# Patient Record
Sex: FEMALE | Race: WHITE | Employment: OTHER | URBAN - METROPOLITAN AREA
[De-identification: names, ages, dates, MRNs, and addresses within clinical notes are randomized per-mention and may not be internally consistent; named-entity substitution may affect disease eponyms.]

---

## 2017-01-17 ENCOUNTER — TELEPHONE (OUTPATIENT)
Dept: CARDIOLOGY CLINIC | Age: 72
End: 2017-01-17

## 2017-02-23 ENCOUNTER — TELEPHONE (OUTPATIENT)
Dept: CARDIOLOGY CLINIC | Age: 72
End: 2017-02-23

## 2017-03-13 ENCOUNTER — OFFICE VISIT (OUTPATIENT)
Dept: CARDIOLOGY CLINIC | Age: 72
End: 2017-03-13

## 2017-03-13 VITALS
SYSTOLIC BLOOD PRESSURE: 134 MMHG | WEIGHT: 246.9 LBS | HEART RATE: 69 BPM | BODY MASS INDEX: 39.68 KG/M2 | OXYGEN SATURATION: 97 % | HEIGHT: 66 IN | DIASTOLIC BLOOD PRESSURE: 66 MMHG

## 2017-03-13 DIAGNOSIS — I36.1 NON-RHEUMATIC TRICUSPID VALVE INSUFFICIENCY: ICD-10-CM

## 2017-03-13 DIAGNOSIS — I48.0 PAROXYSMAL ATRIAL FIBRILLATION (HCC): ICD-10-CM

## 2017-03-13 DIAGNOSIS — I77.9 CAROTID DISEASE, BILATERAL (HCC): ICD-10-CM

## 2017-03-13 DIAGNOSIS — I25.10 ATHEROSCLEROSIS OF NATIVE CORONARY ARTERY OF NATIVE HEART WITHOUT ANGINA PECTORIS: ICD-10-CM

## 2017-03-13 DIAGNOSIS — I50.42 CHRONIC COMBINED SYSTOLIC AND DIASTOLIC CONGESTIVE HEART FAILURE (HCC): Primary | ICD-10-CM

## 2017-03-13 PROCEDURE — 99213 OFFICE O/P EST LOW 20 MIN: CPT | Performed by: INTERNAL MEDICINE

## 2017-03-13 PROCEDURE — 93000 ELECTROCARDIOGRAM COMPLETE: CPT | Performed by: INTERNAL MEDICINE

## 2017-05-17 RX ORDER — RIVAROXABAN 15 MG/1
TABLET, FILM COATED ORAL
Qty: 30 TABLET | Refills: 6 | Status: SHIPPED | OUTPATIENT
Start: 2017-05-17 | End: 2017-12-10 | Stop reason: SDUPTHER

## 2017-09-19 ENCOUNTER — OFFICE VISIT (OUTPATIENT)
Dept: CARDIOLOGY CLINIC | Age: 72
End: 2017-09-19

## 2017-09-19 VITALS
BODY MASS INDEX: 42.43 KG/M2 | HEIGHT: 66 IN | SYSTOLIC BLOOD PRESSURE: 130 MMHG | DIASTOLIC BLOOD PRESSURE: 60 MMHG | WEIGHT: 264 LBS | HEART RATE: 70 BPM

## 2017-09-19 DIAGNOSIS — I77.9 CAROTID DISEASE, BILATERAL (HCC): ICD-10-CM

## 2017-09-19 DIAGNOSIS — I25.10 ATHEROSCLEROSIS OF NATIVE CORONARY ARTERY OF NATIVE HEART WITHOUT ANGINA PECTORIS: ICD-10-CM

## 2017-09-19 DIAGNOSIS — G47.33 OSA (OBSTRUCTIVE SLEEP APNEA): ICD-10-CM

## 2017-09-19 DIAGNOSIS — N18.9 CHRONIC KIDNEY DISEASE, UNSPECIFIED STAGE: ICD-10-CM

## 2017-09-19 DIAGNOSIS — I50.42 CHRONIC COMBINED SYSTOLIC AND DIASTOLIC CONGESTIVE HEART FAILURE (HCC): Primary | ICD-10-CM

## 2017-09-19 DIAGNOSIS — I48.20 CHRONIC ATRIAL FIBRILLATION (HCC): ICD-10-CM

## 2017-09-19 DIAGNOSIS — E78.5 HYPERLIPIDEMIA, UNSPECIFIED HYPERLIPIDEMIA TYPE: ICD-10-CM

## 2017-09-19 DIAGNOSIS — I36.1 NON-RHEUMATIC TRICUSPID VALVE INSUFFICIENCY: ICD-10-CM

## 2017-09-19 PROCEDURE — 99214 OFFICE O/P EST MOD 30 MIN: CPT | Performed by: INTERNAL MEDICINE

## 2017-10-02 ENCOUNTER — TELEPHONE (OUTPATIENT)
Dept: CARDIOLOGY CLINIC | Age: 72
End: 2017-10-02

## 2017-10-02 NOTE — TELEPHONE ENCOUNTER
Pt called to see if order for lexiscan to be faxed to [facility] fax # [phone].  Please call to adv thank you

## 2017-10-02 NOTE — TELEPHONE ENCOUNTER
I spoke with patient to let her know that her order was faxed to [address] [facility],[address]. She verbalizes understanding.

## 2017-12-12 RX ORDER — RIVAROXABAN 15 MG/1
TABLET, FILM COATED ORAL
Qty: 30 TABLET | Refills: 5 | Status: SHIPPED | OUTPATIENT
Start: 2017-12-12 | End: 2018-06-02 | Stop reason: SDUPTHER

## 2018-03-07 ENCOUNTER — TELEPHONE (OUTPATIENT)
Dept: CARDIOLOGY CLINIC | Age: 73
End: 2018-03-07

## 2018-03-12 ENCOUNTER — TELEPHONE (OUTPATIENT)
Dept: CARDIOLOGY CLINIC | Age: 73
End: 2018-03-12

## 2018-03-12 DIAGNOSIS — R06.09 DYSPNEA ON EXERTION: Primary | ICD-10-CM

## 2018-04-13 ENCOUNTER — OFFICE VISIT (OUTPATIENT)
Dept: CARDIOLOGY CLINIC | Age: 73
End: 2018-04-13

## 2018-04-13 VITALS
WEIGHT: 247 LBS | HEART RATE: 74 BPM | DIASTOLIC BLOOD PRESSURE: 63 MMHG | BODY MASS INDEX: 39.7 KG/M2 | SYSTOLIC BLOOD PRESSURE: 137 MMHG | HEIGHT: 66 IN

## 2018-04-13 DIAGNOSIS — I50.9 ACUTE ON CHRONIC CONGESTIVE HEART FAILURE, UNSPECIFIED CONGESTIVE HEART FAILURE TYPE: ICD-10-CM

## 2018-04-13 DIAGNOSIS — I65.23 OCCLUSION AND STENOSIS OF BILATERAL CAROTID ARTERIES: ICD-10-CM

## 2018-04-13 DIAGNOSIS — I25.10 ATHEROSCLEROSIS OF NATIVE CORONARY ARTERY OF NATIVE HEART WITHOUT ANGINA PECTORIS: ICD-10-CM

## 2018-04-13 DIAGNOSIS — I50.43 ACUTE ON CHRONIC COMBINED SYSTOLIC AND DIASTOLIC HEART FAILURE (HCC): Primary | ICD-10-CM

## 2018-04-13 DIAGNOSIS — I77.9 CAROTID DISEASE, BILATERAL (HCC): ICD-10-CM

## 2018-04-13 DIAGNOSIS — Z95.1 HX OF CABG: Chronic | ICD-10-CM

## 2018-04-13 DIAGNOSIS — I10 ESSENTIAL HYPERTENSION: ICD-10-CM

## 2018-04-13 PROCEDURE — G8417 CALC BMI ABV UP PARAM F/U: HCPCS | Performed by: INTERNAL MEDICINE

## 2018-04-13 PROCEDURE — 99214 OFFICE O/P EST MOD 30 MIN: CPT | Performed by: INTERNAL MEDICINE

## 2018-04-13 PROCEDURE — 1036F TOBACCO NON-USER: CPT | Performed by: INTERNAL MEDICINE

## 2018-04-13 PROCEDURE — 3014F SCREEN MAMMO DOC REV: CPT | Performed by: INTERNAL MEDICINE

## 2018-04-13 PROCEDURE — 1090F PRES/ABSN URINE INCON ASSESS: CPT | Performed by: INTERNAL MEDICINE

## 2018-04-13 PROCEDURE — G8400 PT W/DXA NO RESULTS DOC: HCPCS | Performed by: INTERNAL MEDICINE

## 2018-04-13 PROCEDURE — G8598 ASA/ANTIPLAT THER USED: HCPCS | Performed by: INTERNAL MEDICINE

## 2018-04-13 PROCEDURE — 3017F COLORECTAL CA SCREEN DOC REV: CPT | Performed by: INTERNAL MEDICINE

## 2018-04-13 PROCEDURE — 4040F PNEUMOC VAC/ADMIN/RCVD: CPT | Performed by: INTERNAL MEDICINE

## 2018-04-13 PROCEDURE — 1123F ACP DISCUSS/DSCN MKR DOCD: CPT | Performed by: INTERNAL MEDICINE

## 2018-04-13 PROCEDURE — G8427 DOCREV CUR MEDS BY ELIG CLIN: HCPCS | Performed by: INTERNAL MEDICINE

## 2018-04-18 ENCOUNTER — HOSPITAL ENCOUNTER (OUTPATIENT)
Dept: VASCULAR LAB | Age: 73
Discharge: HOME | End: 2018-04-18
Attending: INTERNAL MEDICINE | Admitting: INTERNAL MEDICINE

## 2018-04-18 DIAGNOSIS — I50.42 CHRONIC COMBINED SYSTOLIC AND DIASTOLIC HEART FAILURE (HCC): ICD-10-CM

## 2018-04-18 DIAGNOSIS — I77.9 CAROTID DISEASE, BILATERAL (HCC): ICD-10-CM

## 2018-04-18 DIAGNOSIS — I65.23 OCCLUSION AND STENOSIS OF BILATERAL CAROTID ARTERIES: ICD-10-CM

## 2018-05-17 ENCOUNTER — TELEPHONE (OUTPATIENT)
Dept: CARDIOLOGY CLINIC | Age: 73
End: 2018-05-17

## 2018-06-04 RX ORDER — RIVAROXABAN 15 MG/1
TABLET, FILM COATED ORAL
Qty: 30 TABLET | Refills: 5 | Status: SHIPPED | OUTPATIENT
Start: 2018-06-04 | End: 2018-12-11 | Stop reason: SDUPTHER

## 2018-08-14 ENCOUNTER — OFFICE VISIT (OUTPATIENT)
Dept: SURGERY | Age: 73
End: 2018-08-14

## 2018-08-14 VITALS
BODY MASS INDEX: 38.25 KG/M2 | WEIGHT: 237 LBS | HEART RATE: 98 BPM | DIASTOLIC BLOOD PRESSURE: 81 MMHG | SYSTOLIC BLOOD PRESSURE: 136 MMHG

## 2018-08-14 DIAGNOSIS — I87.2 VENOUS STASIS DERMATITIS OF BOTH LOWER EXTREMITIES: ICD-10-CM

## 2018-08-14 DIAGNOSIS — I89.0 LYMPHEDEMA: Primary | ICD-10-CM

## 2018-08-14 DIAGNOSIS — I87.2 VENOUS INSUFFICIENCY: ICD-10-CM

## 2018-08-14 DIAGNOSIS — L97.919 IDIOPATHIC CHRONIC VENOUS HTN OF RIGHT LEG WITH ULCER AND INFLAMMATION (HCC): ICD-10-CM

## 2018-08-14 DIAGNOSIS — I87.331 IDIOPATHIC CHRONIC VENOUS HTN OF RIGHT LEG WITH ULCER AND INFLAMMATION (HCC): ICD-10-CM

## 2018-08-14 DIAGNOSIS — M79.89 LEG SWELLING: ICD-10-CM

## 2018-08-14 PROCEDURE — 99204 OFFICE O/P NEW MOD 45 MIN: CPT | Performed by: SURGERY

## 2018-08-14 PROCEDURE — 1090F PRES/ABSN URINE INCON ASSESS: CPT | Performed by: SURGERY

## 2018-08-14 PROCEDURE — 3017F COLORECTAL CA SCREEN DOC REV: CPT | Performed by: SURGERY

## 2018-08-14 PROCEDURE — 29580 STRAPPING UNNA BOOT: CPT | Performed by: SURGERY

## 2018-08-14 PROCEDURE — G8417 CALC BMI ABV UP PARAM F/U: HCPCS | Performed by: SURGERY

## 2018-08-14 PROCEDURE — 1101F PT FALLS ASSESS-DOCD LE1/YR: CPT | Performed by: SURGERY

## 2018-08-14 PROCEDURE — G8427 DOCREV CUR MEDS BY ELIG CLIN: HCPCS | Performed by: SURGERY

## 2018-08-14 ASSESSMENT — ENCOUNTER SYMPTOMS
EYES NEGATIVE: 1
ALLERGIC/IMMUNOLOGIC NEGATIVE: 1
RESPIRATORY NEGATIVE: 1
GASTROINTESTINAL NEGATIVE: 1

## 2018-08-14 NOTE — PATIENT INSTRUCTIONS
Patient to elevate leg(s) with the ankle at or above the level of the heart as needed to relieve leg pain and swelling. Patient to participate in exercise as tolerated with focus on the leg(s) including, daily walking, repetitive toe pointing, and calve muscle pumping/stretching as tolerated. Patient has been given a prescription for compression stockings. Patient was instructed to wear compression stockings (20-30 mmHg) on a daily basis, off every night. Patient has been instructed to follow up in 6 weeks with a venous duplex scan and office visit. Unna boot applied to left leg today. Patient is to follow up in 1 week for unna boot change.

## 2018-08-14 NOTE — PROGRESS NOTES
Subjective:      Patient ID: Yuniel Reina is a 74 y.o. female. Chief Complaint   Patient presents with    Leg Swelling     New patient. Ref by Dr. Aubree Kunz for right leg edema        HPI    Review of Systems   Constitutional: Negative. HENT: Negative. Eyes: Negative. Respiratory: Negative. Cardiovascular: Negative. Gastrointestinal: Negative. Endocrine: Negative. Genitourinary: Negative. Musculoskeletal: Negative. Skin: Negative. Allergic/Immunologic: Negative. Neurological: Negative. Hematological: Negative. Psychiatric/Behavioral: Negative. Objective:   Physical Exam   Constitutional: She is oriented to person, place, and time. She appears well-developed and well-nourished. HENT:   Head: Normocephalic and atraumatic. Right Ear: External ear normal.   Left Ear: External ear normal.   Nose: Nose normal.   Mouth/Throat: Oropharynx is clear and moist.   Eyes: Pupils are equal, round, and reactive to light. Conjunctivae and EOM are normal.   Neck: Normal range of motion. Neck supple. Cardiovascular: Normal rate, regular rhythm and normal heart sounds. Pulmonary/Chest: Effort normal and breath sounds normal.   Abdominal: Soft. Bowel sounds are normal.   Musculoskeletal: Normal range of motion. She exhibits edema (12 plus bilateral lower extremity swelling). Neurological: She is alert and oriented to person, place, and time. Skin: Skin is warm and dry. Rash (Bilateral stasis dermatitis with venous ulcers right calf) noted. Psychiatric: She has a normal mood and affect. Her behavior is normal.       Assessment:       Diagnosis Orders   1. Lymphedema  VL Extremity Venous Bilateral    NE APPLY OF PASTE BOOT   2. Leg swelling  VL Extremity Venous Bilateral    NE APPLY OF PASTE BOOT   3. Venous stasis dermatitis of both lower extremities  VL Extremity Venous Bilateral    NE APPLY OF PASTE BOOT   4.  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 Venous insufficiency  VL Extremity Venous Bilateral    MO APPLY OF PASTE BOOT     The patient has evidence of severe chronic venous insufficiency with chronic lymphedema of the lower extremities. She now has an active venous stasis ulcer of the right lower extremity and will benefit from compression wraps and venous duplex imaging      Plan:       Patient to elevate leg(s) with the ankle at or above the level of the heart as needed to relieve leg pain and swelling. Patient to participate in exercise as tolerated with focus on the leg(s) including, daily walking, repetitive toe pointing, and calve muscle pumping/stretching as tolerated. Patient has been given a prescription for compression stockings. Patient was instructed to wear compression stockings (20-30 mmHg) on a daily basis, off every night. Patient has been instructed to follow up in 6 weeks with a venous duplex scan and office visit. Unna boot applied to left leg today. Patient is to follow up in 1 week for unna boot change. EDITH Santos MA am scribing for and in the presence of Juana Gonzales MD on this date of 08/14/18 at 3:01 PM    I Juana Gonzales MD personally performed the services described in this documentation as scribed by the Certified Medical Assistant Zo Santos in my presence and it is both accurate and complete.

## 2018-08-17 ENCOUNTER — TELEPHONE (OUTPATIENT)
Dept: SURGERY | Age: 73
End: 2018-08-17

## 2018-08-17 NOTE — TELEPHONE ENCOUNTER
Pt had weeping the unna boot was way to tight she snipped the top and now it has slipped down due to the weeping in the leg, Dr Lux Ayala said that if the unna boot got wet to come back immediately so her appt has been changed to Mon instead of Wed she wants to know what else to do between now and then please call her back

## 2018-08-20 ENCOUNTER — PROCEDURE VISIT (OUTPATIENT)
Dept: SURGERY | Age: 73
End: 2018-08-20

## 2018-08-20 VITALS
DIASTOLIC BLOOD PRESSURE: 63 MMHG | BODY MASS INDEX: 36.64 KG/M2 | WEIGHT: 228 LBS | HEART RATE: 51 BPM | HEIGHT: 66 IN | SYSTOLIC BLOOD PRESSURE: 127 MMHG

## 2018-08-20 DIAGNOSIS — I89.0 LYMPHEDEMA: ICD-10-CM

## 2018-08-20 DIAGNOSIS — L97.911 ULCER OF RIGHT LEG, LIMITED TO BREAKDOWN OF SKIN (HCC): Primary | ICD-10-CM

## 2018-08-20 DIAGNOSIS — M79.89 LEG SWELLING: ICD-10-CM

## 2018-08-20 DIAGNOSIS — L97.919 IDIOPATHIC CHRONIC VENOUS HTN OF RIGHT LEG WITH ULCER AND INFLAMMATION (HCC): ICD-10-CM

## 2018-08-20 DIAGNOSIS — I87.2 VENOUS INSUFFICIENCY: ICD-10-CM

## 2018-08-20 DIAGNOSIS — I87.331 IDIOPATHIC CHRONIC VENOUS HTN OF RIGHT LEG WITH ULCER AND INFLAMMATION (HCC): ICD-10-CM

## 2018-08-20 PROCEDURE — 29580 STRAPPING UNNA BOOT: CPT | Performed by: NURSE PRACTITIONER

## 2018-08-20 NOTE — PATIENT INSTRUCTIONS
PATIENT EDUCATION focused on need for continued Unna boot therapy for compression. It supports vascular problems, helps to heal leg ulcers and controls leg swelling. The dressing can be worn up to a week before it needs changed. Patient must keep the Unna boot dry. Return in about 1 week (around 8/27/2018) for wound/edema check with unna boot change.

## 2018-08-20 NOTE — PROGRESS NOTES
Chart reviewed and I agree with the assessment and plan as per Ac[place]   Hopefully, she can keep the [facility] boot in place as this will help to promote healing of this swollen leg  MARTHA Schaefer MD
normal and behavior is normal.       Assessment:       Diagnosis Orders   1. Ulcer of right leg, limited to breakdown of skin ([address]     2. 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 (HCC)     3. Leg swelling  SD APPLY OF PASTE BOOT   4. Lymphedema     5. Venous insufficiency         Unna boot applied to RLE (Betamethasone Cream, dry gauze, Maxorb Ag, kerlix and Unna boot (CoFlex). Plan:        Return in about 2 days (around 8/22/2018) for wound/edema check with unna boot change.

## 2018-08-22 ENCOUNTER — PROCEDURE VISIT (OUTPATIENT)
Dept: SURGERY | Age: 73
End: 2018-08-22

## 2018-08-22 VITALS
HEIGHT: 66 IN | SYSTOLIC BLOOD PRESSURE: 140 MMHG | HEART RATE: 72 BPM | DIASTOLIC BLOOD PRESSURE: 79 MMHG | BODY MASS INDEX: 36.8 KG/M2

## 2018-08-22 DIAGNOSIS — L97.919 IDIOPATHIC CHRONIC VENOUS HTN OF RIGHT LEG WITH ULCER AND INFLAMMATION (HCC): ICD-10-CM

## 2018-08-22 DIAGNOSIS — I87.2 VENOUS INSUFFICIENCY: ICD-10-CM

## 2018-08-22 DIAGNOSIS — I89.0 LYMPHEDEMA: ICD-10-CM

## 2018-08-22 DIAGNOSIS — M79.89 LEG SWELLING: ICD-10-CM

## 2018-08-22 DIAGNOSIS — L97.911 ULCER OF RIGHT LEG, LIMITED TO BREAKDOWN OF SKIN (HCC): Primary | ICD-10-CM

## 2018-08-22 DIAGNOSIS — I87.331 IDIOPATHIC CHRONIC VENOUS HTN OF RIGHT LEG WITH ULCER AND INFLAMMATION (HCC): ICD-10-CM

## 2018-08-22 PROCEDURE — 97598 DBRDMT OPN WND ADDL 20CM/<: CPT | Performed by: NURSE PRACTITIONER

## 2018-08-22 PROCEDURE — 97597 DBRDMT OPN WND 1ST 20 CM/<: CPT | Performed by: NURSE PRACTITIONER

## 2018-08-22 NOTE — PATIENT INSTRUCTIONS
PATIENT EDUCATION focused on need for continued Unna boot therapy for compression. It supports vascular problems, helps to heal leg ulcers and controls leg swelling. The dressing can be worn up to a week before it needs changed. Patient must keep the Unna boot dry. Return in about 1 week (around 8/29/2018) for wound/edema check with unna boot change.

## 2018-08-23 NOTE — PROGRESS NOTES
Chart reviewed and I agree with the assessment and plan as per Jimmy borja therapy with weekly changes appropriate.   Kesha Wang MD

## 2018-08-27 ENCOUNTER — PROCEDURE VISIT (OUTPATIENT)
Dept: SURGERY | Age: 73
End: 2018-08-27

## 2018-08-27 VITALS
HEIGHT: 66 IN | SYSTOLIC BLOOD PRESSURE: 141 MMHG | BODY MASS INDEX: 36.8 KG/M2 | HEART RATE: 71 BPM | DIASTOLIC BLOOD PRESSURE: 73 MMHG

## 2018-08-27 DIAGNOSIS — M79.89 LEG SWELLING: ICD-10-CM

## 2018-08-27 DIAGNOSIS — I87.2 VENOUS INSUFFICIENCY: ICD-10-CM

## 2018-08-27 DIAGNOSIS — L97.911 ULCER OF RIGHT LEG, LIMITED TO BREAKDOWN OF SKIN (HCC): Primary | ICD-10-CM

## 2018-08-27 PROCEDURE — 97597 DBRDMT OPN WND 1ST 20 CM/<: CPT | Performed by: NURSE PRACTITIONER

## 2018-08-27 PROCEDURE — 97598 DBRDMT OPN WND ADDL 20CM/<: CPT | Performed by: NURSE PRACTITIONER

## 2018-08-27 NOTE — PROGRESS NOTES
Subjective:      Patient ID: Tutu Gibson is a 74 y.o. female. Chief Complaint   Patient presents with    Wound Check     One week f/u on RLE ulcers/leg swelling and to have unna boot changed.  Leg Swelling     Pain Assessment  Tutu Gibson has a pain level on 0/10 scale:  8  Location:  Right Lower extremity  Description:  throbbing  Radiation:   No  Duration:  5 month(s)  Time:  constant      Wound Check     The patient reports several superficial ulcers on her right shin and lateral calf. The symptoms first began 5 months ago. The patient describes pain as 8 on a scale of 1-10. The ulcers are aggravated by local pressure and prolonged sitting. The patient reports associated symptoms of drainage of clear fluid and swelling. Per the patient the ulcers are recurrent. Relevant past surgeries include none. Current treatment includes:  Silvadene Cream to ulcers, Betamethasone Cream to periwound, gauze dressing, maxorb, abd pad and unna boot (CoFlex). The patient has not undergone any diagnostic testing as yet. Edema  The edema is located in the right lower extremity. The patient reports partial worsening. The wound appeared to be draining through the [employer] days ago and she removed it. She has not been wearing any compression since then. Review of Systems   Constitutional: Negative for chills and fever. Cardiovascular: Positive for leg swelling. Skin: Positive for wound. All other systems reviewed and are negative. Allergies   Allergen Reactions    Adhesive Tape      Other reaction(s): Ulcers    Chlorhexidine     Lisinopril      Med causes lethargy- takes in lower doses     Prior to Visit Medications    Medication Sig Taking? Authorizing Provider   betamethasone valerate (VALISONE) 0.1 % cream Apply topically right leg daily, as needed.  Yes JOBY Pritchett - CNP   XARELTO 15 MG TABS tablet TAKE ONE TABLET BY MOUTH DAILY Yes Royce Calvillo MD   Liraglutide (VICTOZA) 18 MG/3ML SOPN SC injection INJECT 1.8MG (0.3ML) DAILY Yes Historical Provider, MD   polyethyl glycol-propyl glycol 0.4-0.3 % (SYSTANE) 0.4-0.3 % ophthalmic solution 1 drop as needed for Dry Eyes Yes Historical Provider, MD   atorvastatin (LIPITOR) 20 MG tablet Take 20 mg by mouth daily Yes Historical Provider, MD   furosemide (LASIX) 20 MG tablet Take 1 tablet by mouth 2 times daily Yes JOBY Tapia - CNP   vitamin D (CHOLECALCIFEROL) 400 UNITS TABS tablet Take 400 Units by mouth daily Yes Historical Provider, MD   metoprolol (LOPRESSOR) 25 MG tablet Take 0.5 tablets by mouth 2 times daily Yes Carmen Domínguez MD   Lesa Stager Oil 300 MG CAPS Take by mouth daily Yes Historical Provider, MD   therapeutic multivitamin-minerals (THERAGRAN-M) tablet Take 1 tablet by mouth daily. Yes Historical Provider, MD   hydrOXYzine (ATARAX) 25 MG tablet Take 25 mg by mouth 3 times daily as needed. Yes Historical Provider, MD   L-Methylfolate-B6-B12 (METANX) 2.8-25-2 MG TABS Take 1 tablet by mouth daily. Yes Historical Provider, MD   ranitidine (ZANTAC) 300 MG tablet Take 150 mg by mouth 2 times daily  Yes Historical Provider, MD     History reviewed. Objective:   Physical Exam   Constitutional: She is oriented to person, place, and time. She appears well-developed and well-nourished. She is active and cooperative. Neck: Normal range of motion and full passive range of motion without pain. Neck supple. No JVD present. Cardiovascular:   Pulses:       Dorsalis pedis pulses are 2+ on the right side, and 2+ on the left side. Posterior tibial pulses are 2+ on the right side, and 2+ on the left side. Pulmonary/Chest: Effort normal. No respiratory distress. Musculoskeletal: Normal range of motion. She exhibits edema (+3 pitting edema; right ankle measures 25.4 cm, calf 45.6 cm). Neurological: She is alert and oriented to person, place, and time. She has normal strength.    Use of wheelchair   Skin: Skin is warm and dry. Multiple superficial ulcers of her right lower leg. Larger ulcers have been measured:    Proximal right lateral shin:  1.8 cm x 1.1 cm  1.3 cm x 0.4 cm    Right lateral calf:  1.0 cm x 0.6 cm  8.5 cm x 2.5 cm  1.9 cm x 1.4 cm    Right medial lower leg, just above ankle:  1.0 cm x 0.7 cm     Psychiatric: She has a normal mood and affect. Her speech is normal and behavior is normal.       Assessment:       Diagnosis Orders   1. Ulcer of right leg, limited to breakdown of skin (HCC)  MI APPLY OF PASTE BOOT   2. Leg swelling  MI APPLY OF PASTE BOOT   3. Venous insufficiency         Debrided surface area of wound, located right lateral shin. Topical Lidocaine 2% was indicated. Dimensions of wound are 1.8x1.1cms, 1.3x0.4cms, 1.0x0.6cms, 8.5x2.5cms, 1.9x1.4cms, 1.9x1.4cms and 1.0x0.7cms. Dressed area with Silvadene Cream to ulcer beds, Betamethasone Cream to periwound, dry gauze, Maxorb, abd pad and Unna boot (CoFlex). No immediate complications observed. Call office if signs of infection or fever, excessive swelling or pain occur. Total of 27.01 sq cm debrided. Silvadene Cream to ulcer beds, Betamethasone Cream to periwound, dry gauze, Maxorb, abd pad and Unna boot (CoFlex). Plan:       PATIENT EDUCATION focused on need for continued Unna boot therapy for compression. It supports vascular problems, helps to heal leg ulcers and controls leg swelling. The dressing can be worn up to a week before it needs changed. Patient must keep the Unna boot dry. PATIENT EDUCATION focused on proper application of ACE wrap in the event the Unna boot does not remain on her right leg. Patient instructed to start at the ankle, go down to just above his toes and go up leg to just below knee. Avoid Velcro touching the skin. Patient verbalized understanding. Return in about 1 week (around 9/3/2018) for wound/edema check with unna boot change.

## 2018-09-06 ENCOUNTER — PROCEDURE VISIT (OUTPATIENT)
Dept: SURGERY | Age: 73
End: 2018-09-06

## 2018-09-06 VITALS
SYSTOLIC BLOOD PRESSURE: 150 MMHG | BODY MASS INDEX: 36.48 KG/M2 | DIASTOLIC BLOOD PRESSURE: 79 MMHG | HEART RATE: 71 BPM | WEIGHT: 226 LBS

## 2018-09-06 DIAGNOSIS — M79.89 LEG SWELLING: ICD-10-CM

## 2018-09-06 DIAGNOSIS — I87.2 VENOUS INSUFFICIENCY: ICD-10-CM

## 2018-09-06 DIAGNOSIS — L97.911 ULCER OF RIGHT LEG, LIMITED TO BREAKDOWN OF SKIN (HCC): Primary | ICD-10-CM

## 2018-09-06 PROCEDURE — 97597 DBRDMT OPN WND 1ST 20 CM/<: CPT | Performed by: NURSE PRACTITIONER

## 2018-09-06 ASSESSMENT — ENCOUNTER SYMPTOMS: COLOR CHANGE: 1

## 2018-09-06 NOTE — PROGRESS NOTES
Subjective:      Patient ID: Piotr Kaur is a 74 y.o. female. Chief Complaint   Patient presents with    Wound Check     one week f/u on RLE ulcers/leg swelling and to have unna boot changed.  Leg Swelling     Pain Assessment  Piotr Kaur has a pain level on 0/10 scale:  9  Location:  Right Lower extremity  Description:  throbbing  Radiation:   No  Duration:  5 month(s)  Time:  constant      Wound Check     The patient reports several superficial ulcers on her right shin and lateral calf. The symptoms first began 5 months ago. The patient describes pain as 9 on a scale of 1-10. The ulcers are aggravated by local pressure and prolonged sitting. The patient reports associated symptoms of drainage of clear fluid and swelling. Per the patient the ulcers are recurrent. Relevant past surgeries include none. Current treatment includes:  Silvadene Cream to ulcers, Betamethasone Cream to periwound, gauze dressing, maxorb, abd pad and unna boot (CoFlex). The patient has not undergone any diagnostic testing as yet. Edema  The edema is located in the right lower extremity. The patient reports partial worsening. The wound appeared to be draining through the [employer] and she removed it. She has not been wearing any compression since then. Review of Systems   Constitutional: Negative for chills and fever. Cardiovascular: Positive for leg swelling. Skin: Positive for color change and wound. All other systems reviewed and are negative. Allergies   Allergen Reactions    Adhesive Tape      Other reaction(s): Ulcers    Chlorhexidine     Lisinopril      Med causes lethargy- takes in lower doses     Prior to Visit Medications    Medication Sig Taking?  Authorizing Provider   XARELTO 15 MG TABS tablet TAKE ONE TABLET BY MOUTH DAILY Yes Esteban Holly MD   Liraglutide (VICTOZA) 18 MG/3ML SOPN SC injection INJECT 1.8MG (0.3ML) DAILY Yes Historical Provider, MD   polyethyl glycol-propyl glycol 0.4-0.3 % (SYSTANE) 0.4-0.3 % ophthalmic solution 1 drop as needed for Dry Eyes Yes Historical Provider, MD   atorvastatin (LIPITOR) 20 MG tablet Take 20 mg by mouth daily Yes Historical Provider, MD   furosemide (LASIX) 20 MG tablet Take 1 tablet by mouth 2 times daily Yes Yves Gaytan APRN - CNP   vitamin D (CHOLECALCIFEROL) 400 UNITS TABS tablet Take 400 Units by mouth daily Yes Historical Provider, MD   metoprolol (LOPRESSOR) 25 MG tablet Take 0.5 tablets by mouth 2 times daily Yes MD [place] Oil 300 MG CAPS Take by mouth daily Yes Historical Provider, MD   therapeutic multivitamin-minerals (THERAGRAN-M) tablet Take 1 tablet by mouth daily. Yes Historical Provider, MD   hydrOXYzine (ATARAX) 25 MG tablet Take 25 mg by mouth 3 times daily as needed. Yes Historical Provider, MD   L-Methylfolate-B6-B12 (METANX) 2.8-25-2 MG TABS Take 1 tablet by mouth daily. Yes Historical Provider, MD   ranitidine (ZANTAC) 300 MG tablet Take 150 mg by mouth 2 times daily  Yes Historical Provider, MD     History reviewed. Objective:   Physical Exam   Constitutional: She is oriented to person, place, and time. She appears well-developed and well-nourished. She is active and cooperative. Neck: Normal range of motion and full passive range of motion without pain. Neck supple. No JVD present. Cardiovascular:   Pulses:       Dorsalis pedis pulses are 2+ on the right side, and 2+ on the left side. Posterior tibial pulses are 2+ on the right side, and 2+ on the left side. Pulmonary/Chest: Effort normal. No respiratory distress. Musculoskeletal: Normal range of motion. She exhibits edema (RLE +3 pitting edema). Neurological: She is alert and oriented to person, place, and time. She has normal strength. Use of wheelchair   Skin: Skin is warm and dry. Multiple superficial ulcers of her right lower leg.   Larger ulcers have been measured:    Proximal right lateral shin:  1.8 cm x 1.3 cm  1

## 2018-09-10 ENCOUNTER — OFFICE VISIT (OUTPATIENT)
Dept: SURGERY | Age: 73
End: 2018-09-10

## 2018-09-10 VITALS
BODY MASS INDEX: 37.77 KG/M2 | DIASTOLIC BLOOD PRESSURE: 59 MMHG | WEIGHT: 235 LBS | HEART RATE: 69 BPM | HEIGHT: 66 IN | SYSTOLIC BLOOD PRESSURE: 157 MMHG

## 2018-09-10 DIAGNOSIS — I87.2 VENOUS INSUFFICIENCY: ICD-10-CM

## 2018-09-10 DIAGNOSIS — L97.911 ULCER OF RIGHT LEG, LIMITED TO BREAKDOWN OF SKIN (HCC): Primary | ICD-10-CM

## 2018-09-10 DIAGNOSIS — M79.89 LEG SWELLING: ICD-10-CM

## 2018-09-10 PROCEDURE — 29580 STRAPPING UNNA BOOT: CPT | Performed by: NURSE PRACTITIONER

## 2018-09-10 ASSESSMENT — ENCOUNTER SYMPTOMS: COLOR CHANGE: 1

## 2018-09-14 ENCOUNTER — PROCEDURE VISIT (OUTPATIENT)
Dept: SURGERY | Age: 73
End: 2018-09-14

## 2018-09-14 VITALS
HEART RATE: 65 BPM | DIASTOLIC BLOOD PRESSURE: 79 MMHG | BODY MASS INDEX: 37.61 KG/M2 | WEIGHT: 234 LBS | SYSTOLIC BLOOD PRESSURE: 144 MMHG | HEIGHT: 66 IN

## 2018-09-14 DIAGNOSIS — I87.2 VENOUS INSUFFICIENCY: ICD-10-CM

## 2018-09-14 DIAGNOSIS — M79.89 LEG SWELLING: ICD-10-CM

## 2018-09-14 DIAGNOSIS — L97.911 ULCER OF RIGHT LEG, LIMITED TO BREAKDOWN OF SKIN (HCC): Primary | ICD-10-CM

## 2018-09-14 PROCEDURE — 29580 STRAPPING UNNA BOOT: CPT | Performed by: NURSE PRACTITIONER

## 2018-09-14 ASSESSMENT — ENCOUNTER SYMPTOMS: COLOR CHANGE: 1

## 2018-09-14 NOTE — PROGRESS NOTES
Subjective:      Patient ID: Jong Davenport is a 74 y.o. female. Chief Complaint   Patient presents with    Wound Check     4 day f/u for wound/edema check with unna boot change    Leg Swelling     Pain Assessment  The patient is currently not experiencing any pain at this time. Wound Check   The patient reports several superficial ulcers on her right shin and lateral calf. The symptoms first began 5 1/2 months ago. The patient is currently not experiencing any pain. The ulcers are aggravated by local pressure and prolonged sitting. The patient reports associated symptoms of drainage of clear fluid and swelling. Per the patient the ulcers are recurrent. Relevant past surgeries include none. Current treatment includes:  Maxorb Ag to ulcer beds, Betamethasone Cream to periwound, gauze dressing, abd pad and unna boot (CoFlex). The patient has not undergone any diagnostic testing as yet. Edema  The edema is located in the right lower extremity. The patient reports partial improvement. The patient has not experienced any new symptoms since last visit. The patient states the edema is recurrent. The patient has not undergone any new diagnostic testing since last visit. Treatment so far includes: Unna boot. Review of Systems   Constitutional: Negative for chills and fever. Cardiovascular: Positive for leg swelling. Skin: Positive for color change and wound. All other systems reviewed and are negative. Allergies   Allergen Reactions    Adhesive Tape      Other reaction(s): Ulcers    Chlorhexidine     Lisinopril      Med causes lethargy- takes in lower doses     Prior to Visit Medications    Medication Sig Taking?  Authorizing Provider   XARELTO 15 MG TABS tablet TAKE ONE TABLET BY MOUTH DAILY Yes Parveen Angulo MD   Liraglutide (VICTOZA) 18 MG/3ML SOPN SC injection INJECT 1.8MG (0.3ML) DAILY Yes Historical Provider, MD   polyethyl glycol-propyl glycol 0.4-0.3 % (SYSTANE) 0.4-0.3 % ophthalmic solution 1 drop as needed for Dry Eyes Yes Historical Provider, MD   atorvastatin (LIPITOR) 20 MG tablet Take 20 mg by mouth daily Yes Historical Provider, MD   furosemide (LASIX) 20 MG tablet Take 1 tablet by mouth 2 times daily Yes Kavin Simmonds, APRN - CNP   vitamin D (CHOLECALCIFEROL) 400 UNITS TABS tablet Take 400 Units by mouth daily Yes Historical Provider, MD   metoprolol (LOPRESSOR) 25 MG tablet Take 0.5 tablets by mouth 2 times daily Yes Flori Almeida MD   [place] [employer] Oil 300 MG CAPS Take by mouth daily Yes Historical Provider, MD   therapeutic multivitamin-minerals (THERAGRAN-M) tablet Take 1 tablet by mouth daily. Yes Historical Provider, MD   hydrOXYzine (ATARAX) 25 MG tablet Take 25 mg by mouth 3 times daily as needed. Yes Historical Provider, MD   L-Methylfolate-B6-B12 (METANX) 2.8-25-2 MG TABS Take 1 tablet by mouth daily. Yes Historical Provider, MD   ranitidine (ZANTAC) 300 MG tablet Take 150 mg by mouth 2 times daily  Yes Historical Provider, MD     History reviewed. Objective:   Physical Exam   Constitutional: She is oriented to person, place, and time. She appears well-developed and well-nourished. She is active and cooperative. Neck: Normal range of motion and full passive range of motion without pain. Neck supple. No JVD present. Cardiovascular:   Pulses:       Dorsalis pedis pulses are 2+ on the right side, and 2+ on the left side. Posterior tibial pulses are 2+ on the right side, and 2+ on the left side. Pulmonary/Chest: Effort normal. No respiratory distress. Musculoskeletal: Normal range of motion. She exhibits edema (RLE +2 pitting edema right ankle 24.3 cm, right calf 40.8 cm). Neurological: She is alert and oriented to person, place, and time. She has normal strength. Use of wheelchair   Skin: Skin is warm and dry. Multiple superficial ulcers of her right lower leg.   Larger ulcers have been measured:    Proximal right lateral shin:  2.1 cm x 1.0 cm  1.1 cm x 0.5 cm    Right lateral calf:  5.5 cm x 1.9 cm  1.0 cm x 0.6 cm    Right medial lower leg, just above ankle  Has healed     Psychiatric: She has a normal mood and affect. Her speech is normal and behavior is normal.       Assessment:       Diagnosis Orders   1. Ulcer of right leg, limited to breakdown of skin (HCC)     2. Leg swelling     3. Venous insufficiency          Unna boot (CoFlex) applied to RLE (Maxorb Ag to ulcer  beds, Betamethasone Cream to surrounding tissue, gauze dressing and abd pad)    PATIENT EDUCATION focused on need for continued Unna boot therapy for compression. It supports vascular problems, helps to heal leg ulcers and controls leg swelling. The dressing can be worn up to a week before it needs changed. Patient must keep the Unna boot on and dry. Plan:         Return in about 1 week (around 9/21/2018) for RLE ulcers/leg swelling.

## 2018-09-17 ENCOUNTER — PROCEDURE VISIT (OUTPATIENT)
Dept: SURGERY | Age: 73
End: 2018-09-17

## 2018-09-17 VITALS
HEART RATE: 111 BPM | SYSTOLIC BLOOD PRESSURE: 148 MMHG | DIASTOLIC BLOOD PRESSURE: 73 MMHG | BODY MASS INDEX: 37.77 KG/M2 | WEIGHT: 234 LBS

## 2018-09-17 DIAGNOSIS — I87.2 VENOUS INSUFFICIENCY: ICD-10-CM

## 2018-09-17 DIAGNOSIS — L97.911 ULCER OF RIGHT LEG, LIMITED TO BREAKDOWN OF SKIN (HCC): Primary | ICD-10-CM

## 2018-09-17 DIAGNOSIS — M79.89 LEG SWELLING: ICD-10-CM

## 2018-09-17 PROCEDURE — 29580 STRAPPING UNNA BOOT: CPT | Performed by: NURSE PRACTITIONER

## 2018-09-17 ASSESSMENT — ENCOUNTER SYMPTOMS: COLOR CHANGE: 1

## 2018-09-17 NOTE — PROGRESS NOTES
Subjective:      Patient ID: Bettie Tubbs is a 74 y.o. female. Chief Complaint   Patient presents with    Wound Check     4 day f/u for wound/edema check with unna boot change.  Leg Swelling     Pain Assessment  The patient is currently not experiencing any pain at this time. Wound Check   The patient reports 4 superficial ulcers on her right shin and lateral calf. The symptoms first began 5 1/2 months ago. The patient is currently not experiencing any pain. The ulcers are aggravated by local pressure and prolonged sitting. The patient reports associated symptoms of drainage of clear fluid and swelling. Per the patient the ulcers are recurrent. Relevant past surgeries include none. Current treatment includes:  Maxorb Ag to ulcer beds, Betamethasone Cream to periwound, gauze dressing, abd pad and unna boot (CoFlex). The patient has not undergone any diagnostic testing as yet. Edema  The edema is located in the right lower extremity. The patient reports partial improvement. The patient has not experienced any new symptoms since last visit. The patient states the edema is recurrent. The patient has not undergone any new diagnostic testing since last visit. Treatment so far includes: Unna boot. Review of Systems   Constitutional: Negative for chills and fever. Cardiovascular: Positive for leg swelling. Skin: Positive for color change and wound. All other systems reviewed and are negative. Allergies   Allergen Reactions    Adhesive Tape      Other reaction(s): Ulcers    Chlorhexidine     Lisinopril      Med causes lethargy- takes in lower doses     Prior to Visit Medications    Medication Sig Taking?  Authorizing Provider   XARELTO 15 MG TABS tablet TAKE ONE TABLET BY MOUTH DAILY Yes Robi Singer MD   Liraglutide (VICTOZA) 18 MG/3ML SOPN SC injection INJECT 1.8MG (0.3ML) DAILY Yes Historical Provider, MD   polyethyl glycol-propyl glycol 0.4-0.3 % (SYSTANE) 0.4-0.3 % ophthalmic solution 1 drop as needed for Dry Eyes Yes Historical Provider, MD   atorvastatin (LIPITOR) 20 MG tablet Take 20 mg by mouth daily Yes Historical Provider, MD   furosemide (LASIX) 20 MG tablet Take 1 tablet by mouth 2 times daily Yes JOBY Dos Santos - CNP   vitamin D (CHOLECALCIFEROL) 400 UNITS TABS tablet Take 400 Units by mouth daily Yes Historical Provider, MD   metoprolol (LOPRESSOR) 25 MG tablet Take 0.5 tablets by mouth 2 times daily Yes Jessica Armstrong MD   Jitendra Pile Oil 300 MG CAPS Take by mouth daily Yes Historical Provider, MD   therapeutic multivitamin-minerals (THERAGRAN-M) tablet Take 1 tablet by mouth daily. Yes Historical Provider, MD   hydrOXYzine (ATARAX) 25 MG tablet Take 25 mg by mouth 3 times daily as needed. Yes Historical Provider, MD   L-Methylfolate-B6-B12 (METANX) 2.8-25-2 MG TABS Take 1 tablet by mouth daily. Yes Historical Provider, MD   ranitidine (ZANTAC) 300 MG tablet Take 150 mg by mouth 2 times daily  Yes Historical Provider, MD     History reviewed. Objective:   Physical Exam   Constitutional: She is oriented to person, place, and time. She appears well-developed and well-nourished. She is active and cooperative. Neck: Normal range of motion and full passive range of motion without pain. Neck supple. No JVD present. Cardiovascular:   Pulses:       Dorsalis pedis pulses are 2+ on the right side, and 2+ on the left side. Posterior tibial pulses are 2+ on the right side, and 2+ on the left side. Pulmonary/Chest: Effort normal. No respiratory distress. Musculoskeletal: Normal range of motion. She exhibits edema (RLE nonpitting edema). Neurological: She is alert and oriented to person, place, and time. She has normal strength. Use of wheelchair   Skin: Skin is warm and dry.    4 superficial ulcers of RLE:    Proximal right lateral shin:  1.7 cm x 1.0 cm  0.9 cm x 0.4 cm    Right lateral calf:  5.3 cm x 2.7 cm  1.0 cm x 0.6 cm       Psychiatric: She has a normal mood and affect. Her speech is normal and behavior is normal.       Assessment:       Diagnosis Orders   1. Ulcer of right leg, limited to breakdown of skin (HCC)     2. Leg swelling     3. Venous insufficiency          Unna boot (CoFlex) applied to RLE (Maxorb Ag, Silvadene Cream to ulcer  beds, Betamethasone Cream to surrounding tissue and gauze dressing). PATIENT EDUCATION focused on need for continued Unna boot therapy for compression. It supports vascular problems, helps to heal leg ulcers and controls leg swelling. The dressing can be worn up to a week before it needs changed. Patient must keep the Unna boot on and dry. Plan:         Return in about 1 week (around 9/24/2018) for wound/edema check with unna boot change.

## 2018-09-18 NOTE — PROGRESS NOTES
Chart reviewed and I agree with the assessment and plan as per Dontrell Garcia boots prompting healing of the ulcers on the legs  Continue the Filomena Geiger MD

## 2018-09-21 ENCOUNTER — PROCEDURE VISIT (OUTPATIENT)
Dept: SURGERY | Age: 73
End: 2018-09-21

## 2018-09-21 VITALS
HEART RATE: 69 BPM | HEIGHT: 66 IN | BODY MASS INDEX: 37.61 KG/M2 | DIASTOLIC BLOOD PRESSURE: 60 MMHG | WEIGHT: 234 LBS | SYSTOLIC BLOOD PRESSURE: 121 MMHG

## 2018-09-21 DIAGNOSIS — I87.2 VENOUS INSUFFICIENCY: ICD-10-CM

## 2018-09-21 DIAGNOSIS — L97.911 ULCER OF RIGHT LEG, LIMITED TO BREAKDOWN OF SKIN (HCC): Primary | ICD-10-CM

## 2018-09-21 DIAGNOSIS — M79.89 LEG SWELLING: ICD-10-CM

## 2018-09-21 PROCEDURE — 29580 STRAPPING UNNA BOOT: CPT | Performed by: NURSE PRACTITIONER

## 2018-09-21 ASSESSMENT — ENCOUNTER SYMPTOMS: COLOR CHANGE: 1

## 2018-09-21 NOTE — PATIENT INSTRUCTIONS
PATIENT EDUCATION focused on need for continued Unna boot therapy for compression. It supports vascular problems, helps to heal leg ulcers and controls leg swelling. The dressing can be worn up to a week before it needs changed. Patient must keep the Unna boot dry. Return for wound/edema check with unna boot change.

## 2018-09-21 NOTE — PROGRESS NOTES
(SYSTANE) 0.4-0.3 % ophthalmic solution 1 drop as needed for Dry Eyes Yes Historical Provider, MD   atorvastatin (LIPITOR) 20 MG tablet Take 20 mg by mouth daily Yes Historical Provider, MD   furosemide (LASIX) 20 MG tablet Take 1 tablet by mouth 2 times daily Yes Love Snell APRN - CNP   vitamin D (CHOLECALCIFEROL) 400 UNITS TABS tablet Take 400 Units by mouth daily Yes Historical Provider, MD   metoprolol (LOPRESSOR) 25 MG tablet Take 0.5 tablets by mouth 2 times daily Yes Jordin Mayorga MD   Gordon Grammes Oil 300 MG CAPS Take by mouth daily Yes Historical Provider, MD   therapeutic multivitamin-minerals (THERAGRAN-M) tablet Take 1 tablet by mouth daily. Yes Historical Provider, MD   hydrOXYzine (ATARAX) 25 MG tablet Take 25 mg by mouth 3 times daily as needed. Yes Historical Provider, MD   L-Methylfolate-B6-B12 (METANX) 2.8-25-2 MG TABS Take 1 tablet by mouth daily. Yes Historical Provider, MD   ranitidine (ZANTAC) 300 MG tablet Take 150 mg by mouth 2 times daily  Yes Historical Provider, MD     History reviewed. Objective:   Physical Exam   Constitutional: She is oriented to person, place, and time. She appears well-developed and well-nourished. She is active and cooperative. Neck: Normal range of motion and full passive range of motion without pain. Neck supple. No JVD present. Cardiovascular:   Pulses:       Dorsalis pedis pulses are 2+ on the right side, and 2+ on the left side. Posterior tibial pulses are 2+ on the right side, and 2+ on the left side. Pulmonary/Chest: Effort normal. No respiratory distress. Musculoskeletal: Normal range of motion. She exhibits edema (RLE nonpitting edema). Neurological: She is alert and oriented to person, place, and time. She has normal strength. Use of wheelchair   Skin: Skin is warm and dry.    4 superficial ulcers of RLE:    Proximal right lateral shin:  1.8 cm x 0.7 cm  0.5 cm x 0.6 cm    Right lateral calf:  4.8 cm x 1.7 cm  0.7 cm x 0.6 cm Psychiatric: She has a normal mood and affect. Her speech is normal and behavior is normal.       Assessment:       Diagnosis Orders   1. Ulcer of right leg, limited to breakdown of skin (HCC)  ND APPLY OF PASTE BOOT   2. Leg swelling  ND APPLY OF PASTE BOOT   3. Venous insufficiency          Unna boot (CoFlex) applied to RLE (Maxorb Ag, Silvadene Cream to ulcer  beds, Betamethasone Cream to surrounding tissue and gauze dressing). PATIENT EDUCATION focused on need for continued Unna boot therapy for compression. It supports vascular problems, helps to heal leg ulcers and controls leg swelling. The dressing can be worn up to a week before it needs changed. Patient must keep the Unna boot on and dry. Plan:         Return for wound/edema check with unna boot change.

## 2018-09-24 ENCOUNTER — OFFICE VISIT (OUTPATIENT)
Dept: SURGERY | Age: 73
End: 2018-09-24
Payer: MEDICARE

## 2018-09-24 VITALS
DIASTOLIC BLOOD PRESSURE: 81 MMHG | WEIGHT: 234 LBS | BODY MASS INDEX: 37.61 KG/M2 | HEIGHT: 66 IN | SYSTOLIC BLOOD PRESSURE: 127 MMHG

## 2018-09-24 DIAGNOSIS — L97.911 ULCER OF RIGHT LEG, LIMITED TO BREAKDOWN OF SKIN (HCC): Primary | ICD-10-CM

## 2018-09-24 DIAGNOSIS — M79.89 LEG SWELLING: ICD-10-CM

## 2018-09-24 DIAGNOSIS — I87.2 VENOUS INSUFFICIENCY: ICD-10-CM

## 2018-09-24 PROCEDURE — 29580 STRAPPING UNNA BOOT: CPT | Performed by: NURSE PRACTITIONER

## 2018-09-24 ASSESSMENT — ENCOUNTER SYMPTOMS: COLOR CHANGE: 1

## 2018-09-24 NOTE — PROGRESS NOTES
0.4-0.3 % ophthalmic solution 1 drop as needed for Dry Eyes Yes Historical Provider, MD   atorvastatin (LIPITOR) 20 MG tablet Take 20 mg by mouth daily Yes Historical Provider, MD   furosemide (LASIX) 20 MG tablet Take 1 tablet by mouth 2 times daily Yes JOBY Tapia CNP   vitamin D (CHOLECALCIFEROL) 400 UNITS TABS tablet Take 400 Units by mouth daily Yes Historical Provider, MD   metoprolol (LOPRESSOR) 25 MG tablet Take 0.5 tablets by mouth 2 times daily Yes MD Lesa Sung Stager Oil 300 MG CAPS Take by mouth daily Yes Historical Provider, MD   therapeutic multivitamin-minerals (THERAGRAN-M) tablet Take 1 tablet by mouth daily. Yes Historical Provider, MD   hydrOXYzine (ATARAX) 25 MG tablet Take 25 mg by mouth 3 times daily as needed. Yes Historical Provider, MD   L-Methylfolate-B6-B12 (METANX) 2.8-25-2 MG TABS Take 1 tablet by mouth daily. Yes Historical Provider, MD   ranitidine (ZANTAC) 300 MG tablet Take 150 mg by mouth 2 times daily  Yes Historical Provider, MD     History reviewed. Objective:   Physical Exam   Constitutional: She is oriented to person, place, and time. She appears well-developed and well-nourished. She is active and cooperative. Neck: Normal range of motion and full passive range of motion without pain. Neck supple. No JVD present. Cardiovascular:   Pulses:       Dorsalis pedis pulses are 2+ on the right side, and 2+ on the left side. Posterior tibial pulses are 2+ on the right side, and 2+ on the left side. Pulmonary/Chest: Effort normal. No respiratory distress. Musculoskeletal: Normal range of motion. She exhibits edema (RLE nonpitting edema). Neurological: She is alert and oriented to person, place, and time. She has normal strength. Use of wheelchair   Skin: Skin is warm and dry.    4 superficial ulcers of RLE:    Proximal right lateral shin:  2.1 cm x 0.8 cm  1 cm x 0.5 cm    Right lateral calf:  4.9 cm x 1.4 cm  1.0 cm x 0.5 cm Psychiatric: She has a normal mood and affect. Her speech is normal and behavior is normal.       Assessment:       Diagnosis Orders   1. Ulcer of right leg, limited to breakdown of skin (HCC)  NV APPLY OF PASTE BOOT   2. Leg swelling  NV APPLY OF PASTE BOOT   3. Venous insufficiency          Unna boot (CoFlex) applied to RLE (Silvadene Cream to ulcer beds, Betamethasone Cream to surrounding tissue and gauze dressing). PATIENT EDUCATION focused on need for continued Unna boot therapy for compression. It supports vascular problems, helps to heal leg ulcers and controls leg swelling. The dressing can be worn up to a week before it needs changed. Patient must keep the Unna boot on and dry. Plan:         Return in about 1 week (around 10/1/2018) for wound/edema check with unna boot change.

## 2018-09-27 ENCOUNTER — OFFICE VISIT (OUTPATIENT)
Dept: SURGERY | Age: 73
End: 2018-09-27
Payer: MEDICARE

## 2018-09-27 VITALS
BODY MASS INDEX: 37.77 KG/M2 | SYSTOLIC BLOOD PRESSURE: 127 MMHG | HEIGHT: 66 IN | DIASTOLIC BLOOD PRESSURE: 63 MMHG | TEMPERATURE: 99.2 F

## 2018-09-27 DIAGNOSIS — M79.89 LEG SWELLING: ICD-10-CM

## 2018-09-27 DIAGNOSIS — I87.2 VENOUS INSUFFICIENCY: ICD-10-CM

## 2018-09-27 DIAGNOSIS — L97.911 ULCER OF RIGHT LEG, LIMITED TO BREAKDOWN OF SKIN (HCC): Primary | ICD-10-CM

## 2018-09-27 PROCEDURE — 29580 STRAPPING UNNA BOOT: CPT | Performed by: NURSE PRACTITIONER

## 2018-09-27 ASSESSMENT — ENCOUNTER SYMPTOMS
COLOR CHANGE: 1
COUGH: 1

## 2018-09-27 NOTE — PROGRESS NOTES
Subjective:      Patient ID: Zacarias Casillas is a 74 y.o. female. Chief Complaint   Patient presents with    Wound Check     One week f/u for wound/edema check with unna boot change.  Leg Swelling     Pain Assessment  The patient is currently not experiencing any pain at this time. Wound Check   The patient reports 4 superficial ulcers on her right shin and lateral calf. The symptoms first began 6 months ago. The patient is currently not experiencing any pain. The ulcers are aggravated by local pressure and prolonged sitting. The patient reports associated symptoms of drainage of clear fluid and swelling. Per the patient the ulcers are recurrent. Relevant past surgeries include none. Current treatment includes:  Maxorb Ag to ulcer beds, Betamethasone Cream to periwound, gauze dressing, abd pad and unna boot (CoFlex). The patient has not undergone any diagnostic testing as yet. Edema  The edema is located in the right lower extremity. The patient reports partial improvement. The patient has not experienced any new symptoms since last visit. The patient states the edema is recurrent. The patient has not undergone any new diagnostic testing since last visit. Treatment so far includes: Unna boot. Review of Systems   Constitutional: Negative for chills and fever. Respiratory: Positive for cough. Cardiovascular: Positive for leg swelling. Skin: Positive for color change and wound. All other systems reviewed and are negative. Allergies   Allergen Reactions    Adhesive Tape      Other reaction(s): Ulcers    Chlorhexidine     Lisinopril      Med causes lethargy- takes in lower doses     Prior to Visit Medications    Medication Sig Taking?  Authorizing Provider   XARELTO 15 MG TABS tablet TAKE ONE TABLET BY MOUTH DAILY Yes Felipe Lamb MD   Liraglutide (VICTOZA) 18 MG/3ML SOPN SC injection INJECT 1.8MG (0.3ML) DAILY Yes Historical Provider, MD   polyethyl glycol-propyl glycol 0.4-0.3 % (SYSTANE) 0.4-0.3 % ophthalmic solution 1 drop as needed for Dry Eyes Yes Historical Provider, MD   atorvastatin (LIPITOR) 20 MG tablet Take 20 mg by mouth daily Yes Historical Provider, MD   furosemide (LASIX) 20 MG tablet Take 1 tablet by mouth 2 times daily Yes Erick Sharma APRN - CNP   vitamin D (CHOLECALCIFEROL) 400 UNITS TABS tablet Take 400 Units by mouth daily Yes Historical Provider, MD   metoprolol (LOPRESSOR) 25 MG tablet Take 0.5 tablets by mouth 2 times daily Yes MD Dontrell Johnston Chough Oil 300 MG CAPS Take by mouth daily Yes Historical Provider, MD   therapeutic multivitamin-minerals (THERAGRAN-M) tablet Take 1 tablet by mouth daily. Yes Historical Provider, MD   hydrOXYzine (ATARAX) 25 MG tablet Take 25 mg by mouth 3 times daily as needed. Yes Historical Provider, MD   L-Methylfolate-B6-B12 (METANX) 2.8-25-2 MG TABS Take 1 tablet by mouth daily. Yes Historical Provider, MD   ranitidine (ZANTAC) 300 MG tablet Take 150 mg by mouth 2 times daily  Yes Historical Provider, MD     History reviewed. Objective:   Physical Exam   Constitutional: She is oriented to person, place, and time. She appears well-developed and well-nourished. She is active and cooperative. Neck: Normal range of motion and full passive range of motion without pain. Neck supple. No JVD present. Cardiovascular:   Pulses:       Dorsalis pedis pulses are 2+ on the right side, and 2+ on the left side. Posterior tibial pulses are 2+ on the right side, and 2+ on the left side. Pulmonary/Chest: Effort normal. No respiratory distress. Musculoskeletal: Normal range of motion. She exhibits edema (RLE nonpitting edema). Neurological: She is alert and oriented to person, place, and time. She has normal strength. Use of wheelchair   Skin: Skin is warm and dry.    4 superficial ulcers of RLE:    Proximal right lateral shin:  1.9 cm x 0.8 cm  1.2 cm x 0.7 cm    Right lateral calf:  4.6 cm x 1.2

## 2018-09-27 NOTE — PATIENT INSTRUCTIONS
PATIENT EDUCATION focused on need for continued Unna boot therapy for compression. It supports vascular problems, helps to heal leg ulcers and controls leg swelling. The dressing can be worn up to a week before it needs changed. Patient must keep the Unna boot dry. Return in about 1 week (around 10/4/2018) for wound/edema check with unna boot change.

## 2018-10-01 ENCOUNTER — OFFICE VISIT (OUTPATIENT)
Dept: SURGERY | Age: 73
End: 2018-10-01
Payer: MEDICARE

## 2018-10-01 VITALS
SYSTOLIC BLOOD PRESSURE: 121 MMHG | DIASTOLIC BLOOD PRESSURE: 63 MMHG | HEART RATE: 91 BPM | BODY MASS INDEX: 36.64 KG/M2 | WEIGHT: 228 LBS | HEIGHT: 66 IN

## 2018-10-01 DIAGNOSIS — I87.2 VENOUS INSUFFICIENCY: ICD-10-CM

## 2018-10-01 DIAGNOSIS — M79.89 LEG SWELLING: ICD-10-CM

## 2018-10-01 DIAGNOSIS — L97.911 ULCER OF RIGHT LEG, LIMITED TO BREAKDOWN OF SKIN (HCC): Primary | ICD-10-CM

## 2018-10-01 PROCEDURE — 29580 STRAPPING UNNA BOOT: CPT | Performed by: NURSE PRACTITIONER

## 2018-10-01 ASSESSMENT — ENCOUNTER SYMPTOMS
COUGH: 1
COLOR CHANGE: 1

## 2018-10-04 ENCOUNTER — OFFICE VISIT (OUTPATIENT)
Dept: SURGERY | Age: 73
End: 2018-10-04
Payer: MEDICARE

## 2018-10-04 VITALS
DIASTOLIC BLOOD PRESSURE: 70 MMHG | WEIGHT: 232 LBS | HEART RATE: 90 BPM | BODY MASS INDEX: 37.28 KG/M2 | SYSTOLIC BLOOD PRESSURE: 122 MMHG | HEIGHT: 66 IN

## 2018-10-04 DIAGNOSIS — M79.89 LEG SWELLING: ICD-10-CM

## 2018-10-04 DIAGNOSIS — I87.2 VENOUS INSUFFICIENCY: ICD-10-CM

## 2018-10-04 DIAGNOSIS — L97.911 ULCER OF RIGHT LEG, LIMITED TO BREAKDOWN OF SKIN (HCC): Primary | ICD-10-CM

## 2018-10-04 PROCEDURE — 29580 STRAPPING UNNA BOOT: CPT | Performed by: NURSE PRACTITIONER

## 2018-10-04 RX ORDER — BENZONATATE 100 MG/1
100 CAPSULE ORAL 3 TIMES DAILY PRN
COMMUNITY
End: 2018-10-25 | Stop reason: CLARIF

## 2018-10-04 ASSESSMENT — ENCOUNTER SYMPTOMS
COLOR CHANGE: 1
COUGH: 1

## 2018-10-04 NOTE — PROGRESS NOTES
Subjective:      Patient ID: Max Mae is a 74 y.o. female. Chief Complaint   Patient presents with    Wound Check     One week f/u for wound/edenma check with unna boot change.  Leg Swelling     Pain Assessment  The patient is currently not experiencing any pain at this time. Wound Check   The patient reports 4 superficial ulcers on her right shin and lateral calf. The symptoms first began 6 months ago. The patient is currently not experiencing any pain. The ulcers are aggravated by local pressure and prolonged sitting. The patient reports associated symptoms of drainage of clear fluid and swelling. Per the patient the ulcers are recurrent. Relevant past surgeries include none. Current treatment includes:  Maxorb Ag to ulcer beds, Betamethasone Cream to periwound, gauze dressing, abd pad and unna boot (CoFlex). The patient has not undergone any diagnostic testing as yet. Edema  The edema is located in the right lower extremity. The patient reports partial improvement. The patient has not experienced any new symptoms since last visit. The patient states the edema is recurrent. The patient has not undergone any new diagnostic testing since last visit. Treatment so far includes: Unna boot. Review of Systems   Constitutional: Negative for chills and fever. Respiratory: Positive for cough. Cardiovascular: Positive for leg swelling. Skin: Positive for color change and wound. All other systems reviewed and are negative. Allergies   Allergen Reactions    Adhesive Tape      Other reaction(s): Ulcers    Chlorhexidine     Lisinopril      Med causes lethargy- takes in lower doses     Prior to Visit Medications    Medication Sig Taking?  Authorizing Provider   benzonatate (TESSALON) 100 MG capsule Take 100 mg by mouth 3 times daily as needed for Cough Yes Historical Provider, MD   XARELTO 15 MG TABS tablet [place] Broach MOUTH DAILY Yes Ghassan Lowe MD   Liraglutide

## 2018-10-11 ENCOUNTER — OFFICE VISIT (OUTPATIENT)
Dept: SURGERY | Age: 73
End: 2018-10-11
Payer: MEDICARE

## 2018-10-11 VITALS
SYSTOLIC BLOOD PRESSURE: 108 MMHG | DIASTOLIC BLOOD PRESSURE: 50 MMHG | BODY MASS INDEX: 37.61 KG/M2 | WEIGHT: 234 LBS | HEIGHT: 66 IN

## 2018-10-11 DIAGNOSIS — L97.911 ULCER OF RIGHT LEG, LIMITED TO BREAKDOWN OF SKIN (HCC): Primary | ICD-10-CM

## 2018-10-11 DIAGNOSIS — M79.89 LEG SWELLING: ICD-10-CM

## 2018-10-11 DIAGNOSIS — I87.2 VENOUS INSUFFICIENCY: ICD-10-CM

## 2018-10-11 PROCEDURE — 99999 PR OFFICE/OUTPT VISIT,PROCEDURE ONLY: CPT | Performed by: NURSE PRACTITIONER

## 2018-10-11 PROCEDURE — 29580 STRAPPING UNNA BOOT: CPT | Performed by: NURSE PRACTITIONER

## 2018-10-11 ASSESSMENT — ENCOUNTER SYMPTOMS
COLOR CHANGE: 1
COUGH: 1

## 2018-10-15 ENCOUNTER — OFFICE VISIT (OUTPATIENT)
Dept: CARDIOLOGY CLINIC | Age: 73
End: 2018-10-15
Payer: MEDICARE

## 2018-10-15 VITALS
BODY MASS INDEX: 37.12 KG/M2 | HEIGHT: 66 IN | DIASTOLIC BLOOD PRESSURE: 66 MMHG | RESPIRATION RATE: 14 BRPM | OXYGEN SATURATION: 94 % | HEART RATE: 65 BPM | WEIGHT: 231 LBS | SYSTOLIC BLOOD PRESSURE: 110 MMHG

## 2018-10-15 DIAGNOSIS — E78.2 MIXED HYPERLIPIDEMIA: ICD-10-CM

## 2018-10-15 DIAGNOSIS — E78.5 HYPERLIPIDEMIA, UNSPECIFIED HYPERLIPIDEMIA TYPE: ICD-10-CM

## 2018-10-15 DIAGNOSIS — I10 ESSENTIAL HYPERTENSION: ICD-10-CM

## 2018-10-15 DIAGNOSIS — Z95.1 HX OF CABG: Primary | Chronic | ICD-10-CM

## 2018-10-15 DIAGNOSIS — I25.5 ISCHEMIC CARDIOMYOPATHY: ICD-10-CM

## 2018-10-15 DIAGNOSIS — I48.0 PAROXYSMAL ATRIAL FIBRILLATION (HCC): ICD-10-CM

## 2018-10-15 PROCEDURE — 1123F ACP DISCUSS/DSCN MKR DOCD: CPT | Performed by: INTERNAL MEDICINE

## 2018-10-15 PROCEDURE — G8484 FLU IMMUNIZE NO ADMIN: HCPCS | Performed by: INTERNAL MEDICINE

## 2018-10-15 PROCEDURE — 1090F PRES/ABSN URINE INCON ASSESS: CPT | Performed by: INTERNAL MEDICINE

## 2018-10-15 PROCEDURE — 1036F TOBACCO NON-USER: CPT | Performed by: INTERNAL MEDICINE

## 2018-10-15 PROCEDURE — G8417 CALC BMI ABV UP PARAM F/U: HCPCS | Performed by: INTERNAL MEDICINE

## 2018-10-15 PROCEDURE — G8400 PT W/DXA NO RESULTS DOC: HCPCS | Performed by: INTERNAL MEDICINE

## 2018-10-15 PROCEDURE — 4040F PNEUMOC VAC/ADMIN/RCVD: CPT | Performed by: INTERNAL MEDICINE

## 2018-10-15 PROCEDURE — G8427 DOCREV CUR MEDS BY ELIG CLIN: HCPCS | Performed by: INTERNAL MEDICINE

## 2018-10-15 PROCEDURE — 3017F COLORECTAL CA SCREEN DOC REV: CPT | Performed by: INTERNAL MEDICINE

## 2018-10-15 PROCEDURE — G8598 ASA/ANTIPLAT THER USED: HCPCS | Performed by: INTERNAL MEDICINE

## 2018-10-15 PROCEDURE — 99214 OFFICE O/P EST MOD 30 MIN: CPT | Performed by: INTERNAL MEDICINE

## 2018-10-15 PROCEDURE — 1101F PT FALLS ASSESS-DOCD LE1/YR: CPT | Performed by: INTERNAL MEDICINE

## 2018-10-15 NOTE — PROGRESS NOTES
[address]  Cardiology Follow up    [place], 1945    Primary Care Doctor:  Matt Burns MD    Chief Complaint   Patient presents with    6 Month Follow-Up    Congestive Heart Failure    Hypertension    Hyperlipidemia       History of Present Illness:   Ms. Nakia Brock is seen in follow up. She is followed for CHF, HTN, CAD hx CABG, AF and CKI. Follows with  Dr Srinivasa Russell. In the interval since her last visit, she has lost 12 pounds on her own. She only eats when she  Is hungry. She has not yet seen Dr Debi Koyanagi. She  Now  sees the wound clinic due to nonhealing wounds on her legs. Has lymphedema. Her  Podiatrist referred her to Dr Hazel Livingston. She has a uniboot, wraps her legs daily and wears support hose. Currently being treated for URI, has  A productive cough that was yellowish,  Now with clear phlegm. She has no chest  Pain,change In exertional shortness of breath palpitations or dizziness. She has no change in exercise tolerance.  She is here today with her     NYHA class:  II  Allergies   Allergen Reactions    Adhesive Tape      Other reaction(s): Ulcers    Chlorhexidine     Lisinopril      Med causes lethargy- takes in lower doses     Current Outpatient Prescriptions   Medication Sig Dispense Refill    benzonatate (TESSALON) 100 MG capsule Take 100 mg by mouth 3 times daily as needed for Cough      XARELTO 15 MG TABS tablet TAKE ONE TABLET BY MOUTH DAILY 30 tablet 5    Liraglutide (VICTOZA) 18 MG/3ML SOPN SC injection INJECT 1.8MG (0.3ML) DAILY      polyethyl glycol-propyl glycol 0.4-0.3 % (SYSTANE) 0.4-0.3 % ophthalmic solution 1 drop as needed for Dry Eyes      atorvastatin (LIPITOR) 20 MG tablet Take 20 mg by mouth daily      furosemide (LASIX) 20 MG tablet Take 1 tablet by mouth 2 times daily 60 tablet 3    vitamin D (CHOLECALCIFEROL) 400 UNITS TABS tablet Take 400 Units by mouth daily      metoprolol (LOPRESSOR) 25 MG tablet Take 0.5 tablets by mouth 2 times

## 2018-10-15 NOTE — LETTER
[address] Cardiology [place] [address] [address] [address]  Phone: [phone]  Fax: [phone]    Ghassan Lowe MD        October 24, 2018     Chaya Monzon MD  [address]  [address] [place] [address]    Patient: Max Mae  MR Number: [id number]  YOB: 1945  Date of Visit: 10/15/2018    Dear Dr. Chaya Monzon:    Thank you for the request for consultation for Max Mae to me for the evaluation of chf. Below are the relevant portions of my assessment and plan of care. [address]  Cardiology Follow up    Max Mae, 1945    Primary Care Doctor:  Chaya Monzon MD    Chief Complaint   Patient presents with    6 Month Follow-Up    Congestive Heart Failure    Hypertension    Hyperlipidemia       History of Present Illness:   Ms. Lizz Wilkins is seen in follow up. She is followed for CHF, HTN, CAD hx CABG, AF and CKI. Follows with  Dr Rita Briones. In the interval since her last visit, she has lost 12 pounds on her own. She only eats when she  Is hungry. She has not yet seen Dr Selina Madera. She  Now  sees the wound clinic due to nonhealing wounds on her legs. Has lymphedema. Her  Podiatrist referred her to Dr Pat Angel. She has a uniboot, wraps her legs daily and wears support hose. Currently being treated for URI, has  A productive cough that was yellowish,  Now with clear phlegm. She has no chest  Pain,change In exertional shortness of breath palpitations or dizziness. She has no change in exercise tolerance.  She is here today with her     NYHA class:  II  Allergies   Allergen Reactions    Adhesive Tape      Other reaction(s): Ulcers    Chlorhexidine     Lisinopril      Med causes lethargy- takes in lower doses     Current Outpatient Prescriptions   Medication Sig Dispense Refill    benzonatate (TESSALON) 100 MG capsule Take 100 mg by mouth 3 times daily as needed for Cough Family History   Problem Relation Age of Onset    Diabetes Mother     Heart Disease Mother     Heart Disease Father     Stroke Father      Social History     Social History    Marital status:      Spouse name: N/A    Number of children: N/A    Years of education: N/A     Occupational History    Not on file. Social History Main Topics    Smoking status: Never Smoker    Smokeless tobacco: Never Used    Alcohol use No    Drug use: No    Sexual activity: Yes     Partners: Male     Other Topics Concern    Not on file     Social History Narrative    No narrative on file       Review of System:  · General ROS: negative for - chills, fever   · Psychological ROS: negative for - anxiety or depression  · Ophthalmic ROS: negative for - eye pain or loss of vision  · ENT ROS: negative for - headaches, sore throat   · Allergy and Immunology ROS: negative for - hives  · Hematological and Lymphatic ROS: negative for - bleeding problems, blood clots, bruising or jaundice  · Endocrine ROS: negative for - skin changes, temperature intolerance or unexpected weight changes  · Respiratory ROS: negative for - cough, sputum, wheezing  · Cardiovascular ROS: Per HPI. · Gastrointestinal ROS: negative for - abdominal pain, diarrhea, nausea/vomiting, bleeding   · Genito-Urinary ROS: negative for - dysuria or incontinence  · Musculoskeletal ROS: negative for - joint swelling--Joint pain. · Neurological ROS: negative for - confusion, numbness/tingling, seizures, weakness  · Dermatological ROS: negative for - rash    Physical Examination:    Vitals:    10/15/18 1027   BP: 110/66   Site: Left Upper Arm   Position: Sitting   Cuff Size: Large Adult   Pulse: 65   Resp: 14   SpO2: 94%   Weight: 231 lb (104.8 kg)   Height: 5' 6\" (1.676 m)        Constitutional and General Appearance:   . NAD   SKIN:  .     Warm and dry  EYES:    .     EOMI  Neck:   .      Normal carotid contour  Respiratory:

## 2018-10-18 ENCOUNTER — OFFICE VISIT (OUTPATIENT)
Dept: SURGERY | Age: 73
End: 2018-10-18
Payer: MEDICARE

## 2018-10-18 VITALS
WEIGHT: 230 LBS | BODY MASS INDEX: 37.12 KG/M2 | SYSTOLIC BLOOD PRESSURE: 128 MMHG | HEART RATE: 97 BPM | DIASTOLIC BLOOD PRESSURE: 73 MMHG

## 2018-10-18 DIAGNOSIS — L97.911 ULCER OF RIGHT LEG, LIMITED TO BREAKDOWN OF SKIN (HCC): Primary | ICD-10-CM

## 2018-10-18 DIAGNOSIS — M79.89 LEG SWELLING: ICD-10-CM

## 2018-10-18 DIAGNOSIS — I87.2 VENOUS INSUFFICIENCY: ICD-10-CM

## 2018-10-18 DIAGNOSIS — L97.921 ULCER OF LOWER EXTREMITY, LEFT, LIMITED TO BREAKDOWN OF SKIN (HCC): ICD-10-CM

## 2018-10-18 PROCEDURE — 29580 STRAPPING UNNA BOOT: CPT | Performed by: NURSE PRACTITIONER

## 2018-10-18 PROCEDURE — 99999 PR OFFICE/OUTPT VISIT,PROCEDURE ONLY: CPT | Performed by: NURSE PRACTITIONER

## 2018-10-18 ASSESSMENT — ENCOUNTER SYMPTOMS: COLOR CHANGE: 1

## 2018-10-24 NOTE — COMMUNICATION BODY
daily 60 tablet 3    Krill Oil 300 MG CAPS Take by mouth daily      therapeutic multivitamin-minerals (THERAGRAN-M) tablet Take 1 tablet by mouth daily.  hydrOXYzine (ATARAX) 25 MG tablet Take 25 mg by mouth 3 times daily as needed.  L-Methylfolate-B6-B12 (METANX) 2.8-25-2 MG TABS Take 1 tablet by mouth daily.  ranitidine (ZANTAC) 300 MG tablet Take 150 mg by mouth 2 times daily        No current facility-administered medications for this visit. Past Medical History:   Diagnosis Date    Arthritis     Blood circulation, collateral     Blood transfusion     CAD (coronary artery disease)     CHF (congestive heart failure) (HCC)     Chronic renal failure, stage 3 (moderate) (HCC)     Diabetes mellitus (HCC)     GERD (gastroesophageal reflux disease)     Hyperlipidemia     Hypertension      Past Surgical History:   Procedure Laterality Date    ABDOMEN SURGERY      CARDIAC SURGERY      2009    CATARACT REMOVAL WITH IMPLANT Left 11/23/2016    CATARACT REMOVAL WITH IMPLANT Right 11/02/2016    CATARACT REMOVAL WITH IMPLANT Bilateral 10/16, 11/16    CHOLECYSTECTOMY      COLONOSCOPY      ENDOSCOPY, COLON, DIAGNOSTIC      EYE SURGERY      left tear duct surgery    JOINT REPLACEMENT      BTKR    KNEE ARTHROPLASTY  09/15/2010    left total knee    TOTAL KNEE ARTHROPLASTY      VASCULAR SURGERY       Family History   Problem Relation Age of Onset    Diabetes Mother     Heart Disease Mother     Heart Disease Father     Stroke Father      Social History     Social History    Marital status:      Spouse name: N/A    Number of children: N/A    Years of education: N/A     Occupational History    Not on file.      Social History Main Topics    Smoking status: Never Smoker    Smokeless tobacco: Never Used    Alcohol use No    Drug use: No    Sexual activity: Yes     Partners: Male     Other Topics Concern    Not on file     Social History Narrative    No narrative on file

## 2018-10-25 ENCOUNTER — OFFICE VISIT (OUTPATIENT)
Dept: SURGERY | Age: 73
End: 2018-10-25
Payer: MEDICARE

## 2018-10-25 VITALS
BODY MASS INDEX: 36.8 KG/M2 | HEIGHT: 66 IN | HEART RATE: 97 BPM | WEIGHT: 229 LBS | DIASTOLIC BLOOD PRESSURE: 67 MMHG | SYSTOLIC BLOOD PRESSURE: 134 MMHG

## 2018-10-25 DIAGNOSIS — M79.89 LEG SWELLING: ICD-10-CM

## 2018-10-25 DIAGNOSIS — L97.911 ULCER OF RIGHT LEG, LIMITED TO BREAKDOWN OF SKIN (HCC): Primary | ICD-10-CM

## 2018-10-25 DIAGNOSIS — L97.921 ULCER OF LOWER EXTREMITY, LEFT, LIMITED TO BREAKDOWN OF SKIN (HCC): ICD-10-CM

## 2018-10-25 DIAGNOSIS — I87.2 VENOUS INSUFFICIENCY: ICD-10-CM

## 2018-10-25 PROCEDURE — 99999 PR OFFICE/OUTPT VISIT,PROCEDURE ONLY: CPT | Performed by: NURSE PRACTITIONER

## 2018-10-25 PROCEDURE — 29580 STRAPPING UNNA BOOT: CPT | Performed by: NURSE PRACTITIONER

## 2018-10-25 ASSESSMENT — ENCOUNTER SYMPTOMS: COLOR CHANGE: 1

## 2018-11-01 ENCOUNTER — OFFICE VISIT (OUTPATIENT)
Dept: SURGERY | Age: 73
End: 2018-11-01
Payer: MEDICARE

## 2018-11-01 VITALS
BODY MASS INDEX: 37.12 KG/M2 | HEART RATE: 67 BPM | TEMPERATURE: 97.1 F | HEIGHT: 66 IN | SYSTOLIC BLOOD PRESSURE: 145 MMHG | DIASTOLIC BLOOD PRESSURE: 73 MMHG | WEIGHT: 231 LBS

## 2018-11-01 DIAGNOSIS — M79.89 LEG SWELLING: ICD-10-CM

## 2018-11-01 DIAGNOSIS — L97.911 ULCER OF RIGHT LEG, LIMITED TO BREAKDOWN OF SKIN (HCC): Primary | ICD-10-CM

## 2018-11-01 PROCEDURE — 29580 STRAPPING UNNA BOOT: CPT | Performed by: NURSE PRACTITIONER

## 2018-11-01 PROCEDURE — 99999 PR OFFICE/OUTPT VISIT,PROCEDURE ONLY: CPT | Performed by: NURSE PRACTITIONER

## 2018-11-01 ASSESSMENT — ENCOUNTER SYMPTOMS: COLOR CHANGE: 1

## 2018-11-01 NOTE — PATIENT INSTRUCTIONS
PATIENT EDUCATION focused on need for continued Unna boot therapy for compression. They support vascular problems, help to heal leg ulcers and control leg swelling. The dressings can be worn up to a week before they need changed. Patient must keep the Unna boots dry. Return in about 1 week (around 11/8/2018) for wound/edema check with unna boot change.

## 2018-11-08 ENCOUNTER — OFFICE VISIT (OUTPATIENT)
Dept: SURGERY | Age: 73
End: 2018-11-08
Payer: MEDICARE

## 2018-11-08 VITALS
HEART RATE: 66 BPM | WEIGHT: 229.3 LBS | DIASTOLIC BLOOD PRESSURE: 77 MMHG | BODY MASS INDEX: 37.01 KG/M2 | SYSTOLIC BLOOD PRESSURE: 142 MMHG

## 2018-11-08 DIAGNOSIS — L97.911 ULCER OF RIGHT LEG, LIMITED TO BREAKDOWN OF SKIN (HCC): Primary | ICD-10-CM

## 2018-11-08 DIAGNOSIS — M79.89 LEG SWELLING: ICD-10-CM

## 2018-11-08 DIAGNOSIS — I87.2 VENOUS INSUFFICIENCY: ICD-10-CM

## 2018-11-08 PROCEDURE — 99999 PR OFFICE/OUTPT VISIT,PROCEDURE ONLY: CPT | Performed by: NURSE PRACTITIONER

## 2018-11-08 PROCEDURE — 29580 STRAPPING UNNA BOOT: CPT | Performed by: NURSE PRACTITIONER

## 2018-11-08 RX ORDER — BETAMETHASONE DIPROPIONATE 0.05 %
OINTMENT (GRAM) TOPICAL
Qty: 45 G | Refills: 0 | Status: SHIPPED | OUTPATIENT
Start: 2018-11-08 | End: 2019-04-17 | Stop reason: ALTCHOICE

## 2018-11-08 ASSESSMENT — ENCOUNTER SYMPTOMS: COLOR CHANGE: 1

## 2018-11-08 NOTE — PATIENT INSTRUCTIONS
PATIENT EDUCATION focused on need for continued Unna boot therapy for compression.  They support vascular problems, help to heal leg ulcers and control leg swelling.  The dressing can be worn up to a week before they needs changed.  Patient must keep the Unna boots on and dry.      She has been informed that she may benefit from Betamethasone  cream to the right leg to help with itching. Rx for this has been provided to Mrs. Kahlil Archuleta today for 45 gram tube of Betamethasone topical cream to be applied to the affected areas of the right leg for itching. Return in about 1 week (around 11/15/2018) for right leg ulcers/bilateral LE edema and to have Unna boots changed.

## 2018-11-15 ENCOUNTER — OFFICE VISIT (OUTPATIENT)
Dept: SURGERY | Age: 73
End: 2018-11-15
Payer: MEDICARE

## 2018-11-15 VITALS
SYSTOLIC BLOOD PRESSURE: 127 MMHG | WEIGHT: 231 LBS | DIASTOLIC BLOOD PRESSURE: 58 MMHG | HEART RATE: 67 BPM | BODY MASS INDEX: 37.12 KG/M2 | HEIGHT: 66 IN

## 2018-11-15 DIAGNOSIS — I87.2 VENOUS INSUFFICIENCY: ICD-10-CM

## 2018-11-15 DIAGNOSIS — L97.911 ULCER OF RIGHT LEG, LIMITED TO BREAKDOWN OF SKIN (HCC): Primary | ICD-10-CM

## 2018-11-15 DIAGNOSIS — M79.89 LEG SWELLING: ICD-10-CM

## 2018-11-15 PROCEDURE — 99999 PR OFFICE/OUTPT VISIT,PROCEDURE ONLY: CPT | Performed by: NURSE PRACTITIONER

## 2018-11-15 PROCEDURE — 29580 STRAPPING UNNA BOOT: CPT | Performed by: NURSE PRACTITIONER

## 2018-11-15 ASSESSMENT — ENCOUNTER SYMPTOMS: COLOR CHANGE: 1

## 2018-11-15 NOTE — PATIENT INSTRUCTIONS
PATIENT EDUCATION focused on need for continued Unna boot therapy for compression. They support vascular problems, help to heal leg ulcers and control leg swelling. The dressings can be worn up to a week before they need changed. Patient must keep the Unna boots dry. Return in about 1 week (around 11/22/2018) for wound/edema check with unna boot change.

## 2018-11-21 ENCOUNTER — OFFICE VISIT (OUTPATIENT)
Dept: SURGERY | Age: 73
End: 2018-11-21
Payer: MEDICARE

## 2018-11-21 DIAGNOSIS — L97.911 ULCER OF RIGHT LEG, LIMITED TO BREAKDOWN OF SKIN (HCC): Primary | ICD-10-CM

## 2018-11-21 DIAGNOSIS — M79.89 LEG SWELLING: ICD-10-CM

## 2018-11-21 DIAGNOSIS — I87.2 VENOUS INSUFFICIENCY: ICD-10-CM

## 2018-11-21 PROCEDURE — G8598 ASA/ANTIPLAT THER USED: HCPCS | Performed by: NURSE PRACTITIONER

## 2018-11-21 PROCEDURE — 1036F TOBACCO NON-USER: CPT | Performed by: NURSE PRACTITIONER

## 2018-11-21 PROCEDURE — G8427 DOCREV CUR MEDS BY ELIG CLIN: HCPCS | Performed by: NURSE PRACTITIONER

## 2018-11-21 PROCEDURE — 3017F COLORECTAL CA SCREEN DOC REV: CPT | Performed by: NURSE PRACTITIONER

## 2018-11-21 PROCEDURE — 1090F PRES/ABSN URINE INCON ASSESS: CPT | Performed by: NURSE PRACTITIONER

## 2018-11-21 PROCEDURE — G8417 CALC BMI ABV UP PARAM F/U: HCPCS | Performed by: NURSE PRACTITIONER

## 2018-11-21 PROCEDURE — 4040F PNEUMOC VAC/ADMIN/RCVD: CPT | Performed by: NURSE PRACTITIONER

## 2018-11-21 PROCEDURE — 1101F PT FALLS ASSESS-DOCD LE1/YR: CPT | Performed by: NURSE PRACTITIONER

## 2018-11-21 PROCEDURE — 1123F ACP DISCUSS/DSCN MKR DOCD: CPT | Performed by: NURSE PRACTITIONER

## 2018-11-21 PROCEDURE — 99213 OFFICE O/P EST LOW 20 MIN: CPT | Performed by: NURSE PRACTITIONER

## 2018-11-21 PROCEDURE — G8484 FLU IMMUNIZE NO ADMIN: HCPCS | Performed by: NURSE PRACTITIONER

## 2018-11-21 PROCEDURE — G8400 PT W/DXA NO RESULTS DOC: HCPCS | Performed by: NURSE PRACTITIONER

## 2018-11-21 ASSESSMENT — ENCOUNTER SYMPTOMS: COLOR CHANGE: 1

## 2018-11-21 NOTE — PROGRESS NOTES
Subjective:      Patient ID: Delfino Vaughn is a 74 y.o. female. Chief Complaint   Patient presents with    Wound Check     Follow up on right LE ulcers/bilateral LE leg swelling and to have Unna boots removed and compression stockings applied    Leg Swelling     Pain Assessment  The patient is currently not experiencing any pain at this time. HPI     Edema  The edema is located in the bilateral LE's. The patient reports improvement overall. The patient has not experienced any new symptoms since last visit. The patient states the edema is recurrent. The patient has not undergone any new diagnostic testing since last visit. Treatment so far includes: bilateral Unna boots. Ulcers just below right knee continue to heal.      Review of Systems   Constitutional: Negative for chills and fever. Cardiovascular: Leg swelling: controlled with bilateral Unna boots. Skin: Positive for color change. All other systems reviewed and are negative. Allergies   Allergen Reactions    Adhesive Tape      Other reaction(s): Ulcers    Chlorhexidine     Lisinopril      Med causes lethargy- takes in lower doses     Prior to Visit Medications    Medication Sig Taking? Authorizing Provider   betamethasone dipropionate (DIPROLENE) 0.05 % ointment Apply topically daily to the affected areas of the right leg.  Yes Cristhian Macdonald APRN - CNP   XARELTO 15 MG TABS tablet TAKE ONE TABLET BY MOUTH DAILY Yes Nila Munson MD   Liraglutide (VICTOZA) 18 MG/3ML SOPN SC injection INJECT 1.8MG (0.3ML) DAILY Yes Historical Provider, MD   polyethyl glycol-propyl glycol 0.4-0.3 % (SYSTANE) 0.4-0.3 % ophthalmic solution 1 drop as needed for Dry Eyes Yes Historical Provider, MD   atorvastatin (LIPITOR) 20 MG tablet Take 20 mg by mouth daily Yes Historical Provider, MD   furosemide (LASIX) 20 MG tablet Take 1 tablet by mouth 2 times daily Yes Mihaela Ibanez APRN - CNP   vitamin D (CHOLECALCIFEROL) 400 UNITS TABS tablet Take 400 Units by mouth daily Yes Historical Provider, MD   metoprolol (LOPRESSOR) 25 MG tablet Take 0.5 tablets by mouth 2 times daily Yes MD Elvira Bocanegra Larve Oil 300 MG CAPS Take by mouth daily Yes Historical Provider, MD   therapeutic multivitamin-minerals [facility]) tablet Take 1 tablet by mouth daily. Yes Historical Provider, MD   hydrOXYzine (ATARAX) 25 MG tablet Take 25 mg by mouth 3 times daily as needed. Yes Historical Provider, MD   L-Methylfolate-B6-B12 (METANX) 2.8-25-2 MG TABS Take 1 tablet by mouth daily. Yes Historical Provider, MD   ranitidine (ZANTAC) 300 MG tablet Take 150 mg by mouth 2 times daily  Yes Historical Provider, MD     History reviewed. Objective:   Physical Exam   Constitutional: She is oriented to person, place, and time. She appears well-developed and well-nourished. She is active and cooperative. Neck: Normal range of motion and full passive range of motion without pain. Neck supple. No JVD present. Cardiovascular:   Pulses:       Dorsalis pedis pulses are 2+ on the right side, and 2+ on the left side. Posterior tibial pulses are 2+ on the right side, and 2+ on the left side. Pulmonary/Chest: Effort normal. No respiratory distress. Musculoskeletal: Normal range of motion. Edema: nonpitting edema; right ankle measures 23.7 cm, calf 40.6 cm; left ankle measures 25 cm, calf 40.8 cm. Neurological: She is alert and oriented to person, place, and time. She has normal strength. Use of wheelchair   Skin: Skin is warm and dry. Scratches on right lateral shin just below knee have healed significantly since last visit   Psychiatric: She has a normal mood and affect. Her speech is normal and behavior is normal.       Assessment:       Diagnosis   1. Ulcer of right leg, limited to breakdown of skin (HCC) - resolving   2. Leg swelling    3. Venous insufficiency         Compression stockings (30-40 mmHg) were applied to bilateral LE's.     PATIENT EDUCATION focused on

## 2018-11-25 VITALS — SYSTOLIC BLOOD PRESSURE: 158 MMHG | DIASTOLIC BLOOD PRESSURE: 74 MMHG

## 2018-12-11 RX ORDER — RIVAROXABAN 15 MG/1
TABLET, FILM COATED ORAL
Qty: 30 TABLET | Refills: 4 | Status: SHIPPED | OUTPATIENT
Start: 2018-12-11 | End: 2019-05-06 | Stop reason: SDUPTHER

## 2019-04-03 PROBLEM — D50.9 IRON DEFICIENCY ANEMIA: Status: ACTIVE | Noted: 2019-04-03

## 2019-04-17 ENCOUNTER — OFFICE VISIT (OUTPATIENT)
Dept: CARDIOLOGY CLINIC | Age: 74
End: 2019-04-17
Payer: MEDICARE

## 2019-04-17 VITALS
BODY MASS INDEX: 36.64 KG/M2 | WEIGHT: 228 LBS | HEIGHT: 66 IN | SYSTOLIC BLOOD PRESSURE: 134 MMHG | DIASTOLIC BLOOD PRESSURE: 64 MMHG | HEART RATE: 88 BPM | OXYGEN SATURATION: 97 %

## 2019-04-17 DIAGNOSIS — E78.2 MIXED HYPERLIPIDEMIA: ICD-10-CM

## 2019-04-17 DIAGNOSIS — I25.5 ISCHEMIC CARDIOMYOPATHY: Primary | ICD-10-CM

## 2019-04-17 DIAGNOSIS — I25.10 ATHEROSCLEROSIS OF NATIVE CORONARY ARTERY OF NATIVE HEART WITHOUT ANGINA PECTORIS: ICD-10-CM

## 2019-04-17 DIAGNOSIS — I48.0 PAROXYSMAL ATRIAL FIBRILLATION (HCC): ICD-10-CM

## 2019-04-17 DIAGNOSIS — I50.43 ACUTE ON CHRONIC COMBINED SYSTOLIC AND DIASTOLIC HEART FAILURE (HCC): ICD-10-CM

## 2019-04-17 DIAGNOSIS — I10 ESSENTIAL HYPERTENSION: ICD-10-CM

## 2019-04-17 PROCEDURE — G8428 CUR MEDS NOT DOCUMENT: HCPCS | Performed by: INTERNAL MEDICINE

## 2019-04-17 PROCEDURE — 3017F COLORECTAL CA SCREEN DOC REV: CPT | Performed by: INTERNAL MEDICINE

## 2019-04-17 PROCEDURE — 4040F PNEUMOC VAC/ADMIN/RCVD: CPT | Performed by: INTERNAL MEDICINE

## 2019-04-17 PROCEDURE — G8417 CALC BMI ABV UP PARAM F/U: HCPCS | Performed by: INTERNAL MEDICINE

## 2019-04-17 PROCEDURE — 99214 OFFICE O/P EST MOD 30 MIN: CPT | Performed by: INTERNAL MEDICINE

## 2019-04-17 PROCEDURE — 1090F PRES/ABSN URINE INCON ASSESS: CPT | Performed by: INTERNAL MEDICINE

## 2019-04-17 PROCEDURE — 1123F ACP DISCUSS/DSCN MKR DOCD: CPT | Performed by: INTERNAL MEDICINE

## 2019-04-17 PROCEDURE — 1036F TOBACCO NON-USER: CPT | Performed by: INTERNAL MEDICINE

## 2019-04-17 PROCEDURE — 93000 ELECTROCARDIOGRAM COMPLETE: CPT | Performed by: INTERNAL MEDICINE

## 2019-04-17 PROCEDURE — G8400 PT W/DXA NO RESULTS DOC: HCPCS | Performed by: INTERNAL MEDICINE

## 2019-04-17 PROCEDURE — G8598 ASA/ANTIPLAT THER USED: HCPCS | Performed by: INTERNAL MEDICINE

## 2019-04-17 NOTE — LETTER
[address] [place] [address] Cardiology [place] [address] [address] [address]  Phone: [phone]  Fax: [phone]    Chris Herman MD        April 19, 2019     Jarrett Hensley MD  [address]  [address] [id number]    Patient: Kirsten Dial  MR Number: [id number]  YOB: 1945  Date of Visit: 4/17/2019    Dear Dr. Jarrett Hensley:        [address]  Cardiology Follow up    Kirsten Dial, 1945    Primary Care Doctor:  Jarrett Hensley MD    Chief Complaint   Patient presents with    Congestive Heart Failure     6 month f/u.  Coronary Artery Disease    Shortness of Breath     C/o noticing SOB with exertion.  Dizziness     Pt states she has noticed mild dizziness. History of Present Illness:   Ms Maite Brownlee is seen as a follow up for CAD, CABG,CHF, htn,hchol, AF CRI  Today, she complains of exertional shortness of breath. Would like to lose weight an exercise more. She has no chest pain, palpitations or dizziness.  No syncope        NYHA class:  II  Allergies   Allergen Reactions    Adhesive Tape Shortness Of Breath     Other reaction(s): Ulcers    Chlorhexidine     Lisinopril Other (See Comments)     Med causes lethargy- takes in lower doses     Current Outpatient Medications   Medication Sig Dispense Refill    ferrous sulfate 325 (65 Fe) MG tablet Take 1 tablet by mouth 3 times daily (with meals) 90 tablet 3    furosemide (LASIX) 20 MG tablet TAKE 1 TABLET BY MOUTH TWICE A DAY 60 tablet 3    XARELTO 15 MG TABS tablet TAKE ONE TABLET BY MOUTH DAILY 30 tablet 4    Liraglutide (VICTOZA) 18 MG/3ML SOPN SC injection INJECT 1.8MG (0.3ML) DAILY      polyethyl glycol-propyl glycol 0.4-0.3 % (SYSTANE) 0.4-0.3 % ophthalmic solution 1 drop as needed for Dry Eyes      atorvastatin (LIPITOR) 20 MG tablet Take 20 mg by mouth daily      vitamin D (CHOLECALCIFEROL) 400 UNITS TABS tablet Take 400 Units by mouth daily  metoprolol (LOPRESSOR) 25 MG tablet Take 0.5 tablets by mouth 2 times daily 60 tablet 3    Krill Oil 300 MG CAPS Take by mouth daily      therapeutic multivitamin-minerals (THERAGRAN-M) tablet Take 1 tablet by mouth daily.  hydrOXYzine (ATARAX) 25 MG tablet Take 25 mg by mouth 3 times daily as needed.  L-Methylfolate-B6-B12 (METANX) 2.8-25-2 MG TABS Take 1 tablet by mouth daily.  ranitidine (ZANTAC) 300 MG tablet Take 150 mg by mouth 2 times daily        No current facility-administered medications for this visit.         Past Medical History:   Diagnosis Date    Arthritis     Blood circulation, collateral     Blood transfusion     CAD (coronary artery disease)     CHF (congestive heart failure) (HCC)     Chronic renal failure, stage 3 (moderate) (HCC)     Diabetes mellitus (HCC)     GERD (gastroesophageal reflux disease)     Hyperlipidemia     Hypertension      Past Surgical History:   Procedure Laterality Date    ABDOMEN SURGERY      CARDIAC SURGERY      2009    CATARACT REMOVAL WITH IMPLANT Left 11/23/2016    CATARACT REMOVAL WITH IMPLANT Right 11/02/2016    CATARACT REMOVAL WITH IMPLANT Bilateral 10/16, 11/16    CHOLECYSTECTOMY      COLONOSCOPY      ENDOSCOPY, COLON, DIAGNOSTIC      EYE SURGERY      left tear duct surgery    JOINT REPLACEMENT      BTKR    KNEE ARTHROPLASTY  09/15/2010    left total knee    TOTAL KNEE ARTHROPLASTY      VASCULAR SURGERY       Family History   Problem Relation Age of Onset    Diabetes Mother     Heart Disease Mother     Heart Disease Father     Stroke Father      Social History     Socioeconomic History    Marital status:      Spouse name: Not on file    Number of children: Not on file    Years of education: Not on file    Highest education level: Not on file   Occupational History    Not on file   Social Needs    Financial resource strain: Not on file    Food insecurity:     Worry: Not on file     Inability: Not on file  Transportation needs:     Medical: Not on file     Non-medical: Not on file   Tobacco Use    Smoking status: Never Smoker    Smokeless tobacco: Never Used   Substance and Sexual Activity    Alcohol use: No    Drug use: No    Sexual activity: Yes     Partners: Male   Lifestyle    Physical activity:     Days per week: Not on file     Minutes per session: Not on file    Stress: Not on file   Relationships    Social connections:     Talks on phone: Not on file     Gets together: Not on file     Attends Gnosticist service: Not on file     Active member of club or organization: Not on file     Attends meetings of clubs or organizations: Not on file     Relationship status: Not on file    Intimate partner violence:     Fear of current or ex partner: Not on file     Emotionally abused: Not on file     Physically abused: Not on file     Forced sexual activity: Not on file   Other Topics Concern    Not on file   Social History Narrative    Not on file       Review of System:  · General ROS: negative for - chills, fever   · Psychological ROS: negative for - anxiety or depression  · Ophthalmic ROS: negative for - eye pain or loss of vision  · ENT ROS: negative for - headaches, sore throat   · Allergy and Immunology ROS: negative for - hives  · Hematological and Lymphatic ROS: negative for - bleeding problems, blood clots, bruising or jaundice  · Endocrine ROS: negative for - skin changes, temperature intolerance or unexpected weight changes  · Respiratory ROS: negative for - cough, sputum, wheezing  · Cardiovascular ROS: Per HPI. · Gastrointestinal ROS: negative for - abdominal pain, diarrhea, nausea/vomiting, bleeding   · Genito-Urinary ROS: negative for - dysuria or incontinence  · Musculoskeletal ROS: negative for - joint swelling--Joint pain.   · Neurological ROS: negative for - confusion, numbness/tingling, seizures, weakness  · Dermatological ROS: negative for - rash    Physical Examination:    Vitals: 04/17/19 0921   BP: 134/64   Site: Right Upper Arm   Position: Sitting   Cuff Size: Medium Adult   Pulse: 88   SpO2: 97%   Weight: 228 lb (103.4 kg)   Height: 5' 6\" (1.676 m)   Constitutional and General Appearance:   . NAD   SKIN:  .     Warm and dry  EYES:    .     EOMI  Neck:   . Normal carotid contour  Respiratory:  Normal excursion and expansion without use of accessory muscles  Resp Auscultation: Normal breath sounds without dullness  Cardiovascular: The apical impulses not displaced  Heart tones are crisp and normal  Cervical veins are not engorged  Normal S1S2, No S3, No Murmur  Peripheral pulses are symmetrical and full  Extremities: There is no clubbing, cyanosis of the extremities. 1+ edema  Femoral Arteries: 2+ and equal  Pedal Pulses: 2+ and equal   Abdomen:  No masses or tenderness  Liver/Spleen: No Abnormalities Noted  Neurological/Psychiatric:  Alert and oriented in all spheres  Moves all extremities well  Exhibits normal gait balance and coordination  No abnormalities of mood, affect, memory    All testing and labs listed below were personally reviewed. 8/18/15 echo  Left ventricle size is normal.   Normal left ventricular wall thickness.   Global hypokinesis.   Estimated ejection fraction is 45%.   Mitral annular calcification is present.   Mild mitral regurgitation is present.   The left atrium is dilated.   Aortic valve appears sclerotic but opens adequately.   The right ventricle is mildly enlarged.   There is severe tricuspid regurgitation with RVSP estimated at 55 mmHg.   This is suggestive of pulmonary hypertension.   Right atrial size is mildly dilated .   There is a pleural effusion.     Assessment:   CAD--  cath 8/9/10 patent JAIMES to LAD, patent SVG to D1 then OM1, OM2; EF 55%    CHF--  no chf on exam    HTN  /64 (Site: Right Upper Arm, Position: Sitting, Cuff Size: Medium Adult)   Pulse 88   Ht 5' 6\" (1.676 m)   Wt 228 lb (103.4 kg)   SpO2 97%   BMI 36.80 kg/m² Hchol--will repeat    Carotid disease  4/18/18 carotid disease--less than 50% bilateral    AF  currently on Xarelto. Rate controlled      Plan:   Continue all medications    Handicap brittany GUTIERREZ    Patient has been advised on the importance of regular exercise of at least 20-30 minutes daily alternating with aerobic and isometric activities. Follow up in one year        Scribe Attestation:  I, Gus Martinez, am scribing for and in the presence of Mita Plascencia MD.   Bryn Phillips 04/17/19 10:06 AM   Provider Minor Daigle is working as a scribe for and in the presence of milena Plascencia MD). Working as a scribe, Gus Martinez may have prepopulated components of this note with my historical  intellectual property under my direct supervision. Any additions to this intellectual property were performed in my presence and at my direction. Furthermore, the content and accuracy of this note have been reviewed by me Mita Plascencia MD). If you have questions, please do not hesitate to call me. I look forward to following Johanne Chun along with you.     Sincerely,        Chihci Rao MD

## 2019-04-17 NOTE — PROGRESS NOTES
[address]  Cardiology Follow up    [facility], 1945    Primary Care Doctor:  Dulce Maria Retana MD    Chief Complaint   Patient presents with    Congestive Heart Failure     6 month f/u.  Coronary Artery Disease    Shortness of Breath     C/o noticing SOB with exertion.  Dizziness     Pt states she has noticed mild dizziness. History of Present Illness:   Ms Rangel is seen as a follow up for CAD, CABG,CHF, htn,hchol, AF CRI  Today, she complains of exertional shortness of breath. Would like to lose weight an exercise more. She has no chest pain, palpitations or dizziness. No syncope        NYHA class:  II  Allergies   Allergen Reactions    Adhesive Tape Shortness Of Breath     Other reaction(s): Ulcers    Chlorhexidine     Lisinopril Other (See Comments)     Med causes lethargy- takes in lower doses     Current Outpatient Medications   Medication Sig Dispense Refill    ferrous sulfate 325 (65 Fe) MG tablet Take 1 tablet by mouth 3 times daily (with meals) 90 tablet 3    furosemide (LASIX) 20 MG tablet TAKE 1 TABLET BY MOUTH TWICE A DAY 60 tablet 3    XARELTO 15 MG TABS tablet TAKE ONE TABLET BY MOUTH DAILY 30 tablet 4    Liraglutide (VICTOZA) 18 MG/3ML SOPN SC injection INJECT 1.8MG (0.3ML) DAILY      polyethyl glycol-propyl glycol 0.4-0.3 % (SYSTANE) 0.4-0.3 % ophthalmic solution 1 drop as needed for Dry Eyes      atorvastatin (LIPITOR) 20 MG tablet Take 20 mg by mouth daily      vitamin D (CHOLECALCIFEROL) 400 UNITS TABS tablet Take 400 Units by mouth daily      metoprolol (LOPRESSOR) 25 MG tablet Take 0.5 tablets by mouth 2 times daily 60 tablet 3    Krill Oil 300 MG CAPS Take by mouth daily      therapeutic multivitamin-minerals (THERAGRAN-M) tablet Take 1 tablet by mouth daily.  hydrOXYzine (ATARAX) 25 MG tablet Take 25 mg by mouth 3 times daily as needed.  L-Methylfolate-B6-B12 (METANX) 2.8-25-2 MG TABS Take 1 tablet by mouth daily.       ranitidine (ZANTAC) 300 MG tablet Take 150 mg by mouth 2 times daily        No current facility-administered medications for this visit.         Past Medical History:   Diagnosis Date    Arthritis     Blood circulation, collateral     Blood transfusion     CAD (coronary artery disease)     CHF (congestive heart failure) (HCC)     Chronic renal failure, stage 3 (moderate) (HCC)     Diabetes mellitus (HCC)     GERD (gastroesophageal reflux disease)     Hyperlipidemia     Hypertension      Past Surgical History:   Procedure Laterality Date    ABDOMEN SURGERY      CARDIAC SURGERY      2009    CATARACT REMOVAL WITH IMPLANT Left 11/23/2016    CATARACT REMOVAL WITH IMPLANT Right 11/02/2016    CATARACT REMOVAL WITH IMPLANT Bilateral 10/16, 11/16    CHOLECYSTECTOMY      COLONOSCOPY      ENDOSCOPY, COLON, DIAGNOSTIC      EYE SURGERY      left tear duct surgery    JOINT REPLACEMENT      BTKR    KNEE ARTHROPLASTY  09/15/2010    left total knee    TOTAL KNEE ARTHROPLASTY      VASCULAR SURGERY       Family History   Problem Relation Age of Onset    Diabetes Mother     Heart Disease Mother     Heart Disease Father     Stroke Father      Social History     Socioeconomic History    Marital status:      Spouse name: Not on file    Number of children: Not on file    Years of education: Not on file    Highest education level: Not on file   Occupational History    Not on file   Social Needs    Financial resource strain: Not on file    Food insecurity:     Worry: Not on file     Inability: Not on file    Transportation needs:     Medical: Not on file     Non-medical: Not on file   Tobacco Use    Smoking status: Never Smoker    Smokeless tobacco: Never Used   Substance and Sexual Activity    Alcohol use: No    Drug use: No    Sexual activity: Yes     Partners: Male   Lifestyle    Physical activity:     Days per week: Not on file     Minutes per session: Not on file    Stress: Not on file Relationships    Social connections:     Talks on phone: Not on file     Gets together: Not on file     Attends Yazidism service: Not on file     Active member of club or organization: Not on file     Attends meetings of clubs or organizations: Not on file     Relationship status: Not on file    Intimate partner violence:     Fear of current or ex partner: Not on file     Emotionally abused: Not on file     Physically abused: Not on file     Forced sexual activity: Not on file   Other Topics Concern    Not on file   Social History Narrative    Not on file       Review of System:  · General ROS: negative for - chills, fever   · Psychological ROS: negative for - anxiety or depression  · Ophthalmic ROS: negative for - eye pain or loss of vision  · ENT ROS: negative for - headaches, sore throat   · Allergy and Immunology ROS: negative for - hives  · Hematological and Lymphatic ROS: negative for - bleeding problems, blood clots, bruising or jaundice  · Endocrine ROS: negative for - skin changes, temperature intolerance or unexpected weight changes  · Respiratory ROS: negative for - cough, sputum, wheezing  · Cardiovascular ROS: Per HPI. · Gastrointestinal ROS: negative for - abdominal pain, diarrhea, nausea/vomiting, bleeding   · Genito-Urinary ROS: negative for - dysuria or incontinence  · Musculoskeletal ROS: negative for - joint swelling--Joint pain. · Neurological ROS: negative for - confusion, numbness/tingling, seizures, weakness  · Dermatological ROS: negative for - rash    Physical Examination:    Vitals:    04/17/19 0921   BP: 134/64   Site: Right Upper Arm   Position: Sitting   Cuff Size: Medium Adult   Pulse: 88   SpO2: 97%   Weight: 228 lb (103.4 kg)   Height: 5' 6\" (1.676 m)   Constitutional and General Appearance:   . NAD   SKIN:  .     Warm and dry  EYES:    .     EOMI  Neck:   .      Normal carotid contour  Respiratory:  Normal excursion and expansion without use of accessory muscles  Resp Auscultation: Normal breath sounds without dullness  Cardiovascular: The apical impulses not displaced  Heart tones are crisp and normal  Cervical veins are not engorged  Normal S1S2, No S3, No Murmur  Peripheral pulses are symmetrical and full  Extremities: There is no clubbing, cyanosis of the extremities. 1+ edema  Femoral Arteries: 2+ and equal  Pedal Pulses: 2+ and equal   Abdomen:  No masses or tenderness  Liver/Spleen: No Abnormalities Noted  Neurological/Psychiatric:  Alert and oriented in all spheres  Moves all extremities well  Exhibits normal gait balance and coordination  No abnormalities of mood, affect, memory    All testing and labs listed below were personally reviewed. 8/18/15 echo  Left ventricle size is normal.   Normal left ventricular wall thickness.   Global hypokinesis.   Estimated ejection fraction is 45%.   Mitral annular calcification is present.   Mild mitral regurgitation is present.   The left atrium is dilated.   Aortic valve appears sclerotic but opens adequately.   The right ventricle is mildly enlarged.   There is severe tricuspid regurgitation with RVSP estimated at 55 mmHg.   This is suggestive of pulmonary hypertension.   Right atrial size is mildly dilated .   There is a pleural effusion. Assessment:   CAD--  cath 8/9/10 patent JAIMES to LAD, patent SVG to D1 then OM1, OM2; EF 55%    CHF--  no chf on exam    HTN  /64 (Site: Right Upper Arm, Position: Sitting, Cuff Size: Medium Adult)   Pulse 88   Ht 5' 6\" (1.676 m)   Wt 228 lb (103.4 kg)   SpO2 97%   BMI 36.80 kg/m²     Hchol--will repeat    Carotid disease  4/18/18 carotid disease--less than 50% bilateral    AF  currently on Xarelto. Rate controlled      Plan:   Continue all medications    Handicap brittany     BRENDA    Patient has been advised on the importance of regular exercise of at least 20-30 minutes daily alternating with aerobic and isometric activities.     Follow up in one year        Scribe Attestation:  Paul Coleman, am scribing for and in the presence of Anastasia Ogden MD.   Ashely Juan 04/17/19 10:06 AM   Provider Catalina Belcher is working as a scribe for and in the presence of me Anastasia Ogden MD). Working as a scribe, Monae Norton may have prepopulated components of this note with my historical  intellectual property under my direct supervision. Any additions to this intellectual property were performed in my presence and at my direction. Furthermore, the content and accuracy of this note have been reviewed by me Anastasia Ogden MD).

## 2019-05-06 RX ORDER — RIVAROXABAN 15 MG/1
TABLET, FILM COATED ORAL
Qty: 30 TABLET | Refills: 3 | Status: SHIPPED | OUTPATIENT
Start: 2019-05-06 | End: 2019-09-11 | Stop reason: SDUPTHER

## 2019-07-03 PROBLEM — N17.9 AKI (ACUTE KIDNEY INJURY) (HCC): Status: ACTIVE | Noted: 2019-07-03

## 2019-09-11 RX ORDER — RIVAROXABAN 15 MG/1
TABLET, FILM COATED ORAL
Qty: 90 TABLET | Refills: 3 | Status: SHIPPED | OUTPATIENT
Start: 2019-09-11 | End: 2020-08-06

## 2019-10-31 ENCOUNTER — TELEPHONE (OUTPATIENT)
Dept: CARDIOLOGY CLINIC | Age: 74
End: 2019-10-31

## 2019-11-04 ENCOUNTER — OFFICE VISIT (OUTPATIENT)
Dept: CARDIOLOGY CLINIC | Age: 74
End: 2019-11-04
Payer: MEDICARE

## 2019-11-04 VITALS
DIASTOLIC BLOOD PRESSURE: 74 MMHG | HEIGHT: 65 IN | HEART RATE: 74 BPM | SYSTOLIC BLOOD PRESSURE: 126 MMHG | BODY MASS INDEX: 34.66 KG/M2 | WEIGHT: 208 LBS

## 2019-11-04 DIAGNOSIS — I65.23 OCCLUSION AND STENOSIS OF BILATERAL CAROTID ARTERIES: ICD-10-CM

## 2019-11-04 DIAGNOSIS — I48.0 PAROXYSMAL ATRIAL FIBRILLATION (HCC): Primary | ICD-10-CM

## 2019-11-04 DIAGNOSIS — E78.5 HYPERLIPIDEMIA, UNSPECIFIED HYPERLIPIDEMIA TYPE: ICD-10-CM

## 2019-11-04 DIAGNOSIS — I25.10 ATHEROSCLEROSIS OF NATIVE CORONARY ARTERY OF NATIVE HEART WITHOUT ANGINA PECTORIS: ICD-10-CM

## 2019-11-04 DIAGNOSIS — I50.43 ACUTE ON CHRONIC COMBINED SYSTOLIC AND DIASTOLIC HEART FAILURE (HCC): ICD-10-CM

## 2019-11-04 DIAGNOSIS — E78.2 MIXED HYPERLIPIDEMIA: ICD-10-CM

## 2019-11-04 PROCEDURE — 1036F TOBACCO NON-USER: CPT | Performed by: INTERNAL MEDICINE

## 2019-11-04 PROCEDURE — G8417 CALC BMI ABV UP PARAM F/U: HCPCS | Performed by: INTERNAL MEDICINE

## 2019-11-04 PROCEDURE — 4040F PNEUMOC VAC/ADMIN/RCVD: CPT | Performed by: INTERNAL MEDICINE

## 2019-11-04 PROCEDURE — 99214 OFFICE O/P EST MOD 30 MIN: CPT | Performed by: INTERNAL MEDICINE

## 2019-11-04 PROCEDURE — G8482 FLU IMMUNIZE ORDER/ADMIN: HCPCS | Performed by: INTERNAL MEDICINE

## 2019-11-04 PROCEDURE — G8400 PT W/DXA NO RESULTS DOC: HCPCS | Performed by: INTERNAL MEDICINE

## 2019-11-04 PROCEDURE — G8427 DOCREV CUR MEDS BY ELIG CLIN: HCPCS | Performed by: INTERNAL MEDICINE

## 2019-11-04 PROCEDURE — G8598 ASA/ANTIPLAT THER USED: HCPCS | Performed by: INTERNAL MEDICINE

## 2019-11-04 PROCEDURE — 1123F ACP DISCUSS/DSCN MKR DOCD: CPT | Performed by: INTERNAL MEDICINE

## 2019-11-04 PROCEDURE — 1090F PRES/ABSN URINE INCON ASSESS: CPT | Performed by: INTERNAL MEDICINE

## 2019-11-08 ENCOUNTER — HOSPITAL ENCOUNTER (OUTPATIENT)
Dept: ULTRASOUND IMAGING | Age: 74
Discharge: HOME | End: 2019-11-08
Payer: MEDICARE

## 2019-11-08 ENCOUNTER — HOSPITAL ENCOUNTER (OUTPATIENT)
Dept: VASCULAR LAB | Age: 74
Discharge: HOME | End: 2019-11-08
Payer: MEDICARE

## 2019-11-08 ENCOUNTER — TELEPHONE (OUTPATIENT)
Dept: CARDIOLOGY CLINIC | Age: 74
End: 2019-11-08

## 2019-11-08 DIAGNOSIS — I65.23 OCCLUSION AND STENOSIS OF BILATERAL CAROTID ARTERIES: ICD-10-CM

## 2019-11-08 DIAGNOSIS — E78.5 HYPERLIPIDEMIA, UNSPECIFIED HYPERLIPIDEMIA TYPE: ICD-10-CM

## 2019-11-08 DIAGNOSIS — I25.10 ATHEROSCLEROSIS OF NATIVE CORONARY ARTERY OF NATIVE HEART WITHOUT ANGINA PECTORIS: ICD-10-CM

## 2019-11-08 PROCEDURE — 93880 EXTRACRANIAL BILAT STUDY: CPT

## 2019-11-08 PROCEDURE — 76775 US EXAM ABDO BACK WALL LIM: CPT

## 2019-11-12 ENCOUNTER — TELEPHONE (OUTPATIENT)
Dept: CARDIOLOGY CLINIC | Age: 74
End: 2019-11-12

## 2019-11-13 ENCOUNTER — TELEPHONE (OUTPATIENT)
Dept: CARDIOLOGY CLINIC | Age: 74
End: 2019-11-13

## 2020-01-02 LAB
ALBUMIN SERPL-MCNC: 3.4 G/DL (ref 3.5–5)
ANION GAP SERPL CALCULATED.3IONS-SCNC: 13 MMOL/L (ref 6–18)
BUN BLDV-MCNC: 46 MG/DL (ref 8–26)
CALCIUM SERPL-MCNC: 9.4 MG/DL (ref 8.5–10.5)
CHLORIDE BLD-SCNC: 97 MEQ/L (ref 101–111)
CO2: 28 MMOL/L (ref 24–36)
CREAT SERPL-MCNC: 1.52 MG/DL (ref 0.44–1.03)
GFR AFRICAN AMERICAN: 37 ML/MIN/1.73 M2
GFR NON-AFRICAN AMERICAN: 32 ML/MIN/1.73 M2
GLUCOSE BLD-MCNC: 161 MG/DL (ref 70–99)
HCT VFR BLD CALC: 38.6 % (ref 36–46)
HEMOGLOBIN: 12.5 G/DL (ref 12–15.2)
MCH RBC QN AUTO: 29.9 PG (ref 27–33)
MCHC RBC AUTO-ENTMCNC: 32.5 G/DL (ref 32–36)
MCV RBC AUTO: 92.1 FL (ref 82–97)
PARATHYROID HORMONE INTACT: 27.1 PG/ML (ref 15–65)
PDW BLD-RTO: 13.9 %
PHOSPHORUS: 3.6 MG/DL (ref 2.5–4.5)
PLATELET # BLD: 283 THOU/MCL (ref 140–375)
PMV BLD AUTO: 8.2 FL (ref 7.4–11.5)
POTASSIUM SERPL-SCNC: 3.6 MEQ/L (ref 3.6–5.1)
RBC # BLD: 4.19 MIL/MCL (ref 3.8–5.2)
SODIUM BLD-SCNC: 138 MEQ/L (ref 135–145)
WBC # BLD: 7.6 THOU/MCL (ref 3.6–10.5)

## 2020-01-09 NOTE — PROGRESS NOTES
[facility]   Electrophysiology Consultation   Date: 1/14/2020  Reason for Consultation: Atrial Fibrillation  Consult Requesting Physician: Sherida Libman, MD      CC:Afib   HPI: Nicole Foster is a 66 y.o. female with a PMH of atrial fibrillation, CAD s/p CABG, HTN, CHF, hchol, and CRI. Ralph Portillo presents to the office for discussion of atrial fibrillation. By review of ECG it appears that she has been in atrial fibrillation since 2015. She does not know that she is in afib and was unaware of the symptoms of afib. Ralph Portillo does state that she has fatigue at times. She has fibromyalgia that is her biggest complaint. She had been prescribed 400 mg of CoQ10 and it caused her a rash. She tries to walk around and be active but has some complaints of shortness of breath upon activity. She recently lost weight and states this helped with her fatigue symptoms.        Past Medical History:   Diagnosis Date    Arthritis     Blood circulation, collateral     Blood transfusion     CAD (coronary artery disease)     CHF (congestive heart failure) (HCC)     Chronic renal failure, stage 3 (moderate) (HCC)     Diabetes mellitus (HCC)     GERD (gastroesophageal reflux disease)     Hyperlipidemia     Hypertension         Past Surgical History:   Procedure Laterality Date    ABDOMEN SURGERY      CARDIAC SURGERY      2009    CATARACT REMOVAL WITH IMPLANT Left 11/23/2016    CATARACT REMOVAL WITH IMPLANT Right 11/02/2016    CATARACT REMOVAL WITH IMPLANT Bilateral 10/16, 11/16    CHOLECYSTECTOMY      COLONOSCOPY      ENDOSCOPY, COLON, DIAGNOSTIC      EYE SURGERY      left tear duct surgery    JOINT REPLACEMENT      BTKR    KNEE ARTHROPLASTY  09/15/2010    left total knee    TOTAL KNEE ARTHROPLASTY      VASCULAR SURGERY         Allergies   Allergen Reactions    Adhesive Tape Shortness Of Breath     Other reaction(s): Ulcers    Chlorhexidine     Lisinopril Other (See Comments)     Med causes and imaging results reviewed. Reviewed. Lab Results   Component Value Date    CREATININE 1.52 2020    CREATININE 1.15 2019    AST 20 10/29/2019    ALT 16 10/29/2019       EC20  Atrial fibrillation     Lexiscan 3/15/18  Interpetation Summary:  The LV EF is 56%  Normal global and regional wall motion in all territories. 1.Negative nuclear stress imaging for inducible ischemia. 2.Non-diagnostic ECG for ischemia with pharmocologic stress. 3.Normal left ventriular systolic function. 4.Nuclear stress image findings indicate low risk for future      ischemic event . Echo: 8/8/15   Summary   Left ventricle size is normal.   Normal left ventricular wall thickness. Global hypokinesis. Estimated ejection fraction is 45%. Mitral annular calcification is present. Mild mitral regurgitation is present. The left atrium is dilated. Aortic valve appears sclerotic but opens adequately. The right ventricle is mildly enlarged. There is severe tricuspid regurgitation with RVSP estimated at 55 mmHg. This is suggestive of pulmonary hypertension. Right atrial size is mildly dilated . There is a pleural effusion. cath 8/9/10   patent JAIMES to LAD, patent SVG to D1 then OM1, OM2; EF 55%    Medication:  Prior to Admission medications    Medication Sig Start Date End Date Taking?  Authorizing Provider   Coenzyme Q10 (CO Q-10) 400 MG CAPS Take by mouth   Yes Historical Provider, MD   furosemide (LASIX) 20 MG tablet TAKE ONE TABLET BY MOUTH TWICE DAILY  19  Yes Chaparrita Johansen MD   XARELTO 15 MG TABS tablet TAKE ONE TABLET BY MOUTH DAILY 19  Yes Shelia Rocha MD   Liraglutide (VICTOZA) 18 MG/3ML SOPN SC injection Inject 1.2 mg into the skin daily   Yes Historical Provider, MD   Handicap Placard MISC by Does not apply route Sob when walking less than 200 feet  Length of time--greater than 5 year 19  Yes Shelia Rocha MD   ferrous sulfate 325 (65 Fe) MG tablet Take 1 tablet by mouth 3 times daily (with meals) 4/3/19  Yes Liat Flaherty MD   polyethyl glycol-propyl glycol 0.4-0.3 % (SYSTANE) 0.4-0.3 % ophthalmic solution 1 drop as needed for Dry Eyes   Yes Historical Provider, MD   atorvastatin (LIPITOR) 20 MG tablet Take 20 mg by mouth daily   Yes Historical Provider, MD   vitamin D (CHOLECALCIFEROL) 400 UNITS TABS tablet Take 400 Units by mouth daily   Yes Historical Provider, MD   metoprolol (LOPRESSOR) 25 MG tablet Take 0.5 tablets by mouth 2 times daily 8/29/15  Yes Chavo Drew MD   therapeutic multivitamin-minerals [facility]) tablet Take 1 tablet by mouth daily. Yes Historical Provider, MD   hydrOXYzine (ATARAX) 25 MG tablet Take 25 mg by mouth 3 times daily as needed. 8/9/10  Yes Historical Provider, MD   ranitidine (ZANTAC) 300 MG tablet Take 150 mg by mouth 2 times daily  8/9/10  Yes Historical Provider, MD       Assessment and plan:      Long persistent atrial fibrillation   It appears that patient has been in atrial fibrillation since 2015. She has chronic fatigue and also dyspnea on exertion. Is not clear whether these are related to atrial fibrillation or other comorbidities that she has including coronary artery disease, morbid obesity, and hypertension. On xarelto   Metoprolol 12.5 mg BID   Rate controlled. Afib risk factors including age, HTN, obesity, inactivity and INOCENCIO were discussed with patient. Risk factor modification recommended. All questions were answered. - Treatment options including cardioversion, rate control strategy, antiarrhythmics, anticoagulation and possible ablation were discussed with patient. Risks, benefits and alternative of each treatment options were explained. All questions answered    Discussed doing a DCCV to determine if she feels better in sinus rhythm.    Explained to patient and family that since her afib is now chronic there is less of a chance to be successful with treatment

## 2020-01-14 ENCOUNTER — OFFICE VISIT (OUTPATIENT)
Dept: CARDIOLOGY CLINIC | Age: 75
End: 2020-01-14
Payer: MEDICARE

## 2020-01-14 VITALS
BODY MASS INDEX: 36.64 KG/M2 | WEIGHT: 228 LBS | HEART RATE: 96 BPM | RESPIRATION RATE: 20 BRPM | SYSTOLIC BLOOD PRESSURE: 133 MMHG | DIASTOLIC BLOOD PRESSURE: 83 MMHG | HEIGHT: 66 IN

## 2020-01-14 PROCEDURE — G8427 DOCREV CUR MEDS BY ELIG CLIN: HCPCS | Performed by: INTERNAL MEDICINE

## 2020-01-14 PROCEDURE — G8417 CALC BMI ABV UP PARAM F/U: HCPCS | Performed by: INTERNAL MEDICINE

## 2020-01-14 PROCEDURE — 4040F PNEUMOC VAC/ADMIN/RCVD: CPT | Performed by: INTERNAL MEDICINE

## 2020-01-14 PROCEDURE — G8400 PT W/DXA NO RESULTS DOC: HCPCS | Performed by: INTERNAL MEDICINE

## 2020-01-14 PROCEDURE — 99214 OFFICE O/P EST MOD 30 MIN: CPT | Performed by: INTERNAL MEDICINE

## 2020-01-14 PROCEDURE — 93000 ELECTROCARDIOGRAM COMPLETE: CPT | Performed by: INTERNAL MEDICINE

## 2020-01-14 PROCEDURE — 1036F TOBACCO NON-USER: CPT | Performed by: INTERNAL MEDICINE

## 2020-01-14 PROCEDURE — G8482 FLU IMMUNIZE ORDER/ADMIN: HCPCS | Performed by: INTERNAL MEDICINE

## 2020-01-14 PROCEDURE — 1123F ACP DISCUSS/DSCN MKR DOCD: CPT | Performed by: INTERNAL MEDICINE

## 2020-01-14 PROCEDURE — 1090F PRES/ABSN URINE INCON ASSESS: CPT | Performed by: INTERNAL MEDICINE

## 2020-01-14 NOTE — LETTER
[facility]    1/14/20  [facility] [place]  1945  EP Procedures     Pacemaker implant (single/dual)  EP Study    ICD implant (single/dual)  Atrial flutter ablation (SOHAIL Y/N)    Biv implant ICD  Cryoablation    Biv implant PPM  Atrial fibrillation ablation (SOHAIL Yes)    Generator Change (PPM/ICD/BiV)  SVT ablation    Lead revision (RV/LA/RA) (<1 month)  VT ablation      Lead extraction +/- upgrade (BiV/PPM/ICD)  VT Ischemic/ non-ischemic    Loop implant/ removal  VT RVOT   xxxx Cardioversion  VT Left sided    SOHAIL  AVN ablation     Equipment     [employer]   EDISON Mapping System    [place]  [id number] [address] [employer]  CryoAblation    B[employer]ron[employer]  Laser Lead Extraction     EP Procedures Scheduling Request    # hours Requested     Specific Day    Anesthesia    CT surgery backup    Overnight stay    Location [facility]     Pre-Procedure Labs / Imaging     PT/INR  Type & cross    CBC  Units PRBC    BMP/Mg  Units FFP    Venogram  CXR    Echo  Cardiac CTA for Pulmonary vein mapping     RN INITIALS: RA    Patient Instructions  Do not eat or drink after midnight the night prior to procedure  Dx: Afib  On xarelto 15 mg

## 2020-01-27 ENCOUNTER — HOSPITAL ENCOUNTER (OUTPATIENT)
Dept: CARDIAC CATH/INVASIVE PROCEDURES | Age: 75
Discharge: HOME | End: 2020-01-27
Attending: INTERNAL MEDICINE | Admitting: INTERNAL MEDICINE
Payer: MEDICARE

## 2020-01-27 VITALS
TEMPERATURE: 98 F | HEART RATE: 82 BPM | DIASTOLIC BLOOD PRESSURE: 90 MMHG | HEIGHT: 66 IN | BODY MASS INDEX: 36.64 KG/M2 | SYSTOLIC BLOOD PRESSURE: 163 MMHG | WEIGHT: 228 LBS | RESPIRATION RATE: 18 BRPM

## 2020-01-27 LAB
EKG ATRIAL RATE: 56 BPM
EKG ATRIAL RATE: 84 BPM
EKG DIAGNOSIS: NORMAL
EKG DIAGNOSIS: NORMAL
EKG P AXIS: 83 DEGREES
EKG P-R INTERVAL: 202 MS
EKG Q-T INTERVAL: 434 MS
EKG Q-T INTERVAL: 492 MS
EKG QRS DURATION: 164 MS
EKG QRS DURATION: 166 MS
EKG QTC CALCULATION (BAZETT): 474 MS
EKG QTC CALCULATION (BAZETT): 507 MS
EKG R AXIS: -66 DEGREES
EKG R AXIS: -67 DEGREES
EKG T AXIS: -1 DEGREES
EKG T AXIS: -18 DEGREES
EKG VENTRICULAR RATE: 56 BPM
EKG VENTRICULAR RATE: 82 BPM

## 2020-01-27 PROCEDURE — 93005 ELECTROCARDIOGRAM TRACING: CPT | Performed by: INTERNAL MEDICINE

## 2020-01-27 PROCEDURE — 93010 ELECTROCARDIOGRAM REPORT: CPT | Performed by: INTERNAL MEDICINE

## 2020-01-27 PROCEDURE — 2500000003 HC RX 250 WO HCPCS

## 2020-01-27 PROCEDURE — 92960 CARDIOVERSION ELECTRIC EXT: CPT | Performed by: INTERNAL MEDICINE

## 2020-01-27 PROCEDURE — 7100000010 HC PHASE II RECOVERY - FIRST 15 MIN

## 2020-01-27 PROCEDURE — 92960 CARDIOVERSION ELECTRIC EXT: CPT

## 2020-01-27 NOTE — PROCEDURES
[employer]     Electrophysiology Procedure Note       Date of Procedure: 1/27/2020  Patient's Name: Rik Cochran  YOB: 1945   Medical Record Number: [id number]  Procedure Performed by: Chintan Church MD    Procedures performed:  IV sedation. External Electrical cardioversion     Indication of the procedure: Persistent atrial fibrillation     Details of procedure: The patient was brought to the cath lab area in a fasting and non-sedated state. The risks, benefits and alternatives of the procedure were discussed with the patient. The patient opted to proceed with the procedure. Written informed consent was signed and placed in the chart. A timeout protocol was completed to identify the patient and the procedure being performed. Patient is on chronic anticoagulation therapy. Then we used brevital for sedation and electrical DC cardioversion was perfomred using 200J, synchronized shock. Patient was converted to sinus rhythm. The patient tolerated the procedure well and there were no complications.      Conclusion:   Successful external DC cardioversion of atrial fibrillation

## 2020-01-27 NOTE — H&P
H&P Update    I have reviewed the history and physical and examined the patient and updated with relevant changes. Consent: I have discussed with the patient and/or the patient representative the indication, alternatives, and the possible risks and/or complications of the planned procedure and the anesthesia methods. The patient and/or patient representative appear to understand and agree to proceed. Vitals:    01/27/20 0839   BP: (!) 163/90   Pulse: 82   Resp: 18   Temp: 98 °F (36.7 °C)     Prior to Admission medications    Medication Sig Start Date End Date Taking? Authorizing Provider   Coenzyme Q10 (CO Q-10) 400 MG CAPS Take by mouth   Yes Historical Provider, MD   furosemide (LASIX) 20 MG tablet TAKE ONE TABLET BY MOUTH TWICE DAILY  11/14/19  Yes Tommie Ramirez MD   XARELTO 15 MG TABS tablet TAKE ONE TABLET BY MOUTH DAILY 9/11/19  Yes Jason Tony MD   Liraglutide (VICTOZA) 18 MG/3ML SOPN SC injection Inject 1.2 mg into the skin daily   Yes Historical Provider, MD   ferrous sulfate 325 (65 Fe) MG tablet Take 1 tablet by mouth 3 times daily (with meals) 4/3/19  Yes Tommie Ramirez MD   atorvastatin (LIPITOR) 20 MG tablet Take 20 mg by mouth daily   Yes Historical Provider, MD   vitamin D (CHOLECALCIFEROL) 400 UNITS TABS tablet Take 400 Units by mouth daily   Yes Historical Provider, MD   metoprolol (LOPRESSOR) 25 MG tablet Take 0.5 tablets by mouth 2 times daily 8/29/15  Yes Tyson Juárez MD   therapeutic multivitamin-minerals [facility]) tablet Take 1 tablet by mouth daily. Yes Historical Provider, MD   hydrOXYzine (ATARAX) 25 MG tablet Take 25 mg by mouth 3 times daily as needed.  8/9/10  Yes Historical Provider, MD   ranitidine (ZANTAC) 300 MG tablet Take 150 mg by mouth 2 times daily  8/9/10  Yes Historical Provider, MD   Handicap Placard MISC by Does not apply route Sob when walking less than 200 feet  Length of time--greater than 5 year 4/17/19   Jason Tony MD polyethyl glycol-propyl glycol 0.4-0.3 % (SYSTANE) 0.4-0.3 % ophthalmic solution 1 drop as needed for Dry Eyes    Historical Provider, MD     Past Medical History:   Diagnosis Date    Arthritis     Blood circulation, collateral     Blood transfusion     CAD (coronary artery disease)     CHF (congestive heart failure) (HCC)     Chronic renal failure, stage 3 (moderate) (HCC)     Diabetes mellitus ([address]     GERD (gastroesophageal reflux disease)     Hyperlipidemia     Hypertension      Past Surgical History:   Procedure Laterality Date    ABDOMEN SURGERY      CARDIAC SURGERY      2009    CATARACT REMOVAL WITH IMPLANT Left 11/23/2016    CATARACT REMOVAL WITH IMPLANT Right 11/02/2016    CATARACT REMOVAL WITH IMPLANT Bilateral 10/16, 11/16    CHOLECYSTECTOMY      COLONOSCOPY      ENDOSCOPY, COLON, DIAGNOSTIC      EYE SURGERY      left tear duct surgery    JOINT REPLACEMENT      BTKR    KNEE ARTHROPLASTY  09/15/2010    left total knee    TOTAL KNEE ARTHROPLASTY      VASCULAR SURGERY       Allergies   Allergen Reactions    Adhesive Tape Shortness Of Breath     Other reaction(s): Ulcers    Chlorhexidine     Lisinopril Other (See Comments)     Med causes lethargy- takes in lower doses       Pre-Sedation Documentation and Exam:   I have personally completed a history, physical exam & review of systems for this patient (see notes).     Mallampati Airway Assessment:  Class I     Prior History of Anesthesia Complications:   None    ASA Classification:  Class 3 - A patient with severe systemic disease that limits activity but is not incapacitating    Sedation/ Anesthesia Plan:   Intravenous sedation    Medications Planned:   Brevital intravenously     Patient is an appropriate candidate for plan of sedation:   Yes    Electronically signed by Delonte Karimi MD on 1/27/2020 at 8:42 AM

## 2020-03-28 ENCOUNTER — HOSPITAL ENCOUNTER (EMERGENCY)
Age: 75
Discharge: HOME | End: 2020-03-29
Payer: MEDICARE

## 2020-03-28 ENCOUNTER — APPOINTMENT (OUTPATIENT)
Dept: CT IMAGING | Age: 75
End: 2020-03-28
Payer: MEDICARE

## 2020-03-28 PROCEDURE — 70486 CT MAXILLOFACIAL W/O DYE: CPT

## 2020-03-28 PROCEDURE — 72125 CT NECK SPINE W/O DYE: CPT

## 2020-03-28 PROCEDURE — 70450 CT HEAD/BRAIN W/O DYE: CPT

## 2020-03-28 PROCEDURE — 99284 EMERGENCY DEPT VISIT MOD MDM: CPT

## 2020-03-28 RX ORDER — ONDANSETRON 4 MG/1
4 TABLET, ORALLY DISINTEGRATING ORAL ONCE
Status: COMPLETED | OUTPATIENT
Start: 2020-03-28 | End: 2020-03-29

## 2020-03-28 RX ORDER — OXYCODONE HYDROCHLORIDE AND ACETAMINOPHEN 5; 325 MG/1; MG/1
2 TABLET ORAL ONCE
Status: COMPLETED | OUTPATIENT
Start: 2020-03-28 | End: 2020-03-29

## 2020-03-28 ASSESSMENT — PAIN DESCRIPTION - LOCATION: LOCATION: HEAD

## 2020-03-28 ASSESSMENT — PAIN SCALES - GENERAL: PAINLEVEL_OUTOF10: 9

## 2020-03-28 ASSESSMENT — PAIN DESCRIPTION - PAIN TYPE: TYPE: ACUTE PAIN

## 2020-03-29 ENCOUNTER — APPOINTMENT (OUTPATIENT)
Dept: GENERAL RADIOLOGY | Age: 75
End: 2020-03-29
Payer: MEDICARE

## 2020-03-29 VITALS
TEMPERATURE: 98.4 F | RESPIRATION RATE: 16 BRPM | HEIGHT: 66 IN | HEART RATE: 80 BPM | BODY MASS INDEX: 36.96 KG/M2 | OXYGEN SATURATION: 94 % | DIASTOLIC BLOOD PRESSURE: 54 MMHG | WEIGHT: 230 LBS | SYSTOLIC BLOOD PRESSURE: 143 MMHG

## 2020-03-29 PROCEDURE — 73564 X-RAY EXAM KNEE 4 OR MORE: CPT

## 2020-03-29 PROCEDURE — 6370000000 HC RX 637 (ALT 250 FOR IP): Performed by: PHYSICIAN ASSISTANT

## 2020-03-29 RX ORDER — ONDANSETRON 4 MG/1
4 TABLET, ORALLY DISINTEGRATING ORAL ONCE
Status: COMPLETED | OUTPATIENT
Start: 2020-03-29 | End: 2020-03-29

## 2020-03-29 RX ADMIN — OXYCODONE HYDROCHLORIDE AND ACETAMINOPHEN 2 TABLET: 5; 325 TABLET ORAL at 00:00

## 2020-03-29 RX ADMIN — ONDANSETRON 4 MG: 4 TABLET, ORALLY DISINTEGRATING ORAL at 01:20

## 2020-03-29 RX ADMIN — ONDANSETRON 4 MG: 4 TABLET, ORALLY DISINTEGRATING ORAL at 00:00

## 2020-03-29 ASSESSMENT — ENCOUNTER SYMPTOMS
NAUSEA: 0
DIARRHEA: 0
SHORTNESS OF BREATH: 0
ABDOMINAL PAIN: 0
VOMITING: 0

## 2020-03-29 ASSESSMENT — PAIN SCALES - GENERAL: PAINLEVEL_OUTOF10: 9

## 2020-03-29 NOTE — ED PROVIDER NOTES
[facility]        Pt Name: Bello Mckeon  MRN: [id number]  [place] 1945  Date of evaluation: 3/28/2020  Provider: Luciana Courtney PA-C  PCP: Daniel Mcneil MD    Evaluation by YONAS. My supervising physician was available for consultation. CHIEF COMPLAINT       Chief Complaint   Patient presents with    Fall     Pt presented to the ED from via [facility] after the Pt fell when she tripped on the carpet. The Pt hit her head and fell on her left knee, denies LOC. Pt is on blood thinners. HISTORY OF PRESENT ILLNESS   (Location, Timing/Onset, Context/Setting, Quality, Duration, Modifying Factors, Severity, Associated Signs and Symptoms)  Note limiting factors. Bello Mckeon is a 66 y.o. female who presents to the emergency department today for evaluation for a fall which occurred around 10:30 PM tonight. The patient states that she was walking to go to the restroom, and she states that she tripped over a rug, and she fell hitting her head, and landing on her left knee. The patient denies feeling dizzy, lightheaded, having chest pain or shortness of breath before her fall, her fall was mechanical in nature. The patient is complaining of pain mostly to her left knee when she is rating as a 9/10. She does have a history of a prosthesis in that knee. Patient states that her pain is constant, and she does have some bruising to the left side of her face but she denies a headache. She is no visual changes. She has no numbness, tingling or weakness. She is no neck pain or back pain. She does have a history of A. fib, and is on Xarelto. No other blood thinners, no other complaint    Nursing Notes were all reviewed and agreed with or any disagreements were addressed in the HPI.     REVIEW OF SYSTEMS    (2-9 systems for level 4, 10 or more for level 5)     Review of Systems   Constitutional: Negative for activity change, radiographic images are visualized and preliminarily interpreted by the ED Provider with the below findings:        Interpretation per the Radiologist below, if available at the time of this note:    XR KNEE LEFT (MIN 4 VIEWS)   Final Result   Anterior soft tissue swelling with no evidence of an acute fracture. CT FACIAL BONES WO CONTRAST   Final Result   No acute intracranial abnormality. No acute traumatic injury of the facial bones. Left forehead soft tissue swelling and subcutaneous hematoma         CT CERVICAL SPINE WO CONTRAST   Final Result   Multilevel degenerate changes. No acute fracture or traumatic malalignment. CT HEAD WO CONTRAST   Final Result   No acute intracranial abnormality. No acute traumatic injury of the facial bones. Left forehead soft tissue swelling and subcutaneous hematoma           No results found. PROCEDURES   Unless otherwise noted below, none     Procedures    CRITICAL CARE TIME   N/A    CONSULTS:  None      EMERGENCY DEPARTMENT COURSE and DIFFERENTIAL DIAGNOSIS/MDM:   Vitals:    Vitals:    03/28/20 2315 03/28/20 2330   BP: (!) 160/65 (!) 166/59   Pulse: 85 76   Resp: 16 16   Temp: 98.4 °F (36.9 °C)    TempSrc: Oral    SpO2: 97% 98%   Weight: 230 lb (104.3 kg)    Height: 5' 6\" (1.676 m)        Patient was given the following medications:  Medications   oxyCODONE-acetaminophen (PERCOCET) 5-325 MG per tablet 2 tablet (2 tablets Oral Given 3/29/20 0000)   ondansetron (ZOFRAN-ODT) disintegrating tablet 4 mg (4 mg Oral Given 3/29/20 0000)       The patient presents to the emergency department today for evaluation for a fall which occurred around 10:30 PM tonight. The patient states that she was walking to go to the restroom, and she states that she tripped over a rug, and she fell hitting her head, and landing on her left knee.   The patient denies feeling dizzy, lightheaded, having chest pain or shortness of breath before her fall, her fall was [place] [address]  [phone]    Schedule an appointment as soon as possible for a visit in 2 days      [facility] Emergency Department  [address] [facility]  [phone]    As needed, If symptoms worsen      DISCHARGE MEDICATIONS:  New Prescriptions    No medications on file       DISCONTINUED MEDICATIONS:  Discontinued Medications    No medications on file              (Please note that portions of this note were completed with a voice recognition program.  Efforts were made to edit the dictations but occasionally words are mis-transcribed.)    Tato Mcmanus PA-C (electronically signed)            Tato Mcmanus PA-C  03/29/20 0040

## 2020-03-30 ENCOUNTER — CARE COORDINATION (OUTPATIENT)
Dept: CARE COORDINATION | Age: 75
End: 2020-03-30

## 2020-04-01 ENCOUNTER — CARE COORDINATION (OUTPATIENT)
Dept: CARE COORDINATION | Age: 75
End: 2020-04-01

## 2020-06-30 ENCOUNTER — TELEPHONE (OUTPATIENT)
Dept: CARDIOLOGY CLINIC | Age: 75
End: 2020-06-30

## 2020-06-30 NOTE — TELEPHONE ENCOUNTER
Notified patient of all above, agrees to see Dr Darlene Wolf ordered, she will call if she has any further recurrences of bleeding    Schedulers--can you call and find an appointment for her to see Dr Briana Correa to discuss Watchman procedure

## 2020-06-30 NOTE — TELEPHONE ENCOUNTER
Yes. Please ensure that spot for now and place her on the list for call back earlier spots open up.     Joel Foster, JOBY-CNP

## 2020-06-30 NOTE — TELEPHONE ENCOUNTER
Pt states she is having bleeding from nose and hard to stop 1-1 1/2 hr beginning of June and a few weeks later she began having  chunky bloody phlegm in mouth thru out the day. She read that these are warning from [address]. Please call to advise.

## 2020-07-01 ENCOUNTER — HOSPITAL ENCOUNTER (OUTPATIENT)
Age: 75
Discharge: HOME | End: 2020-07-01
Payer: MEDICARE

## 2020-07-01 LAB
HCT VFR BLD CALC: 34.4 % (ref 36–48)
HEMOGLOBIN: 11.2 G/DL (ref 12–16)
MCH RBC QN AUTO: 30.1 PG (ref 26–34)
MCHC RBC AUTO-ENTMCNC: 32.6 G/DL (ref 31–36)
MCV RBC AUTO: 92.4 FL (ref 80–100)
PDW BLD-RTO: 15.2 % (ref 12.4–15.4)
PLATELET # BLD: 263 K/UL (ref 135–450)
PMV BLD AUTO: 8.3 FL (ref 5–10.5)
RBC # BLD: 3.72 M/UL (ref 4–5.2)
WBC # BLD: 6.8 K/UL (ref 4–11)

## 2020-07-01 PROCEDURE — 85027 COMPLETE CBC AUTOMATED: CPT

## 2020-07-01 PROCEDURE — 36415 COLL VENOUS BLD VENIPUNCTURE: CPT

## 2020-07-27 ENCOUNTER — HOSPITAL ENCOUNTER (OUTPATIENT)
Dept: WOUND CARE | Age: 75
Discharge: HOME | End: 2020-07-27

## 2020-08-03 ENCOUNTER — HOSPITAL ENCOUNTER (OUTPATIENT)
Dept: WOUND CARE | Age: 75
Discharge: HOME | End: 2020-08-03
Payer: MEDICARE

## 2020-08-03 PROCEDURE — 11042 DBRDMT SUBQ TIS 1ST 20SQCM/<: CPT | Performed by: NURSE PRACTITIONER

## 2020-08-03 PROCEDURE — 99202 OFFICE O/P NEW SF 15 MIN: CPT | Performed by: NURSE PRACTITIONER

## 2020-08-03 PROCEDURE — 11042 DBRDMT SUBQ TIS 1ST 20SQCM/<: CPT

## 2020-08-03 PROCEDURE — 99213 OFFICE O/P EST LOW 20 MIN: CPT

## 2020-08-03 RX ORDER — LIDOCAINE 50 MG/G
OINTMENT TOPICAL ONCE
Status: CANCELLED | OUTPATIENT
Start: 2020-08-03

## 2020-08-03 RX ORDER — BETAMETHASONE DIPROPIONATE 0.05 %
OINTMENT (GRAM) TOPICAL ONCE
Status: CANCELLED | OUTPATIENT
Start: 2020-08-03

## 2020-08-03 RX ORDER — LIDOCAINE HYDROCHLORIDE 40 MG/ML
SOLUTION TOPICAL ONCE
Status: CANCELLED | OUTPATIENT
Start: 2020-08-03

## 2020-08-03 RX ORDER — BACITRACIN, NEOMYCIN, POLYMYXIN B 400; 3.5; 5 [USP'U]/G; MG/G; [USP'U]/G
OINTMENT TOPICAL ONCE
Status: CANCELLED | OUTPATIENT
Start: 2020-08-03

## 2020-08-03 RX ORDER — GENTAMICIN SULFATE 1 MG/G
OINTMENT TOPICAL ONCE
Status: CANCELLED | OUTPATIENT
Start: 2020-08-03

## 2020-08-03 RX ORDER — BACITRACIN ZINC AND POLYMYXIN B SULFATE 500; 1000 [USP'U]/G; [USP'U]/G
OINTMENT TOPICAL ONCE
Status: CANCELLED | OUTPATIENT
Start: 2020-08-03

## 2020-08-03 RX ORDER — LIDOCAINE 40 MG/G
CREAM TOPICAL ONCE
Status: DISCONTINUED | OUTPATIENT
Start: 2020-08-03 | End: 2020-08-04 | Stop reason: HOSPADM

## 2020-08-03 RX ORDER — GINSENG 100 MG
CAPSULE ORAL ONCE
Status: CANCELLED | OUTPATIENT
Start: 2020-08-03

## 2020-08-03 RX ORDER — CLOBETASOL PROPIONATE 0.5 MG/G
OINTMENT TOPICAL ONCE
Status: CANCELLED | OUTPATIENT
Start: 2020-08-03

## 2020-08-03 RX ORDER — LIDOCAINE 40 MG/G
CREAM TOPICAL ONCE
Status: CANCELLED | OUTPATIENT
Start: 2020-08-03

## 2020-08-03 NOTE — PLAN OF CARE
Discharge instructions given. Patient verbalized understanding. Return to [facility] in 1 week.   Applied Unna Boots/ Betamethasone

## 2020-08-03 NOTE — PLAN OF CARE
Compression Application   Below Knee    NAME:  Malvin Seip  YOB: 1945  MEDICAL RECORD NUMBER:  [id number]  DATE:  8/3/2020       Applied moisturizing agent to dry skin as needed. Appied primary and secondary dressing as ordered     Applied  Unna Boot wrap from toes to knee overlapping each time. Applied cast padding and coban from toes to below the knee. Instructed patient/caregiver to keep dressing dry and intact. DO NOT REMOVE DRESSING. Instructed pt/family/caregiver to report excessive draining, loose bandage, wet dressing, severe pain or tingling in toes. Applied Compression dressing below the knee to Bilateral lower leg(s)      Compression wrap(s) were applied per  Guidelines.      Electronically signed by Yonis Snell RN on 8/3/2020 at 11:11 AM

## 2020-08-04 NOTE — PROGRESS NOTES
[facility]  Progress Note and Procedure Note      Sugey Lopes  AGE: 66 y.o. GENDER: female  : 1945  TODAY'S DATE:  8/3/2020    Subjective:     Chief Complaint   Patient presents with    Wound Check     BLE wounds on and off for 5 years         HISTORY of PRESENT ILLNESS HPI     Sugey Lopes is a 66 y.o. female who presents today for wound evaluation. Pt accompanied by  who assists pt with bilateral lower leg care. Pt has long history of leg wounds. Pt has lymphedema pumps at home but does not use consistently, but understands importance of use. Pt admits to \"picking wounds\" on legs. History of Wound: off and on for last 5 years.   Wound Pain:  intermittent, mild  Severity:  1 / 10   Wound Type:  venous  Modifying Factors:  edema, venous stasis, lymphedema, diabetes, obesity and non-adherence  Associated Signs/Symptoms:  edema, erythema, drainage and pain        PAST MEDICAL HISTORY        Diagnosis Date    Arthritis     Blood circulation, collateral     Blood transfusion     CAD (coronary artery disease)     CHF (congestive heart failure) (HCC)     Chronic renal failure, stage 3 (moderate) (HCC)     Diabetes mellitus ([address]     GERD (gastroesophageal reflux disease)     Hyperlipidemia     Hypertension        PAST SURGICAL HISTORY    Past Surgical History:   Procedure Laterality Date    ABDOMEN SURGERY      CARDIAC SURGERY          CATARACT REMOVAL WITH IMPLANT Left 2016    CATARACT REMOVAL WITH IMPLANT Right 2016    CATARACT REMOVAL WITH IMPLANT Bilateral 10/16,     CHOLECYSTECTOMY      COLONOSCOPY      ENDOSCOPY, COLON, DIAGNOSTIC      EYE SURGERY      left tear duct surgery    JOINT REPLACEMENT      BTKR    KNEE ARTHROPLASTY  09/15/2010    left total knee    TOTAL KNEE ARTHROPLASTY      VASCULAR SURGERY         FAMILY HISTORY    Family History   Problem Relation Age of Onset    Diabetes Mother     Heart Disease Mother  Heart Disease Father     Stroke Father        SOCIAL HISTORY    Social History     Tobacco Use    Smoking status: Never Smoker    Smokeless tobacco: Never Used   Substance Use Topics    Alcohol use: No    Drug use: No       ALLERGIES    Allergies   Allergen Reactions    Adhesive Tape Shortness Of Breath     Other reaction(s): Ulcers    Chlorhexidine     Lisinopril Other (See Comments)     Med causes lethargy- takes in lower doses       MEDICATIONS    Current Outpatient Medications on File Prior to Encounter   Medication Sig Dispense Refill    furosemide (LASIX) 20 MG tablet TAKE ONE TABLET BY MOUTH TWICE DAILY  60 tablet 3    Coenzyme Q10 (CO Q-10) 400 MG CAPS Take by mouth      XARELTO 15 MG TABS tablet TAKE ONE TABLET BY MOUTH DAILY 90 tablet 3    Liraglutide (VICTOZA) 18 MG/3ML SOPN SC injection Inject 1.2 mg into the skin daily      Handicap Placard MISC by Does not apply route Sob when walking less than 200 feet  Length of time--greater than 5 year 1 each 0    ferrous sulfate 325 (65 Fe) MG tablet Take 1 tablet by mouth 3 times daily (with meals) 90 tablet 3    polyethyl glycol-propyl glycol 0.4-0.3 % (SYSTANE) 0.4-0.3 % ophthalmic solution 1 drop as needed for Dry Eyes      atorvastatin (LIPITOR) 20 MG tablet Take 20 mg by mouth daily      vitamin D (CHOLECALCIFEROL) 400 UNITS TABS tablet Take 400 Units by mouth daily      metoprolol (LOPRESSOR) 25 MG tablet Take 0.5 tablets by mouth 2 times daily 60 tablet 3    therapeutic multivitamin-minerals (THERAGRAN-M) tablet Take 1 tablet by mouth daily.  hydrOXYzine (ATARAX) 25 MG tablet Take 25 mg by mouth 3 times daily as needed.  ranitidine (ZANTAC) 300 MG tablet Take 150 mg by mouth 2 times daily        No current facility-administered medications on file prior to encounter. REVIEW OF SYSTEMS    Pertinent items are noted in HPI. Objective: There were no vitals taken for this visit.     PHYSICAL EXAM    General Appearance: alert and oriented to person, place and time, with anxious affect, obese and pale  Skin: warm and dry  Head: normocephalic and atraumatic  Eyes: pupils equal, round, and reactive to light  Pulmonary/Chest: clear to auscultation bilaterally- no wheezes, rales or rhonchi, normal air movement, no respiratory distress  Cardiovascular: normal rate, regular rhythm, no murmurs, no gallops, intact distal pulses and no carotid bruits  Extremities: no cyanosis, no clubbing and 1 + edema-  bilateral lower legs  Wounds:  Anterior surfaces of bilateral lower legs with right> left. Clusters of superficial open areas below knees where pt can reach and scratch/pick wounds.        Assessment:     Patient Active Problem List   Diagnosis    Osteoarthritis of left knee    HTN (hypertension)    Acute blood loss anemia    Diabetes mellitus ([address]    Coronary atherosclerosis of native coronary artery    Ischemic cardiomyopathy    Atrial fibrillation    Peripheral vascular disease ([address]    CHF exacerbation (HCC)    Acute on chronic combined systolic and diastolic heart failure (HCC)    Chronic renal failure, stage 3 (moderate) (HCC)    Chronic atrial fibrillation    Pleural effusion    Atherosclerosis of native coronary artery without angina pectoris    Essential hypertension    Chronic combined systolic and diastolic congestive heart failure ([address]    Hx of CABG    Non-rheumatic tricuspid valve insufficiency    Mixed hyperlipidemia    Carotid disease, bilateral ([address]    Atherosclerosis of native coronary artery of native heart without angina pectoris    Chronic kidney disease    Hyperlipidemia    INOCENCIO (obstructive sleep apnea)    Lymphedema    Leg swelling    Venous stasis dermatitis of both lower extremities    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 Devitalized Tissue Debrided:  fibrin, biofilm and slough    Pre Debridement Measurements:  Are located in the Wound Documentation Flow Sheet    Wound #: 2     Post  Debridement Measurements:  Wound 08/03/20 Leg Right;Lateral #1 (Active)   Wound Image   08/03/20 0929   Wound Other 08/03/20 0929   Wound Length (cm) 1.5 cm 08/03/20 0929   Wound Width (cm) 0.9 cm 08/03/20 0929   Wound Depth (cm) 0.1 cm 08/03/20 0929   Wound Surface Area (cm^2) 1.35 cm^2 08/03/20 0929   Wound Volume (cm^3) 0.14 cm^3 08/03/20 0929   Post-Procedure Length (cm) 1.6 cm 08/03/20 0942   Post-Procedure Width (cm) 1 cm 08/03/20 0942   Post-Procedure Depth (cm) 0.1 cm 08/03/20 0942   Post-Procedure Surface Area (cm^2) 1.6 cm^2 08/03/20 0942   Post-Procedure Volume (cm^3) 0.16 cm^3 08/03/20 0942   Wound Assessment Bleeding 08/03/20 0942   Drainage Amount Moderate 08/03/20 0942   Drainage Description Serosanguinous 08/03/20 0942   Ximena-wound Assessment Excoriated 08/03/20 0929   Rippey%Wound Bed 100 08/03/20 0929   Red%Wound Bed 0 08/03/20 0929   Yellow%Wound Bed 0 08/03/20 0929   Black%Wound Bed 0 08/03/20 0929   Purple%Wound Bed 0 08/03/20 0929   Number of days: 1       Wound 08/03/20 Leg Left; Anterior #2 (Active)   Wound Image   08/03/20 0929   Wound Other 08/03/20 0929   Wound Cleansed Rinsed/Irrigated with saline 08/03/20 0929   Wound Length (cm) 0.8 cm 08/03/20 0929   Wound Width (cm) 0.6 cm 08/03/20 0929   Wound Depth (cm) 0.1 cm 08/03/20 0929   Wound Surface Area (cm^2) 0.48 cm^2 08/03/20 0929   Wound Volume (cm^3) 0.05 cm^3 08/03/20 0929   Post-Procedure Length (cm) 0.9 cm 08/03/20 0942   Post-Procedure Width (cm) 0.7 cm 08/03/20 0942   Post-Procedure Depth (cm) 0.1 cm 08/03/20 0942   Post-Procedure Surface Area (cm^2) 0.63 cm^2 08/03/20 0942   Post-Procedure Volume (cm^3) 0.06 cm^3 08/03/20 0942   Wound Assessment Bleeding 08/03/20 0942   Drainage Amount Moderate 08/03/20 0942   Drainage Description Serosanguinous 08/03/20 0942   Ximena-wound Assessment Excoriated 08/03/20 0929   Garden%Wound Bed 100 08/03/20 0929   Red%Wound Bed 0 08/03/20 0929   Yellow%Wound Bed 0 08/03/20 0929   Black%Wound Bed 0 08/03/20 0929   Number of days: 1           Total Surface Area Debrided:  0.63 sq cm     Percentage of wound debrided 100%    Bleeding:  Minimal    Hemostasis Achieved:  not needed    Procedural Pain:  1  / 10     Post Procedural Pain:  0 / 10     Response to treatment:  Well tolerated by patient. Plan:     The nature of the patient's condition was explained in depth. The patient was informed that their compliance to the treatment plan is paramount to successful healing and prevention of further ulceration and/or infection     Discharge Treatment   Dressing care: Betamethasone to both legs and Unna Boot,extra padding to top anterior of both legs. Change in 1 week.   Pt is agreeable to plan of care stating this treatment has worked to heal ulcers in past.     Written Patient Discharge Instructions Given            Electronically signed by JOBY Guzman CNP on 8/4/2020 at 9:48 AM

## 2020-08-07 RX ORDER — RIVAROXABAN 15 MG/1
TABLET, FILM COATED ORAL
Qty: 90 TABLET | Refills: 1 | Status: SHIPPED | OUTPATIENT
Start: 2020-08-07 | End: 2021-04-09 | Stop reason: SDUPTHER

## 2020-08-10 ENCOUNTER — HOSPITAL ENCOUNTER (OUTPATIENT)
Dept: WOUND CARE | Age: 75
Discharge: HOME | End: 2020-08-10
Payer: MEDICARE

## 2020-08-10 VITALS — RESPIRATION RATE: 16 BRPM | SYSTOLIC BLOOD PRESSURE: 176 MMHG | DIASTOLIC BLOOD PRESSURE: 78 MMHG | HEART RATE: 72 BPM

## 2020-08-10 PROCEDURE — 99213 OFFICE O/P EST LOW 20 MIN: CPT

## 2020-08-10 PROCEDURE — 11042 DBRDMT SUBQ TIS 1ST 20SQCM/<: CPT | Performed by: NURSE PRACTITIONER

## 2020-08-10 PROCEDURE — 11042 DBRDMT SUBQ TIS 1ST 20SQCM/<: CPT

## 2020-08-10 RX ORDER — CLOBETASOL PROPIONATE 0.5 MG/G
OINTMENT TOPICAL ONCE
Status: CANCELLED | OUTPATIENT
Start: 2020-08-10

## 2020-08-10 RX ORDER — BACITRACIN, NEOMYCIN, POLYMYXIN B 400; 3.5; 5 [USP'U]/G; MG/G; [USP'U]/G
OINTMENT TOPICAL ONCE
Status: CANCELLED | OUTPATIENT
Start: 2020-08-10

## 2020-08-10 RX ORDER — LIDOCAINE 50 MG/G
OINTMENT TOPICAL ONCE
Status: CANCELLED | OUTPATIENT
Start: 2020-08-10

## 2020-08-10 RX ORDER — LIDOCAINE 40 MG/G
CREAM TOPICAL ONCE
Status: CANCELLED | OUTPATIENT
Start: 2020-08-10

## 2020-08-10 RX ORDER — BACITRACIN ZINC AND POLYMYXIN B SULFATE 500; 1000 [USP'U]/G; [USP'U]/G
OINTMENT TOPICAL ONCE
Status: CANCELLED | OUTPATIENT
Start: 2020-08-10

## 2020-08-10 RX ORDER — LIDOCAINE 40 MG/G
CREAM TOPICAL ONCE
Status: DISCONTINUED | OUTPATIENT
Start: 2020-08-10 | End: 2020-08-11 | Stop reason: HOSPADM

## 2020-08-10 RX ORDER — BETAMETHASONE DIPROPIONATE 0.05 %
OINTMENT (GRAM) TOPICAL
Qty: 45 G | Refills: 0 | Status: SHIPPED | OUTPATIENT
Start: 2020-08-10 | End: 2020-08-26

## 2020-08-10 RX ORDER — LIDOCAINE HYDROCHLORIDE 40 MG/ML
SOLUTION TOPICAL ONCE
Status: CANCELLED | OUTPATIENT
Start: 2020-08-10

## 2020-08-10 RX ORDER — BETAMETHASONE DIPROPIONATE 0.05 %
OINTMENT (GRAM) TOPICAL ONCE
Status: CANCELLED | OUTPATIENT
Start: 2020-08-10

## 2020-08-10 RX ORDER — GINSENG 100 MG
CAPSULE ORAL ONCE
Status: CANCELLED | OUTPATIENT
Start: 2020-08-10

## 2020-08-10 RX ORDER — GENTAMICIN SULFATE 1 MG/G
OINTMENT TOPICAL ONCE
Status: CANCELLED | OUTPATIENT
Start: 2020-08-10

## 2020-08-10 NOTE — PLAN OF CARE
Discharge instructions given. Patient verbalized understanding. Return to [address] [place] [address],[address] in 1 week.   Called/faxed orders to  [facility]

## 2020-08-10 NOTE — PROGRESS NOTES
VASCULAR SURGERY         FAMILY HISTORY    Family History   Problem Relation Age of Onset    Diabetes Mother     Heart Disease Mother     Heart Disease Father     Stroke Father        SOCIAL HISTORY    Social History     Tobacco Use    Smoking status: Never Smoker    Smokeless tobacco: Never Used   Substance Use Topics    Alcohol use: No    Drug use: No       ALLERGIES    Allergies   Allergen Reactions    Adhesive Tape Shortness Of Breath     Other reaction(s): Ulcers    Chlorhexidine     Lisinopril Other (See Comments)     Med causes lethargy- takes in lower doses       MEDICATIONS    Current Outpatient Medications on File Prior to Encounter   Medication Sig Dispense Refill    XARELTO 15 MG TABS tablet TAKE ONE TABLET BY MOUTH DAILY 90 tablet 1    furosemide (LASIX) 20 MG tablet TAKE ONE TABLET BY MOUTH TWICE DAILY  60 tablet 3    Coenzyme Q10 (CO Q-10) 400 MG CAPS Take by mouth      Liraglutide (VICTOZA) 18 MG/3ML SOPN SC injection Inject 1.2 mg into the skin daily      Handicap Placard MISC by Does not apply route Sob when walking less than 200 feet  Length of time--greater than 5 year 1 each 0    ferrous sulfate 325 (65 Fe) MG tablet Take 1 tablet by mouth 3 times daily (with meals) 90 tablet 3    polyethyl glycol-propyl glycol 0.4-0.3 % (SYSTANE) 0.4-0.3 % ophthalmic solution 1 drop as needed for Dry Eyes      atorvastatin (LIPITOR) 20 MG tablet Take 20 mg by mouth daily      vitamin D (CHOLECALCIFEROL) 400 UNITS TABS tablet Take 400 Units by mouth daily      metoprolol (LOPRESSOR) 25 MG tablet Take 0.5 tablets by mouth 2 times daily 60 tablet 3    therapeutic multivitamin-minerals (THERAGRAN-M) tablet Take 1 tablet by mouth daily.  hydrOXYzine (ATARAX) 25 MG tablet Take 25 mg by mouth 3 times daily as needed.  ranitidine (ZANTAC) 300 MG tablet Take 150 mg by mouth 2 times daily        No current facility-administered medications on file prior to encounter.         REVIEW OF SYSTEMS    Pertinent items are noted in HPI.       Objective:      BP (!) 176/78   Pulse 72   Resp 16     PHYSICAL EXAM    General Appearance: alert and oriented to person, place and time, well-developed and well-nourished, in no acute distress and obese  Skin: warm and dry  Head: normocephalic and atraumatic  Eyes: pupils equal, round, and reactive to light  Pulmonary/Chest: normal air movement, no respiratory distress  Cardiovascular: normal rate, regular rhythm and intact distal pulses  Extremities: no cyanosis, no clubbing and venous stasis dermatosis noted  Wounds:  Multiple superficial wounds both lower legs and full thickness wound left knee from picking at area      Assessment:     Patient Active Problem List   Diagnosis    Osteoarthritis of left knee    HTN (hypertension)    Acute blood loss anemia    Diabetes mellitus ([address]    Coronary atherosclerosis of native coronary artery    Ischemic cardiomyopathy    Atrial fibrillation    Peripheral vascular disease ([address]    CHF exacerbation (HCC)    Acute on chronic combined systolic and diastolic heart failure (HCC)    Chronic renal failure, stage 3 (moderate) (HCC)    Chronic atrial fibrillation    Pleural effusion    Atherosclerosis of native coronary artery without angina pectoris    Essential hypertension    Chronic combined systolic and diastolic congestive heart failure (HCC)    Hx of CABG    Non-rheumatic tricuspid valve insufficiency    Mixed hyperlipidemia    Carotid disease, bilateral ([address]    Atherosclerosis of native coronary artery of native heart without angina pectoris    Chronic kidney disease    Hyperlipidemia    INOCENCIO (obstructive sleep apnea)    Lymphedema    Leg swelling    Venous stasis dermatitis of both lower extremities    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 (HCC)    Venous insufficiency    Ulcer of right leg, limited to breakdown of skin (HCC)    Ulcer of lower extremity, left, limited to breakdown of skin [facility]    Iron deficiency anemia    CHANO (acute kidney injury) ([facility]       Procedure Note    Performed by: JOBY Piper CNP    Consent obtained: Yes    Time out taken:  Yes    Pain Control: Anesthetic  Anesthetic: 4% Lidocaine Cream     Debridement:Excisional Debridement    Using curette and forceps the wound was sharply debrided    down through and including the removal of epidermis, dermis and subcutaneous tissue. Devitalized Tissue Debrided:  fibrin, biofilm and slough    Pre Debridement Measurements:  Are located in the Wound Documentation Flow Sheet    Wound #: 1, 2 and 3     Post  Debridement Measurements:  Wound 08/03/20 Leg Right;Lateral #1 (Active)   Wound Image   08/03/20 0929   Wound Other 08/10/20 0852   Wound Length (cm) 1.1 cm 08/10/20 0852   Wound Width (cm) 0.5 cm 08/10/20 0852   Wound Depth (cm) 0.1 cm 08/10/20 0852   Wound Surface Area (cm^2) 0.55 cm^2 08/10/20 0852   Change in Wound Size % (l*w) 59.26 08/10/20 0852   Wound Volume (cm^3) 0.06 cm^3 08/10/20 0852   Wound Healing % 57 08/10/20 0852   Post-Procedure Length (cm) 1.6 cm 08/03/20 0942   Post-Procedure Width (cm) 1 cm 08/03/20 0942   Post-Procedure Depth (cm) 0.1 cm 08/03/20 0942   Post-Procedure Surface Area (cm^2) 1.6 cm^2 08/03/20 0942   Post-Procedure Volume (cm^3) 0.16 cm^3 08/03/20 0942   Wound Assessment Pink 08/10/20 0852   Drainage Amount Scant 08/10/20 0852   Drainage Description Serosanguinous 08/10/20 0852   Ximena-wound Assessment Excoriated 08/10/20 0852   [place]%Wound Bed 100 08/10/20 0852   Red%Wound Bed 0 08/10/20 0852   Yellow%Wound Bed 0 08/10/20 0852   Black%Wound Bed 0 08/10/20 0852   Purple%Wound Bed 0 08/10/20 0852   Number of days: 7       Wound 08/03/20 Leg Left; Anterior #2 (Active)   Wound Image   08/03/20 0929   Wound Other 08/10/20 0852   Wound Cleansed Rinsed/Irrigated with saline; Wound cleanser 08/10/20 0852   Wound Length (cm) 0.6 cm 08/10/20 0852   Wound Width (cm) 0.4 cm 08/10/20 0852   Wound Depth (cm) 0.1 cm 08/10/20 0852   Wound Surface Area (cm^2) 0.24 cm^2 08/10/20 0852   Change in Wound Size % (l*w) 50 08/10/20 0852   Wound Volume (cm^3) 0.02 cm^3 08/10/20 0852   Wound Healing % 60 08/10/20 0852   Post-Procedure Length (cm) 0.9 cm 08/03/20 0942   Post-Procedure Width (cm) 0.7 cm 08/03/20 0942   Post-Procedure Depth (cm) 0.1 cm 08/03/20 0942   Post-Procedure Surface Area (cm^2) 0.63 cm^2 08/03/20 0942   Post-Procedure Volume (cm^3) 0.06 cm^3 08/03/20 0942   Wound Assessment Pink 08/10/20 0852   Drainage Amount Scant 08/10/20 0852   Drainage Description Serosanguinous 08/10/20 0852   Ximena-wound Assessment Excoriated 08/10/20 0852   [place]%Wound Bed 100 08/10/20 0852   Red%Wound Bed 0 08/10/20 0852   Yellow%Wound Bed 0 08/10/20 0852   Black%Wound Bed 0 08/10/20 0852   Purple%Wound Bed 0 08/10/20 0852   Other%Wound Bed 0 08/10/20 0852   Number of days: 7       Wound 08/10/20 Knee Left #3 (Active)   Wound Other 08/10/20 1149   Wound Cleansed Rinsed/Irrigated with saline 08/10/20 1149   Wound Length (cm) 2.7 cm 08/10/20 1149   Wound Width (cm) 1.9 cm 08/10/20 1149   Wound Depth (cm) 0.1 cm 08/10/20 1149   Wound Surface Area (cm^2) 5.13 cm^2 08/10/20 1149   Wound Volume (cm^3) 0.51 cm^3 08/10/20 1149   Post-Procedure Length (cm) 2.8 cm 08/10/20 1159   Post-Procedure Width (cm) 2 cm 08/10/20 1159   Post-Procedure Depth (cm) 0.1 cm 08/10/20 1159   Post-Procedure Surface Area (cm^2) 5.6 cm^2 08/10/20 1159   Post-Procedure Volume (cm^3) 0.56 cm^3 08/10/20 1159   Wound Assessment Red 08/10/20 1149   Drainage Amount Moderate 08/10/20 1149   Drainage Description Serosanguinous 08/10/20 1149   Odor None 08/10/20 1149   Ximena-wound Assessment Dry 08/10/20 1149   [place]%Wound Bed 0 08/10/20 1149   Red%Wound Bed 100 08/10/20 1149   Yellow%Wound Bed 0 08/10/20 1149   Black%Wound Bed 0 08/10/20 1149   Purple%Wound Bed 0 08/10/20 1149   Other%Wound Bed 0 08/10/20 1149   Number of days: 0 Total Surface Area Debrided:  7.83 sq cm     Percentage of wound debrided 100%    Bleeding:  Minimal    Hemostasis Achieved:  not needed    Procedural Pain:  0  / 10     Post Procedural Pain:  0 / 10     Response to treatment:  Well tolerated by patient. Plan:     The nature of the patient's condition was explained in depth. The patient was informed that their compliance to the treatment plan is paramount to successful healing and prevention of further ulceration and/or infection     Discharge Treatment   Dressing care: Betamethasone to both legs and Unna Boot,extra padding to top anterior of both legs. Change in 1 week.  Apply Polysporin and Mepilex Border to Left Knee- change every 2 days      Written Patient Discharge Instructions Given            Electronically signed by JOBY Murillo CNP on 8/10/2020 at 4:44 PM

## 2020-08-10 NOTE — PROGRESS NOTES
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 (HCC) I87.331, L97.919    Venous insufficiency I87.2    Ulcer of right leg, limited to breakdown of skin (HCC) L97.911    Ulcer of lower extremity, left, limited to breakdown of skin ([facility]) L97.921    Iron deficiency anemia D50.9    CHANO (acute kidney injury) ([address] N17.9       WOUNDS REQUIRING DRESSING SUPPLIES:     Wound 08/03/20 Leg Right;Lateral #1 (Active)   Wound Image   08/03/20 0929   Wound Other 08/10/20 0852   Wound Length (cm) 1.1 cm 08/10/20 0852   Wound Width (cm) 0.5 cm 08/10/20 0852   Wound Depth (cm) 0.1 cm 08/10/20 0852   Wound Surface Area (cm^2) 0.55 cm^2 08/10/20 0852   Change in Wound Size % (l*w) 59.26 08/10/20 0852   Wound Volume (cm^3) 0.06 cm^3 08/10/20 0852   Wound Healing % 57 08/10/20 0852   Post-Procedure Length (cm) 1.6 cm 08/03/20 0942   Post-Procedure Width (cm) 1 cm 08/03/20 0942   Post-Procedure Depth (cm) 0.1 cm 08/03/20 0942   Post-Procedure Surface Area (cm^2) 1.6 cm^2 08/03/20 0942   Post-Procedure Volume (cm^3) 0.16 cm^3 08/03/20 0942   Wound Assessment Pink 08/10/20 0852   Drainage Amount Scant 08/10/20 0852   Drainage Description Serosanguinous 08/10/20 0852   Ximena-wound Assessment Excoriated 08/10/20 0852   [place]%Wound Bed 100 08/10/20 0852   Red%Wound Bed 0 08/10/20 0852   Yellow%Wound Bed 0 08/10/20 0852   Black%Wound Bed 0 08/10/20 0852   Purple%Wound Bed 0 08/10/20 0852   Number of days: 7       Wound 08/03/20 Leg Left; Anterior #2 (Active)   Wound Image   08/03/20 0929   Wound Other 08/10/20 0852   Wound Cleansed Rinsed/Irrigated with saline; Wound cleanser 08/10/20 0852   Wound Length (cm) 0.6 cm 08/10/20 0852   Wound Width (cm) 0.4 cm 08/10/20 0852   Wound Depth (cm) 0.1 cm 08/10/20 0852   Wound Surface Area (cm^2) 0.24 cm^2 08/10/20 0852   Change in Wound Size % (l*w) 50 08/10/20 0852   Wound Volume (cm^3) 0.02 cm^3 08/10/20 0852   Wound Healing % 60 08/10/20 0852   Post-Procedure Length (cm) 0.9 cm 08/03/20 0942   Post-Procedure Width (cm) 0.7 cm 08/03/20 0942   Post-Procedure Depth (cm) 0.1 cm 08/03/20 0942   Post-Procedure Surface Area (cm^2) 0.63 cm^2 08/03/20 0942   Post-Procedure Volume (cm^3) 0.06 cm^3 08/03/20 0942   Wound Assessment Pink 08/10/20 0852   Drainage Amount Scant 08/10/20 0852   Drainage Description Serosanguinous 08/10/20 0852   Ximena-wound Assessment Excoriated 08/10/20 0852   [place]%Wound Bed 100 08/10/20 0852   Red%Wound Bed 0 08/10/20 0852   Yellow%Wound Bed 0 08/10/20 0852   Black%Wound Bed 0 08/10/20 0852   Purple%Wound Bed 0 08/10/20 0852   Other%Wound Bed 0 08/10/20 0852   Number of days: 7       Wound 08/10/20 Knee Left #3 (Active)   Wound Other 08/10/20 1149   Wound Cleansed Rinsed/Irrigated with saline 08/10/20 1149   Wound Length (cm) 2.7 cm 08/10/20 1149   Wound Width (cm) 1.9 cm 08/10/20 1149   Wound Depth (cm) 0.1 cm 08/10/20 1149   Wound Surface Area (cm^2) 5.13 cm^2 08/10/20 1149   Wound Volume (cm^3) 0.51 cm^3 08/10/20 1149   Post-Procedure Length (cm) 2.8 cm 08/10/20 1159   Post-Procedure Width (cm) 2 cm 08/10/20 1159   Post-Procedure Depth (cm) 0.1 cm 08/10/20 1159   Post-Procedure Surface Area (cm^2) 5.6 cm^2 08/10/20 1159   Post-Procedure Volume (cm^3) 0.56 cm^3 08/10/20 1159   Wound Assessment Red 08/10/20 1149   Drainage Amount Moderate 08/10/20 1149   Drainage Description Serosanguinous 08/10/20 1149   Odor None 08/10/20 1149   Ximena-wound Assessment Dry 08/10/20 1149   [place]%Wound Bed 0 08/10/20 1149   Red%Wound Bed 100 08/10/20 1149   Yellow%Wound Bed 0 08/10/20 1149   Black%Wound Bed 0 08/10/20 1149   Purple%Wound Bed 0 08/10/20 1149   Other%Wound Bed 0 08/10/20 1149   Number of days: 0          Supplies Requested :      WOUND #: 3   PRIMARY DRESSING:    Other: Mepilex Border   Cover and Secure with: None     FREQUENCY OF DRESSING CHANGES:  Every other day    Wound Thickness [x] Full   []Partial                                     Patient Wound(s) Debrided: [x] Yes   [] No    Debridement Date: 8/10/2020    Debribement Type: Excisional/Sharp    ADDITIONAL ITEMS:  [] Gloves Small  [x] Gloves Medium [] Gloves Large [] Gloves Amador Siegel  [] Paper Tape 1\" [] Paper Tape 2\" [] Paper Tape 3\"  [] Medipore Tape 3\"  [] Saline  [] Skin Prep   [] Adhesive Remover   [] Cotton Tip Applicators  [] Tubular Stocking   [] Size E  [] Size G  [] Other:    Patient currently being seen by Home Health: [] Yes   [x] No    Duration for needed supplies:  []15  [x]30  []60  []90 Days    Provider Information:      PROVIDER'S NAME/NPI  Ely Tucker [id number]  I give permission to coordinate the care for this patient

## 2020-08-10 NOTE — PLAN OF CARE
Compression Application   Below Knee    NAME:  Americo Padilla  YOB: 1945  MEDICAL RECORD NUMBER:  [id number]  DATE:  8/10/2020       Applied moisturizing agent to dry skin as needed. Appied primary and secondary dressing as ordered     Applied  Unna Boot wrap from toes to knee overlapping each time. Applied cast padding and coban from toes to below the knee. Instructed patient/caregiver to keep dressing dry and intact. DO NOT REMOVE DRESSING. Instructed pt/family/caregiver to report excessive draining, loose bandage, wet dressing, severe pain or tingling in toes. Applied Compression dressing below the knee to Bilateral lower leg(s)      Compression wrap(s) were applied per  Guidelines.      Electronically signed by Carl Ceron RN on 8/10/2020 at 10:06 AM

## 2020-08-10 NOTE — PROGRESS NOTES
[facility] [facility]:      [address] f: [phone] [phone] [email or web address]     Ordering Center: [facility] [place] [address]  [phone]  Dept: [phone]   Fax# [phone]    Patient Information:      Kalyani Burleson  [address] [place] [address]   [phone]   : 1945  AGE: 66 y.o.      GENDER: female   TODAYS DATE:  8/10/2020    Insurance:      PRIMARY INSURANCE:  Plan: [facility] MEDICARE COMPLETE  Coverage: [facility] MEDICARE  Effective Date: 2015  [id number] - (Medicare Managed)      SECONDARY INSURANCE:  Plan:   Coverage:   Effective Date:   [unfilled]    [unfilled]   [unfilled]     Patient Wound Information:     Additional ICD-10 Codes: F03.345    Patient Active Problem List   Diagnosis Code    Osteoarthritis of left knee M17.12    HTN (hypertension) I10    Acute blood loss anemia D62    Diabetes mellitus ([facility] E11.9    Coronary atherosclerosis of native coronary artery I25.10    Ischemic cardiomyopathy I25.5    Atrial fibrillation I48.91    Peripheral vascular disease (HCC) I73.9    CHF exacerbation (HCC) I50.9    Acute on chronic combined systolic and diastolic heart failure (HCC) I50.43    Chronic renal failure, stage 3 (moderate) (HCC) N18.3    Chronic atrial fibrillation I48.20    Pleural effusion J90    Atherosclerosis of native coronary artery without angina pectoris I25.10    Essential hypertension I10    Chronic combined systolic and diastolic congestive heart failure (HCC) I50.42    Hx of CABG Z95.1    Non-rheumatic tricuspid valve insufficiency I36.1    Mixed hyperlipidemia E78.2    Carotid disease, bilateral (HCC) I73.9    Atherosclerosis of native coronary artery of native heart without angina pectoris I25.10    Chronic kidney disease N18.9    Hyperlipidemia E78.5    INOCENCIO (obstructive sleep apnea) G47.33    Lymphedema I89.0 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 (HCC) I87.331, L97.919    Venous insufficiency I87.2    Ulcer of right leg, limited to breakdown of skin (HCC) L97.911    Ulcer of lower extremity, left, limited to breakdown of skin ([facility]) L97.921    Iron deficiency anemia D50.9    CHANO (acute kidney injury) ([address] N17.9       WOUNDS REQUIRING DRESSING SUPPLIES:     Wound 08/03/20 Leg Right;Lateral #1 (Active)   Wound Image   08/03/20 0929   Wound Other 08/10/20 0852   Wound Length (cm) 1.1 cm 08/10/20 0852   Wound Width (cm) 0.5 cm 08/10/20 0852   Wound Depth (cm) 0.1 cm 08/10/20 0852   Wound Surface Area (cm^2) 0.55 cm^2 08/10/20 0852   Change in Wound Size % (l*w) 59.26 08/10/20 0852   Wound Volume (cm^3) 0.06 cm^3 08/10/20 0852   Wound Healing % 57 08/10/20 0852   Post-Procedure Length (cm) 1.6 cm 08/03/20 0942   Post-Procedure Width (cm) 1 cm 08/03/20 0942   Post-Procedure Depth (cm) 0.1 cm 08/03/20 0942   Post-Procedure Surface Area (cm^2) 1.6 cm^2 08/03/20 0942   Post-Procedure Volume (cm^3) 0.16 cm^3 08/03/20 0942   Wound Assessment Pink 08/10/20 0852   Drainage Amount Scant 08/10/20 0852   Drainage Description Serosanguinous 08/10/20 0852   Ximena-wound Assessment Excoriated 08/10/20 0852   [place]%Wound Bed 100 08/10/20 0852   Red%Wound Bed 0 08/10/20 0852   Yellow%Wound Bed 0 08/10/20 0852   Black%Wound Bed 0 08/10/20 0852   Purple%Wound Bed 0 08/10/20 0852   Number of days: 7       Wound 08/03/20 Leg Left; Anterior #2 (Active)   Wound Image   08/03/20 0929   Wound Other 08/10/20 0852   Wound Cleansed Rinsed/Irrigated with saline; Wound cleanser 08/10/20 0852   Wound Length (cm) 0.6 cm 08/10/20 0852   Wound Width (cm) 0.4 cm 08/10/20 0852   Wound Depth (cm) 0.1 cm 08/10/20 0852   Wound Surface Area (cm^2) 0.24 cm^2 08/10/20 0852   Change in Wound Size % (l*w) 50 08/10/20 0852   Wound Volume (cm^3) 0.02 cm^3 08/10/20 0852   Wound Healing % 60 08/10/20 0852   Post-Procedure Length (cm) 0.9 cm 08/03/20 0942   Post-Procedure Width (cm) 0.7 cm 08/03/20 0942   Post-Procedure Depth (cm) 0.1 cm 08/03/20 0942   Post-Procedure Surface Area (cm^2) 0.63 cm^2 08/03/20 0942   Post-Procedure Volume (cm^3) 0.06 cm^3 08/03/20 0942   Wound Assessment Pink 08/10/20 0852   Drainage Amount Scant 08/10/20 0852   Drainage Description Serosanguinous 08/10/20 0852   Ximena-wound Assessment Excoriated 08/10/20 0852   [place]%Wound Bed 100 08/10/20 0852   Red%Wound Bed 0 08/10/20 0852   Yellow%Wound Bed 0 08/10/20 0852   Black%Wound Bed 0 08/10/20 0852   Purple%Wound Bed 0 08/10/20 0852   Other%Wound Bed 0 08/10/20 0852   Number of days: 7          Supplies Requested :      WOUND #: 3   PRIMARY DRESSING:    Other: Mepilex Border   Cover and Secure with: None     FREQUENCY OF DRESSING CHANGES:  Every other day    Wound Thickness [x] Full   []Partial                                     Patient Wound(s) Debrided: [x] Yes   [] No    Debridement Date: 8/10/2020    Debribement Type: Excisional/Sharp    ADDITIONAL ITEMS:  [] Gloves Small  [x] Gloves Medium [] Gloves Large [] Gloves [place]oz[place] Pack  [] Paper Tape 1\" [] Paper Tape 2\" [] Paper Tape 3\"  [] Medipore Tape 3\"  [] Saline  [] Skin Prep   [] Adhesive Remover   [] Cotton Tip Applicators  [] Tubular Stocking   [] Size E  [] Size G  [] Other:    Patient currently being seen by Home Health: [] Yes   [x] No    Duration for needed supplies:  [x]15  []30  []60  []90 Days    Provider Information:      PROVIDER'S NAME/NPI  Fernandolali Brantley [id number]  I give permission to coordinate the care for this patient

## 2020-08-17 ENCOUNTER — HOSPITAL ENCOUNTER (OUTPATIENT)
Dept: WOUND CARE | Age: 75
Discharge: HOME | End: 2020-08-17
Payer: MEDICARE

## 2020-08-17 VITALS — DIASTOLIC BLOOD PRESSURE: 65 MMHG | SYSTOLIC BLOOD PRESSURE: 172 MMHG | HEART RATE: 67 BPM

## 2020-08-17 PROCEDURE — 99212 OFFICE O/P EST SF 10 MIN: CPT | Performed by: NURSE PRACTITIONER

## 2020-08-17 PROCEDURE — 99212 OFFICE O/P EST SF 10 MIN: CPT

## 2020-08-17 RX ORDER — LIDOCAINE 40 MG/G
CREAM TOPICAL ONCE
Status: CANCELLED | OUTPATIENT
Start: 2020-08-17

## 2020-08-17 RX ORDER — BETAMETHASONE DIPROPIONATE 0.05 %
OINTMENT (GRAM) TOPICAL ONCE
Status: CANCELLED | OUTPATIENT
Start: 2020-08-17

## 2020-08-17 RX ORDER — BACITRACIN ZINC AND POLYMYXIN B SULFATE 500; 1000 [USP'U]/G; [USP'U]/G
OINTMENT TOPICAL ONCE
Status: CANCELLED | OUTPATIENT
Start: 2020-08-17

## 2020-08-17 RX ORDER — LIDOCAINE 50 MG/G
OINTMENT TOPICAL ONCE
Status: CANCELLED | OUTPATIENT
Start: 2020-08-17

## 2020-08-17 RX ORDER — CLOBETASOL PROPIONATE 0.5 MG/G
OINTMENT TOPICAL ONCE
Status: CANCELLED | OUTPATIENT
Start: 2020-08-17

## 2020-08-17 RX ORDER — LIDOCAINE 40 MG/G
CREAM TOPICAL ONCE
Status: DISCONTINUED | OUTPATIENT
Start: 2020-08-17 | End: 2020-08-18 | Stop reason: HOSPADM

## 2020-08-17 RX ORDER — BACITRACIN, NEOMYCIN, POLYMYXIN B 400; 3.5; 5 [USP'U]/G; MG/G; [USP'U]/G
OINTMENT TOPICAL ONCE
Status: CANCELLED | OUTPATIENT
Start: 2020-08-17

## 2020-08-17 RX ORDER — GENTAMICIN SULFATE 1 MG/G
OINTMENT TOPICAL ONCE
Status: CANCELLED | OUTPATIENT
Start: 2020-08-17

## 2020-08-17 RX ORDER — LIDOCAINE HYDROCHLORIDE 40 MG/ML
SOLUTION TOPICAL ONCE
Status: CANCELLED | OUTPATIENT
Start: 2020-08-17

## 2020-08-17 RX ORDER — GINSENG 100 MG
CAPSULE ORAL ONCE
Status: CANCELLED | OUTPATIENT
Start: 2020-08-17

## 2020-08-17 NOTE — PLAN OF CARE
Discharge instructions given. Patient verbalized understanding. Return to [facility] in 1 week.   Mepilex Border and stockings

## 2020-08-18 NOTE — PROGRESS NOTES
Bobby   Progress Note and Procedure Note      Oumar Dominguez  AGE: 66 y.o. GENDER: female  : 1945  TODAY'S DATE:  2020    Subjective:     Chief Complaint   Patient presents with    Wound Check     Wounds BLE         HISTORY of PRESENT ILLNESS HPI  Nathan carnes 66 y. o. female who presents today for wound evaluation. Pt accompanied by  who assists pt with bilateral lower leg care.  Pt has long history of leg wounds.  Pt has lymphedema pumps at home but does not use consistently, but understands importance of use.  Pt admits to \"picking wounds\" on legs.  States tolerated unna boots well, this past week only had one episode of picking t wounds right upper leg just under knee and made it bleed. History of Wound: off and on for last 5 years.   Wound Pain:  intermittent, mild  Severity:  1 / 10   Wound Type:  venous  Modifying Factors:  edema, venous stasis, lymphedema, diabetes, obesity and non-adherence  Associated Signs/Symptoms:  edema, erythema, drainage and pain      PAST MEDICAL HISTORY        Diagnosis Date    Arthritis     Blood circulation, collateral     Blood transfusion     CAD (coronary artery disease)     CHF (congestive heart failure) (HCC)     Chronic renal failure, stage 3 (moderate) (HCC)     Diabetes mellitus (HCC)     GERD (gastroesophageal reflux disease)     Hyperlipidemia     Hypertension        PAST SURGICAL HISTORY    Past Surgical History:   Procedure Laterality Date    ABDOMEN SURGERY      CARDIAC SURGERY          CATARACT REMOVAL WITH IMPLANT Left 2016    CATARACT REMOVAL WITH IMPLANT Right 2016    CATARACT REMOVAL WITH IMPLANT Bilateral 10/16,     CHOLECYSTECTOMY      COLONOSCOPY      ENDOSCOPY, COLON, DIAGNOSTIC      EYE SURGERY      left tear duct surgery    JOINT REPLACEMENT      BTKR    KNEE ARTHROPLASTY  09/15/2010    left total knee    TOTAL KNEE ARTHROPLASTY      VASCULAR SURGERY Iron deficiency anemia    CHANO (acute kidney injury) ([facility] [address]       Procedure Note - no procedure    Wound 08/03/20 Leg Right;Lateral #1 (Active)   Wound Image   08/03/20 0929   Wound Other 08/17/20 0849   Wound Cleansed Wound cleanser 08/17/20 0849   Wound Length (cm) 0 cm 08/17/20 0849   Wound Width (cm) 0 cm 08/17/20 0849   Wound Depth (cm) 0 cm 08/17/20 0849   Wound Surface Area (cm^2) 0 cm^2 08/17/20 0849   Change in Wound Size % (l*w) 100 08/17/20 0849   Wound Volume (cm^3) 0 cm^3 08/17/20 0849   Wound Healing % 100 08/17/20 0849   Post-Procedure Length (cm) 1.6 cm 08/03/20 0942   Post-Procedure Width (cm) 1 cm 08/03/20 0942   Post-Procedure Depth (cm) 0.1 cm 08/03/20 0942   Post-Procedure Surface Area (cm^2) 1.6 cm^2 08/03/20 0942   Post-Procedure Volume (cm^3) 0.16 cm^3 08/03/20 0942   Wound Assessment Dry;Epithelialization 08/17/20 0849   Drainage Amount None 08/17/20 0849   Drainage Description Serosanguinous 08/10/20 0852   Ximena-wound Assessment Excoriated 08/10/20 0852   Wilbur%Wound Bed 100 08/17/20 0849   Red%Wound Bed 0 08/17/20 0849   Yellow%Wound Bed 0 08/17/20 0849   Black%Wound Bed 0 08/17/20 0849   Purple%Wound Bed 0 08/10/20 0852   Number of days: 15       Wound 08/03/20 Leg Left; Anterior #2 (Active)   Wound Image   08/03/20 0929   Wound Other 08/17/20 0849   Wound Cleansed Wound cleanser 08/17/20 0849   Wound Length (cm) 0 cm 08/17/20 0849   Wound Width (cm) 0 cm 08/17/20 0849   Wound Depth (cm) 0 cm 08/17/20 0849   Wound Surface Area (cm^2) 0 cm^2 08/17/20 0849   Change in Wound Size % (l*w) 100 08/17/20 0849   Wound Volume (cm^3) 0 cm^3 08/17/20 0849   Wound Healing % 100 08/17/20 0849   Post-Procedure Length (cm) 0.9 cm 08/03/20 0942   Post-Procedure Width (cm) 0.7 cm 08/03/20 0942   Post-Procedure Depth (cm) 0.1 cm 08/03/20 0942   Post-Procedure Surface Area (cm^2) 0.63 cm^2 08/03/20 0942   Post-Procedure Volume (cm^3) 0.06 cm^3 08/03/20 0942   Wound Assessment Dry;Epithelialization 08/17/20 8/18/2020 at 11:10 AM

## 2020-08-20 LAB
ALBUMIN SERPL-MCNC: 4 G/DL (ref 3.5–5)
ANION GAP SERPL CALCULATED.3IONS-SCNC: 11 MMOL/L (ref 6–18)
BUN BLDV-MCNC: 58 MG/DL (ref 8–26)
CALCIUM SERPL-MCNC: 10.1 MG/DL (ref 8.5–10.5)
CHLORIDE BLD-SCNC: 99 MEQ/L (ref 101–111)
CO2: 30 MMOL/L (ref 24–36)
CREAT SERPL-MCNC: 1.59 MG/DL (ref 0.44–1.03)
CREATININE URINE: 86 MG/DL
GFR AFRICAN AMERICAN: 35 ML/MIN/1.73 M2
GFR NON-AFRICAN AMERICAN: 31 ML/MIN/1.73 M2
GLUCOSE BLD-MCNC: 162 MG/DL (ref 70–99)
PHOSPHORUS: 4 MG/DL (ref 2.5–4.5)
POTASSIUM SERPL-SCNC: 4.3 MEQ/L (ref 3.6–5.1)
PROTEIN, URINE, RANDOM: 125.6 MG/DL (ref 0–9.9)
PROTEIN/CREAT RATIO URINE RAN: 1.46 RATIO
SODIUM BLD-SCNC: 140 MEQ/L (ref 135–145)

## 2020-08-24 ENCOUNTER — HOSPITAL ENCOUNTER (OUTPATIENT)
Dept: WOUND CARE | Age: 75
Discharge: HOME | End: 2020-08-24
Payer: MEDICARE

## 2020-08-24 VITALS
TEMPERATURE: 96.8 F | HEART RATE: 59 BPM | SYSTOLIC BLOOD PRESSURE: 150 MMHG | RESPIRATION RATE: 16 BRPM | DIASTOLIC BLOOD PRESSURE: 63 MMHG

## 2020-08-24 PROCEDURE — 99212 OFFICE O/P EST SF 10 MIN: CPT | Performed by: NURSE PRACTITIONER

## 2020-08-24 PROCEDURE — 99213 OFFICE O/P EST LOW 20 MIN: CPT

## 2020-08-24 RX ORDER — GINSENG 100 MG
CAPSULE ORAL ONCE
Status: CANCELLED | OUTPATIENT
Start: 2020-08-24

## 2020-08-24 RX ORDER — LIDOCAINE 50 MG/G
OINTMENT TOPICAL ONCE
Status: CANCELLED | OUTPATIENT
Start: 2020-08-24

## 2020-08-24 RX ORDER — LIDOCAINE 40 MG/G
CREAM TOPICAL ONCE
Status: CANCELLED | OUTPATIENT
Start: 2020-08-24

## 2020-08-24 RX ORDER — GENTAMICIN SULFATE 1 MG/G
OINTMENT TOPICAL ONCE
Status: CANCELLED | OUTPATIENT
Start: 2020-08-24

## 2020-08-24 RX ORDER — CLOBETASOL PROPIONATE 0.5 MG/G
OINTMENT TOPICAL ONCE
Status: CANCELLED | OUTPATIENT
Start: 2020-08-24

## 2020-08-24 RX ORDER — BACITRACIN, NEOMYCIN, POLYMYXIN B 400; 3.5; 5 [USP'U]/G; MG/G; [USP'U]/G
OINTMENT TOPICAL ONCE
Status: CANCELLED | OUTPATIENT
Start: 2020-08-24

## 2020-08-24 RX ORDER — BACITRACIN ZINC AND POLYMYXIN B SULFATE 500; 1000 [USP'U]/G; [USP'U]/G
OINTMENT TOPICAL ONCE
Status: CANCELLED | OUTPATIENT
Start: 2020-08-24

## 2020-08-24 RX ORDER — BETAMETHASONE DIPROPIONATE 0.05 %
OINTMENT (GRAM) TOPICAL ONCE
Status: CANCELLED | OUTPATIENT
Start: 2020-08-24

## 2020-08-24 RX ORDER — LIDOCAINE HYDROCHLORIDE 40 MG/ML
SOLUTION TOPICAL ONCE
Status: CANCELLED | OUTPATIENT
Start: 2020-08-24

## 2020-08-24 NOTE — PLAN OF CARE
Discharge instructions given. Patient verbalized understanding. No Return to [facility].   Healed

## 2020-08-24 NOTE — PROGRESS NOTES
[address]   Progress Note and Procedure Note      Patrice Putnam  MEDICAL RECORD NUMBER:  [id number]  AGE: 66 y.o. GENDER: female  : 1945  EPISODE DATE:  2020    Subjective:     Chief Complaint   Patient presents with    Wound Check     Right lower leg, Left lower leg         HISTORY of PRESENT ILLNESS HPI  Nohemi Poe a 66 y. o. female who presents today for wound evaluation. Pt accompanied by  who assists pt with bilateral lower leg care.  Pt has long history of leg wounds.  Pt has lymphedema pumps at home but does not use consistently, but understands importance of use.  Pt admits to \"picking wounds\" on legs. Wounds now all closed. History of Wound: off and on for last 5 years.   Wound Pain:  intermittent, mild  Severity:  1 / 10   Wound Type:  venous  Modifying Factors:  edema, venous stasis, lymphedema, diabetes, obesity and non-adherence  Associated Signs/Symptoms:  edema, erythema, drainage and pain                                     PAST MEDICAL HISTORY        Diagnosis Date    Arthritis     Blood circulation, collateral     Blood transfusion     CAD (coronary artery disease)     CHF (congestive heart failure) (HCC)     Chronic renal failure, stage 3 (moderate) (HCC)     Diabetes mellitus (HCC)     GERD (gastroesophageal reflux disease)     Hyperlipidemia     Hypertension        PAST SURGICAL HISTORY    Past Surgical History:   Procedure Laterality Date    ABDOMEN SURGERY      CARDIAC SURGERY          CATARACT REMOVAL WITH IMPLANT Left 2016    CATARACT REMOVAL WITH IMPLANT Right 2016    CATARACT REMOVAL WITH IMPLANT Bilateral 10/16,     CHOLECYSTECTOMY      COLONOSCOPY      ENDOSCOPY, COLON, DIAGNOSTIC      EYE SURGERY      left tear duct surgery    JOINT REPLACEMENT      BTKR    KNEE ARTHROPLASTY  09/15/2010    left total knee    TOTAL KNEE ARTHROPLASTY      VASCULAR SURGERY         FAMILY HISTORY    Family History   Problem Relation Age of Onset    Diabetes Mother     Heart Disease Mother     Heart Disease Father     Stroke Father        SOCIAL HISTORY    Social History     Tobacco Use    Smoking status: Never Smoker    Smokeless tobacco: Never Used   Substance Use Topics    Alcohol use: No    Drug use: No       ALLERGIES    Allergies   Allergen Reactions    Adhesive Tape Shortness Of Breath     Other reaction(s): Ulcers    Chlorhexidine     Lisinopril Other (See Comments)     Med causes lethargy- takes in lower doses       MEDICATIONS    Current Outpatient Medications on File Prior to Encounter   Medication Sig Dispense Refill    betamethasone dipropionate (DIPROLENE) 0.05 % ointment Apply topically daily. 45 g 0    XARELTO 15 MG TABS tablet TAKE ONE TABLET BY MOUTH DAILY 90 tablet 1    furosemide (LASIX) 20 MG tablet TAKE ONE TABLET BY MOUTH TWICE DAILY  60 tablet 3    Coenzyme Q10 (CO Q-10) 400 MG CAPS Take by mouth      Liraglutide (VICTOZA) 18 MG/3ML SOPN SC injection Inject 1.2 mg into the skin daily      Handicap Placard MISC by Does not apply route Sob when walking less than 200 feet  Length of time--greater than 5 year 1 each 0    ferrous sulfate 325 (65 Fe) MG tablet Take 1 tablet by mouth 3 times daily (with meals) 90 tablet 3    polyethyl glycol-propyl glycol 0.4-0.3 % (SYSTANE) 0.4-0.3 % ophthalmic solution 1 drop as needed for Dry Eyes      atorvastatin (LIPITOR) 20 MG tablet Take 20 mg by mouth daily      vitamin D (CHOLECALCIFEROL) 400 UNITS TABS tablet Take 400 Units by mouth daily      metoprolol (LOPRESSOR) 25 MG tablet Take 0.5 tablets by mouth 2 times daily 60 tablet 3    therapeutic multivitamin-minerals (THERAGRAN-M) tablet Take 1 tablet by mouth daily.  hydrOXYzine (ATARAX) 25 MG tablet Take 25 mg by mouth 3 times daily as needed.       ranitidine (ZANTAC) 300 MG tablet Take 150 mg by mouth 2 times daily        No current facility-administered Traumatic 08/24/20 0848   Wound Cleansed Rinsed/Irrigated with saline 08/24/20 0848   Wound Length (cm) 0.4 cm 08/24/20 0848   Wound Width (cm) 0.5 cm 08/24/20 0848   Wound Depth (cm) 0.1 cm 08/24/20 0848   Wound Surface Area (cm^2) 0.2 cm^2 08/24/20 0848   Wound Volume (cm^3) 0.02 cm^3 08/24/20 0848   Wound Assessment Pink;Yellow 08/24/20 0848   Drainage Amount Scant 08/24/20 0848   Drainage Description Serosanguinous 08/24/20 0848   Odor None 08/24/20 0848   Ximena-wound Assessment Dry 08/24/20 0848   Pink%Wound Bed 20 08/24/20 0848   Red%Wound Bed 0 08/24/20 0848   Yellow%Wound Bed 80 08/24/20 0848   Black%Wound Bed 0 08/24/20 0848   Purple%Wound Bed 0 08/24/20 0848   Other%Wound Bed 0 08/24/20 0848   Number of days: 0          Plan:     Treatment Note please see attached Discharge Instructions    Written patient dismissal instructions given to patient and signed by patient or POA. Discharge Instructions       [address], [address] [place] [address]  Telephone: [phone]     Discharge Instructions:  Congratulations! You have completed your treatment program. We have outlined a list of reminders to assist you in maintaining your continues health. 1. Return to your primary care physician for all your health issues. 2. Resume your ordinary activities as prescribed. 3. Take your medications as prescribed by your doctor. 4. Check your skin daily for cracks, bruises, sores, or dryness. 5. Clean and dry your skin thoroughly. 6. Avoid alcohol. Quit smoking if applicable. 7. Maintain a nutritious diet; take a multivitamin daily. 8. Avoid pressure on the wound site. Keep your legs elevated above the level of your heart whenever possible. 9. Continue to use wraps/stockings/compresion if prescribed/  10. Replace compression hose/stockings every 6 months to insure proper fit. 11. Wear well fitting shoes.     Call the [address] [place] [address] if your wound reopens or you develop new wounds or if you have any other questions about follow up care [phone]    Vascular tests ordered by Wound Care Physicians may take up to 2 hours to complete. Please keep that in mind when scheduling. If Vascular testing is scheduled, please bring supplies to replace your dressing after testing is done. The vascular department does not stock supplies. Wound: Bilateral legs     With each dressing change, rinse wounds with 0.9% Saline. (May use wound wash or soft contact solution. Both can be purchased at a local drug store). If unable to obtain saline, may use a gentle soap and water. Dressing care: Betamethasone to both legs- red areas,  ( use lotion to whole leg)and 1 Tubi  to both legs. Mepilex Border to Left Leg. Apply Compression Stockings to both legs at home. Use lymphedema pumps. Next Dressing change due ___as instructed____________      Important dietary reminders:  1. Increase Protein intake for optimal wound healing  2. No added salt  3. If diabetic, good glucose control    No Follow up with Paramjit Romero CNP  in the wound care center. Call [phone] for any questions or concerns. Your  is [address] [place] [address]. Information: Should you experience any significant changes in your wound(s) or have questions about your wound care, please contact the [place] [address] at [phone] Monday  - Thursday 8:00 am - 4:00 pm and Friday 8:00 am - 1:00pm. If you need help with your wound outside these hours and cannot wait until we are again available, contact your PCP or go to the hospital emergency room. PLEASE NOTE: IF YOU ARE UNABLE TO OBTAIN WOUND SUPPLIES, CONTINUE TO USE THE SUPPLIES YOU HAVE AVAILABLE UNTIL YOU ARE ABLE TO REACH US. IT IS MOST IMPORTANT TO KEEP THE WOUND COVERED AT ALL TIMES.     You Next Appointment is:    Date__________ Time____________      Essential steps

## 2020-08-24 NOTE — PROGRESS NOTES
Bobby   Progress Note and Procedure Note      Ena Fernandez  AGE: 66 y.o. GENDER: female  : 1945  TODAY'S DATE:  2020    Subjective:           Chief Complaint   Patient presents with    Wound Check       Right lower leg, Left lower leg         HISTORY of PRESENT ILLNESS AVTAR Ron a 66 y. o. female who presents today for wound evaluation. Pt accompanied by  who assists pt with bilateral lower leg care.  Pt has long history of leg wounds.  Pt has lymphedema pumps at home but does not use consistently, but understands importance of use.  Pt admits to \"picking wounds\" on legs. Wounds now all closed. History of Wound: off and on for last 5 years.   Wound Pain:  intermittent, mild  Severity:  1 / 10   Wound Type:  venous  Modifying Factors:  edema, venous stasis, lymphedema, diabetes, obesity and non-adherence  Associated Signs/Symptoms:  edema, erythema, drainage and pain                                     PAST MEDICAL HISTORY     Past Medical History             Diagnosis Date    Arthritis      Blood circulation, collateral      Blood transfusion      CAD (coronary artery disease)      CHF (congestive heart failure) (HCC)      Chronic renal failure, stage 3 (moderate) (HCC)      Diabetes mellitus (HCC)      GERD (gastroesophageal reflux disease)      Hyperlipidemia      Hypertension             PAST SURGICAL HISTORY     Past Surgical History         Past Surgical History:   Procedure Laterality Date    ABDOMEN SURGERY        CARDIAC SURGERY             CATARACT REMOVAL WITH IMPLANT Left 2016    CATARACT REMOVAL WITH IMPLANT Right 2016    CATARACT REMOVAL WITH IMPLANT Bilateral 10/16,     CHOLECYSTECTOMY        COLONOSCOPY        ENDOSCOPY, COLON, DIAGNOSTIC        EYE SURGERY         left tear duct surgery    JOINT REPLACEMENT         BTKR    KNEE ARTHROPLASTY   09/15/2010     left total knee    TOTAL KNEE ARTHROPLASTY        VASCULAR SURGERY               FAMILY HISTORY     Family History         Family History   Problem Relation Age of Onset    Diabetes Mother      Heart Disease Mother      Heart Disease Father      Stroke Father             SOCIAL HISTORY     Social History           Tobacco Use    Smoking status: Never Smoker    Smokeless tobacco: Never Used   Substance Use Topics    Alcohol use: No    Drug use: No        ALLERGIES     Allergies   Allergen Reactions    Adhesive Tape Shortness Of Breath       Other reaction(s): Ulcers    Chlorhexidine      Lisinopril Other (See Comments)       Med causes lethargy- takes in lower doses        MEDICATIONS            Current Outpatient Medications on File Prior to Encounter   Medication Sig Dispense Refill    betamethasone dipropionate (DIPROLENE) 0.05 % ointment Apply topically daily.  45 g 0    XARELTO 15 MG TABS tablet TAKE ONE TABLET BY MOUTH DAILY 90 tablet 1    furosemide (LASIX) 20 MG tablet TAKE ONE TABLET BY MOUTH TWICE DAILY  60 tablet 3    Coenzyme Q10 (CO Q-10) 400 MG CAPS Take by mouth        Liraglutide (VICTOZA) 18 MG/3ML SOPN SC injection Inject 1.2 mg into the skin daily        Handicap Placard MISC by Does not apply route Sob when walking less than 200 feet  Length of time--greater than 5 year 1 each 0    ferrous sulfate 325 (65 Fe) MG tablet Take 1 tablet by mouth 3 times daily (with meals) 90 tablet 3    polyethyl glycol-propyl glycol 0.4-0.3 % (SYSTANE) 0.4-0.3 % ophthalmic solution 1 drop as needed for Dry Eyes        atorvastatin (LIPITOR) 20 MG tablet Take 20 mg by mouth daily        vitamin D (CHOLECALCIFEROL) 400 UNITS TABS tablet Take 400 Units by mouth daily        metoprolol (LOPRESSOR) 25 MG tablet Take 0.5 tablets by mouth 2 times daily 60 tablet 3    therapeutic multivitamin-minerals (THERAGRAN-M) tablet Take 1 tablet by mouth daily.        hydrOXYzine (ATARAX) 25 MG tablet Take 25 mg by mouth 3 times daily as needed.        ranitidine (ZANTAC) 300 MG tablet Take 150 mg by mouth 2 times daily           No current facility-administered medications on file prior to encounter.         REVIEW OF SYSTEMS  Review of Systems     Pertinent items are noted in HPI.     Objective:       BP (!) 150/63   Pulse 59   Temp 96.8 °F (36 °C) (Temporal)   Resp 16          Wt Readings from Last 3 Encounters:   03/28/20 230 lb (104.3 kg)   01/27/20 228 lb (103.4 kg)   01/14/20 228 lb (103.4 kg)        PHYSICAL EXAM  Physical Exam     General Appearance: alert and oriented to person, place and time, well-developed and well-nourished, in no acute distress and obese  Skin: warm and dry, no rash or erythema  Head: normocephalic and atraumatic  Eyes: pupils equal, round, and reactive to light  Pulmonary/Chest:  normal air movement, no respiratory distress  Cardiovascular: normal rate, regular rhythm and intact distal pulses  Extremities: no cyanosis, no clubbing and venous stasis dermatosis noted        Assessment:              Problem List Items Addressed This Visit           Peripheral vascular disease ([facility] [address]      Lymphedema      Relevant Orders      Supply: Wound Cleanser      Supply: Ximena Wound      Supply: Wound Dressings      Supply: Cover and Secure      Supply: Edema Control      Leg swelling      Relevant Orders      Supply: Wound Cleanser      Supply: Ximena Wound      Supply: Wound Dressings      Supply: Cover and Secure      Supply: Edema Control      Venous stasis dermatitis of both lower extremities      Ulcer of right leg, limited to breakdown of skin (HCC)      Relevant Orders      Supply: Wound Cleanser      Supply: Ximena Wound      Supply: Wound Dressings      Supply: Cover and Secure      Supply: Edema Control      Ulcer of lower extremity, left, limited to breakdown of skin (HCC) - Primary      Relevant Orders      Supply: Wound Cleanser      Supply: Ximena Wound      Supply: Wound Dressings      Supply: Cover and Secure      Supply: Edema Control             Procedure Note - no procedure     Wound/Ulcer Descriptions are Pre Debridement except measurements:          Wound 08/24/20 Leg Left; Lower #1 (Active)   Wound Image   08/24/20 0848   Wound Traumatic 08/24/20 0848   Wound Cleansed Rinsed/Irrigated with saline 08/24/20 0848   Wound Length (cm) 0.4 cm 08/24/20 0848   Wound Width (cm) 0.5 cm 08/24/20 0848   Wound Depth (cm) 0.1 cm 08/24/20 0848   Wound Surface Area (cm^2) 0.2 cm^2 08/24/20 0848   Wound Volume (cm^3) 0.02 cm^3 08/24/20 0848   Wound Assessment Pink;Yellow 08/24/20 0848   Drainage Amount Scant 08/24/20 0848   Drainage Description Serosanguinous 08/24/20 0848   Odor None 08/24/20 0848   Ximena-wound Assessment Dry 08/24/20 0848   Pink%Wound Bed 20 08/24/20 0848   Red%Wound Bed 0 08/24/20 0848   Yellow%Wound Bed 80 08/24/20 0848   Black%Wound Bed 0 08/24/20 0848   Purple%Wound Bed 0 08/24/20 0848   Other%Wound Bed 0 08/24/20 0848   Number of days: 0           Plan:      Treatment Note please see attached Discharge Instructions     Written patient dismissal instructions given to patient and signed by patient or POA.          Discharge Instructions        [address]  Telephone: [phone]      Discharge Instructions:  Congratulations! You have completed your treatment program. We have outlined a list of reminders to assist you in maintaining your continues health.     1. Return to your primary care physician for all your health issues. 2. Resume your ordinary activities as prescribed. 3. Take your medications as prescribed by your doctor. 4. Check your skin daily for cracks, bruises, sores, or dryness. 5. Clean and dry your skin thoroughly. 6. Avoid alcohol. Quit smoking if applicable. 7. Maintain a nutritious diet; take a multivitamin daily. 8. Avoid pressure on the wound site.  Keep your legs elevated above the level of your heart whenever possible. 9. Continue to use wraps/stockings/compresion if prescribed/  10. Replace compression hose/stockings every 6 months to insure proper fit. 11. Wear well fitting shoes.     Call the [address] if your wound reopens or you develop new wounds or if you have any other questions about follow up care [phone]     Vascular tests ordered by Wound Care Physicians may take up to 2 hours to complete. Please keep that in mind when scheduling.      If Vascular testing is scheduled, please bring supplies to replace your dressing after testing is done. The vascular department does not stock supplies.      Wound: Bilateral legs      With each dressing change, rinse wounds with 0.9% Saline. (May use wound wash or soft contact solution. Both can be purchased at a local drug store). If unable to obtain saline, may use a gentle soap and water.     Dressing care: Betamethasone to both legs- red areas,  ( use lotion to whole leg)and 1 Tubi  to both legs. Mepilex Border to Left Leg. Apply Compression Stockings to both legs at home. Use lymphedema pumps.     Next Dressing change due ___as instructed____________        Important dietary reminders:  1. Increase Protein intake for optimal wound healing  2. No added salt  3.  If diabetic, good glucose control     No Follow up with Sam Marquez CNP  in the wound care center.      Call [phone] for any questions or concerns.      Your  is [address] [place] [address] Road Information: Should you experience any significant changes in your wound(s) or have questions about your wound care, please contact the [place] [address] at [phone] Monday  - Thursday 8:00 am - 4:00 pm and Friday 8:00 am - 1:00pm. If you need help with your wound outside these hours and cannot wait until we are again available, contact your PCP or go to the hospital emergency room.      PLEASE NOTE: IF YOU ARE UNABLE TO OBTAIN WOUND SUPPLIES, CONTINUE TO USE THE SUPPLIES YOU HAVE AVAILABLE UNTIL YOU ARE ABLE TO REACH US. IT IS MOST IMPORTANT TO KEEP THE WOUND COVERED AT ALL TIMES.     You Next Appointment is:     Date__________ Time____________        Essential steps to Wound Healing      Arterial/Venous Studies ordered on:  Debridement (See Flowsheet)  Culture sent on:  Edema addressed:  Offloading addressed: See Nurses Note Plan of Care  Pain Control:  Host Factors Optimized (DM Control, nutrition, cardiac etc... ):             Electronically signed by JOBY Murillo CNP on 8/24/2020 at 4:29 PM

## 2020-09-28 ENCOUNTER — OFFICE VISIT (OUTPATIENT)
Dept: CARDIOLOGY CLINIC | Age: 75
End: 2020-09-28
Payer: MEDICARE

## 2020-09-28 VITALS
DIASTOLIC BLOOD PRESSURE: 60 MMHG | BODY MASS INDEX: 35.93 KG/M2 | HEART RATE: 59 BPM | OXYGEN SATURATION: 97 % | TEMPERATURE: 98.5 F | SYSTOLIC BLOOD PRESSURE: 120 MMHG | HEIGHT: 66 IN | WEIGHT: 223.6 LBS

## 2020-09-28 PROCEDURE — 1123F ACP DISCUSS/DSCN MKR DOCD: CPT | Performed by: INTERNAL MEDICINE

## 2020-09-28 PROCEDURE — G8427 DOCREV CUR MEDS BY ELIG CLIN: HCPCS | Performed by: INTERNAL MEDICINE

## 2020-09-28 PROCEDURE — 4040F PNEUMOC VAC/ADMIN/RCVD: CPT | Performed by: INTERNAL MEDICINE

## 2020-09-28 PROCEDURE — 1036F TOBACCO NON-USER: CPT | Performed by: INTERNAL MEDICINE

## 2020-09-28 PROCEDURE — 99213 OFFICE O/P EST LOW 20 MIN: CPT | Performed by: INTERNAL MEDICINE

## 2020-09-28 PROCEDURE — G8417 CALC BMI ABV UP PARAM F/U: HCPCS | Performed by: INTERNAL MEDICINE

## 2020-09-28 PROCEDURE — 1090F PRES/ABSN URINE INCON ASSESS: CPT | Performed by: INTERNAL MEDICINE

## 2020-09-28 PROCEDURE — G8400 PT W/DXA NO RESULTS DOC: HCPCS | Performed by: INTERNAL MEDICINE

## 2020-09-28 RX ORDER — QUINIDINE SULFATE 200 MG
400 TABLET ORAL DAILY
Qty: 30 CAPSULE | Refills: 5 | Status: SHIPPED | OUTPATIENT
Start: 2020-09-28

## 2020-09-28 NOTE — PATIENT INSTRUCTIONS
Take coenzyme q 10 400 mg daily to decrease fatigue and aches that could be caused by your statin.  This can be found at any local pharmacy, no prescription needed  Walk daily for 10 minutes  Continue all medications  Carotid dopplers

## 2020-09-28 NOTE — PROGRESS NOTES
0.5 tablets by mouth 2 times daily 60 tablet 3    therapeutic multivitamin-minerals (THERAGRAN-M) tablet Take 1 tablet by mouth daily.  hydrOXYzine (ATARAX) 25 MG tablet Take 25 mg by mouth 3 times daily as needed.  ranitidine (ZANTAC) 300 MG tablet Take 150 mg by mouth 2 times daily        No current facility-administered medications for this visit.         Past Medical History:   Diagnosis Date    Arthritis     Blood circulation, collateral     Blood transfusion     CAD (coronary artery disease)     CHF (congestive heart failure) (HCC)     Chronic renal failure, stage 3 (moderate) (HCC)     Diabetes mellitus (HCC)     GERD (gastroesophageal reflux disease)     Hyperlipidemia     Hypertension      Past Surgical History:   Procedure Laterality Date    ABDOMEN SURGERY      CARDIAC SURGERY      2009    CATARACT REMOVAL WITH IMPLANT Left 11/23/2016    CATARACT REMOVAL WITH IMPLANT Right 11/02/2016    CATARACT REMOVAL WITH IMPLANT Bilateral 10/16, 11/16    CHOLECYSTECTOMY      COLONOSCOPY      ENDOSCOPY, COLON, DIAGNOSTIC      EYE SURGERY      left tear duct surgery    JOINT REPLACEMENT      BTKR    KNEE ARTHROPLASTY  09/15/2010    left total knee    TOTAL KNEE ARTHROPLASTY      VASCULAR SURGERY       Family History   Problem Relation Age of Onset    Diabetes Mother     Heart Disease Mother     Heart Disease Father     Stroke Father      Social History     Socioeconomic History    Marital status:      Spouse name: Not on file    Number of children: Not on file    Years of education: Not on file    Highest education level: Not on file   Occupational History    Not on file   Social Needs    Financial resource strain: Not on file    Food insecurity     Worry: Not on file     Inability: Not on file    Transportation needs     Medical: Not on file     Non-medical: Not on file   Tobacco Use    Smoking status: Never Smoker    Smokeless tobacco: Never Used   Substance and Sexual Activity    Alcohol use: No    Drug use: No    Sexual activity: Yes     Partners: Male   Lifestyle    Physical activity     Days per week: Not on file     Minutes per session: Not on file    Stress: Not on file   Relationships    Social connections     Talks on phone: Not on file     Gets together: Not on file     Attends Christianity service: Not on file     Active member of club or organization: Not on file     Attends meetings of clubs or organizations: Not on file     Relationship status: Not on file    Intimate partner violence     Fear of current or ex partner: Not on file     Emotionally abused: Not on file     Physically abused: Not on file     Forced sexual activity: Not on file   Other Topics Concern    Not on file   Social History Narrative    Not on file       Review of System:  · General ROS: negative for - chills, fever   · Psychological ROS: negative for - anxiety or depression  · Ophthalmic ROS: negative for - eye pain or loss of vision  · ENT ROS: negative for - headaches, sore throat   · Allergy and Immunology ROS: negative for - hives  · Hematological and Lymphatic ROS: negative for - bleeding problems, blood clots, bruising or jaundice  · Endocrine ROS: negative for - skin changes, temperature intolerance or unexpected weight changes  · Respiratory ROS: negative for - cough, sputum, wheezing  · Cardiovascular ROS: Per HPI. · Gastrointestinal ROS: negative for - abdominal pain, diarrhea, nausea/vomiting, bleeding   · Genito-Urinary ROS: negative for - dysuria or incontinence  · Musculoskeletal ROS: negative for - joint swelling--Joint pain.   · Neurological ROS: negative for - confusion, numbness/tingling, seizures, weakness  · Dermatological ROS: negative for - rash    Physical Examination:    Vitals:    09/28/20 1002   BP: 120/60   Site: Right Upper Arm   Position: Sitting   Cuff Size: Large Adult   Pulse: 59   Temp: 98.5 °F (36.9 °C)   SpO2: 97%   Weight: 223 lb 9.6 oz (101.4 kg) hdl 40 ldl 70   Has not been taking coenzyme q 10 , will restart  Tolerates lipitor    Myalgias  Coenzyme q 10 400 mg daily    Carotid disease  4/18/18 carotid disease--less than 50% bilateral  Will repeat    AF  [id number] Score for Atrial Fibrillation Stroke Risk   Risk   Factors  Component Value   C CHF Yes 1   H HTN Yes 1   A2 Age >= 76 Yes,  (77 y.o.) 2   D DM Yes 1   S2 Prior Stroke/TIA No 0   V Vascular Disease No 1   A Age 74-69 No,  (77 y.o.) 0   Sc Sex female 1    [id number]  Score  7   Score last updated 9/28/20 79:62 AM EDT    Click here for a link to the UpToDate guideline \"Atrial Fibrillation: Anticoagulation therapy to prevent embolization    Disclaimer: Risk Score calculation is dependent on accuracy of patient problem list and past encounter diagnosis. currently on Xarelto. Rate controlled  One nosebleed  Scheduled to see Dr June Woodard 10/20--to discuss treatment options risk/benefit of watchman    Plan:   Take coenzyme q 10 400 mg daily to decrease fatigue and aches that could be caused by your statin. This can be found at any local pharmacy, no prescription needed  Walk daily for 10 minutes  Continue all medications  Scheduled for follow up with Dr June Woodard  Will repeat carotids  Follow up in 6 months with Dr Stephanie Cantu:  Taylor Servin am scribing for and in the presence of Zana Landers MD.   Stephanie Nichols 09/28/20 10:31 AM   Provider Bibi Rodriguez is working as a scribe for and in the presence of milena Landers MD). Working as a scribe, Salma Silva may have prepopulated components of this note with my historical  intellectual property under my direct supervision. Any additions to this intellectual property were performed in my presence and at my direction. Furthermore, the content and accuracy of this note have been reviewed by me Zana Landers MD).         Zana Landers MD [facility] [place]

## 2020-12-09 ENCOUNTER — HOSPITAL ENCOUNTER (OUTPATIENT)
Age: 75
Discharge: HOME | End: 2020-12-09
Payer: MEDICARE

## 2020-12-09 LAB
IRON SATURATION: 16 % (ref 15–50)
IRON: 52 UG/DL (ref 37–145)
TOTAL IRON BINDING CAPACITY: 325 UG/DL (ref 260–445)

## 2020-12-09 PROCEDURE — 87086 URINE CULTURE/COLONY COUNT: CPT

## 2020-12-09 PROCEDURE — 83550 IRON BINDING TEST: CPT

## 2020-12-09 PROCEDURE — 83540 ASSAY OF IRON: CPT

## 2020-12-09 PROCEDURE — 87186 SC STD MICRODIL/AGAR DIL: CPT

## 2020-12-09 PROCEDURE — 87088 URINE BACTERIA CULTURE: CPT

## 2020-12-09 PROCEDURE — 36415 COLL VENOUS BLD VENIPUNCTURE: CPT

## 2020-12-11 LAB
ORGANISM: ABNORMAL
URINE CULTURE, ROUTINE: ABNORMAL

## 2021-01-09 ENCOUNTER — HOSPITAL ENCOUNTER (OUTPATIENT)
Age: 76
Setting detail: OBSERVATION
Discharge: NURSING, REHAB OR LONG-TERM CARE | End: 2021-01-12
Attending: EMERGENCY MEDICINE | Admitting: INTERNAL MEDICINE
Payer: MEDICARE

## 2021-01-09 DIAGNOSIS — M25.562 ACUTE PAIN OF LEFT KNEE: Primary | ICD-10-CM

## 2021-01-09 PROCEDURE — 99284 EMERGENCY DEPT VISIT MOD MDM: CPT

## 2021-01-09 ASSESSMENT — PAIN DESCRIPTION - LOCATION: LOCATION: KNEE

## 2021-01-09 ASSESSMENT — PAIN DESCRIPTION - ORIENTATION: ORIENTATION: LEFT

## 2021-01-10 ENCOUNTER — APPOINTMENT (OUTPATIENT)
Dept: CT IMAGING | Age: 76
End: 2021-01-10
Payer: MEDICARE

## 2021-01-10 ENCOUNTER — APPOINTMENT (OUTPATIENT)
Dept: GENERAL RADIOLOGY | Age: 76
End: 2021-01-10
Payer: MEDICARE

## 2021-01-10 PROBLEM — M25.569 KNEE PAIN: Status: ACTIVE | Noted: 2021-01-10

## 2021-01-10 LAB
ALBUMIN SERPL-MCNC: 2.9 G/DL (ref 3.4–5)
ANION GAP SERPL CALCULATED.3IONS-SCNC: 13 MMOL/L (ref 3–16)
BASOPHILS ABSOLUTE: 0.1 K/UL (ref 0–0.2)
BASOPHILS RELATIVE PERCENT: 0.5 %
BUN BLDV-MCNC: 44 MG/DL (ref 7–20)
CALCIUM SERPL-MCNC: 9.3 MG/DL (ref 8.3–10.6)
CHLORIDE BLD-SCNC: 98 MMOL/L (ref 99–110)
CO2: 26 MMOL/L (ref 21–32)
CREAT SERPL-MCNC: 1.6 MG/DL (ref 0.6–1.2)
EOSINOPHILS ABSOLUTE: 0 K/UL (ref 0–0.6)
EOSINOPHILS RELATIVE PERCENT: 0.4 %
GFR AFRICAN AMERICAN: 38
GFR NON-AFRICAN AMERICAN: 31
GLUCOSE BLD-MCNC: 165 MG/DL (ref 70–99)
GLUCOSE BLD-MCNC: 185 MG/DL (ref 70–99)
GLUCOSE BLD-MCNC: 191 MG/DL (ref 70–99)
GLUCOSE BLD-MCNC: 211 MG/DL (ref 70–99)
HCT VFR BLD CALC: 36.4 % (ref 36–48)
HEMOGLOBIN: 11.8 G/DL (ref 12–16)
LYMPHOCYTES ABSOLUTE: 1.1 K/UL (ref 1–5.1)
LYMPHOCYTES RELATIVE PERCENT: 9.8 %
MCH RBC QN AUTO: 30.2 PG (ref 26–34)
MCHC RBC AUTO-ENTMCNC: 32.4 G/DL (ref 31–36)
MCV RBC AUTO: 93.3 FL (ref 80–100)
MONOCYTES ABSOLUTE: 1.2 K/UL (ref 0–1.3)
MONOCYTES RELATIVE PERCENT: 11 %
NEUTROPHILS ABSOLUTE: 8.8 K/UL (ref 1.7–7.7)
NEUTROPHILS RELATIVE PERCENT: 78.3 %
PDW BLD-RTO: 13.2 % (ref 12.4–15.4)
PERFORMED ON: ABNORMAL
PHOSPHORUS: 3.7 MG/DL (ref 2.5–4.9)
PLATELET # BLD: 288 K/UL (ref 135–450)
PMV BLD AUTO: 7.6 FL (ref 5–10.5)
POTASSIUM SERPL-SCNC: 4.1 MMOL/L (ref 3.5–5.1)
RBC # BLD: 3.9 M/UL (ref 4–5.2)
SODIUM BLD-SCNC: 137 MMOL/L (ref 136–145)
WBC # BLD: 11.3 K/UL (ref 4–11)

## 2021-01-10 PROCEDURE — G0378 HOSPITAL OBSERVATION PER HR: HCPCS

## 2021-01-10 PROCEDURE — 73560 X-RAY EXAM OF KNEE 1 OR 2: CPT

## 2021-01-10 PROCEDURE — 73700 CT LOWER EXTREMITY W/O DYE: CPT

## 2021-01-10 PROCEDURE — 6370000000 HC RX 637 (ALT 250 FOR IP): Performed by: NURSE PRACTITIONER

## 2021-01-10 PROCEDURE — 2580000003 HC RX 258: Performed by: INTERNAL MEDICINE

## 2021-01-10 PROCEDURE — 96372 THER/PROPH/DIAG INJ SC/IM: CPT

## 2021-01-10 PROCEDURE — 94760 N-INVAS EAR/PLS OXIMETRY 1: CPT

## 2021-01-10 PROCEDURE — 36415 COLL VENOUS BLD VENIPUNCTURE: CPT

## 2021-01-10 PROCEDURE — 83036 HEMOGLOBIN GLYCOSYLATED A1C: CPT

## 2021-01-10 PROCEDURE — 80069 RENAL FUNCTION PANEL: CPT

## 2021-01-10 PROCEDURE — 85025 COMPLETE CBC W/AUTO DIFF WBC: CPT

## 2021-01-10 PROCEDURE — 6370000000 HC RX 637 (ALT 250 FOR IP): Performed by: INTERNAL MEDICINE

## 2021-01-10 PROCEDURE — 6360000002 HC RX W HCPCS: Performed by: EMERGENCY MEDICINE

## 2021-01-10 PROCEDURE — 2580000003 HC RX 258: Performed by: NURSE PRACTITIONER

## 2021-01-10 RX ORDER — INSULIN LISPRO 100 [IU]/ML
0-12 INJECTION, SOLUTION INTRAVENOUS; SUBCUTANEOUS
Status: DISCONTINUED | OUTPATIENT
Start: 2021-01-10 | End: 2021-01-12 | Stop reason: HOSPADM

## 2021-01-10 RX ORDER — DEXTROSE MONOHYDRATE 50 MG/ML
100 INJECTION, SOLUTION INTRAVENOUS PRN
Status: DISCONTINUED | OUTPATIENT
Start: 2021-01-10 | End: 2021-01-12 | Stop reason: HOSPADM

## 2021-01-10 RX ORDER — FAMOTIDINE 20 MG/1
20 TABLET, FILM COATED ORAL 2 TIMES DAILY
Status: DISCONTINUED | OUTPATIENT
Start: 2021-01-10 | End: 2021-01-10 | Stop reason: CLARIF

## 2021-01-10 RX ORDER — SODIUM CHLORIDE 0.9 % (FLUSH) 0.9 %
10 SYRINGE (ML) INJECTION EVERY 12 HOURS SCHEDULED
Status: DISCONTINUED | OUTPATIENT
Start: 2021-01-10 | End: 2021-01-12 | Stop reason: HOSPADM

## 2021-01-10 RX ORDER — ATORVASTATIN CALCIUM 20 MG/1
20 TABLET, FILM COATED ORAL NIGHTLY
Status: DISCONTINUED | OUTPATIENT
Start: 2021-01-10 | End: 2021-01-12 | Stop reason: HOSPADM

## 2021-01-10 RX ORDER — SODIUM CHLORIDE 0.9 % (FLUSH) 0.9 %
10 SYRINGE (ML) INJECTION PRN
Status: DISCONTINUED | OUTPATIENT
Start: 2021-01-10 | End: 2021-01-12 | Stop reason: HOSPADM

## 2021-01-10 RX ORDER — INSULIN LISPRO 100 [IU]/ML
0-6 INJECTION, SOLUTION INTRAVENOUS; SUBCUTANEOUS NIGHTLY
Status: DISCONTINUED | OUTPATIENT
Start: 2021-01-10 | End: 2021-01-12 | Stop reason: HOSPADM

## 2021-01-10 RX ORDER — OMEGA-3S/DHA/EPA/FISH OIL/D3 300MG-1000
400 CAPSULE ORAL DAILY
Status: DISCONTINUED | OUTPATIENT
Start: 2021-01-10 | End: 2021-01-12 | Stop reason: HOSPADM

## 2021-01-10 RX ORDER — SODIUM CHLORIDE 9 MG/ML
INJECTION, SOLUTION INTRAVENOUS CONTINUOUS
Status: DISCONTINUED | OUTPATIENT
Start: 2021-01-10 | End: 2021-01-12 | Stop reason: HOSPADM

## 2021-01-10 RX ORDER — PROMETHAZINE HYDROCHLORIDE 25 MG/1
12.5 TABLET ORAL EVERY 6 HOURS PRN
Status: DISCONTINUED | OUTPATIENT
Start: 2021-01-10 | End: 2021-01-12 | Stop reason: HOSPADM

## 2021-01-10 RX ORDER — TRAMADOL HYDROCHLORIDE 50 MG/1
50 TABLET ORAL EVERY 6 HOURS PRN
Status: DISCONTINUED | OUTPATIENT
Start: 2021-01-10 | End: 2021-01-12 | Stop reason: HOSPADM

## 2021-01-10 RX ORDER — DEXTROSE MONOHYDRATE 25 G/50ML
12.5 INJECTION, SOLUTION INTRAVENOUS PRN
Status: DISCONTINUED | OUTPATIENT
Start: 2021-01-10 | End: 2021-01-12 | Stop reason: HOSPADM

## 2021-01-10 RX ORDER — FAMOTIDINE 20 MG/1
20 TABLET, FILM COATED ORAL DAILY
Status: DISCONTINUED | OUTPATIENT
Start: 2021-01-10 | End: 2021-01-12 | Stop reason: HOSPADM

## 2021-01-10 RX ORDER — ONDANSETRON 2 MG/ML
4 INJECTION INTRAMUSCULAR; INTRAVENOUS EVERY 6 HOURS PRN
Status: DISCONTINUED | OUTPATIENT
Start: 2021-01-10 | End: 2021-01-12 | Stop reason: HOSPADM

## 2021-01-10 RX ORDER — HYDROMORPHONE HYDROCHLORIDE 1 MG/ML
1 INJECTION, SOLUTION INTRAMUSCULAR; INTRAVENOUS; SUBCUTANEOUS ONCE
Status: COMPLETED | OUTPATIENT
Start: 2021-01-10 | End: 2021-01-10

## 2021-01-10 RX ORDER — NICOTINE POLACRILEX 4 MG
15 LOZENGE BUCCAL PRN
Status: DISCONTINUED | OUTPATIENT
Start: 2021-01-10 | End: 2021-01-12 | Stop reason: HOSPADM

## 2021-01-10 RX ADMIN — METOPROLOL TARTRATE 12.5 MG: 25 TABLET, FILM COATED ORAL at 21:41

## 2021-01-10 RX ADMIN — Medication 400 UNITS: at 08:31

## 2021-01-10 RX ADMIN — Medication 10 ML: at 21:50

## 2021-01-10 RX ADMIN — SODIUM CHLORIDE: 9 INJECTION, SOLUTION INTRAVENOUS at 14:55

## 2021-01-10 RX ADMIN — Medication 10 ML: at 08:31

## 2021-01-10 RX ADMIN — ATORVASTATIN CALCIUM 20 MG: 20 TABLET, FILM COATED ORAL at 21:41

## 2021-01-10 RX ADMIN — HYDROMORPHONE HYDROCHLORIDE 1 MG: 1 INJECTION, SOLUTION INTRAMUSCULAR; INTRAVENOUS; SUBCUTANEOUS at 01:12

## 2021-01-10 RX ADMIN — FAMOTIDINE 20 MG: 20 TABLET ORAL at 08:31

## 2021-01-10 RX ADMIN — INSULIN LISPRO 1 UNITS: 100 INJECTION, SOLUTION INTRAVENOUS; SUBCUTANEOUS at 21:53

## 2021-01-10 RX ADMIN — RIVAROXABAN 15 MG: 15 TABLET, FILM COATED ORAL at 18:49

## 2021-01-10 RX ADMIN — METOPROLOL TARTRATE 12.5 MG: 25 TABLET, FILM COATED ORAL at 08:31

## 2021-01-10 ASSESSMENT — ENCOUNTER SYMPTOMS
VOMITING: 0
SHORTNESS OF BREATH: 0
ABDOMINAL PAIN: 0
NAUSEA: 0

## 2021-01-10 ASSESSMENT — PAIN SCALES - GENERAL
PAINLEVEL_OUTOF10: 0
PAINLEVEL_OUTOF10: 2

## 2021-01-10 NOTE — H&P
been reviewed and are negative except for the history of present  illness. PHYSICAL EXAMINATION:  VITAL SIGNS:  Temperature 99.9, respiratory rate 16, pulse 87, blood  pressure 160/51, saturating 98%. CNS:  Alert, awake and oriented. PSYCH:  Cooperative, pleasant, answering questions appropriately. EYES:  Pupils are reactive to light. Extraocular muscle movements are  intact. RESPIRATORY SYSTEM:  No audible obvious extrinsic wheezes. CVS:  Non-tachycardic. Rate of around 86 beats per minute. Irregularly  irregular rhythm. ABDOMEN:  Nondistended. MUSCULOSKELETAL:  Significant tenderness to palpation over the left knee  with some crepitus, but no obvious visible deformity. SKIN:  No cutaneous rashes or lesions. DIAGNOSTIC DATA:  CT of the left knee shows no evidence of any  abnormalities. The knee replacement _____ appears well seated. CONSULTATIONS REQUESTED:  Currently none. ASSESSMENT:  1. Left knee pain. 2.  Obesity with a BMI of 35.2 kg/m2. 3.  Coronary artery disease. 4.  Type 2 diabetes. 5.  Hypertension. 6.  Chronic atrial fibrillation. PLAN OF CARE:  The patient is admitted to observation. Pain control  will be continued with oral analgesia. The patient will need physical  therapy consultation. DVT prophylaxis will be given with Lovenox. The  patient's home dose of her anticoagulation for the atrial fibrillation  will be continued. The patient actually is on rivaroxaban and not on  Coumadin. CODE STATUS:  Full. EXPECTED LENGTH OF STAY:  Less than two midnights based on plan of care  above. DISPOSITION:  Observation.         Abner Conway MD    D: 01/10/2021 6:42:29       T: 01/10/2021 7:22:47     [email or web address]  Job#: [id number]     Doc#: [id number]    CC:

## 2021-01-10 NOTE — ED PROVIDER NOTES
I personally evaluated and examined the patient in conjunction with the APC and agree with the assessment, treatment plan and disposition of the patient has recorded by the APC. I reviewed pertinent nurse's notes, triage notes, vital signs, past medical history, family and social history, medications, and allergies. Complete review of systems was conducted by the mid-level provider and/or myself. Review of systems is negative except as documented in the history of present illness. This patient comes in with left knee pain after twisting. He states he been unable to walk him in a recliner all day. On examination, there is mild ecchymosis anteriorly and she reports some tenderness anteriorly. She has had some surgical intervention in the past.  X-ray is negative. We will given her some pain medicine and then going to try to ambulate her. She was unable to ambulate secondary to pain. We did a CT scan to rule out any occult injury and this was negative as well. Even after analgesia, the patient was unable to ambulate so I put in a page to the hospitalist for admission for pain control. FINAL IMPRESSION     1. Acute pain of left knee            Electronically signed by: BENNIE Mixon MD  01/10/21 9049

## 2021-01-10 NOTE — ED PROVIDER NOTES
[facility]        Pt Name: Nelida Dye  MRN: [id number]  [place] 1945  Date of evaluation: 1/9/2021  Provider: Saurav Rizvi PA-C  PCP: Lisset Doll MD     I have seen and evaluated this patient with my supervising physician Chidi Lanza MD.    [address] [place] [address]       Chief Complaint   Patient presents with    Knee Pain     left knee pain, feel yesterday and twisted it, pt unable to walk, been in recliner all day, pt defecated on self per EMS       HISTORY OF PRESENT ILLNESS   (Location, Timing/Onset, Context/Setting, Quality, Duration, Modifying Factors, Severity, Associated Signs and Symptoms)  Note limiting factors. Nelida Dye is a 66 y.o. female patient presents emergency department for evaluation of left knee pain. Patient states that yesterday she was in the kitchen standing and twisted with her upper body. Patient states she felt pain in her back and into her left leg. Patient states at first she was able to ambulate and get a brace for her back. She was able to get up and down throughout the day yesterday. Patient states that today however when she woke up she was unable to bear any weight on the left knee. Patient states she is having significant pain to the lateral left knee. Patient states she has some chronic wounds to her lower legs which are unchanged. Denies any fall resulting in head trauma, loss of consciousness or any additional injuries. Patient called EMS to bring her to the hospital.  EMS states that she had defecated on herself while sitting in her recliner she was unable to get up at home. Patient does live with her . Nursing Notes were all reviewed and agreed with or any disagreements were addressed in the HPI. REVIEW OF SYSTEMS    (2-9 systems for level 4, 10 or more for level 5)     Review of Systems   Constitutional: Negative for fatigue and fever. Eyes: Negative for visual disturbance. Respiratory: Negative for shortness of breath. Cardiovascular: Negative for chest pain. Gastrointestinal: Negative for abdominal pain, nausea and vomiting. Musculoskeletal: Positive for arthralgias. Positives and Pertinent negatives as per HPI. Except as noted above in the ROS, all other systems were reviewed and negative. PAST MEDICAL HISTORY     Past Medical History:   Diagnosis Date    Arthritis     Blood circulation, collateral     Blood transfusion     CAD (coronary artery disease)     CHF (congestive heart failure) (HCC)     Chronic renal failure, stage 3 (moderate)     Diabetes mellitus (HCC)     GERD (gastroesophageal reflux disease)     Hyperlipidemia     Hypertension          SURGICAL HISTORY     Past Surgical History:   Procedure Laterality Date    ABDOMEN SURGERY      CARDIAC SURGERY      2009    CATARACT REMOVAL WITH IMPLANT Left 11/23/2016    CATARACT REMOVAL WITH IMPLANT Right 11/02/2016    CATARACT REMOVAL WITH IMPLANT Bilateral 10/16, 11/16    CHOLECYSTECTOMY      COLONOSCOPY      ENDOSCOPY, COLON, DIAGNOSTIC      EYE SURGERY      left tear duct surgery    JOINT REPLACEMENT      BTKR    KNEE ARTHROPLASTY  09/15/2010    left total knee    TOTAL KNEE ARTHROPLASTY      VASCULAR SURGERY           CURRENTMEDICATIONS       Previous Medications    ATORVASTATIN (LIPITOR) 20 MG TABLET    Take 20 mg by mouth daily    COENZYME Q10 (CO Q-10) 400 MG CAPS    Take 400 mg by mouth daily    FERROUS SULFATE 325 (65 FE) MG TABLET    Take 1 tablet by mouth 3 times daily (with meals)    FUROSEMIDE (LASIX) 20 MG TABLET    TAKE ONE TABLET BY MOUTH TWICE DAILY     HANDICAP PLACARD MISC    by Does not apply route Sob when walking less than 200 feet  Length of time--greater than 5 year    HYDROXYZINE (ATARAX) 25 MG TABLET    Take 25 mg by mouth 3 times daily as needed. LIRAGLUTIDE (VICTOZA) 18 MG/3ML SOPN SC INJECTION    Inject 1.2 mg into the skin daily    METOPROLOL (LOPRESSOR) 25 MG TABLET    Take 0.5 tablets by mouth 2 times daily    RANITIDINE (ZANTAC) 300 MG TABLET    Take 150 mg by mouth 2 times daily     THERAPEUTIC MULTIVITAMIN-MINERALS (THERAGRAN-M) TABLET    Take 1 tablet by mouth daily. VITAMIN D (CHOLECALCIFEROL) 400 UNITS TABS TABLET    Take 400 Units by mouth daily    XARELTO 15 MG TABS TABLET    TAKE ONE TABLET BY MOUTH DAILY         ALLERGIES     Adhesive tape, Chlorhexidine, and Lisinopril    FAMILYHISTORY       Family History   Problem Relation Age of Onset    Diabetes Mother     Heart Disease Mother     Heart Disease Father     Stroke Father           SOCIAL HISTORY       Social History     Tobacco Use    Smoking status: Never Smoker    Smokeless tobacco: Never Used   Substance Use Topics    Alcohol use: No    Drug use: No       SCREENINGS             PHYSICAL EXAM    (up to 7 for level 4, 8 or more for level 5)     ED Triage Vitals [01/09/21 2359]   BP Temp Temp Source Pulse Resp SpO2 Height Weight   (!) 160/51 99.9 °F (37.7 °C) Oral 87 16 98 % -- 220 lb (99.8 kg)       Physical Exam  Vitals signs and nursing note reviewed. Constitutional:       Appearance: She is well-developed. She is not diaphoretic. HENT:      Head: Normocephalic and atraumatic. Nose: Nose normal.   Eyes:      General:         Right eye: No discharge. Left eye: No discharge. Neck:      Musculoskeletal: Normal range of motion and neck supple. Cardiovascular:      Rate and Rhythm: Normal rate and regular rhythm. Heart sounds: Normal heart sounds. No murmur. No gallop. Pulmonary:      Effort: Pulmonary effort is normal. No respiratory distress. Breath sounds: Normal breath sounds. No wheezing or rales. Abdominal:      General: Bowel sounds are normal.      Tenderness: There is no abdominal tenderness. There is no guarding or rebound. Musculoskeletal:      Right knee: Normal.      Left knee: She exhibits decreased range of motion and bony tenderness. She exhibits no swelling, no effusion, no ecchymosis, no deformity and no laceration. Tenderness found. Lateral joint line tenderness noted. Comments: Patient has discomfort to direct palpation in the lateral aspect of the left knee and lateral aspect of tibial plateau/proximal fibula. Skin:     General: Skin is warm and dry. Capillary Refill: Capillary refill takes less than 2 seconds. Coloration: Skin is not pale. Comments: Scattered wounds in various stages of healing to bilateral lower extremities. Neurological:      General: No focal deficit present. Mental Status: She is alert and oriented to person, place, and time. Psychiatric:         Behavior: Behavior normal.         DIAGNOSTIC RESULTS   LABS:    Labs Reviewed - No data to display    All other labs were within normal range or not returned as of this dictation. EKG: All EKG's are interpreted by the Emergency Department Physician in the absence of a cardiologist.  Please see their note for interpretation of EKG. RADIOLOGY:   Non-plain film images such as CT, Ultrasound and MRI are read by the radiologist. Plain radiographic images are visualized and preliminarily interpreted by the ED Provider with the below findings:        Interpretation per the Radiologist below, if available at the time of this note:    CT KNEE LEFT WO CONTRAST   Final Result   Status post left total knee arthroplasty. No CT evidence of periprosthetic   fracture or hardware complication. Lateral tilt of the patella. Small suprapatellar joint effusion. XR KNEE LEFT (1-2 VIEWS)   Final Result   Previous total left knee replacement which is unchanged. Diffuse osteopenia with no acute bony abnormality. Postop changes along the patella which is unchanged with a small   suprapatellar effusion. Xr Knee Left (1-2 Views)    Result Date: 1/10/2021  EXAMINATION: XRAY VIEWS OF THE LEFT KNEE 1/10/2021 12:08 am COMPARISON: Non 03/29/2020 HISTORY: ORDERING SYSTEM PROVIDED HISTORY: fall TECHNOLOGIST PROVIDED HISTORY: Reason for exam:->fall FINDINGS: There is a metallic prosthesis in the femoral condyle and tibial plateau which are held in place with methylmethacrylate and are unchanged in position. There are postop changes along the patella posteriorly which is unchanged. No fracture or dislocation is seen. The bones are osteopenic. There is a small suprapatellar effusion. Previous total left knee replacement which is unchanged. Diffuse osteopenia with no acute bony abnormality. Postop changes along the patella which is unchanged with a small suprapatellar effusion.      Ct Knee Left Wo Contrast    Result Date: 1/10/2021 EXAMINATION: CT OF THE LEFT KNEE WITHOUT CONTRAST 1/10/2021 2:51 am TECHNIQUE: CT of the left knee was performed without the administration of intravenous contrast.  Multiplanar reformatted images are provided for review. Dose modulation, iterative reconstruction, and/or weight based adjustment of the mA/kV was utilized to reduce the radiation dose to as low as reasonably achievable. COMPARISON: None. HISTORY ORDERING SYSTEM PROVIDED HISTORY: Pain, rule out fx. Twisted knee. Unable to ambulate TECHNOLOGIST PROVIDED HISTORY: Reason for exam:->rule out fx. Twisted knee. Unable to ambulate Reason for Exam: Knee Pain (left knee pain, feel yesterday and twisted it, pt unable to walk, been in recliner all day, pt defecated on self per EMS) Acuity: Acute Type of Exam: Initial FINDINGS: There has been prior left total knee arthroplasty with no CT evidence of hardware complication. .  No periprosthetic fracture is seen. The patella is tilted laterally. The bones are somewhat osteopenic. Small suprapatellar joint effusion is noted. There is vascular calcification no radiopaque foreign body is noted. Status post left total knee arthroplasty. No CT evidence of periprosthetic fracture or hardware complication. Lateral tilt of the patella. Small suprapatellar joint effusion.            PROCEDURES   Unless otherwise noted below, none     Procedures    CRITICAL CARE TIME   N/A    CONSULTS:  None      EMERGENCY DEPARTMENT COURSE and DIFFERENTIAL DIAGNOSIS/MDM:   Vitals:    Vitals:    01/10/21 0030 01/10/21 0100 01/10/21 0130 01/10/21 0200   BP: (!) 167/67 (!) 164/90 (!) 150/55 (!) 146/64   Pulse:       Resp:       Temp:       TempSrc:       SpO2: 100% 98% 96% 96%   Weight:           Patient was given the following medications:  Medications   sodium chloride flush 0.9 % injection 10 mL (has no administration in time range)   sodium chloride flush 0.9 % injection 10 mL (has no administration in time range) promethazine (PHENERGAN) tablet 12.5 mg (has no administration in time range)     Or   ondansetron (ZOFRAN) injection 4 mg (has no administration in time range)   enoxaparin (LOVENOX) injection 40 mg (has no administration in time range)   traMADol (ULTRAM) tablet 50 mg (has no administration in time range)   HYDROmorphone HCl PF (DILAUDID) injection 1 mg (1 mg Intramuscular Given 1/10/21 0112)         Patient presents emergency department for evaluation of left knee pain. Patient states she twisted her upper body while standing resulting in the pain to the left knee. Patient has point tenderness to the lateral aspect of the left knee as well as the lateral aspect of the tibial plateau and proximal fibula. Patient is completely unable to bear any weight. Patient was still unable to bear weight after being treated with Dilaudid for her pain. X-ray shows no acute bony fracture and CT scan additionally does not show any bony fracture or obviously loosening of her knee hardware however this is still high in the differential.  Patient will be admitted to the hospitalist for further management by orthopedics tomorrow and pain management. Patient is amenable to this inpatient plan will be admitted by hospitalist Inessa. FINAL IMPRESSION      1. Acute pain of left knee          DISPOSITION/PLAN   DISPOSITION Admitted 01/10/2021 05:16:15 AM      PATIENT REFERREDTO:  No follow-up provider specified.     DISCHARGE MEDICATIONS:  New Prescriptions    No medications on file       DISCONTINUED MEDICATIONS:  Discontinued Medications    No medications on file              (Please note that portions of this note were completed with a voice recognition program.  Efforts were made to edit the dictations but occasionally words are mis-transcribed.)    Anam Barragan PA-C (electronically signed)            Anam Barragan PA-C  01/10/21 6420

## 2021-01-10 NOTE — ED NOTES
Report given to [facility] RN. V/u and all questions answered.       Hailey Wheat RN  01/10/21 9208

## 2021-01-10 NOTE — CONSULTS
[facility] Orthopedic Surgery  Consult Note    Patient: Helene Mcgovern  Admit Date: 1/9/2021  Requesting Physician: Walt Johnson MD  Room: [address] [place] [address]40    Chief complaint: Left knee pain    HPI: Helene Mcgovern is a 66 y.o. female who presented to [facility] ED with acute onset of left knee pain. History is significant for bilateral total knee arthroplasty done by Dr. Thanh Sheridan about 15 years ago. She has not had any issues with her knees in the interim. However, on Thursday she twisted in her kitchen to reach for something out of the refrigerator while preparing some food and thought she injured her back. Since that time she has also had trouble moving and walking with her left knee. There is no fall. He was asymptomatic before this event. She is on anticoagulation. She has a history significant for coronary artery disease, congestive heart failure, renal failure, diabetes, obesity. She describes pain in front of her knee and on the medial lateral sides. She has trouble moving the knee. She denies hip or groin pain. Her  is at bedside with her.     Medical History:  Past Medical History:   Diagnosis Date    Arthritis     Blood circulation, collateral     Blood transfusion     CAD (coronary artery disease)     CHF (congestive heart failure) (HCC)     Chronic renal failure, stage 3 (moderate)     Diabetes mellitus (HCC)     GERD (gastroesophageal reflux disease)     Hyperlipidemia     Hypertension      Past Surgical History:   Procedure Laterality Date    ABDOMEN SURGERY      CARDIAC SURGERY      2009    CATARACT REMOVAL WITH IMPLANT Left 11/23/2016    CATARACT REMOVAL WITH IMPLANT Right 11/02/2016    CATARACT REMOVAL WITH IMPLANT Bilateral 10/16, 11/16    CHOLECYSTECTOMY      COLONOSCOPY      ENDOSCOPY, COLON, DIAGNOSTIC      EYE SURGERY      left tear duct surgery    JOINT REPLACEMENT      BTKR    KNEE ARTHROPLASTY  09/15/2010    left total knee  TOTAL KNEE ARTHROPLASTY      VASCULAR SURGERY         Social History:    reports that she has never smoked. She has never used smokeless tobacco.    Family History:        Problem Relation Age of Onset    Diabetes Mother     Heart Disease Mother     Heart Disease Father     Stroke Father        Medications:  ALL MEDICATIONS HAVE BEEN REVIEWED:  Scheduled:   atorvastatin  20 mg Oral Nightly    metoprolol tartrate  12.5 mg Oral BID    vitamin D  400 Units Oral Daily    sodium chloride flush  10 mL Intravenous 2 times per day    famotidine  20 mg Oral Daily    rivaroxaban  15 mg Oral Daily    insulin lispro  0-12 Units Subcutaneous TID WC    insulin lispro  0-6 Units Subcutaneous Nightly     Continuous:   dextrose      sodium chloride       PRN:sodium chloride flush, promethazine **OR** ondansetron, traMADol, glucose, dextrose, glucagon (rDNA), dextrose    Allergies: Allergies   Allergen Reactions    Adhesive Tape Shortness Of Breath     Other reaction(s): Ulcers    Chlorhexidine     Lisinopril Other (See Comments)     Med causes lethargy- takes in lower doses       Review of Systems:  Constitutional: Negative for fever, chills, fatigue. Skin:  Negative for pruritis, rash  Eyes: Negative for photophobia and visual disturbance. ENT:  Negative for rhinorrhea, epistaxis, sore throat  Respiratory:  Negative for cough and shortness of breath. Cardiovascular: Negative for chest pain. Gastrointestinal: Negative for nausea, vomiting, diarrhea. Genitourinary: Negative for dysuria and difficulty urinating. Neurological: Negative for confusion, dysarthria, tremors, seizures. Psychiatric:  Negative for depression or anxiety  Musculoskeletal: Positive for pain and swelling at left knee.       Objective:  Vitals:    01/10/21 1401   BP: (!) 159/70   Pulse: 90   Resp: 18   Temp: 98.4 °F (36.9 °C)   SpO2:       Physical Examination:  GENERAL: No apparent distress, obese, lying in bed Left total knee arthroplasty with acute pain    RECOMMENDATIONS:  It is unclear of the cause of her pain due to limitations in the physical exam.  There are no fractures or signs of loosening evident. She may have an MCL strain or tore her medial retinaculum. Regardless, she will be provided with a hinged knee brace to use with range of motion unlocked. She may be weightbearing as tolerated in the hinged knee brace. Pain control as needed with NSAIDs if able. Follow-up with me in 3 to 4 weeks.     Johann Jacome MD  1/10/2021

## 2021-01-11 LAB
ALBUMIN SERPL-MCNC: 2.5 G/DL (ref 3.4–5)
ANION GAP SERPL CALCULATED.3IONS-SCNC: 12 MMOL/L (ref 3–16)
BASOPHILS ABSOLUTE: 0.1 K/UL (ref 0–0.2)
BASOPHILS RELATIVE PERCENT: 0.6 %
BUN BLDV-MCNC: 43 MG/DL (ref 7–20)
CALCIUM SERPL-MCNC: 8.8 MG/DL (ref 8.3–10.6)
CHLORIDE BLD-SCNC: 101 MMOL/L (ref 99–110)
CO2: 22 MMOL/L (ref 21–32)
CREAT SERPL-MCNC: 1.5 MG/DL (ref 0.6–1.2)
EOSINOPHILS ABSOLUTE: 0.1 K/UL (ref 0–0.6)
EOSINOPHILS RELATIVE PERCENT: 0.6 %
ESTIMATED AVERAGE GLUCOSE: 151.3 MG/DL
GFR AFRICAN AMERICAN: 41
GFR NON-AFRICAN AMERICAN: 34
GLUCOSE BLD-MCNC: 152 MG/DL (ref 70–99)
GLUCOSE BLD-MCNC: 155 MG/DL (ref 70–99)
GLUCOSE BLD-MCNC: 166 MG/DL (ref 70–99)
GLUCOSE BLD-MCNC: 182 MG/DL (ref 70–99)
GLUCOSE BLD-MCNC: 183 MG/DL (ref 70–99)
HBA1C MFR BLD: 6.9 %
HCT VFR BLD CALC: 32.9 % (ref 36–48)
HEMOGLOBIN: 10.7 G/DL (ref 12–16)
LYMPHOCYTES ABSOLUTE: 1 K/UL (ref 1–5.1)
LYMPHOCYTES RELATIVE PERCENT: 9.3 %
MCH RBC QN AUTO: 30 PG (ref 26–34)
MCHC RBC AUTO-ENTMCNC: 32.4 G/DL (ref 31–36)
MCV RBC AUTO: 92.6 FL (ref 80–100)
MONOCYTES ABSOLUTE: 1 K/UL (ref 0–1.3)
MONOCYTES RELATIVE PERCENT: 9.3 %
NEUTROPHILS ABSOLUTE: 8.5 K/UL (ref 1.7–7.7)
NEUTROPHILS RELATIVE PERCENT: 80.2 %
PDW BLD-RTO: 13.2 % (ref 12.4–15.4)
PERFORMED ON: ABNORMAL
PHOSPHORUS: 3 MG/DL (ref 2.5–4.9)
PLATELET # BLD: 254 K/UL (ref 135–450)
PMV BLD AUTO: 8.1 FL (ref 5–10.5)
POTASSIUM SERPL-SCNC: 3.9 MMOL/L (ref 3.5–5.1)
RBC # BLD: 3.55 M/UL (ref 4–5.2)
SODIUM BLD-SCNC: 135 MMOL/L (ref 136–145)
WBC # BLD: 10.5 K/UL (ref 4–11)

## 2021-01-11 PROCEDURE — 85025 COMPLETE CBC W/AUTO DIFF WBC: CPT

## 2021-01-11 PROCEDURE — 97535 SELF CARE MNGMENT TRAINING: CPT

## 2021-01-11 PROCEDURE — 97530 THERAPEUTIC ACTIVITIES: CPT

## 2021-01-11 PROCEDURE — 97165 OT EVAL LOW COMPLEX 30 MIN: CPT

## 2021-01-11 PROCEDURE — G0378 HOSPITAL OBSERVATION PER HR: HCPCS

## 2021-01-11 PROCEDURE — 36415 COLL VENOUS BLD VENIPUNCTURE: CPT

## 2021-01-11 PROCEDURE — 97161 PT EVAL LOW COMPLEX 20 MIN: CPT

## 2021-01-11 PROCEDURE — 2580000003 HC RX 258: Performed by: INTERNAL MEDICINE

## 2021-01-11 PROCEDURE — 80069 RENAL FUNCTION PANEL: CPT

## 2021-01-11 PROCEDURE — 2580000003 HC RX 258: Performed by: NURSE PRACTITIONER

## 2021-01-11 PROCEDURE — 6370000000 HC RX 637 (ALT 250 FOR IP): Performed by: NURSE PRACTITIONER

## 2021-01-11 PROCEDURE — 6370000000 HC RX 637 (ALT 250 FOR IP): Performed by: INTERNAL MEDICINE

## 2021-01-11 RX ADMIN — SODIUM CHLORIDE: 9 INJECTION, SOLUTION INTRAVENOUS at 10:05

## 2021-01-11 RX ADMIN — INSULIN LISPRO 1 UNITS: 100 INJECTION, SOLUTION INTRAVENOUS; SUBCUTANEOUS at 21:13

## 2021-01-11 RX ADMIN — ATORVASTATIN CALCIUM 20 MG: 20 TABLET, FILM COATED ORAL at 21:12

## 2021-01-11 RX ADMIN — INSULIN LISPRO 2 UNITS: 100 INJECTION, SOLUTION INTRAVENOUS; SUBCUTANEOUS at 11:57

## 2021-01-11 RX ADMIN — INSULIN LISPRO 2 UNITS: 100 INJECTION, SOLUTION INTRAVENOUS; SUBCUTANEOUS at 17:10

## 2021-01-11 RX ADMIN — Medication 400 UNITS: at 08:26

## 2021-01-11 RX ADMIN — RIVAROXABAN 15 MG: 15 TABLET, FILM COATED ORAL at 17:10

## 2021-01-11 RX ADMIN — TRAMADOL HYDROCHLORIDE 50 MG: 50 TABLET, FILM COATED ORAL at 11:56

## 2021-01-11 RX ADMIN — METOPROLOL TARTRATE 12.5 MG: 25 TABLET, FILM COATED ORAL at 08:26

## 2021-01-11 RX ADMIN — Medication 10 ML: at 21:13

## 2021-01-11 RX ADMIN — FAMOTIDINE 20 MG: 20 TABLET ORAL at 08:26

## 2021-01-11 RX ADMIN — METOPROLOL TARTRATE 12.5 MG: 25 TABLET, FILM COATED ORAL at 21:12

## 2021-01-11 RX ADMIN — INSULIN LISPRO 2 UNITS: 100 INJECTION, SOLUTION INTRAVENOUS; SUBCUTANEOUS at 10:06

## 2021-01-11 ASSESSMENT — PAIN SCALES - WONG BAKER
WONGBAKER_NUMERICALRESPONSE: 0
WONGBAKER_NUMERICALRESPONSE: 0
WONGBAKER_NUMERICALRESPONSE: 2
WONGBAKER_NUMERICALRESPONSE: 0

## 2021-01-11 ASSESSMENT — PAIN SCALES - GENERAL
PAINLEVEL_OUTOF10: 0
PAINLEVEL_OUTOF10: 5
PAINLEVEL_OUTOF10: 0
PAINLEVEL_OUTOF10: 3
PAINLEVEL_OUTOF10: 0
PAINLEVEL_OUTOF10: 0
PAINLEVEL_OUTOF10: 7

## 2021-01-11 ASSESSMENT — PAIN DESCRIPTION - DESCRIPTORS: DESCRIPTORS: SHARP

## 2021-01-11 ASSESSMENT — PAIN DESCRIPTION - ORIENTATION: ORIENTATION: LEFT

## 2021-01-11 NOTE — PROGRESS NOTES
[facility] PROGRESS NOTE    1/11/2021 2:08 PM        Name: Marielos Lopes . Admitted: 1/9/2021  Primary Care Provider: Jada Correa MD (Tel: [phone])      Subjective:  . Patient feeling well, no complaints. She has received her knee brace but has not worked with therapy yet    Reviewed interval ancillary notes    Current Medications      atorvastatin (LIPITOR) tablet 20 mg, Nightly      metoprolol tartrate (LOPRESSOR) tablet 12.5 mg, BID      vitamin D3 (CHOLECALCIFEROL) tablet 400 Units, Daily      sodium chloride flush 0.9 % injection 10 mL, 2 times per day      sodium chloride flush 0.9 % injection 10 mL, PRN      promethazine (PHENERGAN) tablet 12.5 mg, Q6H PRN    Or      ondansetron (ZOFRAN) injection 4 mg, Q6H PRN      traMADol (ULTRAM) tablet 50 mg, Q6H PRN      famotidine (PEPCID) tablet 20 mg, Daily      rivaroxaban (XARELTO) tablet 15 mg, Daily      glucose (GLUTOSE) 40 % oral gel 15 g, PRN      dextrose 50 % IV solution, PRN      glucagon (rDNA) injection 1 mg, PRN      dextrose 5 % solution, PRN      insulin lispro (1 Unit Dial) 0-12 Units, TID WC      insulin lispro (1 Unit Dial) 0-6 Units, Nightly      0.9 % sodium chloride infusion, Continuous        Objective:  BP (!) 150/81   Pulse 82   Temp 99.6 °F (37.6 °C) (Oral)   Resp 16   Ht 5' 6\" (1.676 m)   Wt 218 lb 4.8 oz (99 kg)   SpO2 98%   BMI 35.23 kg/m²     Intake/Output Summary (Last 24 hours) at 1/11/2021 1408  Last data filed at 1/11/2021 6738  Gross per 24 hour   Intake 2098 ml   Output    Net 2098 ml      Wt Readings from Last 3 Encounters:   01/10/21 218 lb 4.8 oz (99 kg)   12/09/20 221 lb (100.2 kg)   09/28/20 223 lb 9.6 oz (101.4 kg)       General appearance:  Appears comfortable  Eyes: Sclera clear. Pupils equal.  ENT: Moist oral mucosa. Trachea midline, no adenopathy. Cardiovascular: Regular rhythm, normal S1, S2. No murmur. No edema in lower extremities  Respiratory: Not using accessory muscles. Good inspiratory effort. Clear to auscultation bilaterally, no wheeze or crackles. GI: Abdomen soft, no tenderness, not distended, normal bowel sounds  Musculoskeletal: No cyanosis in digits, neck supple, brace on left knee  Neurology: CN 2-12 grossly intact. No speech or motor deficits  Psych: Normal affect. Alert and oriented in time, place and person  Skin: Warm, dry, normal turgor    Labs and Tests:  CBC:   Recent Labs     01/10/21  1145 01/11/21  0756   WBC 11.3* 10.5   HGB 11.8* 10.7*    254     BMP:    Recent Labs     01/10/21  1145 01/11/21  0756    135*   K 4.1 3.9   CL 98* 101   CO2 26 22   BUN 44* 43*   CREATININE 1.6* 1.5*   GLUCOSE 191* 155*     CT Knee  Status post left total knee arthroplasty.  No CT evidence of periprosthetic   fracture or hardware complication. Lateral tilt of the patella. Small suprapatellar joint effusion. Problem List  Active Problems:    Knee pain  Resolved Problems:    * No resolved hospital problems. *       Assessment & Plan:   1. Left knee pain-occurred while she was turning in the kitchen. No evidence of periprosthetic fracture. Ortho has seen. She has knee brace and is awaiting PT OT eval.  2. CKD-creatinine stable at 1.5.  3. Chronic atrial fibrillation-she is on Xarelto  4. Obesity  5.  Disposition-await pt/ot      Diet: DIET CARB CONTROL;  Code:Full Code  DVT PPXxarelto      Ann-Marie Rider PA-C   1/11/2021 2:08 PM

## 2021-01-11 NOTE — PROGRESS NOTES
Physical Therapy    Facility/Department: [address] ORTHO/NEURO NURSING  Initial Assessment    NAME: Chetna Marsh  : 1945  MRN: [id number]    Date of Service: 2021    Discharge Recommendations: Chetna Marsh scored a 8/24 on the AM-PAC short mobility form. Current research shows that an AM-PAC score of 17 or less is typically not associated with a discharge to the patient's home setting. Based on the patient's AM-PAC score and their current functional mobility deficits, it is recommended that the patient have 3-5 sessions per week of Physical Therapy at d/c to increase the patient's independence. Please see assessment section for further patient specific details. If patient discharges prior to next session this note will serve as a discharge summary. Please see below for the latest assessment towards goals. PT Equipment Recommendations  Equipment Needed: No    Assessment   Body structures, Functions, Activity limitations: Decreased functional mobility ; Decreased strength;Decreased balance;Decreased endurance  Assessment: Pt presents with impaired functional strength and endurance and decreased standing balance impairing her ability to perform functional mobility safely and independently. Pt with PLOF of independence and currently requires 2 person assist for all mobility. Pt would benefit from acute PT services to address deficits. Treatment Diagnosis: impaired gait, transfers, and balance  Prognosis: Good  Decision Making: Low Complexity  Clinical Presentation: stable  PT Education: Goals;PT Role;Plan of Care;Precautions; Family Education;Orientation;General Safety;Transfer Training;Equipment; Functional Mobility Training  Patient Education: d/c recommendations--pt verbalizing understanding  Barriers to Learning: none  REQUIRES PT FOLLOW UP: Yes  Activity Tolerance  Activity Tolerance: Patient Tolerated treatment well Activity Tolerance: increased time required for all tasks due to knee brace adjustments/2 calls placed to orthotist       Patient Diagnosis(es): The encounter diagnosis was Acute pain of left knee. has a past medical history of Arthritis, Blood circulation, collateral, Blood transfusion, CAD (coronary artery disease), CHF (congestive heart failure) ([facility] [address] Chronic renal failure, stage 3 (moderate), Diabetes mellitus ([facility] [address] GERD (gastroesophageal reflux disease), Hyperlipidemia, and Hypertension. has a past surgical history that includes Total knee arthroplasty; Cholecystectomy; Knee Arthroplasty (09/15/2010); Abdomen surgery; Colonoscopy; Endoscopy, colon, diagnostic; joint replacement; Cardiac surgery; vascular surgery; Cataract removal with implant (Left, 11/23/2016); Cataract removal with implant (Right, 11/02/2016); eye surgery; and Cataract removal with implant (Bilateral, 10/16, 11/16).     Restrictions  Restrictions/Precautions  Restrictions/Precautions: Fall Risk(high fall risk)  Required Braces or Orthoses?: Yes  Required Braces or Orthoses  Left Lower Extremity Brace: Knee Brace(per MD order, hinged knee brace required; per verbal discussion with orthotist, pt would benefit from knee brace being locked out into extension during mobility; no ROM restrictions noted in chart)  Position Activity Restriction Other position/activity restrictions: Natalya Glynn is a 66 y.o. female patient presents emergency department for evaluation of left knee pain. Patient states that yesterday she was in the kitchen standing and twisted with her upper body. Patient states she felt pain in her back and into her left leg. Patient states at first she was able to ambulate and get a brace for her back. She was able to get up and down throughout the day yesterday. Patient states that today however when she woke up she was unable to bear any weight on the left knee. Patient states she is having significant pain to the lateral left knee. Patient states she has some chronic wounds to her lower legs which are unchanged. Denies any fall resulting in head trauma, loss of consciousness or any additional injuries. Patient called EMS to bring her to the hospital.  EMS states that she had defecated on herself while sitting in her recliner she was unable to get up at home. Patient does live with her . 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 Subjective  Subjective: Pt reporting no pain at this time.  Agreeable to PT/OT eval.  Pain Screening  Patient Currently in Pain: Denies  Vital Signs  Patient Currently in Pain: Denies       Orientation  Orientation  Overall Orientation Status: Within Functional Limits     Social/Functional History  Social/Functional History  Lives With: Spouse([place])  Type of Home: ([facility]) Home Layout: One level, Laundry in basement(Pt doesn't go downstairs.)  Home Access: Stairs to enter with rails  Entrance Stairs - Number of Steps: 3 THERESE  Entrance Stairs - Rails: Both  Bathroom Shower/Tub: Walk-in shower, Shower chair without back  Bathroom Toilet: Handicap height(BSC over toilet; doesn't like BSC over toilet d/t splashing; plans to get safety frame)  Bathroom Equipment: Grab bars in shower, Shower chair, Hand-held shower(mat)  Home Equipment: 4 wheeled walker, Reacher, Standard walker(Adjustable bed; sleeps in recliner.)  ADL Assistance: Independent(mostly dresses independently;  helps w/socks occasionally)  Homemaking Responsibilities: No( does IADLs)  Ambulation Assistance: Independent(w/rollator)  Transfer Assistance: Independent  Active : No  Occupation: Retired  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 Has been walking when the weather was nice. Additional Comments: 2 falls past 6 mos    Objective  AROM RLE (degrees)  RLE AROM: WFL  AROM LLE (degrees)  LLE AROM : Exceptions  LLE General AROM: unable to formally assess as hinged knee brace in place; pt able to perform ~10-20 degrees knee flexion/extension, hip and ankle WFL  Strength RLE  Strength RLE: WFL  Comment: grossly 3+/5  Strength LLE  Strength LLE: Exception  Comment: grossly 3/5  Tone RLE  RLE Tone: Normotonic  Tone LLE  LLE Tone: Normotonic  Motor Control  Gross Motor?: WFL  Sensation  Overall Sensation Status: WFL  Bed mobility  Supine to Sit: Dependent/Total(Mod Ax2, HOB raised, required increased time to perform)  Scooting: Stand by assistance(required increased time to perform)  Comment: Extended time spent while pt supine in bed to determine locking mechanism of hinged knee brace ~10-15 minutes with call placed to orthotist for confirmation of brace adjustments.   Transfers Sit to Stand: Dependent/Total(Max Ax2 from EOB 2x)  Stand to sit: Dependent/Total  Comment: Mod VC for safe hand placement when transferring with RW. Further 15-20 minutes spent adjusting knee brace while pt seated at EOB and in chair with additional call placed to orthotist to assist with problem solving. Ambulation  Ambulation?: Yes  Ambulation 1  Surface: level tile  Device: Rolling Walker  Other Apparatus: (L hinged knee brace)  Assistance: Dependent/Total(Min Ax2)  Quality of Gait: wide Belinda, decreased daina, decreased B step lengths, decreased B foot clearance, slight buckling noted of L knee intermittently  Distance: 2'  Comments: Pt performed stand step transfer EOB>recliner with 2 person assist and mod VC for walker management. No LOB. Stairs/Curb  Stairs?: No     Balance  Posture: Good  Sitting - Static: Good;-  Sitting - Dynamic: Good;-(SBA seated at EOB for ADLs and brace adjustments ~30-40 minutes total)  Standing - Static: Fair;+(CGA standing with RW for UE support ~2-3 minutes total)  Standing - Dynamic: Fair(2 person assist for transfers with RW)        Plan   Plan  Times per week: 7x  Times per day: Daily  Current Treatment Recommendations: Strengthening, Balance Training, Functional Mobility Training, Transfer Training, Endurance Training, Gait Training, Neuromuscular Re-education, Safety Education & Training, Positioning, Modalities, Patient/Caregiver Education & Training, Equipment Evaluation, Education, & procurement, Home Exercise Program  Safety Devices  Type of devices:  All fall risk precautions in place, Call light within reach, Chair alarm in place, Gait belt, Nurse notified, Left in chair, Patient at risk for falls  Restraints  Initially in place: No    G-Code       OutComes Score        AM-PAC Score  AM-PAC Inpatient Mobility Raw Score : 8 (01/11/21 1445)  AM-PAC Inpatient T-Scale Score : 28.52 (01/11/21 1445)  Mobility Inpatient CMS 0-100% Score: 86.62 (01/11/21 1445) Mobility Inpatient CMS G-Code Modifier : CM (01/11/21 1445)          Goals  Short term goals  Time Frame for Short term goals: upon d/c  Short term goal 1: Pt will perform bed mobility with mod Ax1  Short term goal 2: Pt will perform transfers with LRAD and mod Ax1  Short term goal 3: Pt will ambulate 5' with LRAD and mod Ax1  Patient Goals   Patient goals : pt did not state       Therapy Time   Individual Concurrent Group Co-treatment   Time In 1219         Time Out 1400         Minutes 101              Timed Code Treatment Minutes:   38    Total Treatment Minutes:  101  Time spent with pt shared with OT as pt is under observation status. As such, pt is being billed 4 units for evaluation.     [address] [place] [address] [phone]

## 2021-01-11 NOTE — PROGRESS NOTES
Occupational Therapy   Occupational Therapy Initial Assessment  Date: 2021   Patient Name: Prakash Carrillo  MRN: [id number]     : 1945    Date of Service: 2021    Discharge Recommendations:  Prakash Carrillo scored a 14/24 on the AM-PAC ADL Inpatient form. Current research shows that an AM-PAC score of 17 or less is typically not associated with a discharge to the patient's home setting. Based on the patient's AM-PAC score and their current ADL deficits, it is recommended that the patient have 3-5 sessions per week of Occupational Therapy at d/c to increase the patient's independence. Please see assessment section for further patient specific details. If patient discharges prior to next session this note will serve as a discharge summary. Please see below for the latest assessment towards goals. OT Equipment Recommendations  Equipment Needed: No(TBD next level of care.)    Assessment   Performance deficits / Impairments: Decreased functional mobility ; Decreased high-level IADLs;Decreased ADL status; Decreased balance  Assessment: Pt is below her baseline level of occupational function, based on the above deficits associated with knee pain. Pt would benefit from continued skilled acute OT services to address these deficits. Treatment Diagnosis: Decreased ADL/IADL status, functional mobility, endurance, and balance associated with knee pain. Prognosis: Good  Decision Making: Low Complexity  History: P 65 yo, lives w/spouse, 1-level condo, pt independent ADLs, occasional assist w/socks,  helps w/IADLs, not currently driving, 2 recent falls. PMH: CAD, CHF, DM, HTN, HLD, chronic renal failure, marv, L TKA  Exam: ROM, MMT, 6 clicks, 4 performance deficits, stable presentation. Assistance / Modification: Max A of 2 sit to stand, Min A of 2 transfer w/RW, Dep socks  OT Education: OT Role;Plan of Care;Family Education;Transfer Training  Patient Education: D/C recommendation. REQUIRES OT FOLLOW UP: Yes  Activity Tolerance  Activity Tolerance: Patient Tolerated treatment well  Safety Devices  Safety Devices in place: Yes  Type of devices: Left in chair;Nurse notified;Call light within reach;Gait belt( present; chair alarm not present; pt understands not to get out of chair.)           Patient Diagnosis(es): The encounter diagnosis was Acute pain of left knee. has a past medical history of Arthritis, Blood circulation, collateral, Blood transfusion, CAD (coronary artery disease), CHF (congestive heart failure) ([facility] [address] Chronic renal failure, stage 3 (moderate), Diabetes mellitus ([address] GERD (gastroesophageal reflux disease), Hyperlipidemia, and Hypertension. has a past surgical history that includes Total knee arthroplasty; Cholecystectomy; Knee Arthroplasty (09/15/2010); Abdomen surgery; Colonoscopy; Endoscopy, colon, diagnostic; joint replacement; Cardiac surgery; vascular surgery; Cataract removal with implant (Left, 11/23/2016); Cataract removal with implant (Right, 11/02/2016); eye surgery; and Cataract removal with implant (Bilateral, 10/16, 11/16). Treatment Diagnosis: Decreased ADL/IADL status, functional mobility, endurance, and balance associated with knee pain.       Restrictions  Restrictions/Precautions  Restrictions/Precautions: Fall Risk(high fall risk)  Required Braces or Orthoses?: Yes  Required Braces or Orthoses  Left Lower Extremity Brace: Knee Brace(per MD order, hinged knee brace required; per verbal discussion with orthotist, pt would benefit from knee brace being locked out into extension during mobility; no ROM restrictions noted in chart)  Position Activity Restriction Other position/activity restrictions: Nikky Lal is a 66 y.o. female patient presents emergency department for evaluation of left knee pain. Patient states that yesterday she was in the kitchen standing and twisted with her upper body. Patient states she felt pain in her back and into her left leg. Patient states at first she was able to ambulate and get a brace for her back. She was able to get up and down throughout the day yesterday. Patient states that today however when she woke up she was unable to bear any weight on the left knee. Patient states she is having significant pain to the lateral left knee. Patient states she has some chronic wounds to her lower legs which are unchanged. Denies any fall resulting in head trauma, loss of consciousness or any additional injuries. Patient called EMS to bring her to the hospital.  EMS states that she had defecated on herself while sitting in her recliner she was unable to get up at home. Patient does live with her . Subjective   General  Chart Reviewed: Yes  Family / Caregiver Present: David Olsen)  Referring Practitioner: JOBY Evangelista CNP, for d/c planning  Diagnosis: Knee pain  Subjective  Subjective: Pt supine in bed, denies pain. Agreeable to OT eval. Pt very talkative during session.   Patient Currently in Pain: Denies  Vital Signs  Patient Currently in Pain: Denies  Social/Functional History  Social/Functional History  Lives With: Spouse(Escobar)  Type of Home: (condo)  Home Layout: One level, Laundry in basement(Pt doesn't go downstairs.)  Home Access: Stairs to enter with rails  Entrance Stairs - Number of Steps: 3 THERESE  Entrance Stairs - Rails: Both  Bathroom Shower/Tub: Walk-in shower, Shower chair without back  Bathroom Toilet: Handicap height(BSC over toilet; doesn't like BSC over toilet d/t splashing; plans to get safety frame)  Bathroom Equipment: Grab bars in shower, Shower chair, Hand-held shower(mat) Home Equipment: 4 wheeled walker, Reacher, Standard walker(Adjustable bed; sleeps in recliner.)  ADL Assistance: Independent(mostly dresses independently;  helps w/socks occasionally)  Homemaking Responsibilities: No( does IADLs)  Ambulation Assistance: Independent(w/rollator)  Transfer Assistance: Independent  Active : No  Occupation: Retired  Type of occupation: Worked for [employer], [address]: Used to take therapy dog to hospitals, NHs, hospice. Has been walking when the weather was nice. 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)    Orientation  Overall Orientation Status: Within Normal Limits     Balance  Sitting Balance: Stand by assistance  Standing Balance: Dependent/Total(Min A of 2 w/RW; CGA static standing for tavon hygiene)  Standing Balance  Time: ~1-2 min, ~30 sec  Activity: static standing for anal hygiene & doff/don brief; transfer EOB to chair  Functional Mobility  Functional - Mobility Device: Rolling Walker  Activity: Other(transfer EOB to chair)  Assist Level: Dependent/Total(Min A of 2 w/RW, few steps to transfer to chair)  ADL  Grooming: Setup(wash face, comb hair)  UE Bathing: Setup;Supervision(seated on EOB)  LE Bathing: Dependent/Total(anal hygiene only)  UE Dressing:  Moderate assistance(gown)  LE Dressing: Dependent/Total(socks, brief)  Toileting: Dependent/Total  Tone RUE  RUE Tone: Normotonic  Tone LUE  LUE Tone: Normotonic  Coordination  Movements Are Fluid And Coordinated: Yes     Bed mobility  Supine to Sit: Dependent/Total(Mod Ax2, HOB raised, required increased time to perform)  Scooting: Stand by assistance(required increased time to perform) Short term goal 2: Mod A for functional transfers to all ADL surfaces w/RW  Short term goal 3: CGA for functional mobility w/RW for ADLs  Short term goal 4: S/U for UB bathing/Min A for UB dressing  Short term goal 5: Min A for LB bathing/dressing with AE as needed  Long term goals  Time Frame for Long term goals : LTG=STG  Long term goal 1: Min A to don/doff brace  Long term goal 2: Pt to tolerate standing 5 min for ADL activity/functional mobility       Therapy Time   Individual Concurrent Group Co-treatment   Time In 1219         Time Out 1400         Minutes 101               Timed Code Treatment Minutes:   23 min (Minutes shared with PT d/t pt in observation status).     Total Treatment Minutes:  101 minutes    C/ [address], OT   Mahendra Malone, OTR/L, [id number]

## 2021-01-11 NOTE — PROGRESS NOTES
Shift assessment and AM vitals complete, VSS. AM meds. Pt doing well, knee brace will come up before pt works with PT/OT so pt can be evaluated. The care plan and education has been reviewed and mutually agreed upon with the patient.

## 2021-01-11 NOTE — PROGRESS NOTES
Physical/Occupational Therapy  Nikky [facility]  Orders received, chart reviewed. Attempted PT/OT evaluation this date. Pt with orders for hinged knee brace to be worn during mobility. At this time, brace is not present. RN reporting brace is on its way to pt's room. Will re-attempt evaluation later this date as schedule allows.    Gregory Medel PT, DPT [id number]   Maurilio Nair., OTR/L, [id number]

## 2021-01-12 VITALS
OXYGEN SATURATION: 95 % | SYSTOLIC BLOOD PRESSURE: 156 MMHG | WEIGHT: 218.3 LBS | DIASTOLIC BLOOD PRESSURE: 79 MMHG | HEART RATE: 91 BPM | TEMPERATURE: 98.7 F | HEIGHT: 66 IN | BODY MASS INDEX: 35.08 KG/M2 | RESPIRATION RATE: 16 BRPM

## 2021-01-12 LAB
GLUCOSE BLD-MCNC: 133 MG/DL (ref 70–99)
GLUCOSE BLD-MCNC: 172 MG/DL (ref 70–99)
GLUCOSE BLD-MCNC: 193 MG/DL (ref 70–99)
PERFORMED ON: ABNORMAL
SARS-COV-2, NAAT: NOT DETECTED

## 2021-01-12 PROCEDURE — G0378 HOSPITAL OBSERVATION PER HR: HCPCS

## 2021-01-12 PROCEDURE — 97530 THERAPEUTIC ACTIVITIES: CPT

## 2021-01-12 PROCEDURE — 6370000000 HC RX 637 (ALT 250 FOR IP): Performed by: PHYSICIAN ASSISTANT

## 2021-01-12 PROCEDURE — 6370000000 HC RX 637 (ALT 250 FOR IP): Performed by: INTERNAL MEDICINE

## 2021-01-12 PROCEDURE — 97535 SELF CARE MNGMENT TRAINING: CPT

## 2021-01-12 PROCEDURE — U0002 COVID-19 LAB TEST NON-CDC: HCPCS

## 2021-01-12 PROCEDURE — 2580000003 HC RX 258: Performed by: NURSE PRACTITIONER

## 2021-01-12 RX ORDER — TRAMADOL HYDROCHLORIDE 50 MG/1
50 TABLET ORAL EVERY 6 HOURS PRN
Qty: 12 TABLET | Refills: 0 | Status: SHIPPED | OUTPATIENT
Start: 2021-01-12 | End: 2021-01-15

## 2021-01-12 RX ORDER — BISACODYL 10 MG
10 SUPPOSITORY, RECTAL RECTAL DAILY PRN
Status: DISCONTINUED | OUTPATIENT
Start: 2021-01-12 | End: 2021-01-12 | Stop reason: HOSPADM

## 2021-01-12 RX ADMIN — Medication 400 UNITS: at 09:11

## 2021-01-12 RX ADMIN — TRAMADOL HYDROCHLORIDE 50 MG: 50 TABLET, FILM COATED ORAL at 00:01

## 2021-01-12 RX ADMIN — RIVAROXABAN 15 MG: 15 TABLET, FILM COATED ORAL at 17:06

## 2021-01-12 RX ADMIN — METOPROLOL TARTRATE 12.5 MG: 25 TABLET, FILM COATED ORAL at 09:10

## 2021-01-12 RX ADMIN — TRAMADOL HYDROCHLORIDE 50 MG: 50 TABLET, FILM COATED ORAL at 12:03

## 2021-01-12 RX ADMIN — SODIUM CHLORIDE: 9 INJECTION, SOLUTION INTRAVENOUS at 00:02

## 2021-01-12 RX ADMIN — FAMOTIDINE 20 MG: 20 TABLET ORAL at 09:11

## 2021-01-12 RX ADMIN — INSULIN LISPRO 2 UNITS: 100 INJECTION, SOLUTION INTRAVENOUS; SUBCUTANEOUS at 17:06

## 2021-01-12 RX ADMIN — BISACODYL 10 MG: 10 SUPPOSITORY RECTAL at 16:00

## 2021-01-12 ASSESSMENT — PAIN SCALES - WONG BAKER
WONGBAKER_NUMERICALRESPONSE: 0

## 2021-01-12 ASSESSMENT — PAIN DESCRIPTION - ONSET
ONSET: ON-GOING
ONSET: ON-GOING

## 2021-01-12 ASSESSMENT — PAIN DESCRIPTION - ORIENTATION
ORIENTATION: LEFT

## 2021-01-12 ASSESSMENT — PAIN DESCRIPTION - DESCRIPTORS
DESCRIPTORS: ACHING

## 2021-01-12 ASSESSMENT — PAIN DESCRIPTION - PAIN TYPE
TYPE: ACUTE PAIN
TYPE: ACUTE PAIN

## 2021-01-12 ASSESSMENT — PAIN SCALES - GENERAL
PAINLEVEL_OUTOF10: 0
PAINLEVEL_OUTOF10: 4

## 2021-01-12 ASSESSMENT — PAIN DESCRIPTION - LOCATION: LOCATION: LEG

## 2021-01-12 ASSESSMENT — PAIN DESCRIPTION - PROGRESSION
CLINICAL_PROGRESSION: GRADUALLY IMPROVING
CLINICAL_PROGRESSION: GRADUALLY IMPROVING

## 2021-01-12 NOTE — PROGRESS NOTES
Shift assessment and AM vitals complete, VSS. AM meds given. Pt doing well this AM, wound care nurse in to see pt, she will refer pt to her outpatient wound  Nurse for follow up. BLE cleansed and wrapped in Kurlex and ace wrap. Waiting to hear back from SNF. The care plan and education has been reviewed and mutually agreed upon with the patient.

## 2021-01-12 NOTE — DISCHARGE INSTR - COC
Continuity of Care Form    Patient Name: Silvano Carrasco   :  1945  MRN:  [id number]    Admit date:  2021  Discharge date:  ***    Code Status Order: Full Code   Advance Directives:      Admitting Physician:  Pat Moore MD  PCP: Ann Soliman MD    Discharging Nurse: [facility] Unit/Room#: 2RK-5801/7371-46  Discharging Unit Phone Number: ***    Emergency Contact:   Extended Emergency Contact Information  Primary Emergency Contact: BisiEscobar  Address: [address] [place] [address] Phone: [phone]  Relation: Spouse    Past Surgical History:  Past Surgical History:   Procedure Laterality Date    ABDOMEN SURGERY      CARDIAC SURGERY          CATARACT REMOVAL WITH IMPLANT Left 2016    CATARACT REMOVAL WITH IMPLANT Right 2016    CATARACT REMOVAL WITH IMPLANT Bilateral 10/16,     CHOLECYSTECTOMY      COLONOSCOPY      ENDOSCOPY, COLON, DIAGNOSTIC      EYE SURGERY      left tear duct surgery    JOINT REPLACEMENT      BTKR    KNEE ARTHROPLASTY  09/15/2010    left total knee    TOTAL KNEE ARTHROPLASTY      VASCULAR SURGERY         Immunization History:   Immunization History   Administered Date(s) Administered    HPV 9-valent Phill Hodgkin) 2014    Influenza Virus Vaccine 10/17/2014    Influenza Whole 10/09/2009    Influenza, Triv, inactivated, subunit, adjuvanted, IM (Fluad 65 yrs and older) 10/09/2019    Pneumococcal Polysaccharide ([id number]) 2010       Active Problems:  Patient Active Problem List   Diagnosis Code    Osteoarthritis of left knee M17.12    Acute blood loss anemia D62    Diabetes mellitus ([facility] [address] E11.9    Ischemic cardiomyopathy I25.5    Atrial fibrillation I48.91    Peripheral vascular disease (HCC) I73.9    CHF exacerbation ([facility] [address] I50.9    Acute on chronic combined systolic and diastolic heart failure (HCC) I50.43    Chronic renal failure, stage 3 (moderate) N18.30 Chronic atrial fibrillation (HCC) I48.20    Pleural effusion J90    Atherosclerosis of native coronary artery without angina pectoris I25.10    Essential hypertension I10    Chronic combined systolic and diastolic congestive heart failure (HCC) I50.42    Hx of CABG Z95.1    Non-rheumatic tricuspid valve insufficiency I36.1    Mixed hyperlipidemia E78.2    Carotid disease, bilateral (HCC) I77.9    Atherosclerosis of native coronary artery of native heart without angina pectoris I25.10    Chronic kidney disease N18.9    Hyperlipidemia E78.5    INOCENCIO (obstructive sleep apnea) G47.33    Lymphedema I89.0    Leg swelling M79.89    Venous stasis dermatitis of both lower extremities I87.2    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 ([facility]) I87.331, L97.919    Venous insufficiency I87.2    Ulcer of right leg, limited to breakdown of skin ([facility] [address] L97.911    Ulcer of lower extremity, left, limited to breakdown of skin ([facility]) L97.921    Iron deficiency anemia D50.9    CHANO (acute kidney injury) ([facility] [address] N17.9    Knee pain M25.569       Isolation/Infection:   Isolation            No Isolation          Patient Infection Status       None to display            Nurse Assessment:  Last Vital Signs: BP (!) 165/95   Pulse 91   Temp 99.1 °F (37.3 °C) (Oral)   Resp 16   Ht 5' 6\" (1.676 m)   Wt 218 lb 4.8 oz (99 kg)   SpO2 98%   BMI 35.23 kg/m²     Last documented pain score (0-10 scale): Pain Level: 4  Last Weight:   Wt Readings from Last 1 Encounters:   01/10/21 218 lb 4.8 oz (99 kg)     Mental Status:  oriented and alert    IV Access:  - None    Nursing Mobility/ADLs:  Walking   Dependent  Transfer  Dependent  Bathing  Assisted  Dressing  Assisted  Toileting  Dependent  Feeding  Independent  Med Admin  Independent  Med Delivery   whole    Wound Care Documentation and Therapy:  Wound 01/10/21 Leg Anterior; Lower Multiple ulcers/skin tears and scabs to bilat LE (Active)   Wound Etiology Venous 01/12/21 1048 Dressing Status New dressing applied 01/12/21 1048   Wound Cleansed Vashe 01/12/21 1048   Dressing/Treatment Ace wrap;Dry dressing;Petroleum impregnated gauze; Roll gauze 01/12/21 1048   Offloading for Diabetic Foot Ulcers Yes (type) 01/10/21 2130   Dressing Change Due 01/13/21 01/12/21 1048   Wound Assessment Bleeding;Pink/red;Granulation tissue 01/12/21 1048   Drainage Amount Small 01/12/21 1048   Drainage Description Serosanguinous 01/12/21 1048   Odor None 01/12/21 1048   Ximena-wound Assessment Fragile;Blanchable erythema 01/12/21 1048   Margins Attached edges 01/12/21 1048   Wound Thickness Description not for Pressure Injury Partial thickness 01/12/21 1048   Number of days: 2        Elimination:  Continence: Bowel: Yes  Bladder: Yes  Urinary Catheter: None   Colostomy/Ileostomy/Ileal Conduit: No       Date of Last BM: N/A    Intake/Output Summary (Last 24 hours) at 1/12/2021 1230  Last data filed at 1/12/2021 0658  Gross per 24 hour   Intake 1004 ml   Output 425 ml   Net 579 ml     I/O last 3 completed shifts: In: 1004 [I.V.:1004]  Out: 425 [Urine:425]    Safety Concerns:     None    Impairments/Disabilities:      None    Nutrition Therapy:  Current Nutrition Therapy:   - Oral Diet:  Carb Control 4 carbs/meal (1800kcals/day)    Routes of Feeding: Oral  Liquids: No Restrictions  Daily Fluid Restriction: no  Last Modified Barium Swallow with Video (Video Swallowing Test): not done    Treatments at the Time of Hospital Discharge:   Respiratory Treatments: none    Oxygen Therapy:  is not on home oxygen therapy. Ventilator:    - No ventilator support    Rehab Therapies: Physical Therapy and Occupational Therapy  Weight Bearing Status/Restrictions: No weight bearing restirctions  Other Medical Equipment (for information only, NOT a DME order):   Blanca Alcantara pt has knee brace in place  Other Treatments: none    Patient's personal belongings (please select all that are sent with patient):  None RN SIGNATURE:  Electronically signed by Moises Mendosa RN on 1/12/21 at 12:33 PM EST    CASE MANAGEMENT/SOCIAL WORK SECTION    Inpatient Status Date: 01/09/21    Readmission Risk Assessment Score:  Readmission Risk              Risk of Unplanned Readmission:        0         Discharging to Facility/ Agency   Name: [facility]  Address:  [phone]    / signature: Electronically signed by TYRA Dumont on 1/12/2021 at 2:09 PM      PHYSICIAN SECTION    Prognosis: Good    Condition at Discharge: Stable    Rehab Potential (if transferring to Rehab): Good    Recommended Labs or Other Treatments After Discharge:     Physician Certification: I certify the above information and transfer of Deepa Thomas  is necessary for the continuing treatment of the diagnosis listed and that she requires [place] for less 30 days.      Update Admission H&P: No change in H&P    PHYSICIAN SIGNATURE:  Electronically signed by Gemini Hoffman PA-C on 1/12/21 at 2:37 PM EST

## 2021-01-12 NOTE — PROGRESS NOTES
Type of devices: Left in chair;Nurse notified;Call light within reach;Gait belt( present; chair alarm not present; pt understands not to get out of chair w/o assistance)         Patient Diagnosis(es): The encounter diagnosis was Acute pain of left knee. has a past medical history of Arthritis, Blood circulation, collateral, Blood transfusion, CAD (coronary artery disease), CHF (congestive heart failure) ([facility] [address] Chronic renal failure, stage 3 (moderate), Diabetes mellitus ([address] GERD (gastroesophageal reflux disease), Hyperlipidemia, and Hypertension. has a past surgical history that includes Total knee arthroplasty; Cholecystectomy; Knee Arthroplasty (09/15/2010); Abdomen surgery; Colonoscopy; Endoscopy, colon, diagnostic; joint replacement; Cardiac surgery; vascular surgery; Cataract removal with implant (Left, 11/23/2016); Cataract removal with implant (Right, 11/02/2016); eye surgery; and Cataract removal with implant (Bilateral, 10/16, 11/16).     Restrictions  Restrictions/Precautions  Restrictions/Precautions: Fall Risk(high fall risk)  Required Braces or Orthoses?: Yes  Required Braces or Orthoses  Left Lower Extremity Brace: Knee Brace(per MD order, hinged knee brace required; per verbal discussion with orthotist, pt would benefit from knee brace being locked out into extension during mobility; no ROM restrictions noted in chart)  Position Activity Restriction Other position/activity restrictions: Chetna Marsh is a 66 y.o. female patient presents emergency department for evaluation of left knee pain. Patient states that yesterday she was in the kitchen standing and twisted with her upper body. Patient states she felt pain in her back and into her left leg. Patient states at first she was able to ambulate and get a brace for her back. She was able to get up and down throughout the day yesterday. Patient states that today however when she woke up she was unable to bear any weight on the left knee. Patient states she is having significant pain to the lateral left knee. Patient states she has some chronic wounds to her lower legs which are unchanged. Denies any fall resulting in head trauma, loss of consciousness or any additional injuries. Patient called EMS to bring her to the hospital.  EMS states that she had defecated on herself while sitting in her recliner she was unable to get up at home. Patient does live with her . Subjective   General  Chart Reviewed: Yes  Response to previous treatment: Patient with no complaints from previous session  Family / Caregiver Present: Homero Sterling)  Referring Practitioner: Halina Bernheim, APRN - CNP, for d/c planning  Diagnosis: Knee pain  Subjective  Subjective: Pt supine in bed upon arrival, pleasant and agreeable to therapy session.   Pain Assessment  Pain Assessment: 0-10  Pain Level: 3  Pain Type: Acute pain  Pain Location: Knee  Pain Orientation: Left  Pre Treatment Pain Screening  Intervention List: Patient able to continue with treatment;Nurse/Physician notified;Nurse called to administer meds  Vital Signs  Patient Currently in Pain: Yes   Orientation  Orientation  Overall Orientation Status: Within Normal Limits  Objective    ADL  Feeding: Setup  LE Dressing: Maximum assistance  Toileting: Dependent/Total Additional Comments: Total A to aydee L LE hinged knee braced in long sit in bed. Pt with purwick catheter present, removed prior to movement. Pt threaded B LEs into depends with use of reacher, max A for sequencing. Total A to doff soiled depends in stance and to perform rear hygiene, total A to perform LB clothing management over hips.  Further ADLs not addressed d/t fatigue        Balance  Sitting Balance: Stand by assistance  Standing Balance: Dependent/Total  Standing Balance  Time: ~3-4 minutes  Activity: stance for ADL completion, functional transfers  Comment: with use of rw  Functional Mobility  Functional - Mobility Device: Rolling Walker  Activity: Other  Assist Level: Dependent/Total  Functional Mobility Comments: mod A of 2  Bed mobility  Supine to Sit: Dependent/Total;2 Person assistance(mod A of 2)  Scooting: Contact guard assistance  Comment: increased time to complete, c/o increased pain with transitional movements  Transfers  Stand Step Transfers: Dependent/Total;2 Person assistance(mod A of 2 EOB > high back chair)  Sit to stand: 2 Person assistance;Dependent/Total(Max A of 2)  Stand to sit: 2 Person assistance;Dependent/Total(Max A of 2)     Cognition  Overall Cognitive Status: WFL     Perception  Overall Perceptual Status: WFL        Plan   Plan  Times per week: 7  Times per day: Daily  Current Treatment Recommendations: Safety Education & Training, Self-Care / ADL, Functional Mobility Training, Balance Training        AM-PAC Score        AM-Located within [facility] Inpatient Daily Activity Raw Score: 13 (01/12/21 1242)  AM-PAC Inpatient ADL T-Scale Score : 32.03 (01/12/21 1242)  ADL Inpatient CMS 0-100% Score: 63.03 (01/12/21 1242)  ADL Inpatient CMS G-Code Modifier : CL (01/12/21 1242)    Goals  Short term goals  Time Frame for Short term goals: Discharge  Short term goal 1: Min A for bed mobility-mod A of 2 1/12 Short term goal 2: Mod A for functional transfers to all ADL surfaces w/RW-Max A of 2 all sit <> stands 1/12  Short term goal 3: CGA for functional mobility w/RW for ADLs-mod A of 2 1/12  Short term goal 4: S/U for UB bathing/Min A for UB dressing-ongoing 1/12  Short term goal 5: Min A for LB bathing/dressing with AE as needed-max A 1/12  Long term goals  Time Frame for Long term goals : LTG=STG  Long term goal 1: Min A to don/doff brace-dependent 1/12  Long term goal 2: Pt to tolerate standing 5 min for ADL activity/functional mobility-ongoing 1/12       Therapy Time   Individual Concurrent Group Co-treatment   Time In       1138   Time Out       1231   Minutes       53      Timed Code Treatment Minutes:   53 Minutes  Total Treatment Minutes:  [address], [address] [place] [address] Drive

## 2021-01-12 NOTE — PLAN OF CARE
Problem: Falls - Risk of:  Goal: Will remain free from falls  Description: Will remain free from falls  Outcome: Completed  Goal: Absence of physical injury  Description: Absence of physical injury  Outcome: Completed     Problem: Pain:  Goal: Pain level will decrease  Description: Pain level will decrease  Outcome: Completed  Goal: Control of acute pain  Description: Control of acute pain  Outcome: Completed  Goal: Control of chronic pain  Description: Control of chronic pain  Outcome: Completed     Problem: Skin Integrity:  Goal: Will show no infection signs and symptoms  Description: Will show no infection signs and symptoms  Outcome: Completed  Goal: Absence of new skin breakdown  Description: Absence of new skin breakdown  Outcome: Completed

## 2021-01-12 NOTE — DISCHARGE SUMMARY
[facility]    Patient Demographics    Patient. Jhonatan Perdomo  Date of Birth. 1945  MRN. [id number]     Primary care provider. Nadia Paniagua MD  (Tel: [phone])    Admit date: 1/9/2021    Discharge date (blank if same as Note Date): Note Date: 1/12/2021     Reason for Hospitalization. Chief Complaint   Patient presents with    Knee Pain     left knee pain, feel yesterday and twisted it, pt unable to walk, been in recliner all day, pt defecated on self per EMS         Significant Findings. Active Problems:    Knee pain  Resolved Problems:    * No resolved hospital problems. *       Problems and results from this hospitalization that need follow up. 1. L knee pain    Significant test results and incidental findings. 1. CT L knee  Status post left total knee arthroplasty.  No CT evidence of periprosthetic   fracture or hardware complication.       Lateral tilt of the patella.       Small suprapatellar joint effusion. Invasive procedures and treatments. 1. None     Problem-based Hospital Course. Patient is a pleasant 65 yo female who presented to the hospital with L knee pain while she was turning around in the kitchen. She had sudden pain and had difficulty ambulating. She has previously had B total knee replacements. In the ED, CT of the knee was unremarkable. She was admitted to observation, seen by orthopedic surgery, and had a hinged knee brace ordered. She was seen by PT/OT who recommended SNF. She was discharged there in stable condition. Consults. IP CONSULT TO ORTHOPEDIC SURGERY    Physical examination on discharge day. BP (!) 165/95   Pulse 91   Temp 99.1 °F (37.3 °C) (Oral)   Resp 16   Ht 5' 6\" (1.676 m)   Wt 218 lb 4.8 oz (99 kg)   SpO2 98%   BMI 35.23 kg/m²   General appearance. Alert. Looks comfortable. HEENT. Sclera clear. Moist mucus membranes. Cardiovascular. Regular rate and rhythm, normal S1, S2. No murmur. Respiratory. Not using accessory muscles. Clear to auscultation bilaterally, no wheeze. Gastrointestinal. Abdomen soft, non-tender, not distended, normal bowel sounds  Neurology. Facial symmetry. No speech deficits. Moving all extremities equally. Extremities. No edema in lower extremities. Skin. Warm, dry, normal turgor    Condition at time of dischargestable    Medication instructions provided to patient at discharge. Medication List      START taking these medications    traMADol 50 MG tablet  Commonly known as: ULTRAM  Take 1 tablet by mouth every 6 hours as needed for Pain for up to 3 days.         CONTINUE taking these medications    atorvastatin 20 MG tablet  Commonly known as: LIPITOR     Co Q-10 400 MG Caps  Take 400 mg by mouth daily     ferrous sulfate 325 (65 Fe) MG tablet  Commonly known as: IRON 325  Take 1 tablet by mouth 3 times daily (with meals)     Handicap Placard Misc  by Does not apply route Sob when walking less than 200 feet  Length of time--greater than 5 year     hydrOXYzine 25 MG tablet  Commonly known as: ATARAX     metoprolol tartrate 25 MG tablet  Commonly known as: LOPRESSOR  Take 0.5 tablets by mouth 2 times daily     raNITIdine 300 MG tablet  Commonly known as: ZANTAC     therapeutic multivitamin-minerals tablet     Victoza 18 MG/3ML Sopn SC injection  Generic drug: Liraglutide     vitamin D 400 units Tabs tablet  Commonly known as: CHOLECALCIFEROL     Xarelto 15 MG Tabs tablet  Generic drug: rivaroxaban  TAKE ONE TABLET BY MOUTH DAILY        ASK your doctor about these medications    furosemide 20 MG tablet  Commonly known as: LASIX  TAKE ONE TABLET BY MOUTH TWICE DAILY           Where to Get Your Medications      You can get these medications from any pharmacy    Bring a paper prescription for each of these medications  · traMADol 50 MG tablet Discharge recommendations given to patient. Follow Up. Dr Verona Mark in 2 week   Disposition. SNF  Activity. activity as tolerated  Diet: DIET CARB CONTROL;      Spent 35 minutes in discharge process. Signed:   Burns Hashimoto, PA-C     1/12/2021 4:29 PM

## 2021-01-12 NOTE — CARE COORDINATION
DISCHARGE SUMMARY     DATE OF DISCHARGE: 01/12/20    DISCHARGE DESTINATION: [address] [place] [address]    Level of Care: Skilled    Report Number: [phone]    Fax Number:  [phone]    Precert Obtained: N/A    Carissa Completed: N/A    PASARR: Yes    Notified: RN, Family and Facility/Agency-facility, pt and spouse aware of transport time    Prescriptions Faxed:yes    [id number] [id number] Name: [address] [place] [address] up Time: 430PM    Phone Number:     Electronically signed by TYRA Cabral on 1/12/2021 at 2:43 PM

## 2021-01-12 NOTE — CARE COORDINATION
Met w/pt and spouse to discuss PT/OT recommendation for SNF. Pt is in agreement w/SNF plan-presented pt and spouse w/MCR list for review. They would like referrals sent to [facility] and [facility]. Faxed referrals-no precert needed.   Electronically signed by TYRA Hammond on 1/12/2021 at 9:01 AM

## 2021-01-12 NOTE — CARE COORDINATION
[address] [place] [address] Continence Nurse  Consult Note       NAME:  Jaci Erazo  MEDICAL RECORD NUMBER:  [id number]  AGE: 66 y.o. GENDER: female  : 1945  TODAY'S DATE:  2021    Subjective   Reason for WOCN Evaluation and Assessment: ulcers to bilateral lower legs, recommendation for wound care. Jaci Erazo is a 66 y.o. female referred by:   [] Physician  [x] Nursing  [] Other:     Wound Identification:  Wound Type: venous  Contributing Factors: edema, venous stasis, lymphedema, diabetes, decreased mobility and obesity    Wound History: Patient reports has had these ulcers off and on for approximately 5 years and has been going to the wound clinic at times. Patient and spouse reports that she will at times pick at the wounds. Wound clinic normally uses unna boots for treatment. Current Wound Care Treatment: cleansed wounds with vashe, applied petroleum based guaze over wounds and covered the draining wounds with 4x4's and wrapped lower legs with kerlix and then ace wraps bilaterally.     Patient Goal of Care:  [x] Wound Healing  [] Odor Control  [] Palliative Care  [x] Pain Control   [] Other:         PAST MEDICAL HISTORY        Diagnosis Date    Arthritis     Blood circulation, collateral     Blood transfusion     CAD (coronary artery disease)     CHF (congestive heart failure) (HCC)     Chronic renal failure, stage 3 (moderate)     Diabetes mellitus (HCC)     GERD (gastroesophageal reflux disease)     Hyperlipidemia     Hypertension        PAST SURGICAL HISTORY    Past Surgical History:   Procedure Laterality Date    ABDOMEN SURGERY      CARDIAC SURGERY          CATARACT REMOVAL WITH IMPLANT Left 2016    CATARACT REMOVAL WITH IMPLANT Right 2016    CATARACT REMOVAL WITH IMPLANT Bilateral 10/16,     CHOLECYSTECTOMY      COLONOSCOPY      ENDOSCOPY, COLON, DIAGNOSTIC      EYE SURGERY      left tear duct surgery    JOINT REPLACEMENT      BTKR  KNEE ARTHROPLASTY  09/15/2010    left total knee    TOTAL KNEE ARTHROPLASTY      VASCULAR SURGERY         FAMILY HISTORY    Family History   Problem Relation Age of Onset    Diabetes Mother     Heart Disease Mother     Heart Disease Father     Stroke Father        SOCIAL HISTORY    Social History     Tobacco Use    Smoking status: Never Smoker    Smokeless tobacco: Never Used   Substance Use Topics    Alcohol use: No    Drug use: No       ALLERGIES    Allergies   Allergen Reactions    Adhesive Tape Shortness Of Breath     Other reaction(s): Ulcers    Chlorhexidine     Lisinopril Other (See Comments)     Med causes lethargy- takes in lower doses       MEDICATIONS    No current facility-administered medications on file prior to encounter. Current Outpatient Medications on File Prior to Encounter   Medication Sig Dispense Refill    furosemide (LASIX) 20 MG tablet TAKE ONE TABLET BY MOUTH TWICE DAILY  60 tablet 3    Coenzyme Q10 (CO Q-10) 400 MG CAPS Take 400 mg by mouth daily 30 capsule 5    XARELTO 15 MG TABS tablet TAKE ONE TABLET BY MOUTH DAILY 90 tablet 1    Liraglutide (VICTOZA) 18 MG/3ML SOPN SC injection Inject 1.2 mg into the skin daily      Handicap Placard MISC by Does not apply route Sob when walking less than 200 feet  Length of time--greater than 5 year 1 each 0    ferrous sulfate 325 (65 Fe) MG tablet Take 1 tablet by mouth 3 times daily (with meals) 90 tablet 3    atorvastatin (LIPITOR) 20 MG tablet Take 20 mg by mouth daily      vitamin D (CHOLECALCIFEROL) 400 UNITS TABS tablet Take 400 Units by mouth daily      metoprolol (LOPRESSOR) 25 MG tablet Take 0.5 tablets by mouth 2 times daily 60 tablet 3    therapeutic multivitamin-minerals (THERAGRAN-M) tablet Take 1 tablet by mouth daily.  hydrOXYzine (ATARAX) 25 MG tablet Take 25 mg by mouth 3 times daily as needed.       ranitidine (ZANTAC) 300 MG tablet Take 150 mg by mouth 2 times daily          Objective BP (!) 165/95   Pulse 91   Temp 99.1 °F (37.3 °C) (Oral)   Resp 16   Ht 5' 6\" (1.676 m)   Wt 218 lb 4.8 oz (99 kg)   SpO2 98%   BMI 35.23 kg/m²     LABS:  WBC:    Lab Results   Component Value Date    WBC 10.5 01/11/2021     H/H:    Lab Results   Component Value Date    HGB 10.7 01/11/2021    HCT 32.9 01/11/2021     PTT:    Lab Results   Component Value Date    APTT 31.9 08/30/2010   [APTT}  PT/INR:    Lab Results   Component Value Date    PROTIME 19.3 08/21/2015    INR 1.74 08/21/2015     HgBA1c:    Lab Results   Component Value Date    LABA1C 6.9 01/10/2021       Assessment   Stephan Risk Score: Stephan Scale Score: 16    Patient Active Problem List   Diagnosis Code    Osteoarthritis of left knee M17.12    Acute blood loss anemia D62    Diabetes mellitus (HCC) E11.9    Ischemic cardiomyopathy I25.5    Atrial fibrillation I48.91    Peripheral vascular disease (HCC) I73.9    CHF exacerbation (HCC) I50.9    Acute on chronic combined systolic and diastolic heart failure (HCC) I50.43    Chronic renal failure, stage 3 (moderate) N18.30    Chronic atrial fibrillation (HCC) I48.20    Pleural effusion J90    Atherosclerosis of native coronary artery without angina pectoris I25.10    Essential hypertension I10    Chronic combined systolic and diastolic congestive heart failure (HCC) I50.42    Hx of CABG Z95.1    Non-rheumatic tricuspid valve insufficiency I36.1    Mixed hyperlipidemia E78.2    Carotid disease, bilateral (HCC) I77.9    Atherosclerosis of native coronary artery of native heart without angina pectoris I25.10    Chronic kidney disease N18.9    Hyperlipidemia E78.5    INOCENCIO (obstructive sleep apnea) G47.33    Lymphedema I89.0   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 (HCC) I87.331, L97.919    Venous insufficiency I87.2  Ulcer of right leg, limited to breakdown of skin ([facility] [address] L97.911    Ulcer of lower extremity, left, limited to breakdown of skin ([facility]) L97.921    Iron deficiency anemia D50.9    CHANO (acute kidney injury) ([facility] [address] N17.9    Knee pain M25.569       Measurements:  Wound 01/10/21 Leg Anterior; Lower Multiple ulcers/skin tears and scabs to bilat LE (Active)   Wound Etiology Venous 01/12/21 1048   Dressing Status New dressing applied 01/12/21 1048   Wound Cleansed Vashe 01/12/21 1048   Dressing/Treatment Ace wrap;Dry dressing;Petroleum impregnated gauze; Roll gauze 01/12/21 1048   Offloading for Diabetic Foot Ulcers Yes (type) 01/10/21 2130   Dressing Change Due 01/13/21 01/12/21 1048   Wound Assessment Bleeding;Pink/red;Granulation tissue 01/12/21 1048   Drainage Amount Small 01/12/21 1048   Drainage Description Serosanguinous 01/12/21 1048   Odor None 01/12/21 1048   Ximena-wound Assessment Fragile;Blanchable erythema 01/12/21 1048   Margins Attached edges 01/12/21 1048   Wound Thickness Description not for Pressure Injury Partial thickness 01/12/21 1048   Number of days: 2            Response to treatment:  With complaints of pain. Mainly with movement of legs in the knees. Pain Assessment:  Severity:  7 / 10  Quality of pain: sharp  Wound Pain Timing/Severity: intermittent  Premedicated: No    Plan   Plan of Care: Wound 01/10/21 Leg Anterior; Lower Multiple ulcers/skin tears and scabs to bilat LE-Dressing/Treatment: Ace wrap, Dry dressing, Petroleum impregnated gauze, Roll gauze    Specialty Bed Required : No   [] Low Air Loss   [] Pressure Redistribution  [] Fluid Immersion  [] Bariatric  [] Total Pressure Relief  [] Other:     Current Diet: DIET CARB CONTROL;   Dietician consult:  No    Discharge Plan:  Placement for patient upon discharge: skilled nursing    Patient appropriate for Outpatient [address]: Yes    Referrals:  [x]   [] [address] [employer] [facility]  [] Supplies  [] Other

## 2021-01-12 NOTE — PROGRESS NOTES
[facility]    1/12/2021 1:25 PM        Name: Balwinder Wang . Admitted: 1/9/2021  Primary Care Provider: Humaira Flynn MD (Tel: [phone])      Subjective:  . Patient feeling well, awaiting snf placement    Reviewed interval ancillary notes    Current Medications      bisacodyl (DULCOLAX) suppository 10 mg, Daily PRN      atorvastatin (LIPITOR) tablet 20 mg, Nightly      metoprolol tartrate (LOPRESSOR) tablet 12.5 mg, BID      vitamin D3 (CHOLECALCIFEROL) tablet 400 Units, Daily      sodium chloride flush 0.9 % injection 10 mL, 2 times per day      sodium chloride flush 0.9 % injection 10 mL, PRN      promethazine (PHENERGAN) tablet 12.5 mg, Q6H PRN    Or      ondansetron (ZOFRAN) injection 4 mg, Q6H PRN      traMADol (ULTRAM) tablet 50 mg, Q6H PRN      famotidine (PEPCID) tablet 20 mg, Daily      rivaroxaban (XARELTO) tablet 15 mg, Daily      glucose (GLUTOSE) 40 % oral gel 15 g, PRN      dextrose 50 % IV solution, PRN      glucagon (rDNA) injection 1 mg, PRN      dextrose 5 % solution, PRN      insulin lispro (1 Unit Dial) 0-12 Units, TID WC      insulin lispro (1 Unit Dial) 0-6 Units, Nightly      0.9 % sodium chloride infusion, Continuous        Objective:  BP (!) 165/95   Pulse 91   Temp 99.1 °F (37.3 °C) (Oral)   Resp 16   Ht 5' 6\" (1.676 m)   Wt 218 lb 4.8 oz (99 kg)   SpO2 98%   BMI 35.23 kg/m²     Intake/Output Summary (Last 24 hours) at 1/12/2021 1325  Last data filed at 1/12/2021 0658  Gross per 24 hour   Intake 1004 ml   Output 425 ml   Net 579 ml      Wt Readings from Last 3 Encounters:   01/10/21 218 lb 4.8 oz (99 kg)   12/09/20 221 lb (100.2 kg)   09/28/20 223 lb 9.6 oz (101.4 kg)       General appearance:  Appears comfortable  Eyes: Sclera clear. Pupils equal.  ENT: Moist oral mucosa. Trachea midline, no adenopathy. Cardiovascular: Regular rhythm, normal S1, S2. No murmur. No edema in lower extremities  Respiratory: Not using accessory muscles. Good inspiratory effort. Clear to auscultation bilaterally, no wheeze or crackles. GI: Abdomen soft, no tenderness, not distended, normal bowel sounds  Musculoskeletal: No cyanosis in digits, neck supple, brace on left knee  Neurology: CN 2-12 grossly intact. No speech or motor deficits  Psych: Normal affect. Alert and oriented in time, place and person  Skin: Warm, dry, normal turgor    Labs and Tests:  CBC:   Recent Labs     01/10/21  1145 01/11/21  0756   WBC 11.3* 10.5   HGB 11.8* 10.7*    254     BMP:    Recent Labs     01/10/21  1145 01/11/21  0756    135*   K 4.1 3.9   CL 98* 101   CO2 26 22   BUN 44* 43*   CREATININE 1.6* 1.5*   GLUCOSE 191* 155*     CT Knee  Status post left total knee arthroplasty.  No CT evidence of periprosthetic   fracture or hardware complication. Lateral tilt of the patella. Small suprapatellar joint effusion. Problem List  Active Problems:    Knee pain  Resolved Problems:    * No resolved hospital problems. *       Assessment & Plan:   1. Left knee pain-occurred while she was turning in the kitchen. No evidence of periprosthetic fracture. Ortho has seen. She has knee brace and will need snf. 2. CKD-creatinine stable at 1.5.  3. Chronic atrial fibrillation-she is on Xarelto  4. Obesity  5. Disposition-medically ready for dc when arrangements made.        Diet: DIET CARB CONTROL;  Code:Full Code  DVT PP[place]xare[place] PA-C   1/12/2021 1:25 PM

## 2021-01-12 NOTE — PROGRESS NOTES
Physical Therapy  Facility/Department: [address] ORTHO/NEURO NURSING  Daily Treatment Note  NAME: Nikky Lal  : 1945  MRN: [id number]    Date of Service: 2021    Discharge Recommendations:    Nikky Lal scored a 8/24 on the AM-PAC short mobility form. Current research shows that an AM-PAC score of 17 or less is typically not associated with a discharge to the patient's home setting. Based on the patient's AM-PAC score and their current functional mobility deficits, it is recommended that the patient have 3-5 sessions per week of Physical Therapy at d/c to increase the patient's independence. Please see assessment section for further patient specific details. If patient discharges prior to next session this note will serve as a discharge summary. Please see below for the latest assessment towards goals. PT Equipment Recommendations  Equipment Needed: No    Assessment   Body structures, Functions, Activity limitations: Decreased functional mobility ; Decreased strength;Decreased balance;Decreased endurance  Assessment: Pt continues to present with impaired functional strength and endurance and decreased standing balance impairing her ability to perform functional mobility safely and independently. Pt also displays increased anxiety with transitional movements. Pt with PLOF of independence and currently requires 2 person assist for all mobility. Pt would benefit from acute PT services to address deficits. Treatment Diagnosis: impaired gait, transfers, and balance  Prognosis: Good  Decision Making: Low Complexity  Clinical Presentation: stable  PT Education: Goals;PT Role;Plan of Care;Precautions; Family Education;Orientation;General Safety;Transfer Training;Equipment; Functional Mobility Training;Home Exercise Program  Patient Education: d/c recommendations--pt verbalizing understanding  Barriers to Learning: none  REQUIRES PT FOLLOW UP: Yes  Activity Tolerance Activity Tolerance: Patient Tolerated treatment well  Activity Tolerance: increased time required for all tasks due to pt apprehension and anxiety     Patient Diagnosis(es): The encounter diagnosis was Acute pain of left knee. has a past medical history of Arthritis, Blood circulation, collateral, Blood transfusion, CAD (coronary artery disease), CHF (congestive heart failure) ([facility] [address] Chronic renal failure, stage 3 (moderate), Diabetes mellitus ([facility] [address] GERD (gastroesophageal reflux disease), Hyperlipidemia, and Hypertension. has a past surgical history that includes Total knee arthroplasty; Cholecystectomy; Knee Arthroplasty (09/15/2010); Abdomen surgery; Colonoscopy; Endoscopy, colon, diagnostic; joint replacement; Cardiac surgery; vascular surgery; Cataract removal with implant (Left, 11/23/2016); Cataract removal with implant (Right, 11/02/2016); eye surgery; and Cataract removal with implant (Bilateral, 10/16, 11/16).     Restrictions  Restrictions/Precautions  Restrictions/Precautions: Fall Risk(high fall risk)  Required Braces or Orthoses?: Yes  Required Braces or Orthoses  Left Lower Extremity Brace: Knee Brace(per MD order, hinged knee brace required; per verbal discussion with orthotist, pt would benefit from knee brace being locked out into extension during mobility; no ROM restrictions noted in chart)  Position Activity Restriction Other position/activity restrictions: Dany Schaeffer is a 66 y.o. female patient presents emergency department for evaluation of left knee pain. Patient states that yesterday she was in the kitchen standing and twisted with her upper body. Patient states she felt pain in her back and into her left leg. Patient states at first she was able to ambulate and get a brace for her back. She was able to get up and down throughout the day yesterday. Patient states that today however when she woke up she was unable to bear any weight on the left knee. Patient states she is having significant pain to the lateral left knee. Patient states she has some chronic wounds to her lower legs which are unchanged. Denies any fall resulting in head trauma, loss of consciousness or any additional injuries. Patient called EMS to bring her to the hospital.  EMS states that she had defecated on herself while sitting in her recliner she was unable to get up at home. Patient does live with her . Subjective   General  Chart Reviewed: Yes  Response To Previous Treatment: Not applicable  Family / Caregiver Present: Yes()  Subjective  Subjective: Pt reporting no pain at this time. Agreeable to PT/OT tx  General Comment  Comments: Pt supine in bed upon arrival.          Orientation  Orientation  Overall Orientation Status: Within Functional Limits  Cognition      Objective   Bed mobility  Supine to Sit: Dependent/Total;2 Person assistance(Mod A of 2)  Scooting: Contact guard assistance  Comment: increased time to complete due to pain  Transfers  Sit to Stand: Dependent/Total;2 Person Assistance(Max A of 2)  Stand to sit: 2 Person Assistance;Dependent/Total(Max A of 2)  Comment: Mod VC for safe hand placement when transferring with RW. Required 3 attempts to stand with elevated bed.  Pt very anxious  Ambulation  Ambulation?: Yes  Ambulation 1  Surface: level tile  Device: Inotrem Other Apparatus: (knee brace set at full extension)  Assistance: Dependent/Total;2 Person assistance(Mod A of 2)  Quality of Gait: wide Belinda, decreased daina, decreased B step lengths, decreased B foot clearance, mild circumduction  Distance: 3' from EOB to chair  Comments: 2 person assist and mod VC for walker management. No LOB. Balance  Posture: Good  Sitting - Static: Good;-  Sitting - Dynamic: Good;-  Standing - Static: Fair;+  Standing - Dynamic: Fair  Comments: supine while therapist raised pt's LLE to don knee brace. sitting EOB SBA requiring increased assist from RAMIREZ to don socks. stood with RW Mod A of 2 to Max A of 2. Exercises  Comments: educated and demonstrated HEP. Pt verbalized understanding           AM-PAC Score  AM-PAC Inpatient Mobility Raw Score : 8 (01/12/21 1407)  AM-PAC Inpatient T-Scale Score : 28.52 (01/12/21 1407)  Mobility Inpatient CMS 0-100% Score: 86.62 (01/12/21 1407)  Mobility Inpatient CMS G-Code Modifier : CM (01/12/21 1407)          Goals  Short term goals  Time Frame for Short term goals: upon d/c  Short term goal 1: Pt will perform bed mobility with mod Ax1  Short term goal 2: Pt will perform transfers with LRAD and mod Ax1  Short term goal 3: Pt will ambulate 5' with LRAD and mod Ax1  Patient Goals   Patient goals : pt did not state  NO GOALS MET THIS DATE    Plan    Plan  Times per week: 7x  Times per day: Daily  Current Treatment Recommendations: Strengthening, Balance Training, Functional Mobility Training, Transfer Training, Endurance Training, Gait Training, Neuromuscular Re-education, Safety Education & Training, Positioning, Modalities, Patient/Caregiver Education & Training, Equipment Evaluation, Education, & procurement, Home Exercise Program  Safety Devices  Type of devices:  All fall risk precautions in place, Call light within reach, Chair alarm in place, Gait belt, Nurse notified, Left in chair, Patient at risk for falls  Restraints  Initially in place: No Therapy Time   Individual Concurrent Group Co-treatment   Time In       1029   Time Out       1231   Minutes       53   Timed Code Treatment Minutes: [facility], [facility]     I agree with the note above. Goals addressed by PT this session.    [address] [place] [address]

## 2021-01-13 NOTE — PROGRESS NOTES
Patient picked up by [facility] for transport to [facility] via stretcher at 2100. All belongings sent with patient.       Electronically signed by Kimmie Hall RN on 1/12/2021 at 11:31 PM

## 2021-01-21 ENCOUNTER — HOSPITAL ENCOUNTER (OUTPATIENT)
Dept: WOUND CARE | Age: 76
Discharge: HOME | End: 2021-01-21
Payer: MEDICARE

## 2021-01-21 VITALS
RESPIRATION RATE: 16 BRPM | DIASTOLIC BLOOD PRESSURE: 95 MMHG | WEIGHT: 221 LBS | TEMPERATURE: 97 F | SYSTOLIC BLOOD PRESSURE: 195 MMHG | HEART RATE: 91 BPM | BODY MASS INDEX: 35.67 KG/M2

## 2021-01-21 DIAGNOSIS — L97.921 ULCER OF LOWER EXTREMITY, LEFT, LIMITED TO BREAKDOWN OF SKIN (HCC): Primary | ICD-10-CM

## 2021-01-21 DIAGNOSIS — M79.89 LEG SWELLING: ICD-10-CM

## 2021-01-21 DIAGNOSIS — L97.911 ULCER OF RIGHT LEG, LIMITED TO BREAKDOWN OF SKIN (HCC): ICD-10-CM

## 2021-01-21 DIAGNOSIS — I89.0 LYMPHEDEMA: ICD-10-CM

## 2021-01-21 PROCEDURE — 11042 DBRDMT SUBQ TIS 1ST 20SQCM/<: CPT

## 2021-01-21 PROCEDURE — 99213 OFFICE O/P EST LOW 20 MIN: CPT | Performed by: NURSE PRACTITIONER

## 2021-01-21 PROCEDURE — 11042 DBRDMT SUBQ TIS 1ST 20SQCM/<: CPT | Performed by: NURSE PRACTITIONER

## 2021-01-21 RX ORDER — GINSENG 100 MG
CAPSULE ORAL ONCE
Status: CANCELLED | OUTPATIENT
Start: 2021-01-21 | End: 2021-01-21

## 2021-01-21 RX ORDER — LIDOCAINE 40 MG/G
CREAM TOPICAL ONCE
Status: CANCELLED | OUTPATIENT
Start: 2021-01-21 | End: 2021-01-21

## 2021-01-21 RX ORDER — BACITRACIN ZINC AND POLYMYXIN B SULFATE 500; 1000 [USP'U]/G; [USP'U]/G
OINTMENT TOPICAL ONCE
Status: CANCELLED | OUTPATIENT
Start: 2021-01-21 | End: 2021-01-21

## 2021-01-21 RX ORDER — BACITRACIN, NEOMYCIN, POLYMYXIN B 400; 3.5; 5 [USP'U]/G; MG/G; [USP'U]/G
OINTMENT TOPICAL ONCE
Status: CANCELLED | OUTPATIENT
Start: 2021-01-21 | End: 2021-01-21

## 2021-01-21 RX ORDER — GENTAMICIN SULFATE 1 MG/G
OINTMENT TOPICAL ONCE
Status: CANCELLED | OUTPATIENT
Start: 2021-01-21 | End: 2021-01-21

## 2021-01-21 RX ORDER — LIDOCAINE 40 MG/G
CREAM TOPICAL ONCE
Status: DISCONTINUED | OUTPATIENT
Start: 2021-01-21 | End: 2021-01-22 | Stop reason: HOSPADM

## 2021-01-21 RX ORDER — CLOBETASOL PROPIONATE 0.5 MG/G
OINTMENT TOPICAL ONCE
Status: CANCELLED | OUTPATIENT
Start: 2021-01-21 | End: 2021-01-21

## 2021-01-21 RX ORDER — LIDOCAINE 50 MG/G
OINTMENT TOPICAL ONCE
Status: CANCELLED | OUTPATIENT
Start: 2021-01-21 | End: 2021-01-21

## 2021-01-21 RX ORDER — BETAMETHASONE DIPROPIONATE 0.05 %
OINTMENT (GRAM) TOPICAL ONCE
Status: CANCELLED | OUTPATIENT
Start: 2021-01-21 | End: 2021-01-21

## 2021-01-21 RX ORDER — LIDOCAINE HYDROCHLORIDE 40 MG/ML
SOLUTION TOPICAL ONCE
Status: CANCELLED | OUTPATIENT
Start: 2021-01-21 | End: 2021-01-21

## 2021-01-21 ASSESSMENT — PAIN DESCRIPTION - PAIN TYPE: TYPE: ACUTE PAIN

## 2021-01-21 ASSESSMENT — PAIN SCALES - GENERAL: PAINLEVEL_OUTOF10: 2

## 2021-01-21 NOTE — PLAN OF CARE
Discharge instructions given. Patient verbalized understanding. Return to [facility] in 1 week.   Called/faxed orders to  [facility]

## 2021-01-21 NOTE — PLAN OF CARE
Multilayer Compression Wrap   Below the Knee    NAME:  Jermain Desai  YOB: 1945  MEDICAL RECORD NUMBER:  [id number]  DATE:  1/21/2021    Multilayer compression wrap: Applied primary and secondary dressing as ordered. Applied multilayered dressing below the knee to right lower leg. Instructed patient/caregiver not to remove dressing and to keep it clean and dry. Instructed patient/caregiver on complications to report to provider, such as pain, numbness in toes, heavy drainage, and slippage of dressing. Instructed patient on purpose of compression dressing and on activity and exercise recommendations.      Applied  [employer] wrap from toes to knee overlapping each time    Electronically signed by Cristiana Jarvis RN on 1/21/2021 at 2:48 PM

## 2021-01-21 NOTE — PROGRESS NOTES
[address] RAFAEL [place]on, [address]  Telephone: [phone]          Other [facility] [phone]    Discharge Instructions         [facility]  [place], [address]  Telephone: [phone]     Discharge Instructions:  Keep weight off wounds and reposition every 2 hours if applicable. Avoid standing for long periods of time. If wound(s) is on your lower extremity, elevate legs to the level of the heart or above for 30 minutes 4-5 times a day and/or when sitting. Do not get wounds wet in bath or shower unless otherwise instructed by your physician. If your wound is on you foot or leg, you may purchase a cast bag. Please ask at the pharmacy. When taking antibiotics take entire prescription as ordered by MD do not stop taking until medicine is all gone. Exercise as tolerated. No Smoking. Smoking prohibits wound healing. If Vascular testing is ordered, please call [address] [place] [address] [phone]) to schedule. Vascular tests ordered by Wound Care Physicians may take up to 2 hours to complete. Please keep that in mind when scheduling. If Vascular testing is scheduled, please bring supplies to replace your dressing after testing is done. The vascular department does not stock supplies. Wound: Bilateral Legs     With each dressing change, rinse wounds with 0.9% Saline. (May use wound wash or soft contact solution. Both can be purchased at a local drug store). If unable to obtain saline, may use a gentle soap and water. Dressing care: Right leg apply Betamethasone and Unna Boot - change next visit in [address] [place] [address],[address]. Left Leg- Betamethasone, Adaptic, Dry Dressing, 2 Tubi - Change twice through week. Important dietary reminders:  1. Increase Protein intake for optimal wound healing  2. No added salt to reduce any swelling  3.  If diabetic, good glucose control 4. If you smoke, smoking affects wound healing, we ask that you refrain from smoking. Follow up with Luca Saldana CNP In 1 week in the wound care center. Call [phone] for any questions or concerns. Your  is [facility] Agency/Supply Company: [facility] [address] Information: Should you experience any significant changes in your wound(s) or have questions about your wound care, please contact the [place] [address] at [phone] Monday  - Thursday 8:00 am - 4:00 pm and Friday 8:00 am - 1:00pm. If you need help with your wound outside these hours and cannot wait until we are again available, contact your PCP or go to the hospital emergency room. PLEASE NOTE: IF YOU ARE UNABLE TO OBTAIN WOUND SUPPLIES, CONTINUE TO USE THE SUPPLIES YOU HAVE AVAILABLE UNTIL YOU ARE ABLE TO REACH US. IT IS MOST IMPORTANT TO KEEP THE WOUND COVERED AT ALL TIMES. Skilled nurse to evaluate and treat for wound care. Change dressing as ordered   Tuesday and Saturday using clean technique. Kaelyn Del Castillo will be changed next visit in [address] [facility] [address]. Patient/Family/caregiver may change dressings as needed as instructed when Home Care unavailable. WOUNDS REQUIRING DRESSING Changes:     Wound 01/21/21 Leg Right; Lower;Left;Proximal #1 (Active)   Wound Image   01/21/21 1345   Wound Etiology Venous 01/21/21 1345   Wound Cleansed Cleansed with saline 01/21/21 1345   Wound Length (cm) 1.3 cm 01/21/21 1345   Wound Width (cm) 1 cm 01/21/21 1345   Wound Depth (cm) 0.1 cm 01/21/21 1345   Wound Surface Area (cm^2) 1.3 cm^2 01/21/21 1345   Wound Volume (cm^3) 0.13 cm^3 01/21/21 1345   Post-Procedure Length (cm) 1.4 cm 01/21/21 1420   Post-Procedure Width (cm) 1 cm 01/21/21 1420   Post-Procedure Depth (cm) 0.1 cm 01/21/21 1420   Post-Procedure Surface Area (cm^2) 1.4 cm^2 01/21/21 1420   Post-Procedure Volume (cm^3) 0.14 cm^3 01/21/21 1425 Drainage Description Serosanguinous 01/21/21 1420   Odor None 01/21/21 1345   Ximena-wound Assessment Excoriated 01/21/21 1345   Margins Defined edges 01/21/21 1345   Number of days: 0       Wound 01/21/21 Leg Left; Lower; Lateral #4 (Active)   Wound Image   01/21/21 1345   Wound Etiology Venous 01/21/21 1345   Wound Cleansed Cleansed with saline 01/21/21 1345   Wound Length (cm) 1 cm 01/21/21 1345   Wound Width (cm) 1.5 cm 01/21/21 1345   Wound Depth (cm) 0.1 cm 01/21/21 1345   Wound Surface Area (cm^2) 1.5 cm^2 01/21/21 1345   Wound Volume (cm^3) 0.15 cm^3 01/21/21 1345   Post-Procedure Length (cm) 1.1 cm 01/21/21 1420   Post-Procedure Width (cm) 1.6 cm 01/21/21 1420   Post-Procedure Depth (cm) 0.1 cm 01/21/21 1420   Post-Procedure Surface Area (cm^2) 1.76 cm^2 01/21/21 1420   Post-Procedure Volume (cm^3) 0.18 cm^3 01/21/21 1420   Wound Assessment Bleeding 01/21/21 1420   Drainage Amount Moderate 01/21/21 1420   Drainage Description Serosanguinous 01/21/21 1420   Odor None 01/21/21 1345   Ximena-wound Assessment Excoriated 01/21/21 1345   Margins Defined edges 01/21/21 1345   Number of days: 0          Patient seen and treated on 1/21/2021    By Nadeem Tate  [id number]   (provider/NPI)

## 2021-01-21 NOTE — PROGRESS NOTES
[facility]  Progress Note and Procedure Note      Fabricio Evans  AGE: 66 y.o. GENDER: female  : 1945  TODAY'S DATE:  2021    Subjective:     Chief Complaint   Patient presents with    Wound Check     Right lower leg, Left lower leg         HISTORY of PRESENT ILLNESS HPI  Midge Deepak a 66 y. o. female who presents today for wound evaluation. Pt accompanied by  who assists pt with bilateral lower leg care.  Pt has long history of leg wounds.  Was healed and discharged from [place] last 2020. Pt states wounds reopened last 2020. Pt has lymphedema pumps at home but does not use consistently, understands importance of use.  Pt admits to \"picking wounds\" on legs.    History of Wound: off and on for last 5 years.   Wound Pain:  intermittent, mild  Severity:  1 / 10   Wound Type:  venous  Modifying Factors:  edema, venous stasis, lymphedema, diabetes, obesity and non-adherence  Associated Signs/Symptoms:  edema, erythema, drainage and pain                                     PAST MEDICAL HISTORY        Diagnosis Date    Arthritis     Blood circulation, collateral     Blood transfusion     CAD (coronary artery disease)     CHF (congestive heart failure) (HCC)     Chronic renal failure, stage 3 (moderate)     Diabetes mellitus (HCC)     GERD (gastroesophageal reflux disease)     Hyperlipidemia     Hypertension        PAST SURGICAL HISTORY    Past Surgical History:   Procedure Laterality Date    ABDOMEN SURGERY      CARDIAC SURGERY          CATARACT REMOVAL WITH IMPLANT Left 2016    CATARACT REMOVAL WITH IMPLANT Right 2016    CATARACT REMOVAL WITH IMPLANT Bilateral 10/16,     CHOLECYSTECTOMY      COLONOSCOPY      ENDOSCOPY, COLON, DIAGNOSTIC      EYE SURGERY      left tear duct surgery    JOINT REPLACEMENT      BTKR    KNEE ARTHROPLASTY  09/15/2010    left total knee    TOTAL KNEE ARTHROPLASTY  VASCULAR SURGERY         FAMILY HISTORY    Family History   Problem Relation Age of Onset    Diabetes Mother     Heart Disease Mother     Heart Disease Father     Stroke Father        SOCIAL HISTORY    Social History     Tobacco Use    Smoking status: Never Smoker    Smokeless tobacco: Never Used   Substance Use Topics    Alcohol use: No    Drug use: No       ALLERGIES    Allergies   Allergen Reactions    Adhesive Tape Shortness Of Breath     Other reaction(s): Ulcers    Chlorhexidine     Lisinopril Other (See Comments)     Med causes lethargy- takes in lower doses       MEDICATIONS    Current Outpatient Medications on File Prior to Encounter   Medication Sig Dispense Refill    furosemide (LASIX) 20 MG tablet TAKE ONE TABLET BY MOUTH TWICE DAILY  60 tablet 3    Coenzyme Q10 (CO Q-10) 400 MG CAPS Take 400 mg by mouth daily 30 capsule 5    XARELTO 15 MG TABS tablet TAKE ONE TABLET BY MOUTH DAILY 90 tablet 1    Liraglutide (VICTOZA) 18 MG/3ML SOPN SC injection Inject 1.2 mg into the skin daily      Handicap Placard MISC by Does not apply route Sob when walking less than 200 feet  Length of time--greater than 5 year 1 each 0    ferrous sulfate 325 (65 Fe) MG tablet Take 1 tablet by mouth 3 times daily (with meals) 90 tablet 3    atorvastatin (LIPITOR) 20 MG tablet Take 20 mg by mouth daily      vitamin D (CHOLECALCIFEROL) 400 UNITS TABS tablet Take 400 Units by mouth daily      metoprolol (LOPRESSOR) 25 MG tablet Take 0.5 tablets by mouth 2 times daily 60 tablet 3    therapeutic multivitamin-minerals (THERAGRAN-M) tablet Take 1 tablet by mouth daily.  hydrOXYzine (ATARAX) 25 MG tablet Take 25 mg by mouth 3 times daily as needed.  ranitidine (ZANTAC) 300 MG tablet Take 150 mg by mouth 2 times daily        No current facility-administered medications on file prior to encounter. REVIEW OF SYSTEMS    Pertinent items are noted in HPI.       Objective: BP (!) 195/95   Pulse 91   Temp 97 °F (36.1 °C) (Infrared)   Resp 16   Wt 221 lb (100.2 kg)   BMI 35.67 kg/m²     PHYSICAL EXAM    General Appearance: alert and oriented to person, place and time, well-developed and well-nourished, in no acute distress, obese and pale  Skin: warm and dry  Head: normocephalic and atraumatic  Eyes: pupils equal, round, and reactive to light  Pulmonary/Chest: normal air movement, no respiratory distress  Cardiovascular: normal rate, regular rhythm and intact distal pulses  Extremities: no cyanosis, no clubbing and slight non-pitting edema bilateral legs with right>left   Wounds: multiple lesions right lower leg with area of scratching noted and in various stages of healing.   Left lower leg with only 1 wound with red, moist      Assessment:     Patient Active Problem List   Diagnosis    Osteoarthritis of left knee    Acute blood loss anemia    Diabetes mellitus (HCC)    Ischemic cardiomyopathy    Atrial fibrillation    Peripheral vascular disease (HCC)    CHF exacerbation (HCC)    Acute on chronic combined systolic and diastolic heart failure (HCC)    Chronic renal failure, stage 3 (moderate)    Chronic atrial fibrillation (HCC)    Pleural effusion    Atherosclerosis of native coronary artery without angina pectoris    Essential hypertension    Chronic combined systolic and diastolic congestive heart failure (HCC)    Hx of CABG    Non-rheumatic tricuspid valve insufficiency    Mixed hyperlipidemia    Carotid disease, bilateral ([address]    Atherosclerosis of native coronary artery of native heart without angina pectoris    Chronic kidney disease    Hyperlipidemia    INOCENCIO (obstructive sleep apnea)    Lymphedema    Leg swelling    Venous stasis dermatitis of both lower extremities    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 (HCC)    Venous insufficiency    Ulcer of right leg, limited to breakdown of skin ([address]  Ulcer of lower extremity, left, limited to breakdown of skin (HCC)    Iron deficiency anemia    CHANO (acute kidney injury) [address]    Knee pain       Procedure Note    Performed by: JOBY Dennis CNP    Consent obtained: Yes    Time out taken:  Yes    Pain Control: Anesthetic  Anesthetic: 4% Lidocaine Cream     Debridement:Excisional Debridement    Using curette the wound was sharply debrided    down through and including the removal of epidermis, dermis and subcutaneous tissue. Devitalized Tissue Debrided:  fibrin, biofilm and slough    Pre Debridement Measurements:  Are located in the Wound Documentation Flow Sheet    Wound #: 1 and 2,3,4    Post  Debridement Measurements:  Wound 01/21/21 Leg Right; Lower;Left;Proximal #1 (Active)   Wound Image   01/21/21 1345   Wound Etiology Venous 01/21/21 1345   Wound Cleansed Cleansed with saline 01/21/21 1345   Wound Length (cm) 1.3 cm 01/21/21 1345   Wound Width (cm) 1 cm 01/21/21 1345   Wound Depth (cm) 0.1 cm 01/21/21 1345   Wound Surface Area (cm^2) 1.3 cm^2 01/21/21 1345   Wound Volume (cm^3) 0.13 cm^3 01/21/21 1345   Post-Procedure Length (cm) 1.4 cm 01/21/21 1420   Post-Procedure Width (cm) 1 cm 01/21/21 1420   Post-Procedure Depth (cm) 0.1 cm 01/21/21 1420   Post-Procedure Surface Area (cm^2) 1.4 cm^2 01/21/21 1420   Post-Procedure Volume (cm^3) 0.14 cm^3 01/21/21 1420   Wound Assessment Bleeding 01/21/21 1420   Drainage Amount Moderate 01/21/21 1420   Drainage Description Serosanguinous 01/21/21 1420   Odor None 01/21/21 1345   Ximena-wound Assessment Excoriated 01/21/21 1345   Margins Defined edges 01/21/21 1345   Number of days: 0       Wound 01/21/21 Leg Right; Lower; Lateral;Mid #2 (Active)   Wound Image   01/21/21 1345   Wound Etiology Venous 01/21/21 1345   Wound Cleansed Cleansed with saline 01/21/21 1345   Wound Length (cm) 2.2 cm 01/21/21 1345   Wound Width (cm) 0.7 cm 01/21/21 1345   Wound Depth (cm) 0.1 cm 01/21/21 1345 Wound Surface Area (cm^2) 1.54 cm^2 01/21/21 1345   Wound Volume (cm^3) 0.15 cm^3 01/21/21 1345   Post-Procedure Length (cm) 2.3 cm 01/21/21 1420   Post-Procedure Width (cm) 0.8 cm 01/21/21 1420   Post-Procedure Depth (cm) 0.1 cm 01/21/21 1420   Post-Procedure Surface Area (cm^2) 1.84 cm^2 01/21/21 1420   Post-Procedure Volume (cm^3) 0.18 cm^3 01/21/21 1420   Wound Assessment Bleeding 01/21/21 1420   Drainage Amount Moderate 01/21/21 1420   Drainage Description Serosanguinous 01/21/21 1345   Odor None 01/21/21 1345   Ximena-wound Assessment Excoriated 01/21/21 1345   Number of days: 0       Wound 01/21/21 Leg Right; Lower; Lateral;Distal #3 (Active)   Wound Image   01/21/21 1345   Wound Etiology Venous 01/21/21 1345   Wound Cleansed Cleansed with saline 01/21/21 1345   Wound Length (cm) 2 cm 01/21/21 1345   Wound Width (cm) 2 cm 01/21/21 1345   Wound Depth (cm) 0.1 cm 01/21/21 1345   Wound Surface Area (cm^2) 4 cm^2 01/21/21 1345   Wound Volume (cm^3) 0.4 cm^3 01/21/21 1345   Post-Procedure Length (cm) 2.1 cm 01/21/21 1420   Post-Procedure Width (cm) 2.1 cm 01/21/21 1420   Post-Procedure Depth (cm) 0.1 cm 01/21/21 1420   Post-Procedure Surface Area (cm^2) 4.41 cm^2 01/21/21 1420   Post-Procedure Volume (cm^3) 0.44 cm^3 01/21/21 1420   Wound Assessment Bleeding 01/21/21 1420   Drainage Amount Moderate 01/21/21 1420   Drainage Description Serosanguinous 01/21/21 1420   Odor None 01/21/21 1345   Ximena-wound Assessment Excoriated 01/21/21 1345   Margins Defined edges 01/21/21 1345   Number of days: 0       Wound 01/21/21 Leg Left; Lower; Lateral #4 (Active)   Wound Image   01/21/21 1345   Wound Etiology Venous 01/21/21 1345   Wound Cleansed Cleansed with saline 01/21/21 1345   Wound Length (cm) 1 cm 01/21/21 1345   Wound Width (cm) 1.5 cm 01/21/21 1345   Wound Depth (cm) 0.1 cm 01/21/21 1345   Wound Surface Area (cm^2) 1.5 cm^2 01/21/21 1345   Wound Volume (cm^3) 0.15 cm^3 01/21/21 1345 Post-Procedure Length (cm) 1.1 cm 01/21/21 1420   Post-Procedure Width (cm) 1.6 cm 01/21/21 1420   Post-Procedure Depth (cm) 0.1 cm 01/21/21 1420   Post-Procedure Surface Area (cm^2) 1.76 cm^2 01/21/21 1420   Post-Procedure Volume (cm^3) 0.18 cm^3 01/21/21 1420   Wound Assessment Bleeding 01/21/21 1420   Drainage Amount Moderate 01/21/21 1420   Drainage Description Serosanguinous 01/21/21 1420   Odor None 01/21/21 1345   Ximena-wound Assessment Excoriated 01/21/21 1345   Margins Defined edges 01/21/21 1345   Number of days: 0           Total Surface Area Debrided:   4.70 sq cm    Percentage of wound debrided  50 %    Bleeding:  Minimal    Hemostasis Achieved:  not needed    Procedural Pain:  1  / 10     Post Procedural Pain:  0 / 10     Response to treatment:  Well tolerated by patient. Plan:     The nature of the patient's condition was explained in depth. The patient was informed that their compliance to the treatment plan is paramount to successful healing and prevention of further ulceration and/or infection     Discharge Treatment   Dressing care: Right leg apply Betamethasone and Unna Boot - change next visit in [address] [place] [address],[address]. Left Leg- Betamethasone, Adaptic, Dry Dressing, 2 Tubi - Change twice through week.       Written Patient Discharge Instructions Given            Electronically signed by JOBY Lr CNP on 1/21/2021 at 2:42 PM

## 2021-01-22 ENCOUNTER — OFFICE VISIT (OUTPATIENT)
Dept: ORTHOPEDIC SURGERY | Age: 76
End: 2021-01-22
Payer: MEDICARE

## 2021-01-22 VITALS — TEMPERATURE: 97.2 F

## 2021-01-22 DIAGNOSIS — Z96.653 HISTORY OF TOTAL KNEE ARTHROPLASTY, BILATERAL: ICD-10-CM

## 2021-01-22 DIAGNOSIS — M25.562 ACUTE PAIN OF LEFT KNEE: Primary | ICD-10-CM

## 2021-01-22 PROCEDURE — G8427 DOCREV CUR MEDS BY ELIG CLIN: HCPCS | Performed by: ORTHOPAEDIC SURGERY

## 2021-01-22 PROCEDURE — 4040F PNEUMOC VAC/ADMIN/RCVD: CPT | Performed by: ORTHOPAEDIC SURGERY

## 2021-01-22 PROCEDURE — G8417 CALC BMI ABV UP PARAM F/U: HCPCS | Performed by: ORTHOPAEDIC SURGERY

## 2021-01-22 PROCEDURE — 1123F ACP DISCUSS/DSCN MKR DOCD: CPT | Performed by: ORTHOPAEDIC SURGERY

## 2021-01-22 PROCEDURE — 1036F TOBACCO NON-USER: CPT | Performed by: ORTHOPAEDIC SURGERY

## 2021-01-22 PROCEDURE — 99213 OFFICE O/P EST LOW 20 MIN: CPT | Performed by: ORTHOPAEDIC SURGERY

## 2021-01-22 PROCEDURE — G8484 FLU IMMUNIZE NO ADMIN: HCPCS | Performed by: ORTHOPAEDIC SURGERY

## 2021-01-22 PROCEDURE — 1090F PRES/ABSN URINE INCON ASSESS: CPT | Performed by: ORTHOPAEDIC SURGERY

## 2021-01-22 PROCEDURE — G8400 PT W/DXA NO RESULTS DOC: HCPCS | Performed by: ORTHOPAEDIC SURGERY

## 2021-01-22 NOTE — PROGRESS NOTES
[facility]  Office Visit    Chief Complaint: Follow-up for left knee pain    HPI:  Jermain Baker is a 66 y.o. who is here in follow-up for left knee pain. She was seen when she was an inpatient at [facility] for acute left knee pain in the setting of prior left total knee arthroplasty. History is significant for bilateral total knee arthroplasty done by Dr. Shelia Bustillo about 15 years ago. She has not had any issues with her knees in the interim until she had a twisting and falling injury in her kitchen about 2 weeks ago. She was admitted due to inability to bear weight on the left leg. She has a history of coronary artery disease, congestive heart failure, renal failure, diabetes, obesity and is on anticoagulation. She was in a lot of pain while evaluated in the hospital so it detailed exams not possible. Due to the possibility of MCL injury, she was placed in a hinged knee immobilizer. She has been weightbearing as tolerated and knee brace at a nursing facility. She walks with a walker. She reports that her knee pain is much improved.       Patient Active Problem List   Diagnosis    Osteoarthritis of left knee    Acute blood loss anemia    Diabetes mellitus ([address]    Ischemic cardiomyopathy    Atrial fibrillation    Peripheral vascular disease ([address]    CHF exacerbation (HCC)    Acute on chronic combined systolic and diastolic heart failure (HCC)    Chronic renal failure, stage 3 (moderate)    Chronic atrial fibrillation (HCC)    Pleural effusion    Atherosclerosis of native coronary artery without angina pectoris    Essential hypertension    Chronic combined systolic and diastolic congestive heart failure ([address]    Hx of CABG    Non-rheumatic tricuspid valve insufficiency    Mixed hyperlipidemia    Carotid disease, bilateral ([address]    Atherosclerosis of native coronary artery of native heart without angina pectoris    Chronic kidney disease  Hyperlipidemia    INOCENCIO (obstructive sleep apnea)    Lymphedema    Leg swelling    Venous stasis dermatitis of both lower extremities    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 (HCC)    Venous insufficiency    Ulcer of right leg, limited to breakdown of skin (HCC)    Ulcer of lower extremity, left, limited to breakdown of skin (HCC)    Iron deficiency anemia    CHANO (acute kidney injury) [facility]    Knee pain       ROS:  Constitutional: denies fever, chills, weight loss  MSK: denies pain in other joints, muscle aches  Neurological: denies numbness, tingling, weakness    Exam:  Temperature 97.2 °F (36.2 °C), temperature source Temporal, not currently breastfeeding. Appearance: sitting in exam room chair, appears to be in no acute distress, awake and alert  Resp: unlabored breathing on room air  Skin: warm, dry and intact with out erythema or significant increased temperature  Neuro: grossly intact both lower extremities. Intact sensation to light touch. Motor exam 4+ to 5/5 in all major motor groups. Left knee: Examination demonstrates no gross deformity. Skin is unremarkable. Range of motion 0 to 115 degrees. The knee is stable to varus and valgus stress and stable to anterior and posterior drawer sign. Sensation is intact light touch. There is brisk capillary refill. Dorsalis pedis and posterior tibial pulses are intact. There is 5/5 muscle strength in all muscle groups. Imaging:  Weightbearing AP x-ray of the left knee was performed and reviewed today. It is significant for total knee arthroplasty prosthesis cemented into place with no signs of osteolysis, loosening, fracture, dislocation.     Assessment:  History of bilateral total knee arthroplasty    Plan: She is doing well following her fall. She likely had a deep soft tissue contusion. Her ligaments are stable on exam today. She may come out of the brace as tolerated and continue to weight-bear as tolerated. We discussed with the patient today the use of antibiotic prophylaxis prior to any dental procedures. We discussed that the antibiotic prophylaxis would be used to help prevent periprosthetic joint infections. If they were not offered antibiotics by the physician performing the procedure, they should contact our office that we may prescribe them antibiotics. I will see her on an annual basis for follow-up of both knee replacements as her original surgeon is now retired. Total time spent on today's encounter was between 20-29 minutes. This time included reviewing prior notes, radiographs, and lab results when available, reviewing history obtained by medical assistant, performing history and physical exam, reviewing tests/radiographs with the patient, counseling the patient, ordering medications or tests, documentation in the electronic health record, and coordination of care. This dictation was done with Dragon dictation and may contain mechanical errors related to translation.

## 2021-01-27 ENCOUNTER — TELEPHONE (OUTPATIENT)
Dept: ORTHOPEDIC SURGERY | Age: 76
End: 2021-01-27

## 2021-01-28 ENCOUNTER — HOSPITAL ENCOUNTER (OUTPATIENT)
Dept: WOUND CARE | Age: 76
Discharge: HOME | End: 2021-01-28
Payer: MEDICARE

## 2021-01-28 VITALS
HEART RATE: 88 BPM | DIASTOLIC BLOOD PRESSURE: 90 MMHG | SYSTOLIC BLOOD PRESSURE: 197 MMHG | RESPIRATION RATE: 16 BRPM | TEMPERATURE: 97 F

## 2021-01-28 DIAGNOSIS — M79.89 LEG SWELLING: ICD-10-CM

## 2021-01-28 DIAGNOSIS — L89.629 PRESSURE ULCER OF BOTH HEELS: ICD-10-CM

## 2021-01-28 DIAGNOSIS — L97.921 ULCER OF LOWER EXTREMITY, LEFT, LIMITED TO BREAKDOWN OF SKIN (HCC): Primary | ICD-10-CM

## 2021-01-28 DIAGNOSIS — I89.0 LYMPHEDEMA: ICD-10-CM

## 2021-01-28 DIAGNOSIS — L97.919 IDIOPATHIC CHRONIC VENOUS HTN OF RIGHT LEG WITH ULCER AND INFLAMMATION (HCC): ICD-10-CM

## 2021-01-28 DIAGNOSIS — I87.331 IDIOPATHIC CHRONIC VENOUS HTN OF RIGHT LEG WITH ULCER AND INFLAMMATION (HCC): ICD-10-CM

## 2021-01-28 DIAGNOSIS — L89.619 PRESSURE ULCER OF BOTH HEELS: ICD-10-CM

## 2021-01-28 DIAGNOSIS — I73.9 PERIPHERAL VASCULAR DISEASE (HCC): ICD-10-CM

## 2021-01-28 DIAGNOSIS — L97.911 ULCER OF RIGHT LEG, LIMITED TO BREAKDOWN OF SKIN (HCC): ICD-10-CM

## 2021-01-28 PROCEDURE — 11042 DBRDMT SUBQ TIS 1ST 20SQCM/<: CPT

## 2021-01-28 PROCEDURE — 11042 DBRDMT SUBQ TIS 1ST 20SQCM/<: CPT | Performed by: NURSE PRACTITIONER

## 2021-01-28 RX ORDER — GINSENG 100 MG
CAPSULE ORAL ONCE
Status: CANCELLED | OUTPATIENT
Start: 2021-01-28 | End: 2021-01-28

## 2021-01-28 RX ORDER — LIDOCAINE 40 MG/G
CREAM TOPICAL ONCE
Status: CANCELLED | OUTPATIENT
Start: 2021-01-28 | End: 2021-01-28

## 2021-01-28 RX ORDER — LIDOCAINE HYDROCHLORIDE 40 MG/ML
SOLUTION TOPICAL ONCE
Status: CANCELLED | OUTPATIENT
Start: 2021-01-28 | End: 2021-01-28

## 2021-01-28 RX ORDER — GENTAMICIN SULFATE 1 MG/G
OINTMENT TOPICAL ONCE
Status: CANCELLED | OUTPATIENT
Start: 2021-01-28 | End: 2021-01-28

## 2021-01-28 RX ORDER — BACITRACIN, NEOMYCIN, POLYMYXIN B 400; 3.5; 5 [USP'U]/G; MG/G; [USP'U]/G
OINTMENT TOPICAL ONCE
Status: CANCELLED | OUTPATIENT
Start: 2021-01-28 | End: 2021-01-28

## 2021-01-28 RX ORDER — LIDOCAINE 40 MG/G
CREAM TOPICAL ONCE
Status: DISCONTINUED | OUTPATIENT
Start: 2021-01-28 | End: 2021-01-29 | Stop reason: HOSPADM

## 2021-01-28 RX ORDER — BACITRACIN ZINC AND POLYMYXIN B SULFATE 500; 1000 [USP'U]/G; [USP'U]/G
OINTMENT TOPICAL ONCE
Status: CANCELLED | OUTPATIENT
Start: 2021-01-28 | End: 2021-01-28

## 2021-01-28 RX ORDER — LIDOCAINE 50 MG/G
OINTMENT TOPICAL ONCE
Status: CANCELLED | OUTPATIENT
Start: 2021-01-28 | End: 2021-01-28

## 2021-01-28 RX ORDER — CLOBETASOL PROPIONATE 0.5 MG/G
OINTMENT TOPICAL ONCE
Status: CANCELLED | OUTPATIENT
Start: 2021-01-28 | End: 2021-01-28

## 2021-01-28 RX ORDER — BETAMETHASONE DIPROPIONATE 0.05 %
OINTMENT (GRAM) TOPICAL ONCE
Status: CANCELLED | OUTPATIENT
Start: 2021-01-28 | End: 2021-01-28

## 2021-01-28 NOTE — PROGRESS NOTES
[address], [address] [place] [address] Road  Telephone: [phone]        Other [facility]        Discharge Instructions       [address], [address] [place] [address] Road  Telephone: [phone]     Discharge Instructions:  Keep weight off wounds and reposition every 2 hours if applicable. Avoid standing for long periods of time. If wound(s) is on your lower extremity, elevate legs to the level of the heart or above for 30 minutes 4-5 times a day and/or when sitting. Do not get wounds wet in bath or shower unless otherwise instructed by your physician. If your wound is on you foot or leg, you may purchase a cast bag. Please ask at the pharmacy. When taking antibiotics take entire prescription as ordered by MD do not stop taking until medicine is all gone. Exercise as tolerated. No Smoking. Smoking prohibits wound healing. If Vascular testing is ordered, please call [address] [place] [address] [phone]) to schedule. Vascular tests ordered by Wound Care Physicians may take up to 2 hours to complete. Please keep that in mind when scheduling. If Vascular testing is scheduled, please bring supplies to replace your dressing after testing is done. The vascular department does not stock supplies. Wound: Bilateral Legs     With each dressing change, rinse wounds with 0.9% Saline. (May use wound wash or soft contact solution. Both can be purchased at a local drug store). If unable to obtain saline, may use a gentle soap and water. Dressing care: Right leg apply Betamethasone, Adaptic, Dry dressing and 2 Tubi  - change . Left Leg- Betamethasone,  1 Tubi ,. Right heel-Mepilex Border, Left heel- Betadine Mepilex Border- Change every other day  Apply Thigh high Lymphedema pumps for 1 hour twice daily.  - 1st application 1 hour after Breakfast , 2nd application 1 hour after dinner Post-Procedure Width (cm) 0.4 cm 01/28/21 1504   Post-Procedure Depth (cm) 0.1 cm 01/28/21 1504   Post-Procedure Surface Area (cm^2) 0.44 cm^2 01/28/21 1504   Post-Procedure Volume (cm^3) 0.04 cm^3 01/28/21 1504   Wound Assessment Bleeding 01/28/21 1504   Drainage Amount Moderate 01/28/21 1504   Drainage Description Serosanguinous 01/28/21 1504   Odor None 01/28/21 1442   Ximena-wound Assessment Excoriated 01/28/21 1442   Margins Attached edges; Defined edges 01/28/21 1442   Wound Thickness Description not for Pressure Injury Full thickness 01/28/21 1442   Number of days: 7       Wound 01/21/21 Leg Right; Lower; Lateral;Mid #2 (Active)   Wound Image   01/21/21 1345   Wound Etiology Venous 01/28/21 1442   Wound Cleansed Wound cleanser 01/28/21 1442   Wound Length (cm) 1.5 cm 01/28/21 1442   Wound Width (cm) 0.7 cm 01/28/21 1442   Wound Depth (cm) 0.1 cm 01/28/21 1442   Wound Surface Area (cm^2) 1.05 cm^2 01/28/21 1442   Change in Wound Size % (l*w) 31.82 01/28/21 1442   Wound Volume (cm^3) 0.1 cm^3 01/28/21 1442   Wound Healing % 33 01/28/21 1442   Post-Procedure Length (cm) 1.6 cm 01/28/21 1504   Post-Procedure Width (cm) 0.8 cm 01/28/21 1504   Post-Procedure Depth (cm) 0.1 cm 01/28/21 1504   Post-Procedure Surface Area (cm^2) 1.28 cm^2 01/28/21 1504   Post-Procedure Volume (cm^3) 0.13 cm^3 01/28/21 1504   Wound Assessment Bleeding 01/28/21 1504   Drainage Amount Moderate 01/28/21 1504   Drainage Description Serosanguinous 01/28/21 1504   Odor None 01/28/21 1442   Ximena-wound Assessment Excoriated 01/28/21 1442   Margins Attached edges; Defined edges 01/28/21 1442   Number of days: 7       Wound 01/21/21 Leg Right; Lower; Lateral;Distal #3 (Active)   Wound Image   01/21/21 1345   Wound Etiology Venous 01/28/21 1442   Wound Cleansed Wound cleanser 01/28/21 1442   Wound Length (cm) 1.4 cm 01/28/21 1442   Wound Width (cm) 1 cm 01/28/21 1442   Wound Depth (cm) 0.1 cm 01/28/21 1442 Wound Surface Area (cm^2) 1.4 cm^2 01/28/21 1442   Change in Wound Size % (l*w) 65 01/28/21 1442   Wound Volume (cm^3) 0.14 cm^3 01/28/21 1442   Wound Healing % 65 01/28/21 1442   Post-Procedure Length (cm) 1.5 cm 01/28/21 1504   Post-Procedure Width (cm) 1.1 cm 01/28/21 1504   Post-Procedure Depth (cm) 0.1 cm 01/28/21 1504   Post-Procedure Surface Area (cm^2) 1.65 cm^2 01/28/21 1504   Post-Procedure Volume (cm^3) 0.16 cm^3 01/28/21 1504   Wound Assessment Bleeding 01/28/21 1504   Drainage Amount Moderate 01/28/21 1504   Drainage Description Serosanguinous 01/28/21 1504   Odor None 01/28/21 1442   Ximena-wound Assessment Excoriated 01/28/21 1442   Margins Attached edges; Defined edges 01/28/21 1442   Number of days: 7       Wound 01/21/21 Leg Left; Lower; Lateral #4 (Active)   Wound Image   01/21/21 1345   Wound Etiology Venous 01/28/21 1442   Wound Cleansed Wound cleanser 01/28/21 1442   Wound Length (cm) 0 cm 01/28/21 1442   Wound Width (cm) 0 cm 01/28/21 1442   Wound Depth (cm) 0 cm 01/28/21 1442   Wound Surface Area (cm^2) 0 cm^2 01/28/21 1442   Change in Wound Size % (l*w) 100 01/28/21 1442   Wound Volume (cm^3) 0 cm^3 01/28/21 1442   Wound Healing % 100 01/28/21 1442   Post-Procedure Length (cm) 1.1 cm 01/21/21 1420   Post-Procedure Width (cm) 1.6 cm 01/21/21 1420   Post-Procedure Depth (cm) 0.1 cm 01/21/21 1420   Post-Procedure Surface Area (cm^2) 1.76 cm^2 01/21/21 1420   Post-Procedure Volume (cm^3) 0.18 cm^3 01/21/21 1420   Wound Assessment Epithelialization;Dry 01/28/21 1442   Drainage Amount Moderate 01/21/21 1420   Drainage Description Serosanguinous 01/21/21 1420   Odor None 01/21/21 1345   Ximena-wound Assessment Excoriated 01/21/21 1345   Margins Defined edges 01/21/21 1345   Number of days: 7       Wound 01/28/21 Heel Left #5 (Active)   Wound Etiology Deep tissue/Injury 01/28/21 1508   Wound Cleansed Cleansed with saline 01/28/21 1508   Wound Length (cm) 4 cm 01/28/21 1511

## 2021-01-29 PROBLEM — L89.619 PRESSURE ULCER OF BOTH HEELS: Status: ACTIVE | Noted: 2021-01-29

## 2021-01-29 PROBLEM — L89.629 PRESSURE ULCER OF BOTH HEELS: Status: ACTIVE | Noted: 2021-01-29

## 2021-01-29 NOTE — PROGRESS NOTES
[facility]  Progress Note and Procedure Note      Patel David  AGE: 66 y.o. GENDER: female  : 1945  TODAY'S DATE:  2021    Subjective:     Chief Complaint   Patient presents with    Wound Check     BLE follow up         HISTORY of PRESENT ILLNESS HPI  Vic Camejo a 66 y. o. female who presents today for wound evaluation. Pt accompanied by   Pt has long history of leg wounds.   Pt states wounds reopened last 2020. Pt has lymphedema pumps at home but does not use consistently, understands importance of use.  Pt admits to \"picking wounds and scratching of skin on legs\". Newly observed deep tissue injuries bilateral heels.  and pt unaware, unsure of possible start date. Pt had been hospitalized in last 3-4 weeks and then transferred to [facility] where she now resides for rehab.  states pt may be eligible for discharge to home next week. Pt states has pain in right heel, but did not know what it was. Gets out of bed during the week for rehab, but stays in bed on weekends with therapy in bed. Pt has received clearance to not wear hinged knee brace from Dr. Catalina Rodriguez.     states pt is \"more confused\" and is being worked up for a UTI. No results yet  History of Wound: Leg wounds - off and on for last 5 years. Recent DTI's bilateral heels. Wound Pain:  intermittent with pressure; tender, burning pain heels  Severity:  2 / 10   Wound Type:  venous and pressure  Modifying Factors:  edema, venous stasis, lymphedema, diabetes, obesity and non-adherence, lack of mobility.    Associated Signs/Symptoms:  edema, erythema, drainage and pain              PAST MEDICAL HISTORY        Diagnosis Date    Arthritis     Blood circulation, collateral     Blood transfusion     CAD (coronary artery disease)     CHF (congestive heart failure) (HCC)     Chronic renal failure, stage 3 (moderate)     Diabetes mellitus ([facility] [address]  GERD (gastroesophageal reflux disease)     Hyperlipidemia     Hypertension     Lymphedema 8/14/2018    Pressure ulcer of both heels 1/29/2021    Deep tissue injuries to right and left heels with left>right. Date of origin unknown.        PAST SURGICAL HISTORY    Past Surgical History:   Procedure Laterality Date    ABDOMEN SURGERY      CARDIAC SURGERY      2009    CATARACT REMOVAL WITH IMPLANT Left 11/23/2016    CATARACT REMOVAL WITH IMPLANT Right 11/02/2016    CATARACT REMOVAL WITH IMPLANT Bilateral 10/16, 11/16    CHOLECYSTECTOMY      COLONOSCOPY      ENDOSCOPY, COLON, DIAGNOSTIC      EYE SURGERY      left tear duct surgery    JOINT REPLACEMENT      BTKR    KNEE ARTHROPLASTY  09/15/2010    left total knee    TOTAL KNEE ARTHROPLASTY      VASCULAR SURGERY         FAMILY HISTORY    Family History   Problem Relation Age of Onset    Diabetes Mother     Heart Disease Mother     Heart Disease Father     Stroke Father        SOCIAL HISTORY    Social History     Tobacco Use    Smoking status: Never Smoker    Smokeless tobacco: Never Used   Substance Use Topics    Alcohol use: No    Drug use: No       ALLERGIES    Allergies   Allergen Reactions    Adhesive Tape Shortness Of Breath     Other reaction(s): Ulcers    Chlorhexidine     Lisinopril Other (See Comments)     Med causes lethargy- takes in lower doses       MEDICATIONS    Current Outpatient Medications on File Prior to Encounter   Medication Sig Dispense Refill    furosemide (LASIX) 20 MG tablet TAKE ONE TABLET BY MOUTH TWICE DAILY  60 tablet 3    Coenzyme Q10 (CO Q-10) 400 MG CAPS Take 400 mg by mouth daily 30 capsule 5    XARELTO 15 MG TABS tablet TAKE ONE TABLET BY MOUTH DAILY 90 tablet 1    Liraglutide (VICTOZA) 18 MG/3ML SOPN SC injection Inject 1.2 mg into the skin daily      Handicap Placard MISC by Does not apply route Sob when walking less than 200 feet  Length of time--greater than 5 year 1 each 0  ferrous sulfate 325 (65 Fe) MG tablet Take 1 tablet by mouth 3 times daily (with meals) 90 tablet 3    atorvastatin (LIPITOR) 20 MG tablet Take 20 mg by mouth daily      vitamin D (CHOLECALCIFEROL) 400 UNITS TABS tablet Take 400 Units by mouth daily      metoprolol (LOPRESSOR) 25 MG tablet Take 0.5 tablets by mouth 2 times daily 60 tablet 3    therapeutic multivitamin-minerals (THERAGRAN-M) tablet Take 1 tablet by mouth daily.  hydrOXYzine (ATARAX) 25 MG tablet Take 25 mg by mouth 3 times daily as needed.  ranitidine (ZANTAC) 300 MG tablet Take 150 mg by mouth 2 times daily        No current facility-administered medications on file prior to encounter. REVIEW OF SYSTEMS    Pertinent items are noted in HPI. Objective:      BP (!) 197/90   Pulse 88   Temp 97 °F (36.1 °C) (Temporal)   Resp 16     PHYSICAL EXAM    General Appearance: alert and oriented to person, place and time, with anxious affect, obese and pale  Skin: warm and dry  Head: normocephalic and atraumatic  Eyes: pupils equal, round, and reactive to light  Pulmonary/Chest: normal air movement, no respiratory distress  Cardiovascular: normal rate, regular rhythm and intact distal pulses  Extremities: no cyanosis, no clubbing, 2 + edema-  bilateral lower leg edema with lymphedema changes noted in bilateral thighs, and deep tissue injuries bilateral heels  Wounds:  Right lower leg:  Wounds moist with red wound base and noted open areas of excoriation where pt scratches skin. Bilateral Heels: right heel with intact skin DTI - area of purple coloration with redness and tenderness. Left heel:  Open skin over DTI from opened blister tissue underneath tissue has are of deep purple color and serosanguinous drainage.         Assessment:     Patient Active Problem List   Diagnosis    Osteoarthritis of left knee    Acute blood loss anemia    Diabetes mellitus ([address]    Ischemic cardiomyopathy    Atrial fibrillation  [facility]    CHF exacerbation (HCC)    Acute on chronic combined systolic and diastolic heart failure (HCC)    Chronic renal failure, stage 3 (moderate)    Chronic atrial fibrillation (HCC)    Pleural effusion    Atherosclerosis of native coronary artery without angina pectoris    Essential hypertension    Chronic combined systolic and diastolic congestive heart failure (HCC)    Hx of CABG    Non-rheumatic tricuspid valve insufficiency    Mixed hyperlipidemia    Carotid disease, bilateral (HCC)    Atherosclerosis of native coronary artery of native heart without angina pectoris    Chronic kidney disease    Hyperlipidemia    INOCENCIO (obstructive sleep apnea)    Lymphedema    Leg swelling    Venous stasis dermatitis of both lower extremities    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 (HCC)    Venous insufficiency    Ulcer of right leg, limited to breakdown of skin (HCC)    Ulcer of lower extremity, left, limited to breakdown of skin (HCC)    Iron deficiency anemia    CHANO (acute kidney injury) ([facility] [address]    Knee pain    Pressure ulcer of both heels       Procedure Note    Performed by: JOBY Moscoso CNP    Consent obtained: Yes    Time out taken:  Yes    Pain Control:   4% lidocaine    Debridement:Excisional Debridement    Using curette the wound was sharply debrided    down through and including the removal of epidermis, dermis and subcutaneous tissue.         Devitalized Tissue Debrided:  fibrin, biofilm and slough    Pre Debridement Measurements:  Are located in the Wound Documentation Flow Sheet    Wound #: 1, 2 and 3     Post  Debridement Measurements:  Wound 01/21/21 Leg Lower;Left;Proximal #1 (Active)   Wound Image   01/21/21 1345   Wound Etiology Venous 01/28/21 1442   Wound Cleansed Wound cleanser 01/28/21 1442   Wound Length (cm) 1 cm 01/28/21 1442   Wound Width (cm) 0.3 cm 01/28/21 1442   Wound Depth (cm) 0.1 cm 01/28/21 1442 Wound Surface Area (cm^2) 0.3 cm^2 01/28/21 1442   Change in Wound Size % (l*w) 76.92 01/28/21 1442   Wound Volume (cm^3) 0.03 cm^3 01/28/21 1442   Wound Healing % 77 01/28/21 1442   Post-Procedure Length (cm) 1.1 cm 01/28/21 1504   Post-Procedure Width (cm) 0.4 cm 01/28/21 1504   Post-Procedure Depth (cm) 0.1 cm 01/28/21 1504   Post-Procedure Surface Area (cm^2) 0.44 cm^2 01/28/21 1504   Post-Procedure Volume (cm^3) 0.04 cm^3 01/28/21 1504   Wound Assessment Bleeding 01/28/21 1504   Drainage Amount Moderate 01/28/21 1504   Drainage Description Serosanguinous 01/28/21 1504   Odor None 01/28/21 1442   Ximena-wound Assessment Excoriated 01/28/21 1442   Margins Attached edges; Defined edges 01/28/21 1442   Wound Thickness Description not for Pressure Injury Full thickness 01/28/21 1442   Number of days: 7       Wound 01/21/21 Leg Right; Lower; Lateral;Mid #2 (Active)   Wound Image   01/21/21 1345   Wound Etiology Venous 01/28/21 1442   Wound Cleansed Wound cleanser 01/28/21 1442   Wound Length (cm) 1.5 cm 01/28/21 1442   Wound Width (cm) 0.7 cm 01/28/21 1442   Wound Depth (cm) 0.1 cm 01/28/21 1442   Wound Surface Area (cm^2) 1.05 cm^2 01/28/21 1442   Change in Wound Size % (l*w) 31.82 01/28/21 1442   Wound Volume (cm^3) 0.1 cm^3 01/28/21 1442   Wound Healing % 33 01/28/21 1442   Post-Procedure Length (cm) 1.6 cm 01/28/21 1504   Post-Procedure Width (cm) 0.8 cm 01/28/21 1504   Post-Procedure Depth (cm) 0.1 cm 01/28/21 1504   Post-Procedure Surface Area (cm^2) 1.28 cm^2 01/28/21 1504   Post-Procedure Volume (cm^3) 0.13 cm^3 01/28/21 1504   Wound Assessment Bleeding 01/28/21 1504   Drainage Amount Moderate 01/28/21 1504   Drainage Description Serosanguinous 01/28/21 1504   Odor None 01/28/21 1442   Ximena-wound Assessment Excoriated 01/28/21 1442   Margins Attached edges; Defined edges 01/28/21 1442   Number of days: 7       Wound 01/21/21 Leg Right; Lower; Lateral;Distal #3 (Active)   Wound Image   01/21/21 0286 Wound Etiology Venous 01/28/21 1442   Wound Cleansed Wound cleanser 01/28/21 1442   Wound Length (cm) 1.4 cm 01/28/21 1442   Wound Width (cm) 1 cm 01/28/21 1442   Wound Depth (cm) 0.1 cm 01/28/21 1442   Wound Surface Area (cm^2) 1.4 cm^2 01/28/21 1442   Change in Wound Size % (l*w) 65 01/28/21 1442   Wound Volume (cm^3) 0.14 cm^3 01/28/21 1442   Wound Healing % 65 01/28/21 1442   Post-Procedure Length (cm) 1.5 cm 01/28/21 1504   Post-Procedure Width (cm) 1.1 cm 01/28/21 1504   Post-Procedure Depth (cm) 0.1 cm 01/28/21 1504   Post-Procedure Surface Area (cm^2) 1.65 cm^2 01/28/21 1504   Post-Procedure Volume (cm^3) 0.16 cm^3 01/28/21 1504   Wound Assessment Bleeding 01/28/21 1504   Drainage Amount Moderate 01/28/21 1504   Drainage Description Serosanguinous 01/28/21 1504   Odor None 01/28/21 1442   Ximena-wound Assessment Excoriated 01/28/21 1442   Margins Attached edges; Defined edges 01/28/21 1442   Number of days: 7       Wound 01/21/21 Leg Left; Lower; Lateral #4 (Active)   Wound Image   01/21/21 1345   Wound Etiology Venous 01/28/21 1442   Wound Cleansed Wound cleanser 01/28/21 1442   Wound Length (cm) 0 cm 01/28/21 1442   Wound Width (cm) 0 cm 01/28/21 1442   Wound Depth (cm) 0 cm 01/28/21 1442   Wound Surface Area (cm^2) 0 cm^2 01/28/21 1442   Change in Wound Size % (l*w) 100 01/28/21 1442   Wound Volume (cm^3) 0 cm^3 01/28/21 1442   Wound Healing % 100 01/28/21 1442   Post-Procedure Length (cm) 1.1 cm 01/21/21 1420   Post-Procedure Width (cm) 1.6 cm 01/21/21 1420   Post-Procedure Depth (cm) 0.1 cm 01/21/21 1420   Post-Procedure Surface Area (cm^2) 1.76 cm^2 01/21/21 1420   Post-Procedure Volume (cm^3) 0.18 cm^3 01/21/21 1420   Wound Assessment Epithelialization;Dry 01/28/21 1442   Drainage Amount Moderate 01/21/21 1420   Drainage Description Serosanguinous 01/21/21 1420   Odor None 01/21/21 1345   Ximena-wound Assessment Excoriated 01/21/21 1345   Margins Defined edges 01/21/21 1345   Number of days: 7 Wound 01/28/21 Heel Left #5 (Active)   Wound Etiology Deep tissue/Injury 01/28/21 1508   Wound Cleansed Cleansed with saline 01/28/21 1508   Wound Length (cm) 4 cm 01/28/21 1511   Wound Width (cm) 5.5 cm 01/28/21 1511   Wound Depth (cm) 0.1 cm 01/28/21 1511   Wound Surface Area (cm^2) 22 cm^2 01/28/21 1511   Wound Volume (cm^3) 2.2 cm^3 01/28/21 1511   Wound Assessment Bleeding 01/28/21 1511   Drainage Amount Moderate 01/28/21 1511   Drainage Description Serosanguinous 01/28/21 1511   Odor None 01/28/21 1508   Ximena-wound Assessment Dry/flaky 01/28/21 1508   Margins Attached edges 01/28/21 1508   Number of days: 0       Wound 01/28/21 Heel Right #6 (Active)   Wound Etiology Deep tissue/Injury 01/28/21 1515   Wound Cleansed Cleansed with saline 01/28/21 1515   Wound Assessment Purple/maroon 01/28/21 1515   Drainage Amount None 01/28/21 1515   Odor None 01/28/21 1515   Ximena-wound Assessment Dry/flaky 01/28/21 1515   Margins Attached edges 01/28/21 1515   Number of days: 0           Total Surface Area Debrided:  3.37 sq cm     Percentage of wound debrided 100 %    Bleeding:  Minimal    Hemostasis Achieved:  not needed    Procedural Pain:  1  / 10     Post Procedural Pain:  0 / 10     Response to treatment:  Well tolerated by patient. Plan:     The nature of the patient's condition was explained in depth. The patient was informed that their compliance to the treatment plan is paramount to successful healing and prevention of further ulceration and/or infection     Discharge Treatment   Dressing care: Right leg apply Betamethasone, Adaptic, Dry dressing and 2 Tubi  - change . Left Leg- Betamethasone,  1 Tubi ,. Right heel-Mepilex Border, Left heel- Betadine Mepilex Border- Change every other day  Apply Thigh high Lymphedema pumps for 1 hour twice daily. - 1st application 1 hour after Breakfast , 2nd application 1 hour after dinner  Apply Prevalon Boots to Heels when sitting or in bed. Written Patient Discharge Instructions Given            Electronically signed by JOBY Schaefer CNP on 1/29/2021 at 9:20 AM

## 2021-02-04 ENCOUNTER — HOSPITAL ENCOUNTER (OUTPATIENT)
Dept: WOUND CARE | Age: 76
Discharge: HOME | End: 2021-02-04
Payer: MEDICARE

## 2021-02-04 VITALS
TEMPERATURE: 97.3 F | HEART RATE: 85 BPM | SYSTOLIC BLOOD PRESSURE: 162 MMHG | DIASTOLIC BLOOD PRESSURE: 73 MMHG | RESPIRATION RATE: 17 BRPM

## 2021-02-04 DIAGNOSIS — L89.619 PRESSURE ULCER OF BOTH HEELS: Primary | ICD-10-CM

## 2021-02-04 DIAGNOSIS — L97.911 ULCER OF RIGHT LEG, LIMITED TO BREAKDOWN OF SKIN (HCC): ICD-10-CM

## 2021-02-04 DIAGNOSIS — I89.0 LYMPHEDEMA: ICD-10-CM

## 2021-02-04 DIAGNOSIS — I87.2 VENOUS STASIS DERMATITIS OF BOTH LOWER EXTREMITIES: ICD-10-CM

## 2021-02-04 DIAGNOSIS — L97.921 ULCER OF LOWER EXTREMITY, LEFT, LIMITED TO BREAKDOWN OF SKIN (HCC): ICD-10-CM

## 2021-02-04 DIAGNOSIS — I73.9 PERIPHERAL VASCULAR DISEASE (HCC): ICD-10-CM

## 2021-02-04 DIAGNOSIS — I87.2 VENOUS INSUFFICIENCY: ICD-10-CM

## 2021-02-04 DIAGNOSIS — M79.89 LEG SWELLING: ICD-10-CM

## 2021-02-04 DIAGNOSIS — L89.629 PRESSURE ULCER OF BOTH HEELS: Primary | ICD-10-CM

## 2021-02-04 PROCEDURE — 99213 OFFICE O/P EST LOW 20 MIN: CPT

## 2021-02-04 PROCEDURE — 11042 DBRDMT SUBQ TIS 1ST 20SQCM/<: CPT | Performed by: NURSE PRACTITIONER

## 2021-02-04 RX ORDER — BETAMETHASONE DIPROPIONATE 0.05 %
OINTMENT (GRAM) TOPICAL ONCE
Status: CANCELLED | OUTPATIENT
Start: 2021-02-04 | End: 2021-02-04

## 2021-02-04 RX ORDER — LIDOCAINE 40 MG/G
CREAM TOPICAL ONCE
Status: CANCELLED | OUTPATIENT
Start: 2021-02-04 | End: 2021-02-04

## 2021-02-04 RX ORDER — BACITRACIN, NEOMYCIN, POLYMYXIN B 400; 3.5; 5 [USP'U]/G; MG/G; [USP'U]/G
OINTMENT TOPICAL ONCE
Status: CANCELLED | OUTPATIENT
Start: 2021-02-04 | End: 2021-02-04

## 2021-02-04 RX ORDER — GENTAMICIN SULFATE 1 MG/G
OINTMENT TOPICAL ONCE
Status: CANCELLED | OUTPATIENT
Start: 2021-02-04 | End: 2021-02-04

## 2021-02-04 RX ORDER — BACITRACIN ZINC AND POLYMYXIN B SULFATE 500; 1000 [USP'U]/G; [USP'U]/G
OINTMENT TOPICAL ONCE
Status: CANCELLED | OUTPATIENT
Start: 2021-02-04 | End: 2021-02-04

## 2021-02-04 RX ORDER — LIDOCAINE HYDROCHLORIDE 40 MG/ML
SOLUTION TOPICAL ONCE
Status: CANCELLED | OUTPATIENT
Start: 2021-02-04 | End: 2021-02-04

## 2021-02-04 RX ORDER — LIDOCAINE 40 MG/G
CREAM TOPICAL ONCE
Status: DISCONTINUED | OUTPATIENT
Start: 2021-02-04 | End: 2021-02-05 | Stop reason: HOSPADM

## 2021-02-04 RX ORDER — GINSENG 100 MG
CAPSULE ORAL ONCE
Status: CANCELLED | OUTPATIENT
Start: 2021-02-04 | End: 2021-02-04

## 2021-02-04 RX ORDER — LIDOCAINE 50 MG/G
OINTMENT TOPICAL ONCE
Status: CANCELLED | OUTPATIENT
Start: 2021-02-04 | End: 2021-02-04

## 2021-02-04 RX ORDER — CLOBETASOL PROPIONATE 0.5 MG/G
OINTMENT TOPICAL ONCE
Status: CANCELLED | OUTPATIENT
Start: 2021-02-04 | End: 2021-02-04

## 2021-02-04 NOTE — PROGRESS NOTES
[address], [address] [place] [address] Road  Telephone: [phone]        [facility] Fax # [phone]      Discharge Instructions       [place] [id number] [facility], [address] [place] [address] Road  Telephone: [phone]     Discharge Instructions:  Keep weight off wounds and reposition every 2 hours if applicable. Avoid standing for long periods of time. If wound(s) is on your lower extremity, elevate legs to the level of the heart or above for 30 minutes 4-5 times a day and/or when sitting. Do not get wounds wet in bath or shower unless otherwise instructed by your physician. If your wound is on you foot or leg, you may purchase a cast bag. Please ask at the pharmacy. When taking antibiotics take entire prescription as ordered by MD do not stop taking until medicine is all gone. Exercise as tolerated. No Smoking. Smoking prohibits wound healing. If Vascular testing is ordered, please call [address] [place] [address] [phone]) to schedule. Vascular tests ordered by Wound Care Physicians may take up to 2 hours to complete. Please keep that in mind when scheduling. If Vascular testing is scheduled, please bring supplies to replace your dressing after testing is done. The vascular department does not stock supplies. Wound: Bilateral Legs     With each dressing change, rinse wounds with 0.9% Saline. (May use wound wash or soft contact solution. Both can be purchased at a local drug store). If unable to obtain saline, may use a gentle soap and water. Dressing care: Right leg apply Betamethasone, Dry dressing and 6\"Ace - change . Left Leg- Betamethasone and 6\" Ace,. Right and Left  heel-Betadine  And Foam- Change daily  Apply Thigh high Lymphedema pumps for 1 hour twice daily.  - 1st application 1 hour after Breakfast , 2nd application 1 hour after dinner Prevalon Boots or Heel Protecting Boot to Heels when sitting or in bed. Can call Viky. Viky [facility]  [address]  [facility], [place]  P: [phone]  F: [phone]      Important dietary reminders:  1. Increase Protein intake for optimal wound healing  2. No added salt to reduce any swelling  3. If diabetic, good glucose control  4. If you smoke, smoking affects wound healing, we ask that you refrain from smoking. Follow up with Hansel Madrigal CNP In 1 week in the wound care center. Call [phone] for any questions or concerns. Your  is Haylie Colin: [address] [facility] [address] Information: Should you experience any significant changes in your wound(s) or have questions about your wound care, please contact the [facility] at [phone] Monday  - Thursday 8:00 am - 4:00 pm and Friday 8:00 am - 1:00pm. If you need help with your wound outside these hours and cannot wait until we are again available, contact your PCP or go to the hospital emergency room. PLEASE NOTE: IF YOU ARE UNABLE TO OBTAIN WOUND SUPPLIES, CONTINUE TO USE THE SUPPLIES YOU HAVE AVAILABLE UNTIL YOU ARE ABLE TO REACH US. IT IS MOST IMPORTANT TO KEEP THE WOUND COVERED AT ALL TIMES. Skilled nurse to evaluate and treat for wound care. Change dressing as ordered  Daily using clean technique. Patient/Family/caregiver may change dressings as needed as instructed when Home Care unavailable.     WOUNDS REQUIRING DRESSING Changes:     Wound 01/21/21 Leg Lower;Left;Proximal #1 (Active)   Wound Image   01/21/21 1345   Wound Etiology Venous 02/04/21 1511   Wound Cleansed Wound cleanser 01/28/21 1442   Wound Length (cm) 0 cm 02/04/21 1511   Wound Width (cm) 0 cm 02/04/21 1511   Wound Depth (cm) 0 cm 02/04/21 1511   Wound Surface Area (cm^2) 0 cm^2 02/04/21 1511   Change in Wound Size % (l*w) 100 02/04/21 1511 Wound Volume (cm^3) 0 cm^3 02/04/21 1511   Wound Healing % 100 02/04/21 1511   Post-Procedure Length (cm) 1.1 cm 01/28/21 1504   Post-Procedure Width (cm) 0.4 cm 01/28/21 1504   Post-Procedure Depth (cm) 0.1 cm 01/28/21 1504   Post-Procedure Surface Area (cm^2) 0.44 cm^2 01/28/21 1504   Post-Procedure Volume (cm^3) 0.04 cm^3 01/28/21 1504   Wound Assessment Epithelialization 02/04/21 1511   Drainage Amount Moderate 01/28/21 1504   Drainage Description Serosanguinous 01/28/21 1504   Odor None 01/28/21 1442   Ximena-wound Assessment Excoriated 01/28/21 1442   Margins Attached edges; Defined edges 01/28/21 1442   Wound Thickness Description not for Pressure Injury Full thickness 01/28/21 1442   Number of days: 14       Wound 01/21/21 Leg Right; Lower; Lateral;Mid #2 (Active)   Wound Image   01/21/21 1345   Wound Etiology Venous 02/04/21 1511   Wound Cleansed Cleansed with saline 02/04/21 1511   Wound Length (cm) 0.5 cm 02/04/21 1511   Wound Width (cm) 0.5 cm 02/04/21 1511   Wound Depth (cm) 0.1 cm 02/04/21 1511   Wound Surface Area (cm^2) 0.25 cm^2 02/04/21 1511   Change in Wound Size % (l*w) 83.77 02/04/21 1511   Wound Volume (cm^3) 0.02 cm^3 02/04/21 1511   Wound Healing % 87 02/04/21 1511   Post-Procedure Length (cm) 1.6 cm 01/28/21 1504   Post-Procedure Width (cm) 0.8 cm 01/28/21 1504   Post-Procedure Depth (cm) 0.1 cm 01/28/21 1504   Post-Procedure Surface Area (cm^2) 1.28 cm^2 01/28/21 1504   Post-Procedure Volume (cm^3) 0.13 cm^3 01/28/21 1504   Wound Assessment Granulation tissue 02/04/21 1511   Drainage Amount Small 02/04/21 1511   Drainage Description Serosanguinous 02/04/21 1511   Odor None 02/04/21 1511   Ximena-wound Assessment Excoriated 02/04/21 1511   Margins Attached edges; Defined edges 01/28/21 1442   Number of days: 14       Wound 01/21/21 Leg Right; Lower; Lateral;Distal #3 (Active)   Wound Image   01/21/21 1345   Wound Etiology Venous 02/04/21 1511   Wound Cleansed Wound cleanser 01/28/21 1442 Wound Length (cm) 0 cm 02/04/21 1511   Wound Width (cm) 0 cm 02/04/21 1511   Wound Depth (cm) 0 cm 02/04/21 1511   Wound Surface Area (cm^2) 0 cm^2 02/04/21 1511   Change in Wound Size % (l*w) 100 02/04/21 1511   Wound Volume (cm^3) 0 cm^3 02/04/21 1511   Wound Healing % 100 02/04/21 1511   Post-Procedure Length (cm) 1.5 cm 01/28/21 1504   Post-Procedure Width (cm) 1.1 cm 01/28/21 1504   Post-Procedure Depth (cm) 0.1 cm 01/28/21 1504   Post-Procedure Surface Area (cm^2) 1.65 cm^2 01/28/21 1504   Post-Procedure Volume (cm^3) 0.16 cm^3 01/28/21 1504   Wound Assessment Dry;Epithelialization 02/04/21 1511   Drainage Amount Moderate 01/28/21 1504   Drainage Description Serosanguinous 01/28/21 1504   Odor None 01/28/21 1442   Ximena-wound Assessment Excoriated 01/28/21 1442   Margins Attached edges; Defined edges 01/28/21 1442   Number of days: 14       Wound 01/21/21 Leg Left; Lower; Lateral #4 (Active)   Wound Image   01/21/21 1345   Wound Etiology Venous 02/04/21 1511   Wound Cleansed Wound cleanser 01/28/21 1442   Wound Length (cm) 0 cm 02/04/21 1511   Wound Width (cm) 0 cm 02/04/21 1511   Wound Depth (cm) 0 cm 02/04/21 1511   Wound Surface Area (cm^2) 0 cm^2 02/04/21 1511   Change in Wound Size % (l*w) 100 02/04/21 1511   Wound Volume (cm^3) 0 cm^3 02/04/21 1511   Wound Healing % 100 02/04/21 1511   Post-Procedure Length (cm) 1.1 cm 01/21/21 1420   Post-Procedure Width (cm) 1.6 cm 01/21/21 1420   Post-Procedure Depth (cm) 0.1 cm 01/21/21 1420   Post-Procedure Surface Area (cm^2) 1.76 cm^2 01/21/21 1420   Post-Procedure Volume (cm^3) 0.18 cm^3 01/21/21 1420   Wound Assessment Dry;Epithelialization 02/04/21 1511   Drainage Amount Moderate 01/21/21 1420   Drainage Description Serosanguinous 01/21/21 1420   Odor None 01/21/21 1345   Ximena-wound Assessment Excoriated 01/21/21 1345   Margins Defined edges 01/21/21 1345   Number of days: 14       Wound 01/28/21 Heel Left #5 (Active)   Wound Image   02/04/21 1511 Wound Etiology Pressure Unstageable 02/04/21 1511   Wound Cleansed Cleansed with saline 02/04/21 1511   Wound Length (cm) 3.5 cm 02/04/21 1511   Wound Width (cm) 3.3 cm 02/04/21 1511   Wound Depth (cm) 0.1 cm 02/04/21 1511   Wound Surface Area (cm^2) 11.55 cm^2 02/04/21 1511   Change in Wound Size % (l*w) 47.5 02/04/21 1511   Wound Volume (cm^3) 1.16 cm^3 02/04/21 1511   Wound Healing % 47 02/04/21 1511   Wound Assessment Eschar dry;Slough 02/04/21 1511   Drainage Amount Moderate 02/04/21 1511   Drainage Description Serosanguinous 02/04/21 1511   Odor None 02/04/21 1511   Ximena-wound Assessment Intact 02/04/21 1511   Margins Attached edges 02/04/21 1511   Number of days: 7       Wound 01/28/21 Heel Right #6 (Active)   Wound Image   02/04/21 1511   Wound Etiology Deep tissue/Injury 02/04/21 1511   Wound Cleansed Cleansed with saline 02/04/21 1511   Wound Assessment Purple/maroon 02/04/21 1511   Drainage Amount None 02/04/21 1511   Odor None 01/28/21 1515   Ximena-wound Assessment Dry/flaky 01/28/21 1515   Margins Attached edges 01/28/21 1515   Number of days: 7          Patient seen and treated on 2/4/2021    By Moshe Crump  [id number]   (provider/NPI)

## 2021-02-04 NOTE — PROGRESS NOTES
[facility]   Progress Note and Procedure Note      Shedrick Goldmann  AGE: 66 y.o. GENDER: female  : 1945  TODAY'S DATE:  2021    Subjective:     Chief Complaint   Patient presents with    Wound Check     Right lower extremity, Left lower extremity         HISTORY of PRESENT ILLNESS HPI  Ronaldo Jamil a 66 y. o. female who presents today for wound evaluation. Pt accompanied by  who assists pt with bilateral lower leg care.  Pt has long history of leg wounds.  Was healed and discharged from [place] last 2020. Pt states wounds reopened last 2020. Pt has lymphedema pumps at home but does not use consistently, understands importance of use.  Pt admits to \"picking wounds\" and \"scratching\"  legs.  The \"confusion\" pt was experiencing last week in no longer and issue and urine culture negative. Pt returned home from [facility] this past  and now has home care with therapies. Has started back to using lymphedema pumps but is slowly increasing time. Left leg wrap fell down this past week. History of Wound: off and on for last 5 years. Wound Pain:  intermittent, mild  Severity:  1 / 10   Wound Type:  venous  Modifying Factors:  edema, venous stasis, lymphedema, diabetes, obesity and non-adherence  Associated Signs/Symptoms:  edema, erythema, drainage and pain                    PAST MEDICAL HISTORY        Diagnosis Date    Arthritis     Blood circulation, collateral     Blood transfusion     CAD (coronary artery disease)     CHF (congestive heart failure) (HCC)     Chronic renal failure, stage 3 (moderate)     Diabetes mellitus (HCC)     GERD (gastroesophageal reflux disease)     Hyperlipidemia     Hypertension     Leg swelling 2018    Lymphedema 2018    Pressure ulcer of both heels 2021    Deep tissue injuries to right and left heels with left>right. Date of origin unknown.        PAST SURGICAL HISTORY Past Surgical History:   Procedure Laterality Date    ABDOMEN SURGERY      CARDIAC SURGERY      2009    CATARACT REMOVAL WITH IMPLANT Left 11/23/2016    CATARACT REMOVAL WITH IMPLANT Right 11/02/2016    CATARACT REMOVAL WITH IMPLANT Bilateral 10/16, 11/16    CHOLECYSTECTOMY      COLONOSCOPY      ENDOSCOPY, COLON, DIAGNOSTIC      EYE SURGERY      left tear duct surgery    JOINT REPLACEMENT      BTKR    KNEE ARTHROPLASTY  09/15/2010    left total knee    TOTAL KNEE ARTHROPLASTY      VASCULAR SURGERY         FAMILY HISTORY    Family History   Problem Relation Age of Onset    Diabetes Mother     Heart Disease Mother     Heart Disease Father     Stroke Father        SOCIAL HISTORY    Social History     Tobacco Use    Smoking status: Never Smoker    Smokeless tobacco: Never Used   Substance Use Topics    Alcohol use: No    Drug use: No       ALLERGIES    Allergies   Allergen Reactions    Adhesive Tape Shortness Of Breath     Other reaction(s): Ulcers    Chlorhexidine     Lisinopril Other (See Comments)     Med causes lethargy- takes in lower doses       MEDICATIONS    Current Outpatient Medications on File Prior to Encounter   Medication Sig Dispense Refill    furosemide (LASIX) 20 MG tablet TAKE ONE TABLET BY MOUTH TWICE DAILY  60 tablet 3    Coenzyme Q10 (CO Q-10) 400 MG CAPS Take 400 mg by mouth daily 30 capsule 5    XARELTO 15 MG TABS tablet TAKE ONE TABLET BY MOUTH DAILY 90 tablet 1    Liraglutide (VICTOZA) 18 MG/3ML SOPN SC injection Inject 1.2 mg into the skin daily      Handicap Placard MISC by Does not apply route Sob when walking less than 200 feet  Length of time--greater than 5 year 1 each 0    ferrous sulfate 325 (65 Fe) MG tablet Take 1 tablet by mouth 3 times daily (with meals) 90 tablet 3    atorvastatin (LIPITOR) 20 MG tablet Take 20 mg by mouth daily      vitamin D (CHOLECALCIFEROL) 400 UNITS TABS tablet Take 400 Units by mouth daily  metoprolol (LOPRESSOR) 25 MG tablet Take 0.5 tablets by mouth 2 times daily 60 tablet 3    therapeutic multivitamin-minerals (THERAGRAN-M) tablet Take 1 tablet by mouth daily.  hydrOXYzine (ATARAX) 25 MG tablet Take 25 mg by mouth 3 times daily as needed.  ranitidine (ZANTAC) 300 MG tablet Take 150 mg by mouth 2 times daily        No current facility-administered medications on file prior to encounter. REVIEW OF SYSTEMS    Pertinent items are noted in HPI. Objective:      BP (!) 162/73   Pulse 85   Temp 97.3 °F (36.3 °C) (Infrared)   Resp 17     PHYSICAL EXAM    General Appearance: alert and oriented to person, place and time, with anxious affect, obese and pale  Skin: warm and dry  Head: normocephalic and atraumatic  Pulmonary/Chest: no chest wall tenderness  Cardiovascular: normal rate, regular rhythm and intact distal pulses  Extremities: no cyanosis, no clubbing and 2 + edema-  bilateral lower legs  Wounds:  Open areas of excoriation from scratching legs on right upper leg. Other wounds now closed . Right heel wound now a blister with less redness. Left heel blister peeling off and area of deep tissue injury still intact this week, less periwound redness.       Assessment:     Patient Active Problem List   Diagnosis    Osteoarthritis of left knee    Acute blood loss anemia    Diabetes mellitus ([address]    Ischemic cardiomyopathy    Atrial fibrillation    Peripheral vascular disease ([address]    CHF exacerbation (HCC)    Acute on chronic combined systolic and diastolic heart failure (HCC)    Chronic renal failure, stage 3 (moderate)    Chronic atrial fibrillation (HCC)    Pleural effusion    Atherosclerosis of native coronary artery without angina pectoris    Essential hypertension    Chronic combined systolic and diastolic congestive heart failure ([address]    Hx of CABG    Non-rheumatic tricuspid valve insufficiency    Mixed hyperlipidemia  Carotid disease, bilateral ([address]    Atherosclerosis of native coronary artery of native heart without angina pectoris    Chronic kidney disease    Hyperlipidemia    INOCENCIO (obstructive sleep apnea)    Lymphedema    Leg swelling    Venous stasis dermatitis of both lower extremities    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 (HCC)    Venous insufficiency    Ulcer of right leg, limited to breakdown of skin (HCC)    Ulcer of lower extremity, left, limited to breakdown of skin (HCC)    Iron deficiency anemia    CHANO (acute kidney injury) ([address]    Knee pain    Pressure ulcer of both heels       Procedure Note    Performed by: JOBY Ramos CNP    Consent obtained: Yes    Time out taken:  Yes    Pain Control: Anesthetic  Anesthetic: 4% Lidocaine Cream     Debridement:Excisional Debridement    Using curette, scissors and forceps the wound was sharply debrided    down through and including the removal of epidermis, dermis and subcutaneous tissue.         Devitalized Tissue Debrided:  fibrin, biofilm, slough and blister remnants    Pre Debridement Measurements:  Are located in the Wound Documentation Flow Sheet    Wound #: 5     Post  Debridement Measurements:  Wound 01/21/21 Leg Lower;Left;Proximal #1 (Active)   Wound Image   01/21/21 1345   Wound Etiology Venous 02/04/21 1511   Wound Cleansed Wound cleanser 01/28/21 1442   Wound Length (cm) 0 cm 02/04/21 1511   Wound Width (cm) 0 cm 02/04/21 1511   Wound Depth (cm) 0 cm 02/04/21 1511   Wound Surface Area (cm^2) 0 cm^2 02/04/21 1511   Change in Wound Size % (l*w) 100 02/04/21 1511   Wound Volume (cm^3) 0 cm^3 02/04/21 1511   Wound Healing % 100 02/04/21 1511   Post-Procedure Length (cm) 1.1 cm 01/28/21 1504   Post-Procedure Width (cm) 0.4 cm 01/28/21 1504   Post-Procedure Depth (cm) 0.1 cm 01/28/21 1504   Post-Procedure Surface Area (cm^2) 0.44 cm^2 01/28/21 1504   Post-Procedure Volume (cm^3) 0.04 cm^3 01/28/21 1504 Wound Assessment Epithelialization 02/04/21 1511   Drainage Amount Moderate 01/28/21 1504   Drainage Description Serosanguinous 01/28/21 1504   Odor None 01/28/21 1442   Ximena-wound Assessment Excoriated 01/28/21 1442   Margins Attached edges; Defined edges 01/28/21 1442   Wound Thickness Description not for Pressure Injury Full thickness 01/28/21 1442   Number of days: 14       Wound 01/21/21 Leg Right; Lower; Lateral;Mid #2 (Active)   Wound Image   01/21/21 1345   Wound Etiology Venous 02/04/21 1511   Wound Cleansed Cleansed with saline 02/04/21 1511   Wound Length (cm) 0.5 cm 02/04/21 1511   Wound Width (cm) 0.5 cm 02/04/21 1511   Wound Depth (cm) 0.1 cm 02/04/21 1511   Wound Surface Area (cm^2) 0.25 cm^2 02/04/21 1511   Change in Wound Size % (l*w) 83.77 02/04/21 1511   Wound Volume (cm^3) 0.02 cm^3 02/04/21 1511   Wound Healing % 87 02/04/21 1511   Post-Procedure Length (cm) 1.6 cm 01/28/21 1504   Post-Procedure Width (cm) 0.8 cm 01/28/21 1504   Post-Procedure Depth (cm) 0.1 cm 01/28/21 1504   Post-Procedure Surface Area (cm^2) 1.28 cm^2 01/28/21 1504   Post-Procedure Volume (cm^3) 0.13 cm^3 01/28/21 1504   Wound Assessment Granulation tissue 02/04/21 1511   Drainage Amount Small 02/04/21 1511   Drainage Description Serosanguinous 02/04/21 1511   Odor None 02/04/21 1511   Ximena-wound Assessment Excoriated 02/04/21 1511   Margins Attached edges; Defined edges 01/28/21 1442   Number of days: 14       Wound 01/21/21 Leg Right; Lower; Lateral;Distal #3 (Active)   Wound Image   01/21/21 1345   Wound Etiology Venous 02/04/21 1511   Wound Cleansed Wound cleanser 01/28/21 1442   Wound Length (cm) 0 cm 02/04/21 1511   Wound Width (cm) 0 cm 02/04/21 1511   Wound Depth (cm) 0 cm 02/04/21 1511   Wound Surface Area (cm^2) 0 cm^2 02/04/21 1511   Change in Wound Size % (l*w) 100 02/04/21 1511   Wound Volume (cm^3) 0 cm^3 02/04/21 1511   Wound Healing % 100 02/04/21 1511   Post-Procedure Length (cm) 1.5 cm 01/28/21 1504 Post-Procedure Width (cm) 1.1 cm 01/28/21 1504   Post-Procedure Depth (cm) 0.1 cm 01/28/21 1504   Post-Procedure Surface Area (cm^2) 1.65 cm^2 01/28/21 1504   Post-Procedure Volume (cm^3) 0.16 cm^3 01/28/21 1504   Wound Assessment Dry;Epithelialization 02/04/21 1511   Drainage Amount Moderate 01/28/21 1504   Drainage Description Serosanguinous 01/28/21 1504   Odor None 01/28/21 1442   Ximena-wound Assessment Excoriated 01/28/21 1442   Margins Attached edges; Defined edges 01/28/21 1442   Number of days: 14       Wound 01/21/21 Leg Left; Lower; Lateral #4 (Active)   Wound Image   01/21/21 1345   Wound Etiology Venous 02/04/21 1511   Wound Cleansed Wound cleanser 01/28/21 1442   Wound Length (cm) 0 cm 02/04/21 1511   Wound Width (cm) 0 cm 02/04/21 1511   Wound Depth (cm) 0 cm 02/04/21 1511   Wound Surface Area (cm^2) 0 cm^2 02/04/21 1511   Change in Wound Size % (l*w) 100 02/04/21 1511   Wound Volume (cm^3) 0 cm^3 02/04/21 1511   Wound Healing % 100 02/04/21 1511   Post-Procedure Length (cm) 1.1 cm 01/21/21 1420   Post-Procedure Width (cm) 1.6 cm 01/21/21 1420   Post-Procedure Depth (cm) 0.1 cm 01/21/21 1420   Post-Procedure Surface Area (cm^2) 1.76 cm^2 01/21/21 1420   Post-Procedure Volume (cm^3) 0.18 cm^3 01/21/21 1420   Wound Assessment Dry;Epithelialization 02/04/21 1511   Drainage Amount Moderate 01/21/21 1420   Drainage Description Serosanguinous 01/21/21 1420   Odor None 01/21/21 1345   Ximena-wound Assessment Excoriated 01/21/21 1345   Margins Defined edges 01/21/21 1345   Number of days: 14       Wound 01/28/21 Heel Left #5 (Active)   Wound Image   02/04/21 1511   Wound Etiology Pressure Unstageable 02/04/21 1511   Wound Cleansed Cleansed with saline 02/04/21 1511   Wound Length (cm) 3.5 cm 02/04/21 1511   Wound Width (cm) 3.3 cm 02/04/21 1511   Wound Depth (cm) 0.1 cm 02/04/21 1511   Wound Surface Area (cm^2) 11.55 cm^2 02/04/21 1511   Change in Wound Size % (l*w) 47.5 02/04/21 1511 Wound Volume (cm^3) 1.16 cm^3 02/04/21 1511   Wound Healing % 47 02/04/21 1511   Wound Assessment Eschar dry;Slough 02/04/21 1511   Drainage Amount Moderate 02/04/21 1511   Drainage Description Serosanguinous 02/04/21 1511   Odor None 02/04/21 1511   Ximena-wound Assessment Intact 02/04/21 1511   Margins Attached edges 02/04/21 1511   Number of days: 7       Wound 01/28/21 Heel Right #6 (Active)   Wound Image   02/04/21 1511   Wound Etiology Deep tissue/Injury 02/04/21 1511   Wound Cleansed Cleansed with saline 02/04/21 1511   Wound Assessment Purple/maroon 02/04/21 1511   Drainage Amount None 02/04/21 1511   Odor None 01/28/21 1515   Ximena-wound Assessment Dry/flaky 01/28/21 1515   Margins Attached edges 01/28/21 1515   Number of days: 7           Total Surface Area Debrided:  2.88 sq cm     Percentage of wound debrided 25 %    Bleeding:  None    Hemostasis Achieved:  not needed    Procedural Pain:  2  / 10     Post Procedural Pain:  0 / 10     Response to treatment:  With complaints of pain. Plan:     The nature of the patient's condition was explained in depth. The patient was informed that their compliance to the treatment plan is paramount to successful healing and prevention of further ulceration and/or infection     Discharge Treatment  Pt/ education per provider related to plan of care and recommendations of heel lift boots. Nurse  reviewed plan of care with . In agreement with plan of care and questions answered.      Written Patient Discharge Instructions Given            Electronically signed by JOBY Victor CNP on 2/4/2021 at 4:29 PM

## 2021-02-04 NOTE — PLAN OF CARE
Discharge instructions given. Patient verbalized understanding. Return to [facility] in 1 week.   Called/faxed orders to  [facility]

## 2021-02-11 ENCOUNTER — HOSPITAL ENCOUNTER (OUTPATIENT)
Dept: WOUND CARE | Age: 76
Discharge: HOME | End: 2021-02-11
Payer: MEDICARE

## 2021-02-11 VITALS
RESPIRATION RATE: 17 BRPM | SYSTOLIC BLOOD PRESSURE: 186 MMHG | TEMPERATURE: 97.5 F | DIASTOLIC BLOOD PRESSURE: 86 MMHG | HEART RATE: 70 BPM

## 2021-02-11 DIAGNOSIS — L89.619 PRESSURE ULCER OF BOTH HEELS: Primary | ICD-10-CM

## 2021-02-11 DIAGNOSIS — L97.921 ULCER OF LOWER EXTREMITY, LEFT, LIMITED TO BREAKDOWN OF SKIN (HCC): ICD-10-CM

## 2021-02-11 DIAGNOSIS — M79.89 LEG SWELLING: ICD-10-CM

## 2021-02-11 DIAGNOSIS — I89.0 LYMPHEDEMA: ICD-10-CM

## 2021-02-11 DIAGNOSIS — L89.629 PRESSURE ULCER OF BOTH HEELS: Primary | ICD-10-CM

## 2021-02-11 DIAGNOSIS — L97.911 ULCER OF RIGHT LEG, LIMITED TO BREAKDOWN OF SKIN (HCC): ICD-10-CM

## 2021-02-11 DIAGNOSIS — I73.9 PERIPHERAL VASCULAR DISEASE (HCC): ICD-10-CM

## 2021-02-11 PROCEDURE — 99212 OFFICE O/P EST SF 10 MIN: CPT | Performed by: NURSE PRACTITIONER

## 2021-02-11 PROCEDURE — 99213 OFFICE O/P EST LOW 20 MIN: CPT

## 2021-02-11 RX ORDER — CLOBETASOL PROPIONATE 0.5 MG/G
OINTMENT TOPICAL ONCE
Status: CANCELLED | OUTPATIENT
Start: 2021-02-11 | End: 2021-02-11

## 2021-02-11 RX ORDER — BACITRACIN, NEOMYCIN, POLYMYXIN B 400; 3.5; 5 [USP'U]/G; MG/G; [USP'U]/G
OINTMENT TOPICAL ONCE
Status: CANCELLED | OUTPATIENT
Start: 2021-02-11 | End: 2021-02-11

## 2021-02-11 RX ORDER — LIDOCAINE 40 MG/G
CREAM TOPICAL ONCE
Status: CANCELLED | OUTPATIENT
Start: 2021-02-11 | End: 2021-02-11

## 2021-02-11 RX ORDER — GENTAMICIN SULFATE 1 MG/G
OINTMENT TOPICAL ONCE
Status: CANCELLED | OUTPATIENT
Start: 2021-02-11 | End: 2021-02-11

## 2021-02-11 RX ORDER — BACITRACIN ZINC AND POLYMYXIN B SULFATE 500; 1000 [USP'U]/G; [USP'U]/G
OINTMENT TOPICAL ONCE
Status: CANCELLED | OUTPATIENT
Start: 2021-02-11 | End: 2021-02-11

## 2021-02-11 RX ORDER — GINSENG 100 MG
CAPSULE ORAL ONCE
Status: CANCELLED | OUTPATIENT
Start: 2021-02-11 | End: 2021-02-11

## 2021-02-11 RX ORDER — LIDOCAINE 40 MG/G
CREAM TOPICAL ONCE
Status: DISCONTINUED | OUTPATIENT
Start: 2021-02-11 | End: 2021-02-12 | Stop reason: HOSPADM

## 2021-02-11 RX ORDER — LIDOCAINE 50 MG/G
OINTMENT TOPICAL ONCE
Status: CANCELLED | OUTPATIENT
Start: 2021-02-11 | End: 2021-02-11

## 2021-02-11 RX ORDER — BETAMETHASONE DIPROPIONATE 0.05 %
OINTMENT (GRAM) TOPICAL ONCE
Status: CANCELLED | OUTPATIENT
Start: 2021-02-11 | End: 2021-02-11

## 2021-02-11 RX ORDER — LIDOCAINE HYDROCHLORIDE 40 MG/ML
SOLUTION TOPICAL ONCE
Status: CANCELLED | OUTPATIENT
Start: 2021-02-11 | End: 2021-02-11

## 2021-02-11 NOTE — PLAN OF CARE
Discharge instructions given. Patient verbalized understanding. Return to [address] [place] [address],[address] in 1 week.   Continue Betadine, [facility] & [facility]

## 2021-02-11 NOTE — PROGRESS NOTES
Prevalon Boots or Heel Protecting Boot to Heels when sitting or in bed. Can call Viky. Viky Mckeon [address]  Fausto Leavitt  P: [phone]  F: [phone]      Important dietary reminders:  1. Increase Protein intake for optimal wound healing  2. No added salt to reduce any swelling  3. If diabetic, good glucose control  4. If you smoke, smoking affects wound healing, we ask that you refrain from smoking. Follow up with John Payne CNP In 1 week in the wound care center. Call [phone] for any questions or concerns. Your  is Haylie Raphael42: [address] 25      [address] [facility] Information: Should you experience any significant changes in your wound(s) or have questions about your wound care, please contact the [employer] at [phone] Monday  - Thursday 8:00 am - 4:00 pm and Friday 8:00 am - 1:00pm. If you need help with your wound outside these hours and cannot wait until we are again available, contact your PCP or go to the hospital emergency room. PLEASE NOTE: IF YOU ARE UNABLE TO OBTAIN WOUND SUPPLIES, CONTINUE TO USE THE SUPPLIES YOU HAVE AVAILABLE UNTIL YOU ARE ABLE TO REACH US. IT IS MOST IMPORTANT TO KEEP THE WOUND COVERED AT ALL TIMES. Skilled nurse to evaluate and treat for wound care. Change dressing as ordered   Dailyusing clean technique. Patient/Family/caregiver may change dressings as needed as instructed when Home Care unavailable. WOUNDS REQUIRING DRESSING Changes:     Wound 01/21/21 Leg Right; Lower; Lateral;Mid #2 (Active)   Wound Image   01/21/21 1345   Wound Etiology Venous 02/11/21 1308   Wound Cleansed Cleansed with saline 02/11/21 1308   Wound Length (cm) 0 cm 02/11/21 1308   Wound Width (cm) 0 cm 02/11/21 1308   Wound Depth (cm) 0 cm 02/11/21 1308   Wound Surface Area (cm^2) 0 cm^2 02/11/21 1308   Change in Wound Size % (l*w) 100 02/11/21 1308 Wound Volume (cm^3) 0 cm^3 02/11/21 1308   Wound Healing % 100 02/11/21 1308   Post-Procedure Length (cm) 1.6 cm 01/28/21 1504   Post-Procedure Width (cm) 0.8 cm 01/28/21 1504   Post-Procedure Depth (cm) 0.1 cm 01/28/21 1504   Post-Procedure Surface Area (cm^2) 1.28 cm^2 01/28/21 1504   Post-Procedure Volume (cm^3) 0.13 cm^3 01/28/21 1504   Wound Assessment Epithelialization 02/11/21 1308   Drainage Amount None 02/11/21 1308   Drainage Description Serosanguinous 02/04/21 1511   Odor None 02/11/21 1308   Ximena-wound Assessment Intact 02/11/21 1308   Margins Attached edges 02/11/21 1308   Number of days: 20       Wound 01/28/21 Heel Left #5 (Active)   Wound Image   02/04/21 1511   Wound Etiology Pressure Unstageable 02/11/21 1308   Wound Cleansed Cleansed with saline 02/11/21 1308   Wound Length (cm) 2.8 cm 02/11/21 1308   Wound Width (cm) 3 cm 02/11/21 1308   Wound Depth (cm) 0.1 cm 02/11/21 1308   Wound Surface Area (cm^2) 8.4 cm^2 02/11/21 1308   Change in Wound Size % (l*w) 61.82 02/11/21 1308   Wound Volume (cm^3) 0.84 cm^3 02/11/21 1308   Wound Healing % 62 02/11/21 1308   Post-Procedure Length (cm) 2.9 cm 02/11/21 1331   Post-Procedure Width (cm) 3.1 cm 02/11/21 1331   Post-Procedure Depth (cm) 0.1 cm 02/11/21 1331   Post-Procedure Surface Area (cm^2) 8.99 cm^2 02/11/21 1331   Post-Procedure Volume (cm^3) 0.9 cm^3 02/11/21 1331   Wound Assessment Bleeding 02/11/21 1331   Drainage Amount Moderate 02/11/21 1331   Drainage Description Serosanguinous 02/11/21 1331   Odor None 02/11/21 1308   Ximena-wound Assessment Intact 02/11/21 1308   Margins Defined edges 02/11/21 1308   Number of days: 13       Wound 01/28/21 Heel Right #6 (Active)   Wound Image   02/04/21 1511   Wound Etiology Deep tissue/Injury 02/11/21 1308   Wound Cleansed Cleansed with saline 02/11/21 1308   Wound Assessment Purple/maroon 02/04/21 1511   Drainage Amount None 02/04/21 1511   Odor None 01/28/21 1515 Ximena-wound Assessment Dry/flaky 01/28/21 1515   Margins Attached edges 01/28/21 1515   Number of days: 13          Patient seen and treated on 2/11/2021    By Loren Solares  [id number]   (provider/NPI)

## 2021-02-12 NOTE — PROGRESS NOTES
Bobby Moreno  Progress Note and Procedure Note      Boyle Reasons  AGE: 66 y.o. GENDER: female  : 1945  TODAY'S DATE:  2021    Subjective:     Chief Complaint   Patient presents with    Wound Check     Right heel, Left heel, Right lower leg, Left lower leg         HISTORY of PRESENT ILLNESS HPI  Naye Faulkner a 66 y. o. female who presents today for wound evaluation. Pt accompanied by  who assists pt with bilateral lower leg care.  Pt has long history of leg wounds.  Was healed and discharged from [place] last 2020. Pt states wounds reopened last 2020.  Pt has lymphedema pumps at home but does not use consistently, understands importance of use.  Pt admits to \"picking wounds\" and \"scratching\"  legs.    Pt returned home from [facility] on 2021 and now has home care with therapies. At last visit noting bilateral heel ulcers; deep tissue injuries with left> right. Left heel skin intact over DTI. Treatment aimed at reducing heel pressure with heel lift boots  given information to purchase. Local treatment was to promote drying of of eschar and drying of DTI blister on right heel with Betadine daily. This week  states did not get boots and was confused about what to put on her heels and so was applying Betamethasone instead of Betadine. Patient has started back to using lymphedema pumps but is slowly increasing time. History of Wound: off and on for last 5 years.   Wound Pain:  intermittent, mild  Severity:  1 / 10   Wound Type:  venous  Modifying Factors:  edema, venous stasis, lymphedema, diabetes, obesity and non-adherence  Associated Signs/Symptoms:  edema, erythema, drainage and pain                             PAST MEDICAL HISTORY        Diagnosis Date    Arthritis     Blood circulation, collateral     Blood transfusion     CAD (coronary artery disease)     CHF (congestive heart failure) ([facility])  Chronic renal failure, stage 3 (moderate)     Diabetes mellitus (HCC)     GERD (gastroesophageal reflux disease)     Hyperlipidemia     Hypertension     Leg swelling 8/14/2018    Lymphedema 8/14/2018    Pressure ulcer of both heels 1/29/2021    Deep tissue injuries to right and left heels with left>right. Date of origin unknown.        PAST SURGICAL HISTORY    Past Surgical History:   Procedure Laterality Date    ABDOMEN SURGERY      CARDIAC SURGERY      2009    CATARACT REMOVAL WITH IMPLANT Left 11/23/2016    CATARACT REMOVAL WITH IMPLANT Right 11/02/2016    CATARACT REMOVAL WITH IMPLANT Bilateral 10/16, 11/16    CHOLECYSTECTOMY      COLONOSCOPY      ENDOSCOPY, COLON, DIAGNOSTIC      EYE SURGERY      left tear duct surgery    JOINT REPLACEMENT      BTKR    KNEE ARTHROPLASTY  09/15/2010    left total knee    TOTAL KNEE ARTHROPLASTY      VASCULAR SURGERY         FAMILY HISTORY    Family History   Problem Relation Age of Onset    Diabetes Mother     Heart Disease Mother     Heart Disease Father     Stroke Father        SOCIAL HISTORY    Social History     Tobacco Use    Smoking status: Never Smoker    Smokeless tobacco: Never Used   Substance Use Topics    Alcohol use: No    Drug use: No       ALLERGIES    Allergies   Allergen Reactions    Adhesive Tape Shortness Of Breath     Other reaction(s): Ulcers    Chlorhexidine     Lisinopril Other (See Comments)     Med causes lethargy- takes in lower doses       MEDICATIONS    Current Outpatient Medications on File Prior to Encounter   Medication Sig Dispense Refill    furosemide (LASIX) 20 MG tablet TAKE ONE TABLET BY MOUTH TWICE DAILY  60 tablet 3    Coenzyme Q10 (CO Q-10) 400 MG CAPS Take 400 mg by mouth daily 30 capsule 5    XARELTO 15 MG TABS tablet TAKE ONE TABLET BY MOUTH DAILY 90 tablet 1    Liraglutide (VICTOZA) 18 MG/3ML SOPN SC injection Inject 1.2 mg into the skin daily  Acute on chronic combined systolic and diastolic heart failure (HCC)    Chronic renal failure, stage 3 (moderate)    Chronic atrial fibrillation (HCC)    Pleural effusion    Atherosclerosis of native coronary artery without angina pectoris    Essential hypertension    Chronic combined systolic and diastolic congestive heart failure (HCC)    Hx of CABG    Non-rheumatic tricuspid valve insufficiency    Mixed hyperlipidemia    Carotid disease, bilateral (HCC)    Atherosclerosis of native coronary artery of native heart without angina pectoris    Chronic kidney disease    Hyperlipidemia    INOCENCIO (obstructive sleep apnea)    Lymphedema    Leg swelling    Venous stasis dermatitis of both lower extremities    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 (HCC)    Venous insufficiency    Ulcer of right leg, limited to breakdown of skin (HCC)    Ulcer of lower extremity, left, limited to breakdown of skin (HCC)    Iron deficiency anemia    CHANO (acute kidney injury) ([facility] [address]    Knee pain    Pressure ulcer of both heels       Procedure Note:  no procedure    Wound 01/21/21 Leg Right; Lower; Lateral;Mid #2 (Active)   Wound Image   01/21/21 1345   Wound Etiology Venous 02/11/21 1308   Wound Cleansed Cleansed with saline 02/11/21 1308   Wound Length (cm) 0 cm 02/11/21 1308   Wound Width (cm) 0 cm 02/11/21 1308   Wound Depth (cm) 0 cm 02/11/21 1308   Wound Surface Area (cm^2) 0 cm^2 02/11/21 1308   Change in Wound Size % (l*w) 100 02/11/21 1308   Wound Volume (cm^3) 0 cm^3 02/11/21 1308   Wound Healing % 100 02/11/21 1308   Post-Procedure Length (cm) 1.6 cm 01/28/21 1504   Post-Procedure Width (cm) 0.8 cm 01/28/21 1504   Post-Procedure Depth (cm) 0.1 cm 01/28/21 1504   Post-Procedure Surface Area (cm^2) 1.28 cm^2 01/28/21 1504   Post-Procedure Volume (cm^3) 0.13 cm^3 01/28/21 1504   Wound Assessment Epithelialization 02/11/21 1308   Drainage Amount None 02/11/21 1308 Drainage Description Serosanguinous 02/04/21 1511   Odor None 02/11/21 1308   Ximena-wound Assessment Intact 02/11/21 1308   Margins Attached edges 02/11/21 1308   Number of days: 21       Wound 01/28/21 Heel Left #5 (Active)   Wound Image   02/04/21 1511   Wound Etiology Pressure Unstageable 02/11/21 1308   Wound Cleansed Cleansed with saline 02/11/21 1308   Wound Length (cm) 2.8 cm 02/11/21 1308   Wound Width (cm) 3 cm 02/11/21 1308   Wound Depth (cm) 0.1 cm 02/11/21 1308   Wound Surface Area (cm^2) 8.4 cm^2 02/11/21 1308   Change in Wound Size % (l*w) 61.82 02/11/21 1308   Wound Volume (cm^3) 0.84 cm^3 02/11/21 1308   Wound Healing % 62 02/11/21 1308   Wound Assessment Eschar dry;Pink/red;Slough 02/11/21 1308   Drainage Amount Small 02/11/21 1308   Drainage Description Serosanguinous 02/11/21 1308   Odor None 02/11/21 1308   Ximena-wound Assessment Intact 02/11/21 1308   Margins Defined edges 02/11/21 1308   Number of days: 14       Wound 01/28/21 Heel Right #6 (Active)   Wound Image   02/04/21 1511   Wound Etiology Deep tissue/Injury 02/11/21 1308   Wound Cleansed Cleansed with saline 02/11/21 1308   Wound Assessment Purple/maroon 02/04/21 1511   Drainage Amount None 02/04/21 1511   Odor None 01/28/21 1515   Ximena-wound Assessment Dry/flaky 01/28/21 1515   Margins Attached edges 01/28/21 1515   Number of days: 14         Plan:     The nature of the patient's condition was explained in depth.  The patient was informed that their compliance to the treatment plan is paramount to successful healing and prevention of further ulceration and/or infection Discharge Treatment   Dressing care: Right and Left leg apply Betamethasone, Dry dressing and 6\"Ace -. Right and Left  heel-Betadine  And Foam- Change daily-Keep heel dry with daily betadine. Advised pt's  about pressure reduction of heels and treatment of heels. Also gave additional Information given about a more affordable pair of heel lift boots. Pt/ in agreement with plan of care and questions answered.       Written Patient Discharge Instructions Given            Electronically signed by JOBY Seth CNP on 2/12/2021 at 7:37 AM

## 2021-02-18 ENCOUNTER — HOSPITAL ENCOUNTER (OUTPATIENT)
Dept: WOUND CARE | Age: 76
Discharge: HOME | End: 2021-02-18
Payer: MEDICARE

## 2021-02-18 VITALS
DIASTOLIC BLOOD PRESSURE: 71 MMHG | HEART RATE: 77 BPM | SYSTOLIC BLOOD PRESSURE: 166 MMHG | TEMPERATURE: 97.3 F | RESPIRATION RATE: 16 BRPM

## 2021-02-18 DIAGNOSIS — L97.911 ULCER OF RIGHT LEG, LIMITED TO BREAKDOWN OF SKIN (HCC): ICD-10-CM

## 2021-02-18 DIAGNOSIS — I89.0 LYMPHEDEMA: ICD-10-CM

## 2021-02-18 DIAGNOSIS — L89.623 PRESSURE ULCER OF LEFT HEEL, STAGE 3 (HCC): ICD-10-CM

## 2021-02-18 DIAGNOSIS — L89.619 PRESSURE ULCER OF BOTH HEELS: Primary | ICD-10-CM

## 2021-02-18 DIAGNOSIS — L97.921 ULCER OF LOWER EXTREMITY, LEFT, LIMITED TO BREAKDOWN OF SKIN (HCC): ICD-10-CM

## 2021-02-18 DIAGNOSIS — M79.89 LEG SWELLING: ICD-10-CM

## 2021-02-18 DIAGNOSIS — L89.629 PRESSURE ULCER OF BOTH HEELS: Primary | ICD-10-CM

## 2021-02-18 PROCEDURE — 11042 DBRDMT SUBQ TIS 1ST 20SQCM/<: CPT

## 2021-02-18 PROCEDURE — 11042 DBRDMT SUBQ TIS 1ST 20SQCM/<: CPT | Performed by: NURSE PRACTITIONER

## 2021-02-18 RX ORDER — BETAMETHASONE DIPROPIONATE 0.05 %
OINTMENT (GRAM) TOPICAL ONCE
Status: CANCELLED | OUTPATIENT
Start: 2021-02-18 | End: 2021-02-18

## 2021-02-18 RX ORDER — BACITRACIN, NEOMYCIN, POLYMYXIN B 400; 3.5; 5 [USP'U]/G; MG/G; [USP'U]/G
OINTMENT TOPICAL ONCE
Status: CANCELLED | OUTPATIENT
Start: 2021-02-18 | End: 2021-02-18

## 2021-02-18 RX ORDER — GINSENG 100 MG
CAPSULE ORAL ONCE
Status: CANCELLED | OUTPATIENT
Start: 2021-02-18 | End: 2021-02-18

## 2021-02-18 RX ORDER — LIDOCAINE HYDROCHLORIDE 40 MG/ML
SOLUTION TOPICAL ONCE
Status: CANCELLED | OUTPATIENT
Start: 2021-02-18 | End: 2021-02-18

## 2021-02-18 RX ORDER — LIDOCAINE 40 MG/G
CREAM TOPICAL ONCE
Status: CANCELLED | OUTPATIENT
Start: 2021-02-18 | End: 2021-02-18

## 2021-02-18 RX ORDER — CLOBETASOL PROPIONATE 0.5 MG/G
OINTMENT TOPICAL ONCE
Status: CANCELLED | OUTPATIENT
Start: 2021-02-18 | End: 2021-02-18

## 2021-02-18 RX ORDER — LIDOCAINE 40 MG/G
CREAM TOPICAL ONCE
Status: DISCONTINUED | OUTPATIENT
Start: 2021-02-18 | End: 2021-02-19 | Stop reason: HOSPADM

## 2021-02-18 RX ORDER — LIDOCAINE 50 MG/G
OINTMENT TOPICAL ONCE
Status: CANCELLED | OUTPATIENT
Start: 2021-02-18 | End: 2021-02-18

## 2021-02-18 RX ORDER — GENTAMICIN SULFATE 1 MG/G
OINTMENT TOPICAL ONCE
Status: CANCELLED | OUTPATIENT
Start: 2021-02-18 | End: 2021-02-18

## 2021-02-18 RX ORDER — BACITRACIN ZINC AND POLYMYXIN B SULFATE 500; 1000 [USP'U]/G; [USP'U]/G
OINTMENT TOPICAL ONCE
Status: CANCELLED | OUTPATIENT
Start: 2021-02-18 | End: 2021-02-18

## 2021-02-18 NOTE — PROGRESS NOTES
Patient called and 30 days of Santyl costs 245$ for her. I told her we could do something else or order 1/2 the Santyl and see how that works. She agreed and she is calling pharmacy tomorrow.

## 2021-02-18 NOTE — PROGRESS NOTES
Bobby Moreno  Progress Note and Procedure Note      Claryce Alpers  AGE: 66 y.o. GENDER: female  : 1945  TODAY'S DATE:  2021    Subjective:     Chief Complaint   Patient presents with    Wound Check     Left heel, Right heel         HISTORY of PRESENT ILLNESS HPI  Chelsie Vásquez a 66 y. o. female who presents today for wound evaluation. Pt accompanied by  who assists pt with bilateral lower leg care.  Pt has long history of leg wounds.  Was healed and discharged from [place] last 2020. Pt states wounds reopened last 2020.  Pt has lymphedema pumps at home but does not use consistently, understands importance of use.  Pt admits to \"picking wounds\" and \"scratching\"  legs. Crane [place] gets dressing changes and products mixed up and right heel has no evidence of Betadine but had a Band-Aid he states placed by home care over a week ago was left in place. Right heel wound also not dry without evidence of Betadine on skin or wound. Pt returned home from [facility] on 2021 and now has home care with therapies. At last visit noting bilateral heel ulcers; deep tissue injuries with left> right. Left heel skin intact over DTI. Treatment aimed at reducing heel pressure with heel lift boots  given information to purchase and he ordered them last week. Local treatment plan was to promote drying of of eschar and drying of DTI blister on right heel with Betadine daily. Patient has started back to using lymphedema pumps but is slowly increasing time. History of Wound: off and on for last 5 years.   Wound Pain:  intermittent, mild  Severity:  1 / 10   Wound Type:  venous  Modifying Factors:  edema, venous stasis, lymphedema, diabetes, obesity and non-adherence  Associated Signs/Symptoms:  edema, erythema, drainage and pain                                            PAST MEDICAL HISTORY        Diagnosis Date    Arthritis     Blood circulation, collateral  Blood transfusion     CAD (coronary artery disease)     CHF (congestive heart failure) (HCC)     Chronic renal failure, stage 3 (moderate)     Diabetes mellitus (HCC)     GERD (gastroesophageal reflux disease)     Hyperlipidemia     Hypertension     Leg swelling 8/14/2018    Lymphedema 8/14/2018    Pressure ulcer of both heels 1/29/2021    Deep tissue injuries to right and left heels with left>right. Date of origin unknown.     Pressure ulcer of left heel, stage 3 ([address] 2/18/2021       PAST SURGICAL HISTORY    Past Surgical History:   Procedure Laterality Date    ABDOMEN SURGERY      CARDIAC SURGERY      2009    CATARACT REMOVAL WITH IMPLANT Left 11/23/2016    CATARACT REMOVAL WITH IMPLANT Right 11/02/2016    CATARACT REMOVAL WITH IMPLANT Bilateral 10/16, 11/16    CHOLECYSTECTOMY      COLONOSCOPY      ENDOSCOPY, COLON, DIAGNOSTIC      EYE SURGERY      left tear duct surgery    JOINT REPLACEMENT      BTKR    KNEE ARTHROPLASTY  09/15/2010    left total knee    TOTAL KNEE ARTHROPLASTY      VASCULAR SURGERY         FAMILY HISTORY    Family History   Problem Relation Age of Onset    Diabetes Mother     Heart Disease Mother     Heart Disease Father     Stroke Father        SOCIAL HISTORY    Social History     Tobacco Use    Smoking status: Never Smoker    Smokeless tobacco: Never Used   Substance Use Topics    Alcohol use: No    Drug use: No       ALLERGIES    Allergies   Allergen Reactions    Adhesive Tape Shortness Of Breath     Other reaction(s): Ulcers    Chlorhexidine     Lisinopril Other (See Comments)     Med causes lethargy- takes in lower doses       MEDICATIONS    Current Outpatient Medications on File Prior to Encounter   Medication Sig Dispense Refill    furosemide (LASIX) 20 MG tablet TAKE ONE TABLET BY MOUTH TWICE DAILY  60 tablet 3    Coenzyme Q10 (CO Q-10) 400 MG CAPS Take 400 mg by mouth daily 30 capsule 5  XARELTO 15 MG TABS tablet TAKE ONE TABLET BY MOUTH DAILY 90 tablet 1    Liraglutide (VICTOZA) 18 MG/3ML SOPN SC injection Inject 1.2 mg into the skin daily      Handicap Placard MISC by Does not apply route Sob when walking less than 200 feet  Length of time--greater than 5 year 1 each 0    ferrous sulfate 325 (65 Fe) MG tablet Take 1 tablet by mouth 3 times daily (with meals) 90 tablet 3    atorvastatin (LIPITOR) 20 MG tablet Take 20 mg by mouth daily      vitamin D (CHOLECALCIFEROL) 400 UNITS TABS tablet Take 400 Units by mouth daily      metoprolol (LOPRESSOR) 25 MG tablet Take 0.5 tablets by mouth 2 times daily 60 tablet 3    therapeutic multivitamin-minerals (THERAGRAN-M) tablet Take 1 tablet by mouth daily.  hydrOXYzine (ATARAX) 25 MG tablet Take 25 mg by mouth 3 times daily as needed.  ranitidine (ZANTAC) 300 MG tablet Take 150 mg by mouth 2 times daily        No current facility-administered medications on file prior to encounter. REVIEW OF SYSTEMS    Pertinent items are noted in HPI. Objective:      BP (!) 166/71   Pulse 77   Temp 97.3 °F (36.3 °C) (Infrared)   Resp 16     PHYSICAL EXAM    General Appearance: alert and oriented to person, place and time, well-developed and well-nourished, in no acute distress, obese and pale  Skin: warm and dry  Head: normocephalic and atraumatic  Eyes: pupils equal, round, and reactive to light  Pulmonary/Chest: normal air movement, no respiratory distress  Cardiovascular: normal rate, regular rhythm and intact distal pulses  Extremities: no cyanosis, no clubbing and no edema  Wounds: right heel blister dry without evidence of redness or infection. Left heel wound soft and wet with drainage, now unstable. Wound debrided.        Assessment:     Patient Active Problem List   Diagnosis    Osteoarthritis of left knee    Acute blood loss anemia    Diabetes mellitus ([address]    Ischemic cardiomyopathy    Atrial fibrillation  Peripheral vascular disease ([facility] [address]    CHF exacerbation (HCC)    Acute on chronic combined systolic and diastolic heart failure (HCC)    Chronic renal failure, stage 3 (moderate)    Chronic atrial fibrillation (HCC)    Pleural effusion    Atherosclerosis of native coronary artery without angina pectoris    Essential hypertension    Chronic combined systolic and diastolic congestive heart failure (HCC)    Hx of CABG    Non-rheumatic tricuspid valve insufficiency    Mixed hyperlipidemia    Carotid disease, bilateral (HCC)    Atherosclerosis of native coronary artery of native heart without angina pectoris    Chronic kidney disease    Hyperlipidemia    INOCENCIO (obstructive sleep apnea)    Lymphedema    Leg swelling    Venous stasis dermatitis of both lower extremities    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 (HCC)    Venous insufficiency    Ulcer of right leg, limited to breakdown of skin (HCC)    Ulcer of lower extremity, left, limited to breakdown of skin (HCC)    Iron deficiency anemia    CHANO (acute kidney injury) ([address]    Knee pain    Pressure ulcer of both heels    Pressure ulcer of left heel, stage 3 ([facility] [address]       Procedure Note    Performed by: JOBY Monaco CNP    Consent obtained: Yes    Time out taken:  Yes    Pain Control:   4% cream     Debridement:Excisional Debridement    Using curette, forceps and # 10 blade scalpel the wound was sharply debrided    down through and including the removal of epidermis, dermis and subcutaneous tissue.         Devitalized Tissue Debrided:  fibrin, biofilm, slough and necrotic/eschar    Pre Debridement Measurements:  Are located in the Wound Documentation Flow Sheet    Wound #: 5     Post  Debridement Measurements:  Wound 01/28/21 Heel Left #5 (Active)   Wound Image   02/04/21 1511   Wound Etiology Pressure Unstageable 02/18/21 1301   Wound Cleansed Cleansed with saline 02/18/21 1301 Wound Length (cm) 2.7 cm 02/18/21 1301   Wound Width (cm) 3 cm 02/18/21 1301   Wound Depth (cm) 0.2 cm 02/18/21 1301   Wound Surface Area (cm^2) 8.1 cm^2 02/18/21 1301   Change in Wound Size % (l*w) 63.18 02/18/21 1301   Wound Volume (cm^3) 1.62 cm^3 02/18/21 1301   Wound Healing % 26 02/18/21 1301   Post-Procedure Length (cm) 2.8 cm 02/18/21 1335   Post-Procedure Width (cm) 3.1 cm 02/18/21 1335   Post-Procedure Depth (cm) 0.2 cm 02/18/21 1335   Post-Procedure Surface Area (cm^2) 8.68 cm^2 02/18/21 1335   Post-Procedure Volume (cm^3) 1.74 cm^3 02/18/21 1335   Wound Assessment Bleeding 02/18/21 1335   Drainage Amount Moderate 02/18/21 1335   Drainage Description Serosanguinous 02/18/21 1335   Odor Moderate 02/18/21 1301   Ximena-wound Assessment Intact 02/18/21 1301   Margins Defined edges 02/18/21 1301   Number of days: 20       Wound 01/28/21 Heel Right #6 (Active)   Wound Image   02/04/21 1511   Wound Etiology Deep tissue/Injury 02/18/21 1300   Wound Cleansed Cleansed with saline 02/11/21 1308   Wound Assessment Eschar dry 02/18/21 1300   Drainage Amount None 02/18/21 1300   Odor None 01/28/21 1515   Ximena-wound Assessment Dry/flaky 01/28/21 1515   Margins Attached edges 01/28/21 1515   Number of days: 20           Total Surface Area Debrided:  8.68 sq cm     Percentage of wound debrided 100%    Bleeding:  Minimal    Hemostasis Achieved:  by pressure    Procedural Pain:  0  / 10     Post Procedural Pain:  0 / 10     Response to treatment:  Well tolerated by patient. Plan:     The nature of the patient's condition was explained in depth.  The patient was informed that their compliance to the treatment plan is paramount to successful healing and prevention of further ulceration and/or infection Discharge Treatment   Dressing care: Right and Left leg apply Betamethasone, Dry dressing and 6\"Ace -. Left  heel-Apply Santyl, Adaptic and dry dressing - Change daily- Apply adaptic and dry dressing until santyl comes at home. Right heel apply Betadine and dry gauze dressing.       Written Patient Discharge Instructions Given            Electronically signed by JOBY Hernandez CNP on 2/18/2021 at 3:06 PM

## 2021-02-18 NOTE — PROGRESS NOTES
[facility]  [address] [facility], [address] [place] [address] Road  Telephone: [phone]        [facility] Fax # [phone]        Discharge Instructions       [facility]  [address] [facility], [address] [place] [address] Road  Telephone: [phone]     Discharge Instructions:  Keep weight off wounds and reposition every 2 hours if applicable. Avoid standing for long periods of time. If wound(s) is on your lower extremity, elevate legs to the level of the heart or above for 30 minutes 4-5 times a day and/or when sitting. Do not get wounds wet in bath or shower unless otherwise instructed by your physician. If your wound is on you foot or leg, you may purchase a cast bag. Please ask at the pharmacy. When taking antibiotics take entire prescription as ordered by MD do not stop taking until medicine is all gone. Exercise as tolerated. No Smoking. Smoking prohibits wound healing. If Vascular testing is ordered, please call [address] [place] [address] [phone]) to schedule. Vascular tests ordered by Wound Care Physicians may take up to 2 hours to complete. Please keep that in mind when scheduling. If Vascular testing is scheduled, please bring supplies to replace your dressing after testing is done. The vascular department does not stock supplies. Wound: Bilateral Legs     With each dressing change, rinse wounds with 0.9% Saline. (May use wound wash or soft contact solution. Both can be purchased at a local drug store). If unable to obtain saline, may use a gentle soap and water. Dressing care: Right and Left leg apply Betamethasone, Dry dressing and 6\"Ace -. Right and Left  heel-Apply Santyl, Adaptic and dry dressing - Change daily- Apply adaptic and dry dressing until santyl comes at home. You have been given a prescription for Santyl. Santyl is an enzyme that dissolves dead tissue in the wound. Apply a small amount to the wound and cover. Apply Thigh high Lymphedema pumps for 1 hour twice daily. - 1st application 1 hour after Breakfast , 2nd application 1 hour after dinner   Prevalon Boots or Heel Protecting Boot to Heels when sitting or in bed. Can call Viky. Viky [facility]  [address]  [facility]  P: [phone]  F: [phone]      Important dietary reminders:  1. Increase Protein intake for optimal wound healing  2. No added salt to reduce any swelling  3. If diabetic, good glucose control  4. If you smoke, smoking affects wound healing, we ask that you refrain from smoking. Follow up with Jose Yo CNP In 1 week in the wound care center. Call [phone] for any questions or concerns. Your  is Haylie Raphael42: [address] [facility] Information: Should you experience any significant changes in your wound(s) or have questions about your wound care, please contact the [facility] at [phone] Monday  - Thursday 8:00 am - 4:00 pm and Friday 8:00 am - 1:00pm. If you need help with your wound outside these hours and cannot wait until we are again available, contact your PCP or go to the hospital emergency room. PLEASE NOTE: IF YOU ARE UNABLE TO OBTAIN WOUND SUPPLIES, CONTINUE TO USE THE SUPPLIES YOU HAVE AVAILABLE UNTIL YOU ARE ABLE TO REACH US. IT IS MOST IMPORTANT TO KEEP THE WOUND COVERED AT ALL TIMES. Skilled nurse to evaluate and treat for wound care. Change dressing as ordered daily using clean technique. Patient/Family/caregiver may change dressings as needed as instructed when Home Care unavailable.     WOUNDS REQUIRING DRESSING Changes:     Wound 01/28/21 Heel Left #5 (Active)   Wound Image   02/04/21 1511   Wound Etiology Pressure Unstageable 02/18/21 1301   Wound Cleansed Cleansed with saline 02/18/21 1301   Wound Length (cm) 2.7 cm 02/18/21 1301   Wound Width (cm) 3 cm 02/18/21 1301 Wound Depth (cm) 0.2 cm 02/18/21 1301   Wound Surface Area (cm^2) 8.1 cm^2 02/18/21 1301   Change in Wound Size % (l*w) 63.18 02/18/21 1301   Wound Volume (cm^3) 1.62 cm^3 02/18/21 1301   Wound Healing % 26 02/18/21 1301   Wound Assessment Eschar moist 02/18/21 1301   Drainage Amount Moderate 02/18/21 1301   Drainage Description Serosanguinous 02/18/21 1301   Odor Moderate 02/18/21 1301   Ximena-wound Assessment Intact 02/18/21 1301   Margins Defined edges 02/18/21 1301   Number of days: 20       Wound 01/28/21 Heel Right #6 (Active)   Wound Image   02/04/21 1511   Wound Etiology Deep tissue/Injury 02/18/21 1300   Wound Cleansed Cleansed with saline 02/11/21 1308   Wound Assessment Eschar dry 02/18/21 1300   Drainage Amount None 02/18/21 1300   Odor None 01/28/21 1515   Ximena-wound Assessment Dry/flaky 01/28/21 1515   Margins Attached edges 01/28/21 1515   Number of days: 20          Patient seen and treated on 2/18/2021    By Juana Mendez  [id number]   (provider/NPI)

## 2021-02-18 NOTE — PROGRESS NOTES
[address], [address] [place] [address] Road  Telephone: [phone]        [facility] Fax # [phone]        Discharge Instructions       [place] [id number] [facility], [address] [place] [address] Road  Telephone: [phone]     Discharge Instructions:  Keep weight off wounds and reposition every 2 hours if applicable. Avoid standing for long periods of time. If wound(s) is on your lower extremity, elevate legs to the level of the heart or above for 30 minutes 4-5 times a day and/or when sitting. Do not get wounds wet in bath or shower unless otherwise instructed by your physician. If your wound is on you foot or leg, you may purchase a cast bag. Please ask at the pharmacy. When taking antibiotics take entire prescription as ordered by MD do not stop taking until medicine is all gone. Exercise as tolerated. No Smoking. Smoking prohibits wound healing. If Vascular testing is ordered, please call [address] [place] [address] [phone]) to schedule. Vascular tests ordered by Wound Care Physicians may take up to 2 hours to complete. Please keep that in mind when scheduling. If Vascular testing is scheduled, please bring supplies to replace your dressing after testing is done. The vascular department does not stock supplies. Wound: Bilateral Legs     With each dressing change, rinse wounds with 0.9% Saline. (May use wound wash or soft contact solution. Both can be purchased at a local drug store). If unable to obtain saline, may use a gentle soap and water. Dressing care: Right and Left leg apply Betamethasone, Dry dressing and 6\"Ace -. Left  heel-Apply Santyl, Adaptic and dry dressing - Change daily- Apply adaptic and dry dressing until santyl comes at home. Right heel apply Betadine and dry gauze dressing. Wound Etiology Pressure Unstageable 02/18/21 1301   Wound Cleansed Cleansed with saline 02/18/21 1301   Wound Length (cm) 2.7 cm 02/18/21 1301   Wound Width (cm) 3 cm 02/18/21 1301   Wound Depth (cm) 0.2 cm 02/18/21 1301   Wound Surface Area (cm^2) 8.1 cm^2 02/18/21 1301   Change in Wound Size % (l*w) 63.18 02/18/21 1301   Wound Volume (cm^3) 1.62 cm^3 02/18/21 1301   Wound Healing % 26 02/18/21 1301   Post-Procedure Length (cm) 2.8 cm 02/18/21 1335   Post-Procedure Width (cm) 3.1 cm 02/18/21 1335   Post-Procedure Depth (cm) 0.2 cm 02/18/21 1335   Post-Procedure Surface Area (cm^2) 8.68 cm^2 02/18/21 1335   Post-Procedure Volume (cm^3) 1.74 cm^3 02/18/21 1335   Wound Assessment Bleeding 02/18/21 1335   Drainage Amount Moderate 02/18/21 1335   Drainage Description Serosanguinous 02/18/21 1335   Odor Moderate 02/18/21 1301   Ximena-wound Assessment Intact 02/18/21 1301   Margins Defined edges 02/18/21 1301   Number of days: 20       Wound 01/28/21 Heel Right #6 (Active)   Wound Image   02/04/21 1511   Wound Etiology Deep tissue/Injury 02/18/21 1300   Wound Cleansed Cleansed with saline 02/11/21 1308   Wound Assessment Eschar dry 02/18/21 1300   Drainage Amount None 02/18/21 1300   Odor None 01/28/21 1515   Ximena-wound Assessment Dry/flaky 01/28/21 1515   Margins Attached edges 01/28/21 1515   Number of days: 20          Patient seen and treated on 2/18/2021    By Loren Solares  [id number]   (provider/NPI)

## 2021-02-25 ENCOUNTER — HOSPITAL ENCOUNTER (OUTPATIENT)
Dept: WOUND CARE | Age: 76
Discharge: HOME | End: 2021-02-25
Payer: MEDICARE

## 2021-02-25 VITALS
HEART RATE: 78 BPM | SYSTOLIC BLOOD PRESSURE: 155 MMHG | DIASTOLIC BLOOD PRESSURE: 68 MMHG | RESPIRATION RATE: 16 BRPM | TEMPERATURE: 97.3 F

## 2021-02-25 DIAGNOSIS — I89.0 LYMPHEDEMA: ICD-10-CM

## 2021-02-25 DIAGNOSIS — L89.623 PRESSURE ULCER OF LEFT HEEL, STAGE 3 (HCC): Primary | ICD-10-CM

## 2021-02-25 DIAGNOSIS — L89.619 PRESSURE ULCER OF BOTH HEELS: ICD-10-CM

## 2021-02-25 DIAGNOSIS — L89.629 PRESSURE ULCER OF BOTH HEELS: ICD-10-CM

## 2021-02-25 DIAGNOSIS — L97.911 ULCER OF RIGHT LEG, LIMITED TO BREAKDOWN OF SKIN (HCC): ICD-10-CM

## 2021-02-25 DIAGNOSIS — M79.89 LEG SWELLING: ICD-10-CM

## 2021-02-25 DIAGNOSIS — I87.2 VENOUS STASIS DERMATITIS OF BOTH LOWER EXTREMITIES: ICD-10-CM

## 2021-02-25 DIAGNOSIS — L97.921 ULCER OF LOWER EXTREMITY, LEFT, LIMITED TO BREAKDOWN OF SKIN (HCC): ICD-10-CM

## 2021-02-25 PROCEDURE — 11042 DBRDMT SUBQ TIS 1ST 20SQCM/<: CPT | Performed by: NURSE PRACTITIONER

## 2021-02-25 PROCEDURE — 11042 DBRDMT SUBQ TIS 1ST 20SQCM/<: CPT

## 2021-02-25 RX ORDER — GINSENG 100 MG
CAPSULE ORAL ONCE
Status: CANCELLED | OUTPATIENT
Start: 2021-02-25 | End: 2021-02-25

## 2021-02-25 RX ORDER — GENTAMICIN SULFATE 1 MG/G
OINTMENT TOPICAL ONCE
Status: CANCELLED | OUTPATIENT
Start: 2021-02-25 | End: 2021-02-25

## 2021-02-25 RX ORDER — LIDOCAINE 50 MG/G
OINTMENT TOPICAL ONCE
Status: CANCELLED | OUTPATIENT
Start: 2021-02-25 | End: 2021-02-25

## 2021-02-25 RX ORDER — LIDOCAINE 40 MG/G
CREAM TOPICAL ONCE
Status: DISCONTINUED | OUTPATIENT
Start: 2021-02-25 | End: 2021-02-26 | Stop reason: HOSPADM

## 2021-02-25 RX ORDER — BACITRACIN ZINC AND POLYMYXIN B SULFATE 500; 1000 [USP'U]/G; [USP'U]/G
OINTMENT TOPICAL ONCE
Status: CANCELLED | OUTPATIENT
Start: 2021-02-25 | End: 2021-02-25

## 2021-02-25 RX ORDER — CLOBETASOL PROPIONATE 0.5 MG/G
OINTMENT TOPICAL ONCE
Status: CANCELLED | OUTPATIENT
Start: 2021-02-25 | End: 2021-02-25

## 2021-02-25 RX ORDER — LIDOCAINE HYDROCHLORIDE 40 MG/ML
SOLUTION TOPICAL ONCE
Status: CANCELLED | OUTPATIENT
Start: 2021-02-25 | End: 2021-02-25

## 2021-02-25 RX ORDER — BACITRACIN, NEOMYCIN, POLYMYXIN B 400; 3.5; 5 [USP'U]/G; MG/G; [USP'U]/G
OINTMENT TOPICAL ONCE
Status: CANCELLED | OUTPATIENT
Start: 2021-02-25 | End: 2021-02-25

## 2021-02-25 RX ORDER — LIDOCAINE 40 MG/G
CREAM TOPICAL ONCE
Status: CANCELLED | OUTPATIENT
Start: 2021-02-25 | End: 2021-02-25

## 2021-02-25 RX ORDER — BETAMETHASONE DIPROPIONATE 0.05 %
OINTMENT (GRAM) TOPICAL ONCE
Status: CANCELLED | OUTPATIENT
Start: 2021-02-25 | End: 2021-02-25

## 2021-02-25 NOTE — PROGRESS NOTES
[address], [address] [place] [address] Road  Telephone: [phone]        [facility] Fax # [phone]        Discharge Instructions       [place] [address] [facility], [address] [place] [address] Road  Telephone: [phone]     Discharge Instructions:  Keep weight off wounds and reposition every 2 hours if applicable. Avoid standing for long periods of time. If wound(s) is on your lower extremity, elevate legs to the level of the heart or above for 30 minutes 4-5 times a day and/or when sitting. Do not get wounds wet in bath or shower unless otherwise instructed by your physician. If your wound is on you foot or leg, you may purchase a cast bag. Please ask at the pharmacy. When taking antibiotics take entire prescription as ordered by MD do not stop taking until medicine is all gone. Exercise as tolerated. No Smoking. Smoking prohibits wound healing. If Vascular testing is ordered, please call [address] [place] [address] [phone]) to schedule. Vascular tests ordered by Wound Care Physicians may take up to 2 hours to complete. Please keep that in mind when scheduling. If Vascular testing is scheduled, please bring supplies to replace your dressing after testing is done. The vascular department does not stock supplies. Wound: Bilateral Legs     With each dressing change, rinse wounds with 0.9% Saline. (May use wound wash or soft contact solution. Both can be purchased at a local drug store). If unable to obtain saline, may use a gentle soap and water. Dressing care: Right and Left leg apply Betamethasone,  6\"Ace -. Left  heel-Apply Santyl, Adaptic and dry dressing - Change daily- .Right heel apply Betadine and dry gauze dressing. You have been given a prescription for Santyl. Santyl is an enzyme that dissolves dead tissue in the wound. Apply a small amount to the wound and cover. Apply Thigh high Lymphedema pumps for 1 hour twice daily. - 1st application 1 hour after Breakfast , 2nd application 1 hour after dinner   Prevalon Boots or Heel Protecting Boot to Heels when sitting or in bed. Can call Viky. Viky [facility]  [address]  [facility]  P: [phone]  F: [phone]      Important dietary reminders:  1. Increase Protein intake for optimal wound healing  2. No added salt to reduce any swelling  3. If diabetic, good glucose control  4. If you smoke, smoking affects wound healing, we ask that you refrain from smoking. Follow up with Hansel Madrigal CNP In 1 week in the wound care center. Call [phone] for any questions or concerns. Your  is Haylie Raphael42: [address] [facility] Information: Should you experience any significant changes in your wound(s) or have questions about your wound care, please contact the [facility] at [phone] Monday  - Thursday 8:00 am - 4:00 pm and Friday 8:00 am - 1:00pm. If you need help with your wound outside these hours and cannot wait until we are again available, contact your PCP or go to the hospital emergency room. PLEASE NOTE: IF YOU ARE UNABLE TO OBTAIN WOUND SUPPLIES, CONTINUE TO USE THE SUPPLIES YOU HAVE AVAILABLE UNTIL YOU ARE ABLE TO REACH US. IT IS MOST IMPORTANT TO KEEP THE WOUND COVERED AT ALL TIMES. Skilled nurse to evaluate and treat for wound care. Change dressing as ordered  once a day on Dailyusing clean technique. Patient/Family/caregiver may change dressings as needed as instructed when Home Care unavailable.     WOUNDS REQUIRING DRESSING Changes:     Wound 01/28/21 Heel Left #5 (Active)   Wound Image   02/04/21 1511   Wound Etiology Pressure Stage  3 02/25/21 1428   Wound Cleansed Cleansed with saline 02/18/21 1301   Wound Length (cm) 2.2 cm 02/25/21 1428 Wound Width (cm) 2.4 cm 02/25/21 1428   Wound Depth (cm) 0.1 cm 02/25/21 1428   Wound Surface Area (cm^2) 5.28 cm^2 02/25/21 1428   Change in Wound Size % (l*w) 76 02/25/21 1428   Wound Volume (cm^3) 0.53 cm^3 02/25/21 1428   Wound Healing % 76 02/25/21 1428   Post-Procedure Length (cm) 2.3 cm 02/25/21 1450   Post-Procedure Width (cm) 2.5 cm 02/25/21 1450   Post-Procedure Depth (cm) 0.2 cm 02/25/21 1450   Post-Procedure Surface Area (cm^2) 5.75 cm^2 02/25/21 1450   Post-Procedure Volume (cm^3) 1.15 cm^3 02/25/21 1450   Wound Assessment Bleeding 02/25/21 1450   Drainage Amount Moderate 02/25/21 1450   Drainage Description Serosanguinous 02/25/21 1450   Odor Mild 02/25/21 1428   Ximena-wound Assessment Hyperkeratosis (callous) 02/25/21 1428   Margins Attached edges; Defined edges 02/25/21 1428   Wound Thickness Description not for Pressure Injury Full thickness 02/25/21 1428   Number of days: 27       Wound 01/28/21 Heel Right #6 (Active)   Wound Image   02/04/21 1511   Wound Etiology Pressure Unstageable 02/25/21 1428   Wound Cleansed Cleansed with saline 02/25/21 1428   Wound Assessment Eschar dry 02/25/21 1428   Drainage Amount None 02/25/21 1428   Odor None 01/28/21 1515   Ximena-wound Assessment Intact 02/25/21 1428   Margins Attached edges; Defined edges 02/25/21 1428   Number of days: 27          Patient seen and treated on 2/25/2021    By Mauri Hernandez  [id number]   (provider/NPI)

## 2021-02-25 NOTE — PLAN OF CARE
Discharge instructions given. Patient verbalized understanding. Return to [facility] in 1 week.   Called/faxed orders to [facility]

## 2021-02-26 NOTE — PROGRESS NOTES
Bobby Moreno  Progress Note and Procedure Note      [place]  AGE: 66 y.o. GENDER: female  : 1945  TODAY'S DATE:  2021    Subjective:     Chief Complaint   Patient presents with    Wound Check     Wounds, Edema BLE         HISTORY of PRESENT ILLNESS HPI  Abril Warren a 66 y. o. female who presents today for wound evaluation. Pt accompanied by  who assists pt with bilateral lower leg care.    Pt has long history of leg wounds.  Was healed and discharged from [facility] last 2020. Pt states wounds reopened last 2020.  Pt has lymphedema pumps at home but does not use consistently, understands importance of use.  Pt admits to \"picking wounds\" and \"scratching\"  legs.  Each week pt/ have \"tweeks\" to instructions which are not consistent with orders and delay healing. This week pt reports she \"rests\" heels on top of heel lift boots while sitting in chair instead of wearing boots allowing heels to \"float\", but does wear them in bed.  states home care nurse placing \"clear, plastic dressing over left heel, and states he may not be putting Santyl ointment on left heel wound in appropriate thickness (nickel-thick).  Left heel skin intact over DTI.  .    Patient has started back to using lymphedema pumps but is slowly increasing time. History of Wound: off and on for last 5 years.   Wound Pain:  intermittent, mild  Severity:   / 10   Wound Type:  venous  Modifying Factors:  edema, venous stasis, lymphedema, diabetes, obesity and non-adherence  Associated Signs/Symptoms:  edema, erythema, drainage and pain                                        PAST MEDICAL HISTORY        Diagnosis Date    Arthritis     Blood circulation, collateral     Blood transfusion     CAD (coronary artery disease)     [facility]est[facility]     Chronic renal failure, stage 3 (moderate)     Diabetes mellitus ([facility] [address]  GERD (gastroesophageal reflux disease)     Hyperlipidemia     Hypertension     Leg swelling 8/14/2018    Lymphedema 8/14/2018    Pressure ulcer of both heels 1/29/2021    Deep tissue injuries to right and left heels with left>right. Date of origin unknown.  Pressure ulcer of left heel, stage 3 ([address] 2/18/2021       PAST SURGICAL HISTORY    Past Surgical History:   Procedure Laterality Date    ABDOMEN SURGERY      CARDIAC SURGERY      2009    CATARACT REMOVAL WITH IMPLANT Left 11/23/2016    CATARACT REMOVAL WITH IMPLANT Right 11/02/2016    CATARACT REMOVAL WITH IMPLANT Bilateral 10/16, 11/16    CHOLECYSTECTOMY      COLONOSCOPY      ENDOSCOPY, COLON, DIAGNOSTIC      EYE SURGERY      left tear duct surgery    JOINT REPLACEMENT      BTKR    KNEE ARTHROPLASTY  09/15/2010    left total knee    TOTAL KNEE ARTHROPLASTY      VASCULAR SURGERY         FAMILY HISTORY    Family History   Problem Relation Age of Onset    Diabetes Mother     Heart Disease Mother     Heart Disease Father     Stroke Father        SOCIAL HISTORY    Social History     Tobacco Use    Smoking status: Never Smoker    Smokeless tobacco: Never Used   Substance Use Topics    Alcohol use: No    Drug use: No       ALLERGIES    Allergies   Allergen Reactions    Adhesive Tape Shortness Of Breath     Other reaction(s): Ulcers    Chlorhexidine     Lisinopril Other (See Comments)     Med causes lethargy- takes in lower doses       MEDICATIONS    Current Outpatient Medications on File Prior to Encounter   Medication Sig Dispense Refill    collagenase (SANTYL) 250 UNIT/GM ointment Apply topically daily for 30 days to open wound.  Size: 2.8 x 3.1 x 0.2 60 g 1    furosemide (LASIX) 20 MG tablet TAKE ONE TABLET BY MOUTH TWICE DAILY  60 tablet 3    Coenzyme Q10 (CO Q-10) 400 MG CAPS Take 400 mg by mouth daily 30 capsule 5    XARELTO 15 MG TABS tablet TAKE ONE TABLET BY MOUTH DAILY 90 tablet 1  Liraglutide (VICTOZA) 18 MG/3ML SOPN SC injection Inject 1.2 mg into the skin daily      Handicap Placard MISC by Does not apply route Sob when walking less than 200 feet  Length of time--greater than 5 year 1 each 0    ferrous sulfate 325 (65 Fe) MG tablet Take 1 tablet by mouth 3 times daily (with meals) 90 tablet 3    atorvastatin (LIPITOR) 20 MG tablet Take 20 mg by mouth daily      vitamin D (CHOLECALCIFEROL) 400 UNITS TABS tablet Take 400 Units by mouth daily      metoprolol (LOPRESSOR) 25 MG tablet Take 0.5 tablets by mouth 2 times daily 60 tablet 3    therapeutic multivitamin-minerals (THERAGRAN-M) tablet Take 1 tablet by mouth daily.  hydrOXYzine (ATARAX) 25 MG tablet Take 25 mg by mouth 3 times daily as needed.  ranitidine (ZANTAC) 300 MG tablet Take 150 mg by mouth 2 times daily        No current facility-administered medications on file prior to encounter. REVIEW OF SYSTEMS    Pertinent items are noted in HPI. Objective:      BP (!) 155/68   Pulse 78   Temp 97.3 °F (36.3 °C) (Temporal)   Resp 16     PHYSICAL EXAM    General Appearance: alert and oriented to person, place and time  Skin: warm and dry  Head: normocephalic and atraumatic  Eyes: pupils equal, round, and reactive to light  Pulmonary/Chest:  normal air movement, no respiratory distress  Cardiovascular: normal rate, regular rhythm and intact distal pulses  Extremities: no cyanosis, no clubbing and 1 + edema-  bilateral lower legs  Wounds:  Legs are with closed healing scattered area and right upper leg still with scratching excoriations. Right heel DTI remains flat, closed with no overt evidence of infection. Left heel developed soft eschar again this week with periwound pinkness.        Assessment:     Patient Active Problem List   Diagnosis    Osteoarthritis of left knee    Acute blood loss anemia    Diabetes mellitus ([address]    Ischemic cardiomyopathy    Atrial fibrillation  Peripheral vascular disease ([facility] [address]    CHF exacerbation (HCC)    Acute on chronic combined systolic and diastolic heart failure (HCC)    Chronic renal failure, stage 3 (moderate)    Chronic atrial fibrillation (HCC)    Pleural effusion    Atherosclerosis of native coronary artery without angina pectoris    Essential hypertension    Chronic combined systolic and diastolic congestive heart failure (HCC)    Hx of CABG    Non-rheumatic tricuspid valve insufficiency    Mixed hyperlipidemia    Carotid disease, bilateral (HCC)    Atherosclerosis of native coronary artery of native heart without angina pectoris    Chronic kidney disease    Hyperlipidemia    INOCENCIO (obstructive sleep apnea)    Lymphedema    Leg swelling    Venous stasis dermatitis of both lower extremities    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 (HCC)    Venous insufficiency    Ulcer of right leg, limited to breakdown of skin (HCC)    Ulcer of lower extremity, left, limited to breakdown of skin (HCC)    Iron deficiency anemia    CHANO (acute kidney injury) ([address]    Knee pain    Pressure ulcer of both heels    Pressure ulcer of left heel, stage 3 ([facility] [address]       Procedure Note    Performed by: JOBY Monaco CNP    Consent obtained: Yes    Time out taken:  Yes    Pain Control:   4% Lidocaine    Debridement:Excisional Debridement    Using curette, #15 blade scalpel and forceps the wound was sharply debrided    down through and including the removal of epidermis, dermis and subcutaneous tissue.         Devitalized Tissue Debrided:  fibrin, biofilm, slough and necrotic/eschar    Pre Debridement Measurements:  Are located in the Wound Documentation Flow Sheet    Wound #: 5     Post  Debridement Measurements:  Wound 01/28/21 Heel Left #5 (Active)   Wound Image   02/04/21 1511   Wound Etiology Pressure Stage  3 02/25/21 1428   Wound Cleansed Cleansed with saline 02/18/21 1301 Wound Length (cm) 2.2 cm 02/25/21 1428   Wound Width (cm) 2.4 cm 02/25/21 1428   Wound Depth (cm) 0.1 cm 02/25/21 1428   Wound Surface Area (cm^2) 5.28 cm^2 02/25/21 1428   Change in Wound Size % (l*w) 76 02/25/21 1428   Wound Volume (cm^3) 0.53 cm^3 02/25/21 1428   Wound Healing % 76 02/25/21 1428   Post-Procedure Length (cm) 2.3 cm 02/25/21 1450   Post-Procedure Width (cm) 2.5 cm 02/25/21 1450   Post-Procedure Depth (cm) 0.2 cm 02/25/21 1450   Post-Procedure Surface Area (cm^2) 5.75 cm^2 02/25/21 1450   Post-Procedure Volume (cm^3) 1.15 cm^3 02/25/21 1450   Wound Assessment Bleeding 02/25/21 1450   Drainage Amount Moderate 02/25/21 1450   Drainage Description Serosanguinous 02/25/21 1450   Odor Mild 02/25/21 1428   Ximena-wound Assessment Hyperkeratosis (callous) 02/25/21 1428   Margins Attached edges; Defined edges 02/25/21 1428   Wound Thickness Description not for Pressure Injury Full thickness 02/25/21 1428   Number of days: 28       Wound 01/28/21 Heel Right #6 (Active)   Wound Image   02/04/21 1511   Wound Etiology Pressure Unstageable 02/25/21 1428   Wound Cleansed Cleansed with saline 02/25/21 1428   Wound Assessment Eschar dry 02/25/21 1428   Drainage Amount None 02/25/21 1428   Odor None 01/28/21 1515   Ximena-wound Assessment Intact 02/25/21 1428   Margins Attached edges; Defined edges 02/25/21 1428   Number of days: 28           Total Surface Area Debrided:  5.75 sq cm     Percentage of wound debrided 100%    Bleeding:  Minimal    Hemostasis Achieved:  by pressure    Procedural Pain:  0  / 10     Post Procedural Pain:  0 / 10     Response to treatment:  Well tolerated by patient. Plan:     The nature of the patient's condition was explained in depth.  The patient was informed that their compliance to the treatment plan is paramount to successful healing and prevention of further ulceration and/or infection Discharge Treatment   Dressing care: Right and Left lower legs - apply Betamethasone,  6\"Ace -. Left  heel-Apply nickel-thickness Santyl, single layer of Adaptic and dry dressing - Change daily- Right heel apply Betadine and dry gauze dressing, daily  You have been given a prescription for Santyl. Santyl is an enzyme that dissolves dead tissue in the wound. Use Heel lift boots when up in chair and in bed - put boots on, do not rest heels on top of boots.      Written Patient Discharge Instructions Given            Electronically signed by JOBY Cardenas CNP on 2/26/2021 at 8:58 AM

## 2021-03-04 ENCOUNTER — HOSPITAL ENCOUNTER (OUTPATIENT)
Dept: WOUND CARE | Age: 76
Discharge: HOME | End: 2021-03-04
Payer: MEDICARE

## 2021-03-04 VITALS — HEART RATE: 61 BPM | SYSTOLIC BLOOD PRESSURE: 165 MMHG | DIASTOLIC BLOOD PRESSURE: 68 MMHG | RESPIRATION RATE: 16 BRPM

## 2021-03-04 DIAGNOSIS — L89.629 PRESSURE ULCER OF BOTH HEELS: ICD-10-CM

## 2021-03-04 DIAGNOSIS — I87.2 VENOUS STASIS DERMATITIS OF BOTH LOWER EXTREMITIES: ICD-10-CM

## 2021-03-04 DIAGNOSIS — L97.921 ULCER OF LOWER EXTREMITY, LEFT, LIMITED TO BREAKDOWN OF SKIN (HCC): ICD-10-CM

## 2021-03-04 DIAGNOSIS — L89.619 PRESSURE ULCER OF BOTH HEELS: ICD-10-CM

## 2021-03-04 DIAGNOSIS — L89.623 PRESSURE ULCER OF LEFT HEEL, STAGE 3 (HCC): Primary | ICD-10-CM

## 2021-03-04 DIAGNOSIS — L97.911 ULCER OF RIGHT LEG, LIMITED TO BREAKDOWN OF SKIN (HCC): ICD-10-CM

## 2021-03-04 DIAGNOSIS — I87.2 VENOUS INSUFFICIENCY: ICD-10-CM

## 2021-03-04 DIAGNOSIS — M79.89 LEG SWELLING: ICD-10-CM

## 2021-03-04 DIAGNOSIS — I89.0 LYMPHEDEMA: ICD-10-CM

## 2021-03-04 PROCEDURE — 87070 CULTURE OTHR SPECIMN AEROBIC: CPT

## 2021-03-04 PROCEDURE — 11042 DBRDMT SUBQ TIS 1ST 20SQCM/<: CPT

## 2021-03-04 PROCEDURE — 11042 DBRDMT SUBQ TIS 1ST 20SQCM/<: CPT | Performed by: NURSE PRACTITIONER

## 2021-03-04 PROCEDURE — 87205 SMEAR GRAM STAIN: CPT

## 2021-03-04 PROCEDURE — 87186 SC STD MICRODIL/AGAR DIL: CPT

## 2021-03-04 RX ORDER — LIDOCAINE 40 MG/G
CREAM TOPICAL ONCE
Status: CANCELLED | OUTPATIENT
Start: 2021-03-04 | End: 2021-03-04

## 2021-03-04 RX ORDER — BETAMETHASONE DIPROPIONATE 0.05 %
OINTMENT (GRAM) TOPICAL ONCE
Status: CANCELLED | OUTPATIENT
Start: 2021-03-04 | End: 2021-03-04

## 2021-03-04 RX ORDER — LIDOCAINE HYDROCHLORIDE 40 MG/ML
SOLUTION TOPICAL ONCE
Status: CANCELLED | OUTPATIENT
Start: 2021-03-04 | End: 2021-03-04

## 2021-03-04 RX ORDER — GENTAMICIN SULFATE 1 MG/G
OINTMENT TOPICAL ONCE
Status: CANCELLED | OUTPATIENT
Start: 2021-03-04 | End: 2021-03-04

## 2021-03-04 RX ORDER — BACITRACIN, NEOMYCIN, POLYMYXIN B 400; 3.5; 5 [USP'U]/G; MG/G; [USP'U]/G
OINTMENT TOPICAL ONCE
Status: CANCELLED | OUTPATIENT
Start: 2021-03-04 | End: 2021-03-04

## 2021-03-04 RX ORDER — LIDOCAINE 50 MG/G
OINTMENT TOPICAL ONCE
Status: CANCELLED | OUTPATIENT
Start: 2021-03-04 | End: 2021-03-04

## 2021-03-04 RX ORDER — CLOBETASOL PROPIONATE 0.5 MG/G
OINTMENT TOPICAL ONCE
Status: CANCELLED | OUTPATIENT
Start: 2021-03-04 | End: 2021-03-04

## 2021-03-04 RX ORDER — BACITRACIN ZINC AND POLYMYXIN B SULFATE 500; 1000 [USP'U]/G; [USP'U]/G
OINTMENT TOPICAL ONCE
Status: CANCELLED | OUTPATIENT
Start: 2021-03-04 | End: 2021-03-04

## 2021-03-04 RX ORDER — GINSENG 100 MG
CAPSULE ORAL ONCE
Status: CANCELLED | OUTPATIENT
Start: 2021-03-04 | End: 2021-03-04

## 2021-03-04 RX ORDER — LIDOCAINE 40 MG/G
CREAM TOPICAL ONCE
Status: DISCONTINUED | OUTPATIENT
Start: 2021-03-04 | End: 2021-03-05 | Stop reason: HOSPADM

## 2021-03-04 NOTE — PLAN OF CARE
Discharge instructions given. Patient verbalized understanding. Return to [facility] in 1 week.   [facility]

## 2021-03-04 NOTE — PROGRESS NOTES
[address], [address]  Telephone: [phone]        General acute hospital Fax # [phone]        Discharge Instructions       [place] [id number] [facility], [address]  Telephone: [phone]     Discharge Instructions:  Keep weight off wounds and reposition every 2 hours if applicable. Avoid standing for long periods of time. If wound(s) is on your lower extremity, elevate legs to the level of the heart or above for 30 minutes 4-5 times a day and/or when sitting. Do not get wounds wet in bath or shower unless otherwise instructed by your physician. If your wound is on you foot or leg, you may purchase a cast bag. Please ask at the pharmacy. When taking antibiotics take entire prescription as ordered by MD do not stop taking until medicine is all gone. Exercise as tolerated. No Smoking. Smoking prohibits wound healing. If Vascular testing is ordered, please call [address] [place] [address] [phone]) to schedule. Vascular tests ordered by Wound Care Physicians may take up to 2 hours to complete. Please keep that in mind when scheduling. If Vascular testing is scheduled, please bring supplies to replace your dressing after testing is done. The vascular department does not stock supplies. Wound: Bilateral Legs     With each dressing change, rinse wounds with 0.9% Saline. (May use wound wash or soft contact solution. Both can be purchased at a local drug store). If unable to obtain saline, may use a gentle soap and water. Dressing care: Right and Left leg apply Betamethasone,  6\"Ace -. Left  heel-Apply Santyl, Adaptic and dry dressing - Change daily- .Right heel apply Betadine and dry gauze dressing. You have been given a prescription for Santyl. Santyl is an enzyme that dissolves dead tissue in the wound. Apply a small amount to the wound and cover. Apply Thigh high Lymphedema pumps for 1 hour twice daily. - 1st application 1 hour after Breakfast , 2nd application 1 hour after dinner   Prevalon Boots or Heel Protecting Boot to Heels when sitting or in bed. Can call [facility]  [address]  [facility]  P: [phone]  F: [phone]      Important dietary reminders:  1. Increase Protein intake for optimal wound healing  2. No added salt to reduce any swelling  3. If diabetic, good glucose control  4. If you smoke, smoking affects wound healing, we ask that you refrain from smoking. Follow up with Hansel Madrigal CNP In 1 week in the wound care center. Call [phone] for any questions or concerns. Your  is Haylie Raphael42: [address] [facility] Information: Should you experience any significant changes in your wound(s) or have questions about your wound care, please contact the [facility] at [phone] Monday  - Thursday 8:00 am - 4:00 pm and Friday 8:00 am - 1:00pm. If you need help with your wound outside these hours and cannot wait until we are again available, contact your PCP or go to the hospital emergency room. PLEASE NOTE: IF YOU ARE UNABLE TO OBTAIN WOUND SUPPLIES, CONTINUE TO USE THE SUPPLIES YOU HAVE AVAILABLE UNTIL YOU ARE ABLE TO REACH US. IT IS MOST IMPORTANT TO KEEP THE WOUND COVERED AT ALL TIMES. Skilled nurse to evaluate and treat for wound care. Change dressing as ordered  once a day on Daily using clean technique. Patient/Family/caregiver may change dressings as needed as instructed when Home Care unavailable.     WOUNDS REQUIRING DRESSING Changes:     Wound 01/28/21 Heel Left #5 (Active)   Wound Image   02/04/21 1511   Wound Etiology Pressure Stage  3 03/04/21 1328   Wound Cleansed Cleansed with saline 02/18/21 1301   Wound Length (cm) 2.2 cm 03/04/21 1328 Wound Width (cm) 2.7 cm 03/04/21 1328   Wound Depth (cm) 0.1 cm 03/04/21 1328   Wound Surface Area (cm^2) 5.94 cm^2 03/04/21 1328   Change in Wound Size % (l*w) 73 03/04/21 1328   Wound Volume (cm^3) 0.59 cm^3 03/04/21 1328   Wound Healing % 73 03/04/21 1328   Post-Procedure Length (cm) 2.3 cm 03/04/21 1347   Post-Procedure Width (cm) 2.8 cm 03/04/21 1347   Post-Procedure Depth (cm) 0.2 cm 03/04/21 1347   Post-Procedure Surface Area (cm^2) 6.44 cm^2 03/04/21 1347   Post-Procedure Volume (cm^3) 1.29 cm^3 03/04/21 1347   Wound Assessment Bleeding 03/04/21 1347   Drainage Amount Moderate 03/04/21 1347   Drainage Description Serosanguinous 03/04/21 1347   Odor None 03/04/21 1328   Ximena-wound Assessment Hyperkeratosis (callous) 03/04/21 1328   Margins Attached edges; Defined edges 03/04/21 1328   Wound Thickness Description not for Pressure Injury Full thickness 03/04/21 1328   Number of days: 34       Wound 01/28/21 Heel Right #6 (Active)   Wound Image   02/04/21 1511   Wound Etiology Pressure Unstageable 03/04/21 1328   Wound Cleansed Cleansed with saline 03/04/21 1328   Wound Length (cm) 0 cm 03/04/21 1328   Wound Width (cm) 0 cm 03/04/21 1328   Wound Depth (cm) 0 cm 03/04/21 1328   Wound Surface Area (cm^2) 0 cm^2 03/04/21 1328   Wound Volume (cm^3) 0 cm^3 03/04/21 1328   Wound Assessment Dry;Eschar dry 03/04/21 1328   Drainage Amount None 03/04/21 1328   Odor None 01/28/21 1515   Ximena-wound Assessment Intact 03/04/21 1328   Margins Attached edges; Defined edges 03/04/21 1328   Number of days: 34          Patient seen and treated on 3/4/2021    By Mehdi Landeros  [id number]   (provider/NPI)

## 2021-03-05 NOTE — PROGRESS NOTES
[facility]   Progress Note and Procedure Note      Shedrick Goldmann  AGE: 66 y.o. GENDER: female  : 1945  TODAY'S DATE:  3/4/2021    Subjective:     Chief Complaint   Patient presents with    Wound Check     bilateral lower extremity         HISTORY of PRESENT ILLNESS HPI  Ronaldo Jamil a 66 y. o. female who presents today for wound evaluation. Pt accompanied by  who assists pt with bilateral lower leg care.    Pt has long history of leg wounds. Bilateral lower leg wounds were healed and discharged from [facility] last 2020. Pt states wounds reopened last 2020.  Pt has lymphedema pumps at home and states is building up se time to betweem 1-1.5 hours daily. understands importance of use.  Pt admits to \"picking wounds\" and \"scratching\" legs. Left heel skin DTI still intact. Left heel wound is Stage 3. History of Wound: off and on for last 5 years. Wound Pain:  intermittent, mild  Severity:  1 / 10   Wound Type:  venous  Modifying Factors:  edema, venous stasis, lymphedema, diabetes, obesity and non-adherence  Associated Signs/Symptoms:  edema, erythema, drainage and pain                                                                              PAST MEDICAL HISTORY        Diagnosis Date    Arthritis     Blood circulation, collateral     Blood transfusion     CAD (coronary artery disease)     CHF (congestive heart failure) (HCC)     Chronic renal failure, stage 3 (moderate)     Diabetes mellitus (HCC)     GERD (gastroesophageal reflux disease)     Hyperlipidemia     Hypertension     Leg swelling 2018    Lymphedema 2018    Pressure ulcer of both heels 2021    Deep tissue injuries to right and left heels with left>right. Date of origin unknown.     Pressure ulcer of left heel, stage 3 ([address] 2021       PAST SURGICAL HISTORY    Past Surgical History:   Procedure Laterality Date    ABDOMEN SURGERY      CARDIAC SURGERY 2009    CATARACT REMOVAL WITH IMPLANT Left 11/23/2016    CATARACT REMOVAL WITH IMPLANT Right 11/02/2016    CATARACT REMOVAL WITH IMPLANT Bilateral 10/16, 11/16    CHOLECYSTECTOMY      COLONOSCOPY      ENDOSCOPY, COLON, DIAGNOSTIC      EYE SURGERY      left tear duct surgery    JOINT REPLACEMENT      BTKR    KNEE ARTHROPLASTY  09/15/2010    left total knee    TOTAL KNEE ARTHROPLASTY      VASCULAR SURGERY         FAMILY HISTORY    Family History   Problem Relation Age of Onset    Diabetes Mother     Heart Disease Mother     Heart Disease Father     Stroke Father        SOCIAL HISTORY    Social History     Tobacco Use    Smoking status: Never Smoker    Smokeless tobacco: Never Used   Substance Use Topics    Alcohol use: No    Drug use: No       ALLERGIES    Allergies   Allergen Reactions    Adhesive Tape Shortness Of Breath     Other reaction(s): Ulcers    Chlorhexidine     Lisinopril Other (See Comments)     Med causes lethargy- takes in lower doses       MEDICATIONS    Current Outpatient Medications on File Prior to Encounter   Medication Sig Dispense Refill    furosemide (LASIX) 20 MG tablet TAKE ONE TABLET BY MOUTH TWICE DAILY  60 tablet 3    Coenzyme Q10 (CO Q-10) 400 MG CAPS Take 400 mg by mouth daily 30 capsule 5    XARELTO 15 MG TABS tablet TAKE ONE TABLET BY MOUTH DAILY 90 tablet 1    Liraglutide (VICTOZA) 18 MG/3ML SOPN SC injection Inject 1.2 mg into the skin daily      Handicap Placard MISC by Does not apply route Sob when walking less than 200 feet  Length of time--greater than 5 year 1 each 0    ferrous sulfate 325 (65 Fe) MG tablet Take 1 tablet by mouth 3 times daily (with meals) 90 tablet 3    atorvastatin (LIPITOR) 20 MG tablet Take 20 mg by mouth daily      vitamin D (CHOLECALCIFEROL) 400 UNITS TABS tablet Take 400 Units by mouth daily      metoprolol (LOPRESSOR) 25 MG tablet Take 0.5 tablets by mouth 2 times daily 60 tablet 3  therapeutic multivitamin-minerals (THERAGRAN-M) tablet Take 1 tablet by mouth daily.  hydrOXYzine (ATARAX) 25 MG tablet Take 25 mg by mouth 3 times daily as needed.  ranitidine (ZANTAC) 300 MG tablet Take 150 mg by mouth 2 times daily        No current facility-administered medications on file prior to encounter. REVIEW OF SYSTEMS    Pertinent items are noted in HPI. Objective:      BP (!) 165/68   Pulse 61   Resp 16     PHYSICAL EXAM    General Appearance: alert and oriented to person, place and time, well-developed and well-nourished, in no acute distress, obese and pale  Skin: warm and dry  Head: normocephalic and atraumatic  Eyes: pupils equal, round, and reactive to light  Pulmonary/Chest:  normal air movement, no respiratory distress  Cardiovascular: normal rate, regular rhythm and intact distal pulses  Extremities: no cyanosis, no clubbing, 1 + edema-  bilateral lower and venous stasis dermatosis noted  Wounds to lower legs now just areas of excoriation from pt scratching. Right heel DTI still closed, flat without erythema. Left heel with  wound base, small amount of periwound redness. Culture obtained.       Assessment:     Patient Active Problem List   Diagnosis    Osteoarthritis of left knee    Acute blood loss anemia    Diabetes mellitus ([address]    Ischemic cardiomyopathy    Atrial fibrillation    Peripheral vascular disease ([address]    CHF exacerbation (HCC)    Acute on chronic combined systolic and diastolic heart failure (HCC)    Chronic renal failure, stage 3 (moderate)    Chronic atrial fibrillation (HCC)    Pleural effusion    Atherosclerosis of native coronary artery without angina pectoris    Essential hypertension    Chronic combined systolic and diastolic congestive heart failure ([address]    Hx of CABG    Non-rheumatic tricuspid valve insufficiency    Mixed hyperlipidemia    Carotid disease, bilateral ([address]  Atherosclerosis of native coronary artery of native heart without angina pectoris    Chronic kidney disease    Hyperlipidemia    INOCENCIO (obstructive sleep apnea)    Lymphedema    Leg swelling    Venous stasis dermatitis of both lower extremities    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 (HCC)    Venous insufficiency    Ulcer of right leg, limited to breakdown of skin (HCC)    Ulcer of lower extremity, left, limited to breakdown of skin (HCC)    Iron deficiency anemia    [address]ANO (acute kidney injury) [address]    Knee pain    Pressure ulcer of both heels    Pressure ulcer of left heel, stage 3 ([facility] [address]       Procedure Note    Performed by: JOBY Velasquez CNP    Consent obtained: Yes    Time out taken:  Yes    Pain Control: Anesthetic  Anesthetic: 4% Lidocaine Cream     Debridement:Excisional Debridement    Using curette, #15 blade scalpel and forceps the wound was sharply debrided    down through and including the removal of epidermis, dermis and subcutaneous tissue.         Devitalized Tissue Debrided:  fibrin, biofilm and slough    Pre Debridement Measurements:  Are located in the Wound Documentation Flow Sheet    Wound #: 5     Post  Debridement Measurements:  Wound 01/28/21 Heel Left #5 (Active)   Wound Image   02/04/21 1511   Wound Etiology Pressure Stage  3 03/04/21 1328   Wound Cleansed Cleansed with saline 02/18/21 1301   Wound Length (cm) 2.2 cm 03/04/21 1328   Wound Width (cm) 2.7 cm 03/04/21 1328   Wound Depth (cm) 0.1 cm 03/04/21 1328   Wound Surface Area (cm^2) 5.94 cm^2 03/04/21 1328   Change in Wound Size % (l*w) 73 03/04/21 1328   Wound Volume (cm^3) 0.59 cm^3 03/04/21 1328   Wound Healing % 73 03/04/21 1328   Post-Procedure Length (cm) 2.3 cm 03/04/21 1347   Post-Procedure Width (cm) 2.8 cm 03/04/21 1347   Post-Procedure Depth (cm) 0.2 cm 03/04/21 1347   Post-Procedure Surface Area (cm^2) 6.44 cm^2 03/04/21 1347 Post-Procedure Volume (cm^3) 1.29 cm^3 03/04/21 1347   Wound Assessment Bleeding 03/04/21 1347   Drainage Amount Moderate 03/04/21 1347   Drainage Description Serosanguinous 03/04/21 1347   Odor None 03/04/21 1328   Ximena-wound Assessment Hyperkeratosis (callous) 03/04/21 1328   Margins Attached edges; Defined edges 03/04/21 1328   Wound Thickness Description not for Pressure Injury Full thickness 03/04/21 1328   Number of days: 35       Wound 01/28/21 Heel Right #6 (Active)   Wound Image   02/04/21 1511   Wound Etiology Pressure Unstageable 03/04/21 1328   Wound Cleansed Cleansed with saline 03/04/21 1328   Wound Length (cm) 0 cm 03/04/21 1328   Wound Width (cm) 0 cm 03/04/21 1328   Wound Depth (cm) 0 cm 03/04/21 1328   Wound Surface Area (cm^2) 0 cm^2 03/04/21 1328   Wound Volume (cm^3) 0 cm^3 03/04/21 1328   Wound Assessment Dry;Eschar dry 03/04/21 1328   Drainage Amount None 03/04/21 1328   Odor None 01/28/21 1515   Ximena-wound Assessment Intact 03/04/21 1328   Margins Attached edges; Defined edges 03/04/21 1328   Number of days: 35           Total Surface Area Debrided:  6.44 sq cm     Percentage of wound debrided 100%    Bleeding:  Estimated amount of blood loss is 1 ml. Hemostasis Achieved:  by pressure and by silver nitrate stick    Procedural Pain:  2  / 10     Post Procedural Pain:  1 / 10     Response to treatment:  Well tolerated by patient. Plan:     The nature of the patient's condition was explained in depth. The patient was informed that their compliance to the treatment plan is paramount to successful healing and prevention of further ulceration and/or infection   Reinforced current plan of care and discussed plans for future care when wound ready and if culture negative. Discharge Treatment   Dressing care: Right and Left leg apply Betamethasone,  6\"Ace -. Left  heel-Apply Santyl, Adaptic and dry dressing - Change daily- .Right heel apply Betadine and dry gauze dressing. Culture sent to lab. You have been given a prescription for Santyl. Santyl is an enzyme that dissolves dead tissue in the wound.  Apply a nickel-thickness amount to the wound and cover with damp NSS, change daily        Written Patient Discharge Instructions Given            Electronically signed by JOBY Lr CNP on 3/5/2021 at 11:28 AM

## 2021-03-06 LAB
GRAM STAIN RESULT: ABNORMAL
ORGANISM: ABNORMAL
WOUND/ABSCESS: ABNORMAL
WOUND/ABSCESS: ABNORMAL

## 2021-03-11 ENCOUNTER — HOSPITAL ENCOUNTER (OUTPATIENT)
Dept: WOUND CARE | Age: 76
Discharge: HOME | End: 2021-03-11
Payer: MEDICARE

## 2021-03-11 ENCOUNTER — HOSPITAL ENCOUNTER (OUTPATIENT)
Age: 76
Discharge: HOME | End: 2021-03-11
Payer: MEDICARE

## 2021-03-11 ENCOUNTER — HOSPITAL ENCOUNTER (OUTPATIENT)
Dept: GENERAL RADIOLOGY | Age: 76
Discharge: HOME | End: 2021-03-11
Payer: MEDICARE

## 2021-03-11 VITALS
RESPIRATION RATE: 16 BRPM | DIASTOLIC BLOOD PRESSURE: 74 MMHG | HEART RATE: 70 BPM | TEMPERATURE: 97.3 F | SYSTOLIC BLOOD PRESSURE: 159 MMHG

## 2021-03-11 DIAGNOSIS — M79.89 LEG SWELLING: ICD-10-CM

## 2021-03-11 DIAGNOSIS — L97.921 ULCER OF LOWER EXTREMITY, LEFT, LIMITED TO BREAKDOWN OF SKIN (HCC): ICD-10-CM

## 2021-03-11 DIAGNOSIS — L89.623 PRESSURE ULCER OF LEFT HEEL, STAGE 3 (HCC): ICD-10-CM

## 2021-03-11 DIAGNOSIS — I87.2 VENOUS STASIS DERMATITIS OF BOTH LOWER EXTREMITIES: ICD-10-CM

## 2021-03-11 DIAGNOSIS — I89.0 LYMPHEDEMA: ICD-10-CM

## 2021-03-11 DIAGNOSIS — L89.619 PRESSURE ULCER OF BOTH HEELS: ICD-10-CM

## 2021-03-11 DIAGNOSIS — L89.629 PRESSURE ULCER OF BOTH HEELS: ICD-10-CM

## 2021-03-11 DIAGNOSIS — L89.623 PRESSURE ULCER OF LEFT HEEL, STAGE 3 (HCC): Primary | ICD-10-CM

## 2021-03-11 DIAGNOSIS — L97.911 ULCER OF RIGHT LEG, LIMITED TO BREAKDOWN OF SKIN (HCC): ICD-10-CM

## 2021-03-11 DIAGNOSIS — I73.9 PERIPHERAL VASCULAR DISEASE (HCC): ICD-10-CM

## 2021-03-11 LAB
C-REACTIVE PROTEIN: <3 MG/L (ref 0–5.1)
SEDIMENTATION RATE, ERYTHROCYTE: 60 MM/HR (ref 0–30)

## 2021-03-11 PROCEDURE — 11042 DBRDMT SUBQ TIS 1ST 20SQCM/<: CPT | Performed by: NURSE PRACTITIONER

## 2021-03-11 PROCEDURE — 36415 COLL VENOUS BLD VENIPUNCTURE: CPT

## 2021-03-11 PROCEDURE — 73650 X-RAY EXAM OF HEEL: CPT

## 2021-03-11 PROCEDURE — 11042 DBRDMT SUBQ TIS 1ST 20SQCM/<: CPT

## 2021-03-11 PROCEDURE — 85652 RBC SED RATE AUTOMATED: CPT

## 2021-03-11 PROCEDURE — 86140 C-REACTIVE PROTEIN: CPT

## 2021-03-11 RX ORDER — BETAMETHASONE DIPROPIONATE 0.05 %
OINTMENT (GRAM) TOPICAL
Qty: 45 G | Refills: 2 | Status: SHIPPED | OUTPATIENT
Start: 2021-03-11 | End: 2021-05-07

## 2021-03-11 RX ORDER — LIDOCAINE 50 MG/G
OINTMENT TOPICAL ONCE
Status: CANCELLED | OUTPATIENT
Start: 2021-03-11 | End: 2021-03-11

## 2021-03-11 RX ORDER — LIDOCAINE 40 MG/G
CREAM TOPICAL ONCE
Status: DISCONTINUED | OUTPATIENT
Start: 2021-03-11 | End: 2021-03-12 | Stop reason: HOSPADM

## 2021-03-11 RX ORDER — GINSENG 100 MG
CAPSULE ORAL ONCE
Status: CANCELLED | OUTPATIENT
Start: 2021-03-11 | End: 2021-03-11

## 2021-03-11 RX ORDER — BACITRACIN ZINC AND POLYMYXIN B SULFATE 500; 1000 [USP'U]/G; [USP'U]/G
OINTMENT TOPICAL ONCE
Status: CANCELLED | OUTPATIENT
Start: 2021-03-11 | End: 2021-03-11

## 2021-03-11 RX ORDER — GENTAMICIN SULFATE 1 MG/G
OINTMENT TOPICAL ONCE
Status: CANCELLED | OUTPATIENT
Start: 2021-03-11 | End: 2021-03-11

## 2021-03-11 RX ORDER — LIDOCAINE 40 MG/G
CREAM TOPICAL ONCE
Status: CANCELLED | OUTPATIENT
Start: 2021-03-11 | End: 2021-03-11

## 2021-03-11 RX ORDER — CLOBETASOL PROPIONATE 0.5 MG/G
OINTMENT TOPICAL ONCE
Status: CANCELLED | OUTPATIENT
Start: 2021-03-11 | End: 2021-03-11

## 2021-03-11 RX ORDER — LIDOCAINE HYDROCHLORIDE 40 MG/ML
SOLUTION TOPICAL ONCE
Status: CANCELLED | OUTPATIENT
Start: 2021-03-11 | End: 2021-03-11

## 2021-03-11 RX ORDER — DOXYCYCLINE HYCLATE 100 MG/1
100 CAPSULE ORAL 2 TIMES DAILY
Qty: 14 CAPSULE | Refills: 0 | Status: SHIPPED | OUTPATIENT
Start: 2021-03-11 | End: 2021-03-18

## 2021-03-11 RX ORDER — BACITRACIN, NEOMYCIN, POLYMYXIN B 400; 3.5; 5 [USP'U]/G; MG/G; [USP'U]/G
OINTMENT TOPICAL ONCE
Status: CANCELLED | OUTPATIENT
Start: 2021-03-11 | End: 2021-03-11

## 2021-03-11 RX ORDER — BETAMETHASONE DIPROPIONATE 0.05 %
OINTMENT (GRAM) TOPICAL ONCE
Status: CANCELLED | OUTPATIENT
Start: 2021-03-11 | End: 2021-03-11

## 2021-03-11 ASSESSMENT — PAIN DESCRIPTION - ORIENTATION: ORIENTATION: RIGHT;LEFT

## 2021-03-11 NOTE — PLAN OF CARE
Discharge instructions given. Patient verbalized understanding. Return to [facility] in 1 week.   Called/faxed orders to  [facility]

## 2021-03-11 NOTE — PROGRESS NOTES
[address], [address] [place] [address] Road  Telephone: [phone]        [facility] Fax # [phone]        Discharge Instructions       [address], [address] [place] [address] Road  Telephone: [phone]     Discharge Instructions:  Keep weight off wounds and reposition every 2 hours if applicable. Avoid standing for long periods of time. If wound(s) is on your lower extremity, elevate legs to the level of the heart or above for 30 minutes 4-5 times a day and/or when sitting. Do not get wounds wet in bath or shower unless otherwise instructed by your physician. If your wound is on you foot or leg, you may purchase a cast bag. Please ask at the pharmacy. When taking antibiotics take entire prescription as ordered by MD do not stop taking until medicine is all gone. Exercise as tolerated. No Smoking. Smoking prohibits wound healing. If Vascular testing is ordered, please call [address] [place] [address] [phone]) to schedule. Vascular tests ordered by Wound Care Physicians may take up to 2 hours to complete. Please keep that in mind when scheduling. If Vascular testing is scheduled, please bring supplies to replace your dressing after testing is done. The vascular department does not stock supplies. Wound: Bilateral Legs     With each dressing change, rinse wounds with 0.9% Saline. (May use wound wash or soft contact solution. Both can be purchased at a local drug store). If unable to obtain saline, may use a gentle soap and water. Dressing care: Right and Left leg apply Betamethasone,  6\"Ace -. Left  heel-Apply Santyl, Adaptic and dry dressing - Change daily- .Right heel apply Betadine and dry Keramax Border -change every other day. You have been given a prescription for Santyl. Santyl is an enzyme that dissolves dead tissue in the wound.  Apply a nickel-thickness amount to the wound and cover with damp NSS, change daily. Get xray and Lab work. Start anti-biotic. Apply Thigh high Lymphedema pumps for 1 hour twice daily. - 1st application 1 hour after Breakfast , 2nd application 1 hour after dinner   Prevalon Boots or Heel Protecting Boot to Heels when sitting or in bed. Can call [facility],. [facility]  [address]  [facility], [place]  P: [phone]  F: [phone]      Important dietary reminders:  1. Increase Protein intake for optimal wound healing  2. No added salt to reduce any swelling  3. If diabetic, good glucose control  4. If you smoke, smoking affects wound healing, we ask that you refrain from smoking. Follow up with Sheng Fierro CNP In 1 week in the wound care center. Call [phone] for any questions or concerns. Your  is Haylie Raphael42: [address] [facility] Information: Should you experience any significant changes in your wound(s) or have questions about your wound care, please contact the [facility] at [phone] Monday  - Thursday 8:00 am - 4:00 pm and Friday 8:00 am - 1:00pm. If you need help with your wound outside these hours and cannot wait until we are again available, contact your PCP or go to the hospital emergency room. PLEASE NOTE: IF YOU ARE UNABLE TO OBTAIN WOUND SUPPLIES, CONTINUE TO USE THE SUPPLIES YOU HAVE AVAILABLE UNTIL YOU ARE ABLE TO REACH US. IT IS MOST IMPORTANT TO KEEP THE WOUND COVERED AT ALL TIMES. Skilled nurse to evaluate and treat for wound care. Change dressing as ordered daily using clean technique. Patient/Family/caregiver may change dressings as needed as instructed when Home Care unavailable.     WOUNDS REQUIRING DRESSING Changes:     Wound 01/28/21 Heel Left #5 (Active)   Wound Image   02/04/21 1511   Wound Etiology Pressure Stage  3 03/11/21 1320   Wound Cleansed Cleansed with saline 03/11/21 1320   Wound Length (cm) 2.7 cm 03/11/21 1320   Wound Width (cm) 2.9 cm 03/11/21 1320   Wound Depth (cm) 0.7 cm 03/11/21 1320   Wound Surface Area (cm^2) 7.83 cm^2 03/11/21 1320   Change in Wound Size % (l*w) 64.41 03/11/21 1320   Wound Volume (cm^3) 5.48 cm^3 03/11/21 1320   Wound Healing % -149 03/11/21 1320   Post-Procedure Length (cm) 2.8 cm 03/11/21 1330   Post-Procedure Width (cm) 3 cm 03/11/21 1330   Post-Procedure Depth (cm) 0.8 cm 03/11/21 1330   Post-Procedure Surface Area (cm^2) 8.4 cm^2 03/11/21 1330   Post-Procedure Volume (cm^3) 6.72 cm^3 03/11/21 1330   Wound Assessment Bleeding 03/11/21 1330   Drainage Amount Moderate 03/11/21 1330   Drainage Description Serosanguinous 03/11/21 1330   Odor Mild 03/11/21 1320   Ximena-wound Assessment Maceration 03/11/21 1320   Margins Defined edges 03/11/21 1320   Wound Thickness Description not for Pressure Injury Full thickness 03/11/21 1320   Number of days: 41       Wound 01/28/21 Heel Right #6 (Active)   Wound Image   02/04/21 1511   Wound Etiology Pressure Unstageable 03/11/21 1320   Wound Cleansed Cleansed with saline 03/11/21 1320   Wound Length (cm) 0 cm 03/11/21 1320   Wound Width (cm) 0 cm 03/11/21 1320   Wound Depth (cm) 0 cm 03/11/21 1320   Wound Surface Area (cm^2) 0 cm^2 03/11/21 1320   Wound Volume (cm^3) 0 cm^3 03/11/21 1320   Wound Assessment Eschar dry 03/11/21 1320   Drainage Amount None 03/11/21 1320   Odor None 03/11/21 1320   Ximena-wound Assessment Intact 03/11/21 1320   Margins Attached edges 03/11/21 1320   Number of days: 41          Patient seen and treated on 3/11/2021    By Grazyna Armijo  [id number]   (provider/NPI)

## 2021-03-11 NOTE — PROGRESS NOTES
Right 11/02/2016    CATARACT REMOVAL WITH IMPLANT Bilateral 10/16, 11/16    CHOLECYSTECTOMY      COLONOSCOPY      ENDOSCOPY, COLON, DIAGNOSTIC      EYE SURGERY      left tear duct surgery    JOINT REPLACEMENT      BTKR    KNEE ARTHROPLASTY  09/15/2010    left total knee    TOTAL KNEE ARTHROPLASTY      VASCULAR SURGERY         FAMILY HISTORY    Family History   Problem Relation Age of Onset    Diabetes Mother     Heart Disease Mother     Heart Disease Father     Stroke Father        SOCIAL HISTORY    Social History     Tobacco Use    Smoking status: Never Smoker    Smokeless tobacco: Never Used   Substance Use Topics    Alcohol use: No    Drug use: No       ALLERGIES    Allergies   Allergen Reactions    Adhesive Tape Shortness Of Breath     Other reaction(s): Ulcers    Chlorhexidine     Lisinopril Other (See Comments)     Med causes lethargy- takes in lower doses       MEDICATIONS    Current Outpatient Medications on File Prior to Encounter   Medication Sig Dispense Refill    furosemide (LASIX) 20 MG tablet TAKE ONE TABLET BY MOUTH TWICE DAILY  60 tablet 3    Coenzyme Q10 (CO Q-10) 400 MG CAPS Take 400 mg by mouth daily 30 capsule 5    XARELTO 15 MG TABS tablet TAKE ONE TABLET BY MOUTH DAILY 90 tablet 1    Liraglutide (VICTOZA) 18 MG/3ML SOPN SC injection Inject 1.2 mg into the skin daily      Handicap Placard MISC by Does not apply route Sob when walking less than 200 feet  Length of time--greater than 5 year 1 each 0    ferrous sulfate 325 (65 Fe) MG tablet Take 1 tablet by mouth 3 times daily (with meals) 90 tablet 3    atorvastatin (LIPITOR) 20 MG tablet Take 20 mg by mouth daily      vitamin D (CHOLECALCIFEROL) 400 UNITS TABS tablet Take 400 Units by mouth daily      metoprolol (LOPRESSOR) 25 MG tablet Take 0.5 tablets by mouth 2 times daily 60 tablet 3    therapeutic multivitamin-minerals (THERAGRAN-M) tablet Take 1 tablet by mouth daily.       hydrOXYzine (ATARAX) 25 MG tablet Take 25 mg by mouth 3 times daily as needed.  ranitidine (ZANTAC) 300 MG tablet Take 150 mg by mouth 2 times daily        No current facility-administered medications on file prior to encounter. REVIEW OF SYSTEMS    Pertinent items are noted in HPI. Objective: There were no vitals taken for this visit. PHYSICAL EXAM    General Appearance: alert and oriented to person, place and time, well-developed and well-nourished, in no acute distress, obese and pale  Skin: warm and dry  Head: normocephalic and atraumatic  Eyes: pupils equal, round, and reactive to light  Pulmonary/Chest:  normal air movement, no respiratory distress  Cardiovascular: normal rate, regular rhythm and intact distal pulses  Extremities: no cyanosis, no clubbing and no edema  Wounds:  Right heel wound still discolored, purple but closed, pain only, no redness. Left heel still open, less painful and debrided to bleeding tissue, will get approval for NPWT as long as inflammatory makers and xray are negative.       Assessment:     Patient Active Problem List   Diagnosis    Osteoarthritis of left knee    Acute blood loss anemia    Diabetes mellitus ([address]    Ischemic cardiomyopathy    Atrial fibrillation    Peripheral vascular disease ([address]    CHF exacerbation (HCC)    Acute on chronic combined systolic and diastolic heart failure (HCC)    Chronic renal failure, stage 3 (moderate)    Chronic atrial fibrillation (HCC)    Pleural effusion    Atherosclerosis of native coronary artery without angina pectoris    Essential hypertension    Chronic combined systolic and diastolic congestive heart failure ([address]    Hx of CABG    Non-rheumatic tricuspid valve insufficiency    Mixed hyperlipidemia    Carotid disease, bilateral ([address]    Atherosclerosis of native coronary artery of native heart without angina pectoris    Chronic kidney disease    Hyperlipidemia    INOCENCIO (obstructive sleep apnea)    Lymphedema    Leg swelling    Venous stasis dermatitis of both lower extremities    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 (HCC)    Venous insufficiency    Ulcer of right leg, limited to breakdown of skin (HCC)    Ulcer of lower extremity, left, limited to breakdown of skin (HCC)    Iron deficiency anemia    [facility]acute kidney injury) [facility]    Knee pain    Pressure ulcer of both heels    Pressure ulcer of left heel, stage 3 ([facility]       Procedure Note    Performed by: JOBY Seth CNP    Consent obtained: Yes    Time out taken:  Yes    Pain Control:   4% lidocaine    Debridement:Excisional Debridement    Using curette, #15 blade scalpel and forceps the wound was sharply debrided    down through and including the removal of epidermis, dermis and subcutaneous tissue.         Devitalized Tissue Debrided:  fibrin, biofilm, slough and necrotic/eschar    Pre Debridement Measurements:  Are located in the Wound Documentation Flow Sheet    Wound #: 5     Post  Debridement Measurements:  Wound 01/28/21 Heel Left #5 (Active)   Wound Etiology Pressure Stage  3 03/04/21 1328   Wound Cleansed Cleansed with saline 02/18/21 1301   Wound Length (cm) 2.2 cm 03/04/21 1328   Wound Width (cm) 2.7 cm 03/04/21 1328   Wound Depth (cm) 0.1 cm 03/04/21 1328   Wound Surface Area (cm^2) 5.94 cm^2 03/04/21 1328   Change in Wound Size % (l*w) 73 03/04/21 1328   Wound Volume (cm^3) 0.59 cm^3 03/04/21 1328   Wound Healing % 73 03/04/21 1328   Post-Procedure Length (cm) 2.3 cm 03/04/21 1347   Post-Procedure Width (cm) 2.8 cm 03/04/21 1347   Post-Procedure Depth (cm) 0.2 cm 03/04/21 1347   Post-Procedure Surface Area (cm^2) 6.44 cm^2 03/04/21 1347   Post-Procedure Volume (cm^3) 1.29 cm^3 03/04/21 1347   Wound Assessment Bleeding 03/04/21 1347   Drainage Amount Moderate 03/04/21 1347   Drainage Description Serosanguinous 03/04/21 1347   Odor None 03/04/21 1328   Ximena-wound Assessment Hyperkeratosis (callous) 03/04/21 1328   Margins Attached edges; Defined edges 03/04/21 1328   Wound Thickness Description not for Pressure Injury Full thickness 03/04/21 1328   Number of days: 41       Wound 01/28/21 Heel Right #6 (Active)   Wound Etiology Pressure Unstageable 03/04/21 1328   Wound Cleansed Cleansed with saline 03/04/21 1328   Wound Length (cm) 0 cm 03/04/21 1328   Wound Width (cm) 0 cm 03/04/21 1328   Wound Depth (cm) 0 cm 03/04/21 1328   Wound Surface Area (cm^2) 0 cm^2 03/04/21 1328   Wound Volume (cm^3) 0 cm^3 03/04/21 1328   Wound Assessment Dry;Eschar dry 03/04/21 1328   Drainage Amount None 03/04/21 1328   Ximena-wound Assessment Intact 03/04/21 1328   Margins Attached edges; Defined edges 03/04/21 1328   Number of days: 41           Total Surface Area Debrided:  6.44 sq cm     Percentage of wound debrided 100 %    Bleeding:  Minimal    Hemostasis Achieved:  by pressure    Procedural Pain:  2  / 10     Post Procedural Pain:  1 / 10     Response to treatment:  Well tolerated by patient. Plan:     The nature of the patient's condition was explained in depth. The patient was informed that their compliance to the treatment plan is paramount to successful healing and prevention of further ulceration and/or infection     Discharge Treatment   Dressing care: Right and Left leg apply Betamethasone,  6\"Ace -. Left  heel-Apply Santyl, Adaptic and dry dressing - Change daily- .Right heel apply Betadine and dry Keramax Border -change every other day. You have been given a prescription for Santyl. Santyl is an enzyme that dissolves dead tissue in the wound. Apply a nickel-thickness amount to the wound and cover with damp NSS, change daily. Get xray and Lab work. Start anti-biotic.       Written Patient Discharge Instructions Given            Electronically signed by JOBY Rico CNP on 3/11/2021 at 1:15 PM

## 2021-03-16 NOTE — PROGRESS NOTES
Spoke to [facility] and gave orders for applying Wound Vac, 125 mm hg to left heel with bridge , drape under all foam. Home care to change Monday, Wednesday , Friday. Called patient to inform.

## 2021-03-18 ENCOUNTER — HOSPITAL ENCOUNTER (OUTPATIENT)
Dept: WOUND CARE | Age: 76
Discharge: HOME | End: 2021-03-18
Payer: MEDICARE

## 2021-03-18 VITALS — HEART RATE: 77 BPM | SYSTOLIC BLOOD PRESSURE: 154 MMHG | DIASTOLIC BLOOD PRESSURE: 80 MMHG | RESPIRATION RATE: 16 BRPM

## 2021-03-18 DIAGNOSIS — L89.619 PRESSURE ULCER OF BOTH HEELS: ICD-10-CM

## 2021-03-18 DIAGNOSIS — I87.2 VENOUS STASIS DERMATITIS OF BOTH LOWER EXTREMITIES: ICD-10-CM

## 2021-03-18 DIAGNOSIS — I89.0 LYMPHEDEMA: ICD-10-CM

## 2021-03-18 DIAGNOSIS — L97.921 ULCER OF LOWER EXTREMITY, LEFT, LIMITED TO BREAKDOWN OF SKIN (HCC): ICD-10-CM

## 2021-03-18 DIAGNOSIS — L97.911 ULCER OF RIGHT LEG, LIMITED TO BREAKDOWN OF SKIN (HCC): ICD-10-CM

## 2021-03-18 DIAGNOSIS — L89.629 PRESSURE ULCER OF BOTH HEELS: ICD-10-CM

## 2021-03-18 DIAGNOSIS — L89.623 PRESSURE ULCER OF LEFT HEEL, STAGE 3 (HCC): Primary | ICD-10-CM

## 2021-03-18 DIAGNOSIS — M79.89 LEG SWELLING: ICD-10-CM

## 2021-03-18 PROCEDURE — 11042 DBRDMT SUBQ TIS 1ST 20SQCM/<: CPT

## 2021-03-18 PROCEDURE — 11042 DBRDMT SUBQ TIS 1ST 20SQCM/<: CPT | Performed by: NURSE PRACTITIONER

## 2021-03-18 RX ORDER — BACITRACIN, NEOMYCIN, POLYMYXIN B 400; 3.5; 5 [USP'U]/G; MG/G; [USP'U]/G
OINTMENT TOPICAL ONCE
Status: CANCELLED | OUTPATIENT
Start: 2021-03-18 | End: 2021-03-18

## 2021-03-18 RX ORDER — BETAMETHASONE DIPROPIONATE 0.05 %
OINTMENT (GRAM) TOPICAL ONCE
Status: CANCELLED | OUTPATIENT
Start: 2021-03-18 | End: 2021-03-18

## 2021-03-18 RX ORDER — LIDOCAINE 40 MG/G
CREAM TOPICAL ONCE
Status: CANCELLED | OUTPATIENT
Start: 2021-03-18 | End: 2021-03-18

## 2021-03-18 RX ORDER — GENTAMICIN SULFATE 1 MG/G
OINTMENT TOPICAL ONCE
Status: CANCELLED | OUTPATIENT
Start: 2021-03-18 | End: 2021-03-18

## 2021-03-18 RX ORDER — LIDOCAINE 40 MG/G
CREAM TOPICAL ONCE
Status: DISCONTINUED | OUTPATIENT
Start: 2021-03-18 | End: 2021-03-19 | Stop reason: HOSPADM

## 2021-03-18 RX ORDER — BACITRACIN ZINC AND POLYMYXIN B SULFATE 500; 1000 [USP'U]/G; [USP'U]/G
OINTMENT TOPICAL ONCE
Status: CANCELLED | OUTPATIENT
Start: 2021-03-18 | End: 2021-03-18

## 2021-03-18 RX ORDER — LIDOCAINE HYDROCHLORIDE 40 MG/ML
SOLUTION TOPICAL ONCE
Status: CANCELLED | OUTPATIENT
Start: 2021-03-18 | End: 2021-03-18

## 2021-03-18 RX ORDER — GINSENG 100 MG
CAPSULE ORAL ONCE
Status: CANCELLED | OUTPATIENT
Start: 2021-03-18 | End: 2021-03-18

## 2021-03-18 RX ORDER — LIDOCAINE 50 MG/G
OINTMENT TOPICAL ONCE
Status: CANCELLED | OUTPATIENT
Start: 2021-03-18 | End: 2021-03-18

## 2021-03-18 RX ORDER — CLOBETASOL PROPIONATE 0.5 MG/G
OINTMENT TOPICAL ONCE
Status: CANCELLED | OUTPATIENT
Start: 2021-03-18 | End: 2021-03-18

## 2021-03-18 NOTE — PROGRESS NOTES
[facility]  [address] [facility], [address] [place] [address] Road  Telephone: [phone]        [facility] Fax # [phone]        Discharge Instructions       [facility]  [address] [facility], [address] [place] [address] Road  Telephone: [phone]     Discharge Instructions:  Keep weight off wounds and reposition every 2 hours if applicable. Avoid standing for long periods of time. If wound(s) is on your lower extremity, elevate legs to the level of the heart or above for 30 minutes 4-5 times a day and/or when sitting. Do not get wounds wet in bath or shower unless otherwise instructed by your physician. If your wound is on you foot or leg, you may purchase a cast bag. Please ask at the pharmacy. When taking antibiotics take entire prescription as ordered by MD do not stop taking until medicine is all gone. Exercise as tolerated. No Smoking. Smoking prohibits wound healing. If Vascular testing is ordered, please call [address] [place] [address] [phone]) to schedule. Vascular tests ordered by Wound Care Physicians may take up to 2 hours to complete. Please keep that in mind when scheduling. If Vascular testing is scheduled, please bring supplies to replace your dressing after testing is done. The vascular department does not stock supplies. Wound: Bilateral Legs     With each dressing change, rinse wounds with 0.9% Saline. (May use wound wash or soft contact solution. Both can be purchased at a local drug store). If unable to obtain saline, may use a gentle soap and water. Dressing care: In the morning change dressing on heel with Vashe moistened gauze and dry dressing. Home care to change Wound vac. Skin prep to tavon-wound, drape around wound to protect good skin, bridge up side of leg so patient does not stand on track- NPWT continuous at 120 mmHg or 125 mmHg (depending on the type of NPWT device), change Monday, Wednesday, & Friday.  Home care to change. If unable to place NPWT, place wet to dry, dry dressing- change daily. Apply Thigh high Lymphedema pumps for 1 hour twice daily. - 1st application 1 hour after Breakfast , 2nd application 1 hour after dinner   Prevalon Boots or Heel Protecting Boot to Heels when sitting or in bed. Can call Viky,. [facility]  [address]  [facility]  P: [phone]  F: [phone]      Important dietary reminders:  1. Increase Protein intake for optimal wound healing  2. No added salt to reduce any swelling  3. If diabetic, good glucose control  4. If you smoke, smoking affects wound healing, we ask that you refrain from smoking. Follow up with Andrew Odom CNP In 1 week in the wound care center. Call [phone] for any questions or concerns. Your  is Haylie Raphael42: [address] [facility] Information: Should you experience any significant changes in your wound(s) or have questions about your wound care, please contact the [employer] at [phone] Monday  - Thursday 8:00 am - 4:00 pm and Friday 8:00 am - 1:00pm. If you need help with your wound outside these hours and cannot wait until we are again available, contact your PCP or go to the hospital emergency room. PLEASE NOTE: IF YOU ARE UNABLE TO OBTAIN WOUND SUPPLIES, CONTINUE TO USE THE SUPPLIES YOU HAVE AVAILABLE UNTIL YOU ARE ABLE TO REACH US. IT IS MOST IMPORTANT TO KEEP THE WOUND COVERED AT ALL TIMES. Skilled nurse to evaluate and treat for wound care. Change dressing as ordered  once a day on Monday, Wednesday and Friday using clean technique. Patient/Family/caregiver may change dressings as needed as instructed when Home Care unavailable.     WOUNDS REQUIRING DRESSING Changes:     Wound 01/28/21 Heel Left #5 (Active)   Wound Image   02/04/21 1511   Wound Etiology Pressure Stage  3 03/18/21 1432   Wound Cleansed Cleansed with saline 03/18/21 1432   Wound Length (cm) 2.5 cm 03/18/21 1432   Wound Width (cm) 2.8 cm 03/18/21 1432   Wound Depth (cm) 0.2 cm 03/18/21 1432   Wound Surface Area (cm^2) 7 cm^2 03/18/21 1432   Change in Wound Size % (l*w) 68.18 03/18/21 1432   Wound Volume (cm^3) 1.4 cm^3 03/18/21 1432   Wound Healing % 36 03/18/21 1432   Post-Procedure Length (cm) 2.6 cm 03/18/21 1500   Post-Procedure Width (cm) 2.9 cm 03/18/21 1500   Post-Procedure Depth (cm) 0.8 cm 03/18/21 1500   Post-Procedure Surface Area (cm^2) 7.54 cm^2 03/18/21 1500   Post-Procedure Volume (cm^3) 6.03 cm^3 03/18/21 1500   Wound Assessment Bleeding 03/18/21 1500   Drainage Amount Moderate 03/18/21 1500   Drainage Description Serosanguinous 03/18/21 1500   Odor Mild 03/18/21 1432   Ximena-wound Assessment Maceration 03/18/21 1432   Margins Defined edges 03/18/21 1432   Wound Thickness Description not for Pressure Injury Full thickness 03/18/21 1432   Number of days: 48       Wound 01/28/21 Heel Right #6 (Active)   Wound Image   03/18/21 1432   Wound Etiology Pressure Unstageable 03/11/21 1320   Wound Cleansed Cleansed with saline 03/11/21 1320   Wound Length (cm) 0 cm 03/18/21 1432   Wound Width (cm) 0 cm 03/18/21 1432   Wound Depth (cm) 0 cm 03/18/21 1432   Wound Surface Area (cm^2) 0 cm^2 03/18/21 1432   Wound Volume (cm^3) 0 cm^3 03/18/21 1432   Wound Assessment Other (Comment) 03/18/21 1432   Drainage Amount None 03/18/21 1432   Odor None 03/11/21 1320   Ximena-wound Assessment Intact 03/11/21 1320   Margins Attached edges 03/11/21 1320   Number of days: 48          Patient seen and treated on 3/18/2021    By Jose Mari  [id number]   (provider/NPI)

## 2021-03-25 ENCOUNTER — HOSPITAL ENCOUNTER (OUTPATIENT)
Dept: WOUND CARE | Age: 76
Discharge: HOME | End: 2021-03-25
Payer: MEDICARE

## 2021-03-25 VITALS — SYSTOLIC BLOOD PRESSURE: 160 MMHG | RESPIRATION RATE: 16 BRPM | DIASTOLIC BLOOD PRESSURE: 82 MMHG | HEART RATE: 78 BPM

## 2021-03-25 DIAGNOSIS — L97.911 ULCER OF RIGHT LEG, LIMITED TO BREAKDOWN OF SKIN (HCC): ICD-10-CM

## 2021-03-25 DIAGNOSIS — L89.619 PRESSURE ULCER OF BOTH HEELS: ICD-10-CM

## 2021-03-25 DIAGNOSIS — I89.0 LYMPHEDEMA: ICD-10-CM

## 2021-03-25 DIAGNOSIS — I87.2 VENOUS INSUFFICIENCY: ICD-10-CM

## 2021-03-25 DIAGNOSIS — M79.89 LEG SWELLING: ICD-10-CM

## 2021-03-25 DIAGNOSIS — I87.2 VENOUS STASIS DERMATITIS OF BOTH LOWER EXTREMITIES: ICD-10-CM

## 2021-03-25 DIAGNOSIS — L89.623 PRESSURE ULCER OF LEFT HEEL, STAGE 3 (HCC): Primary | ICD-10-CM

## 2021-03-25 DIAGNOSIS — I73.9 PERIPHERAL VASCULAR DISEASE (HCC): ICD-10-CM

## 2021-03-25 DIAGNOSIS — L89.629 PRESSURE ULCER OF BOTH HEELS: ICD-10-CM

## 2021-03-25 DIAGNOSIS — L97.921 ULCER OF LOWER EXTREMITY, LEFT, LIMITED TO BREAKDOWN OF SKIN (HCC): ICD-10-CM

## 2021-03-25 PROCEDURE — 11042 DBRDMT SUBQ TIS 1ST 20SQCM/<: CPT

## 2021-03-25 PROCEDURE — 11042 DBRDMT SUBQ TIS 1ST 20SQCM/<: CPT | Performed by: NURSE PRACTITIONER

## 2021-03-25 RX ORDER — LIDOCAINE 40 MG/G
CREAM TOPICAL ONCE
Status: CANCELLED | OUTPATIENT
Start: 2021-03-25 | End: 2021-03-25

## 2021-03-25 RX ORDER — GENTAMICIN SULFATE 1 MG/G
OINTMENT TOPICAL ONCE
Status: CANCELLED | OUTPATIENT
Start: 2021-03-25 | End: 2021-03-25

## 2021-03-25 RX ORDER — LIDOCAINE 40 MG/G
CREAM TOPICAL ONCE
Status: DISCONTINUED | OUTPATIENT
Start: 2021-03-25 | End: 2021-03-26 | Stop reason: HOSPADM

## 2021-03-25 RX ORDER — LIDOCAINE 50 MG/G
OINTMENT TOPICAL ONCE
Status: CANCELLED | OUTPATIENT
Start: 2021-03-25 | End: 2021-03-25

## 2021-03-25 RX ORDER — CLOBETASOL PROPIONATE 0.5 MG/G
OINTMENT TOPICAL ONCE
Status: CANCELLED | OUTPATIENT
Start: 2021-03-25 | End: 2021-03-25

## 2021-03-25 RX ORDER — LIDOCAINE HYDROCHLORIDE 40 MG/ML
SOLUTION TOPICAL ONCE
Status: CANCELLED | OUTPATIENT
Start: 2021-03-25 | End: 2021-03-25

## 2021-03-25 RX ORDER — BACITRACIN, NEOMYCIN, POLYMYXIN B 400; 3.5; 5 [USP'U]/G; MG/G; [USP'U]/G
OINTMENT TOPICAL ONCE
Status: CANCELLED | OUTPATIENT
Start: 2021-03-25 | End: 2021-03-25

## 2021-03-25 RX ORDER — GINSENG 100 MG
CAPSULE ORAL ONCE
Status: CANCELLED | OUTPATIENT
Start: 2021-03-25 | End: 2021-03-25

## 2021-03-25 RX ORDER — BACITRACIN ZINC AND POLYMYXIN B SULFATE 500; 1000 [USP'U]/G; [USP'U]/G
OINTMENT TOPICAL ONCE
Status: CANCELLED | OUTPATIENT
Start: 2021-03-25 | End: 2021-03-25

## 2021-03-25 RX ORDER — BETAMETHASONE DIPROPIONATE 0.05 %
OINTMENT (GRAM) TOPICAL ONCE
Status: CANCELLED | OUTPATIENT
Start: 2021-03-25 | End: 2021-03-25

## 2021-03-25 NOTE — PLAN OF CARE
Discharge instructions given. Patient verbalized understanding. Return to [address] [place] [address],[address] in 1 week.   Called/faxed orders to  [facility]

## 2021-03-25 NOTE — PROGRESS NOTES
[facility]  [address] [facility], [address]  Telephone: [phone]        [facility] Fax # [phone]        Discharge Instructions       [facility]  [address] [facility], [address]  Telephone: [phone]     Discharge Instructions:  Keep weight off wounds and reposition every 2 hours if applicable. Avoid standing for long periods of time. If wound(s) is on your lower extremity, elevate legs to the level of the heart or above for 30 minutes 4-5 times a day and/or when sitting. Do not get wounds wet in bath or shower unless otherwise instructed by your physician. If your wound is on you foot or leg, you may purchase a cast bag. Please ask at the pharmacy. When taking antibiotics take entire prescription as ordered by MD do not stop taking until medicine is all gone. Exercise as tolerated. No Smoking. Smoking prohibits wound healing. If Vascular testing is ordered, please call [address] [place] [address] [phone]) to schedule. Vascular tests ordered by Wound Care Physicians may take up to 2 hours to complete. Please keep that in mind when scheduling. If Vascular testing is scheduled, please bring supplies to replace your dressing after testing is done. The vascular department does not stock supplies. Wound: Bilateral Legs     With each dressing change, rinse wounds with 0.9% Saline. (May use wound wash or soft contact solution. Both can be purchased at a local drug store). If unable to obtain saline, may use a gentle soap and water. Dressing care: Left Heel-  Home care to change Wound vac. Skin prep to tavon-wound, drape around wound to protect good skin, bridge up side of leg so patient does not stand on track- NPWT continuous at 150 mmHg (depending on the type of NPWT device), change Monday, Wednesday, & Friday. Home care to change. If unable to place NPWT, place wet to dry, dry dressing- change daily. Right Heel- Apply Mepilex Border                Apply Thigh high Lymphedema pumps for 1 hour twice daily. - 1st application 1 hour after Breakfast , 2nd application 1 hour after dinner   Prevalon Boots or Heel Protecting Boot to Heels when sitting or in bed. Can call Viky. [facility]  [address]  [facility], [place]  P: [phone]  F: [phone]      Important dietary reminders:  1. Increase Protein intake for optimal wound healing  2. No added salt to reduce any swelling  3. If diabetic, good glucose control  4. If you smoke, smoking affects wound healing, we ask that you refrain from smoking. Follow up with Casandra Gage CNP In 1 week in the wound care center. Call [phone] for any questions or concerns. Your  is Haylie Raphael42: [address] 63 [address] [facility] Information: Should you experience any significant changes in your wound(s) or have questions about your wound care, please contact the [employer] at [phone] Monday  - Thursday 8:00 am - 4:00 pm and Friday 8:00 am - 1:00pm. If you need help with your wound outside these hours and cannot wait until we are again available, contact your PCP or go to the hospital emergency room. PLEASE NOTE: IF YOU ARE UNABLE TO OBTAIN WOUND SUPPLIES, CONTINUE TO USE THE SUPPLIES YOU HAVE AVAILABLE UNTIL YOU ARE ABLE TO REACH US. IT IS MOST IMPORTANT TO KEEP THE WOUND COVERED AT ALL TIMES. Skilled nurse to evaluate and treat for wound care. Change dressing as ordered  once a day on Monday, Wednesday and Friday using clean technique. Patient/Family/caregiver may change dressings as needed as instructed when Home Care unavailable.     WOUNDS REQUIRING DRESSING Changes:     Wound 01/28/21 Heel Left #5 (Active)   Wound Image   02/04/21 1511   Wound Etiology Pressure Stage  3 03/25/21 1317   Wound Cleansed Cleansed with saline 03/25/21 1317   Wound Length (cm) 2.3 cm 03/25/21 1317   Wound Width (cm) 2.7 cm 03/25/21 1317   Wound Depth (cm) 0.2 cm 03/25/21 1317   Wound Surface Area (cm^2) 6.21 cm^2 03/25/21 1317   Change in Wound Size % (l*w) 71.77 03/25/21 1317   Wound Volume (cm^3) 1.24 cm^3 03/25/21 1317   Wound Healing % 44 03/25/21 1317   Post-Procedure Length (cm) 2.4 cm 03/25/21 1343   Post-Procedure Width (cm) 2.7 cm 03/25/21 1343   Post-Procedure Depth (cm) 0.2 cm 03/25/21 1343   Post-Procedure Surface Area (cm^2) 6.48 cm^2 03/25/21 1343   Post-Procedure Volume (cm^3) 1.3 cm^3 03/25/21 1343   Wound Assessment Bleeding 03/25/21 1343   Drainage Amount Moderate 03/25/21 1343   Drainage Description Serosanguinous 03/25/21 1343   Odor None 03/25/21 1317   Ximena-wound Assessment Maceration 03/25/21 1317   Margins Defined edges 03/25/21 1317   Wound Thickness Description not for Pressure Injury Full thickness 03/25/21 1317   Number of days: 55       Wound 01/28/21 Heel Right #6 (Active)   Wound Image   03/18/21 1432   Wound Etiology Pressure Unstageable 03/11/21 1320   Wound Cleansed Cleansed with saline 03/11/21 1320   Wound Length (cm) 0 cm 03/25/21 1317   Wound Width (cm) 0 cm 03/25/21 1317   Wound Depth (cm) 0 cm 03/25/21 1317   Wound Surface Area (cm^2) 0 cm^2 03/25/21 1317   Wound Volume (cm^3) 0 cm^3 03/25/21 1317   Wound Assessment Other (Comment) 03/18/21 1432   Drainage Amount None 03/25/21 1317   Odor None 03/11/21 1320   Ximena-wound Assessment Intact 03/11/21 1320   Margins Attached edges 03/11/21 1320   Number of days: 55          Patient seen and treated on 3/25/2021    By Gilberto Mari  [id number]   (provider/NPI)

## 2021-03-26 NOTE — PROGRESS NOTES
Bobby Mroeno  Progress Note and Procedure Note      Sg Robertson  AGE: 66 y.o. GENDER: female  : 1945  TODAY'S DATE:  3/25/2021    Subjective:     Chief Complaint   Patient presents with    Wound Check     stage 3 pressure ulcer left heel         HISTORY of PRESENT ILLNESS HPI  Mccord Foot a 66 y. o. female who presents today for wound evaluation. Pt accompanied by  who assists pt with bilateral lower leg care.  Pt has NPWT unit and dressing to left heel wound. Pt has long history of leg wounds. Bilateral lower leg wounds were healed and discharged from [facility] last 2020. Pt states wounds reopened last 2020.  Pt has lymphedema pumps at home but reports she has stopped using them because of NPWT, also reports stopped using heel pressure reducing boots and is noticing more tenderness right heel.  Pt admits to \"picking wounds\" and \"scratching\" legs. Lat week right heel ulcer eschar fell off with pink closed skin underneath.  Left heel wound is Stage 3.    History of Wound: off and on for last 5 years. Wound Pain:  intermittent, mild  Severity:  1 / 10   Wound Type:  venous  Modifying Factors:  edema, venous stasis, lymphedema, diabetes, obesity and non-adherence  Associated Signs/Symptoms:  edema, erythema, drainage and pain                                       PAST MEDICAL HISTORY        Diagnosis Date    Arthritis     Blood circulation, collateral     Blood transfusion     CAD (coronary artery disease)     CHF (congestive heart failure) (HCC)     Chronic renal failure, stage 3 (moderate)     Diabetes mellitus (HCC)     GERD (gastroesophageal reflux disease)     Hyperlipidemia     Hypertension     Leg swelling 2018    Lymphedema 2018    Pressure ulcer of both heels 2021    Deep tissue injuries to right and left heels with left>right. Date of origin unknown.     Pressure ulcer of left heel, stage 3 ([address] 2021    Ulcer of right leg, limited to breakdown of skin ([facility] 8/20/2018       PAST SURGICAL HISTORY    Past Surgical History:   Procedure Laterality Date    ABDOMEN SURGERY      CARDIAC SURGERY      2009    CATARACT REMOVAL WITH IMPLANT Left 11/23/2016    CATARACT REMOVAL WITH IMPLANT Right 11/02/2016    CATARACT REMOVAL WITH IMPLANT Bilateral 10/16, 11/16    CHOLECYSTECTOMY      COLONOSCOPY      ENDOSCOPY, COLON, DIAGNOSTIC      EYE SURGERY      left tear duct surgery    JOINT REPLACEMENT      BTKR    KNEE ARTHROPLASTY  09/15/2010    left total knee    TOTAL KNEE ARTHROPLASTY      VASCULAR SURGERY         FAMILY HISTORY    Family History   Problem Relation Age of Onset    Diabetes Mother     Heart Disease Mother     Heart Disease Father     Stroke Father        SOCIAL HISTORY    Social History     Tobacco Use    Smoking status: Never Smoker    Smokeless tobacco: Never Used   Substance Use Topics    Alcohol use: No    Drug use: No       ALLERGIES    Allergies   Allergen Reactions    Adhesive Tape Shortness Of Breath     Other reaction(s): Ulcers    Chlorhexidine     Lisinopril Other (See Comments)     Med causes lethargy- takes in lower doses       MEDICATIONS    Current Outpatient Medications on File Prior to Encounter   Medication Sig Dispense Refill    betamethasone dipropionate (DIPROLENE) 0.05 % ointment Apply topically daily.  45 g 2    furosemide (LASIX) 20 MG tablet TAKE ONE TABLET BY MOUTH TWICE DAILY  60 tablet 3    Coenzyme Q10 (CO Q-10) 400 MG CAPS Take 400 mg by mouth daily 30 capsule 5    XARELTO 15 MG TABS tablet TAKE ONE TABLET BY MOUTH DAILY 90 tablet 1    Liraglutide (VICTOZA) 18 MG/3ML SOPN SC injection Inject 1.2 mg into the skin daily      Handicap Placard MISC by Does not apply route Sob when walking less than 200 feet  Length of time--greater than 5 year 1 each 0    ferrous sulfate 325 (65 Fe) MG tablet Take 1 tablet by mouth 3 times daily (with meals) 90 tablet 3    atorvastatin (LIPITOR) 20 MG tablet Take 20 mg by mouth daily      vitamin D (CHOLECALCIFEROL) 400 UNITS TABS tablet Take 400 Units by mouth daily      metoprolol (LOPRESSOR) 25 MG tablet Take 0.5 tablets by mouth 2 times daily 60 tablet 3    therapeutic multivitamin-minerals (THERAGRAN-M) tablet Take 1 tablet by mouth daily.  hydrOXYzine (ATARAX) 25 MG tablet Take 25 mg by mouth 3 times daily as needed.  ranitidine (ZANTAC) 300 MG tablet Take 150 mg by mouth 2 times daily        No current facility-administered medications on file prior to encounter. REVIEW OF SYSTEMS    Pertinent items are noted in HPI. Objective:      BP (!) 160/82   Pulse 78   Resp 16     PHYSICAL EXAM    General Appearance: alert and oriented to person, place and time, well-developed and well-nourished, in no acute distress, obese and pale  Skin: warm and dry  Head: normocephalic and atraumatic  Eyes: pupils equal, round, and reactive to light  Pulmonary/Chest:  normal air movement, no respiratory distress  Cardiovascular: normal rate, regular rhythm and intact distal pulses  Extremities: no cyanosis, no clubbing and no edema  Wounds:  Keeping scratched area on right lower leg covered with foam dressings to keep from scratching.        Assessment:     Patient Active Problem List   Diagnosis    Osteoarthritis of left knee    Acute blood loss anemia    Diabetes mellitus ([address]    Ischemic cardiomyopathy    Atrial fibrillation    Peripheral vascular disease ([address]    CHF exacerbation (HCC)    Acute on chronic combined systolic and diastolic heart failure (HCC)    Chronic renal failure, stage 3 (moderate)    Chronic atrial fibrillation (HCC)    Pleural effusion    Atherosclerosis of native coronary artery without angina pectoris    Essential hypertension    Chronic combined systolic and diastolic congestive heart failure (HCC)    Hx of CABG    Non-rheumatic tricuspid valve insufficiency    Mixed hyperlipidemia    [facility]ot[facility] disease, bilateral [facility]    Atherosclerosis of native coronary artery of native heart without angina pectoris    Chronic kidney disease    Hyperlipidemia    INOCENCIO (obstructive sleep apnea)    Lymphedema    Leg swelling    Venous stasis dermatitis of both lower extremities    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 (HCC)    Venous insufficiency    Ulcer of right leg, limited to breakdown of skin (HCC)    Ulcer of lower extremity, left, limited to breakdown of skin (HCC)    Iron deficiency anemia    CHANO (acute kidney injury) ([facility]    Knee pain    Pressure ulcer of both heels    [facility]       Procedure Note    Performed by: Jerene Schaumann, APRN - CNP    Consent obtained: Yes    Time out taken:  Yes    Pain Control: Anesthetic  Anesthetic: 4% Lidocaine Cream     Debridement:Excisional Debridement    Using curette the wound was sharply debrided    down through and including the removal of epidermis, dermis and subcutaneous tissue.         Devitalized Tissue Debrided:  fibrin, biofilm and slough    Pre Debridement Measurements:  Are located in the Wound Documentation Flow Sheet    Wound #: 5     Post  Debridement Measurements:  Wound 01/28/21 Heel Left #5 (Active)   Wound Image   02/04/21 1511   Wound Etiology Pressure Stage  3 03/25/21 1317   Wound Cleansed Cleansed with saline 03/25/21 1317   Wound Length (cm) 2.3 cm 03/25/21 1317   Wound Width (cm) 2.7 cm 03/25/21 1317   Wound Depth (cm) 0.2 cm 03/25/21 1317   Wound Surface Area (cm^2) 6.21 cm^2 03/25/21 1317   Change in Wound Size % (l*w) 71.77 03/25/21 1317   Wound Volume (cm^3) 1.24 cm^3 03/25/21 1317   Wound Healing % 44 03/25/21 1317   Post-Procedure Length (cm) 2.4 cm 03/25/21 1343   Post-Procedure Width (cm) 2.7 cm 03/25/21 1343   Post-Procedure Depth (cm) 0.2 cm 03/25/21 1343   Post-Procedure Surface Area (cm^2) 6.48 cm^2 03/25/21 1343   Post-Procedure Volume (cm^3) 1.3 cm^3 03/25/21 1343   Wound Assessment Bleeding 03/25/21 1343   Drainage Amount Moderate 03/25/21 1343   Drainage Description Serosanguinous 03/25/21 1343   Odor None 03/25/21 1317   Tavon-wound Assessment Maceration 03/25/21 1317   Margins Defined edges 03/25/21 1317   Wound Thickness Description not for Pressure Injury Full thickness 03/25/21 1317   Number of days: 56       Wound 01/28/21 Heel Right #6 (Active)   Wound Image   03/18/21 1432   Wound Etiology Pressure Unstageable 03/11/21 1320   Wound Cleansed Cleansed with saline 03/11/21 1320   Wound Length (cm) 0 cm 03/25/21 1317   Wound Width (cm) 0 cm 03/25/21 1317   Wound Depth (cm) 0 cm 03/25/21 1317   Wound Surface Area (cm^2) 0 cm^2 03/25/21 1317   Wound Volume (cm^3) 0 cm^3 03/25/21 1317   Wound Assessment Other (Comment) 03/18/21 1432   Drainage Amount None 03/25/21 1317   Odor None 03/11/21 1320   Tavon-wound Assessment Intact 03/11/21 1320   Margins Attached edges 03/11/21 1320   Number of days: 56           Total Surface Area Debrided:  6.48 sq cm     Percentage of wound debrided 100 %    Bleeding:  Minimal    Hemostasis Achieved:  by pressure    Procedural Pain:  1  / 10     Post Procedural Pain:  0 / 10     Response to treatment:  Well tolerated by patient. Plan:     The nature of the patient's condition was explained in depth. The patient was informed that their compliance to the treatment plan is paramount to successful healing and prevention of further ulceration and/or infection. Reviewed all home treatments and encouraged them to continue use of lymphedema pumps twice a day at home and to continue using pressure relieving heel boots to float heels. Will  Increase NPWT to 150 mmHg continuously. Pt and  agree with plan of care and questions answered. Discharge Treatment   Dressing care: Left Heel-  Home care to change Wound vac.  Skin prep to tavon-wound, drape around wound to protect good skin, bridge up side of leg so patient does not stand on track- NPWT continuous at 150 mmHg (depending on the type of NPWT device), change Monday, Wednesday, & Friday. Home care to change. If unable to place NPWT, place wet to dry, dry dressing- change daily.       Written Patient Discharge Instructions Given            Electronically signed by JOBY Felipe CNP on 3/26/2021 at 8:24 AM

## 2021-04-01 ENCOUNTER — HOSPITAL ENCOUNTER (OUTPATIENT)
Dept: WOUND CARE | Age: 76
Discharge: HOME | End: 2021-04-01
Payer: MEDICARE

## 2021-04-01 VITALS — HEART RATE: 61 BPM | SYSTOLIC BLOOD PRESSURE: 144 MMHG | RESPIRATION RATE: 16 BRPM | DIASTOLIC BLOOD PRESSURE: 62 MMHG

## 2021-04-01 DIAGNOSIS — L97.921 ULCER OF LOWER EXTREMITY, LEFT, LIMITED TO BREAKDOWN OF SKIN (HCC): ICD-10-CM

## 2021-04-01 DIAGNOSIS — L89.623 PRESSURE ULCER OF LEFT HEEL, STAGE 3 (HCC): Primary | ICD-10-CM

## 2021-04-01 DIAGNOSIS — L89.629 PRESSURE ULCER OF BOTH HEELS: ICD-10-CM

## 2021-04-01 DIAGNOSIS — L97.911 ULCER OF RIGHT LEG, LIMITED TO BREAKDOWN OF SKIN (HCC): ICD-10-CM

## 2021-04-01 DIAGNOSIS — M79.89 LEG SWELLING: ICD-10-CM

## 2021-04-01 DIAGNOSIS — L89.619 PRESSURE ULCER OF BOTH HEELS: ICD-10-CM

## 2021-04-01 DIAGNOSIS — I87.2 VENOUS STASIS DERMATITIS OF BOTH LOWER EXTREMITIES: ICD-10-CM

## 2021-04-01 DIAGNOSIS — I89.0 LYMPHEDEMA: ICD-10-CM

## 2021-04-01 DIAGNOSIS — I73.9 PERIPHERAL VASCULAR DISEASE (HCC): ICD-10-CM

## 2021-04-01 PROCEDURE — 11042 DBRDMT SUBQ TIS 1ST 20SQCM/<: CPT

## 2021-04-01 PROCEDURE — 11042 DBRDMT SUBQ TIS 1ST 20SQCM/<: CPT | Performed by: NURSE PRACTITIONER

## 2021-04-01 RX ORDER — BACITRACIN, NEOMYCIN, POLYMYXIN B 400; 3.5; 5 [USP'U]/G; MG/G; [USP'U]/G
OINTMENT TOPICAL ONCE
Status: CANCELLED | OUTPATIENT
Start: 2021-04-01 | End: 2021-04-01

## 2021-04-01 RX ORDER — BETAMETHASONE DIPROPIONATE 0.05 %
OINTMENT (GRAM) TOPICAL ONCE
Status: CANCELLED | OUTPATIENT
Start: 2021-04-01 | End: 2021-04-01

## 2021-04-01 RX ORDER — CLOBETASOL PROPIONATE 0.5 MG/G
OINTMENT TOPICAL ONCE
Status: CANCELLED | OUTPATIENT
Start: 2021-04-01 | End: 2021-04-01

## 2021-04-01 RX ORDER — LIDOCAINE HYDROCHLORIDE 40 MG/ML
SOLUTION TOPICAL ONCE
Status: CANCELLED | OUTPATIENT
Start: 2021-04-01 | End: 2021-04-01

## 2021-04-01 RX ORDER — GINSENG 100 MG
CAPSULE ORAL ONCE
Status: CANCELLED | OUTPATIENT
Start: 2021-04-01 | End: 2021-04-01

## 2021-04-01 RX ORDER — LIDOCAINE 40 MG/G
CREAM TOPICAL ONCE
Status: DISCONTINUED | OUTPATIENT
Start: 2021-04-01 | End: 2021-04-02 | Stop reason: HOSPADM

## 2021-04-01 RX ORDER — LIDOCAINE 50 MG/G
OINTMENT TOPICAL ONCE
Status: CANCELLED | OUTPATIENT
Start: 2021-04-01 | End: 2021-04-01

## 2021-04-01 RX ORDER — LIDOCAINE 40 MG/G
CREAM TOPICAL ONCE
Status: CANCELLED | OUTPATIENT
Start: 2021-04-01 | End: 2021-04-01

## 2021-04-01 RX ORDER — BACITRACIN ZINC AND POLYMYXIN B SULFATE 500; 1000 [USP'U]/G; [USP'U]/G
OINTMENT TOPICAL ONCE
Status: CANCELLED | OUTPATIENT
Start: 2021-04-01 | End: 2021-04-01

## 2021-04-01 RX ORDER — GENTAMICIN SULFATE 1 MG/G
OINTMENT TOPICAL ONCE
Status: CANCELLED | OUTPATIENT
Start: 2021-04-01 | End: 2021-04-01

## 2021-04-01 NOTE — PROGRESS NOTES
[address], [address] [place] [address] Road  Telephone: [phone]        [facility] Fax # [phone]        Discharge Instructions       [place] [id number] [facility], [address] [place] [address] Road  Telephone: [phone]     Discharge Instructions:  Keep weight off wounds and reposition every 2 hours if applicable. Avoid standing for long periods of time. If wound(s) is on your lower extremity, elevate legs to the level of the heart or above for 30 minutes 4-5 times a day and/or when sitting. Do not get wounds wet in bath or shower unless otherwise instructed by your physician. If your wound is on you foot or leg, you may purchase a cast bag. Please ask at the pharmacy. When taking antibiotics take entire prescription as ordered by MD do not stop taking until medicine is all gone. Exercise as tolerated. No Smoking. Smoking prohibits wound healing. If Vascular testing is ordered, please call [address] [place] [address] [phone]) to schedule. Vascular tests ordered by Wound Care Physicians may take up to 2 hours to complete. Please keep that in mind when scheduling. If Vascular testing is scheduled, please bring supplies to replace your dressing after testing is done. The vascular department does not stock supplies. Wound: Bilateral Legs     With each dressing change, rinse wounds with 0.9% Saline. (May use wound wash or soft contact solution. Both can be purchased at a local drug store). If unable to obtain saline, may use a gentle soap and water. Dressing care: Left Heel-  Home care to change Wound vac. Skin prep to tavon-wound, drape around wound to protect good skin, bridge up side of leg so patient does not stand on track- NPWT continuous at 150 mmHg (depending on the type of NPWT device), change Monday, Wednesday, & Friday. Home care to change. If unable to place NPWT, place wet to dry, dry dressing- change daily. Right Heel- Apply Mepilex Border                Apply Thigh high Lymphedema pumps for 1 hour twice daily. - 1st application 1 hour after Breakfast , 2nd application 1 hour after dinner   Prevalon Boots or Heel Protecting Boot to Heels when sitting or in bed. Can call [facility]. [facility]  [address]  [facility] [place]  P: [phone]  F: [phone]      Important dietary reminders:  1. Increase Protein intake for optimal wound healing  2. No added salt to reduce any swelling  3. If diabetic, good glucose control  4. If you smoke, smoking affects wound healing, we ask that you refrain from smoking. Follow up with Devon Hein CNP In 1 week in the wound care center. Call [phone] for any questions or concerns. Your  is Haylie Raphael42: [address] [facility] Information: Should you experience any significant changes in your wound(s) or have questions about your wound care, please contact the [facility] at [phone] Monday  - Thursday 8:00 am - 4:00 pm and Friday 8:00 am - 1:00pm. If you need help with your wound outside these hours and cannot wait until we are again available, contact your PCP or go to the hospital emergency room. PLEASE NOTE: IF YOU ARE UNABLE TO OBTAIN WOUND SUPPLIES, CONTINUE TO USE THE SUPPLIES YOU HAVE AVAILABLE UNTIL YOU ARE ABLE TO REACH US. IT IS MOST IMPORTANT TO KEEP THE WOUND COVERED AT ALL TIMES. Skilled nurse to evaluate and treat for wound care. Change dressing as ordered  once a day on Monday, Wednesday and Friday using clean technique. Patient/Family/caregiver may change dressings as needed as instructed when Home Care unavailable.     WOUNDS REQUIRING DRESSING Changes:     Wound 01/28/21 Heel Left #5 (Active)   Wound Image   02/04/21 1511   Wound Etiology Pressure Stage  3 04/01/21 1400   Wound Cleansed Cleansed with saline 04/01/21

## 2021-04-02 NOTE — PROGRESS NOTES
Bobby   Progress Note and Procedure Note      Aditya Flores  AGE: 66 y.o. GENDER: female  : 1945  TODAY'S DATE:  2021    Subjective:     Chief Complaint   Patient presents with    Wound Check     left foot         HISTORY of PRESENT ILLNESS HPI  Aristides Banks a 66 y. o. female who presents today for wound evaluation.  Pt has NPWT unit/ dressing to left heel wound.   Pt has long history of leg wounds. Bilateral lower leg wounds were healed and discharged from [facility] last 2020. Pt states wounds reopened last 2020.  Pt has lymphedema pumps at home and states is using these daily. Pt reports last A1C was 6.9.   Left heel wound is Stage 3.    History of Wound: from last hospitalization/[facility]. Wound Pain:  intermittent, mild  Severity:  1 / 10   Wound Type:  venous  Modifying Factors:  edema, venous stasis, lymphedema, diabetes, obesity and non-adherence  Associated Signs/Symptoms:  edema, erythema, drainage and pain                               PAST MEDICAL HISTORY        Diagnosis Date    Arthritis     Blood circulation, collateral     Blood transfusion     CAD (coronary artery disease)     CHF (congestive heart failure) (HCC)     Chronic renal failure, stage 3 (moderate)     Diabetes mellitus (HCC)     GERD (gastroesophageal reflux disease)     Hyperlipidemia     Hypertension     Leg swelling 2018    Lymphedema 2018    Pressure ulcer of both heels 2021    Deep tissue injuries to right and left heels with left>right. Date of origin unknown.     Pressure ulcer of left heel, stage 3 ([address] 2021    Ulcer of right leg, limited to breakdown of skin ([address] 2018       PAST SURGICAL HISTORY    Past Surgical History:   Procedure Laterality Date    ABDOMEN SURGERY      CARDIAC SURGERY      2009    CATARACT REMOVAL WITH IMPLANT Left 2016    CATARACT REMOVAL WITH IMPLANT Right 2016    CATARACT REMOVAL WITH daily.      hydrOXYzine (ATARAX) 25 MG tablet Take 25 mg by mouth 3 times daily as needed.  ranitidine (ZANTAC) 300 MG tablet Take 150 mg by mouth 2 times daily        No current facility-administered medications on file prior to encounter. REVIEW OF SYSTEMS    Pertinent items are noted in HPI.       Objective:      BP (!) 144/62   Pulse 61   Resp 16     PHYSICAL EXAM    General Appearance: alert and oriented to person, place and time, well-developed and well-nourished, in no acute distress and obese  Skin: warm and dry  Head: normocephalic and atraumatic  Eyes: pupils equal, round, and reactive to light  Pulmonary/Chest:  normal air movement, no respiratory distress  Cardiovascular: normal rate, regular rhythm and intact distal pulses  Extremities: no cyanosis, no clubbing and 1 + edema-  bilateral lower legs  Wound left heel:  Filling in well easily debrided to granular tissue      Assessment:     Patient Active Problem List   Diagnosis    Osteoarthritis of left knee    Acute blood loss anemia    [facility]    Ischemic cardiomyopathy    Atrial fibrillation    Peripheral vascular disease ([facility]    CHF exacerbation (HCC)    Acute on chronic combined systolic and diastolic heart failure (HCC)    Chronic renal failure, stage 3 (moderate)    Chronic atrial fibrillation (HCC)    Pleural effusion    Atherosclerosis of native coronary artery without angina pectoris    Essential hypertension    Chronic combined systolic and diastolic congestive heart failure (HCC)    Hx of CABG    Non-rheumatic tricuspid valve insufficiency    Mixed hyperlipidemia    Carotid disease, bilateral (HCC)    Atherosclerosis of native coronary artery of native heart without angina pectoris    Chronic kidney disease    Hyperlipidemia    INOCENCIO (obstructive sleep apnea)    Lymphedema    Leg swelling    Venous stasis dermatitis of both lower extremities    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 ([facility]    Venous insufficiency    Ulcer of right leg, limited to breakdown of skin (HCC)    Ulcer of lower extremity, left, limited to breakdown of skin (HCC)    Iron deficiency anemia    CHANO (acute kidney injury) ([facility]    Knee pain    Pressure ulcer of both heels    Pressure ulcer of left heel, stage 3 ([facility]       Procedure Note    Performed by: JOBY Marroquin CNP    Consent obtained: Yes    Time out taken:  Yes    Pain Control: Anesthetic  Anesthetic: 4% Lidocaine Cream     Debridement:Excisional Debridement    Using curette the wound was sharply debrided    down through and including the removal of epidermis, dermis and subcutaneous tissue.         Devitalized Tissue Debrided:  fibrin, biofilm and slough    Pre Debridement Measurements:  Are located in the Wound Documentation Flow Sheet    Wound #: 5     Post  Debridement Measurements:  Wound 01/28/21 Heel Left #5 (Active)   Wound Image   02/04/21 1511   Wound Etiology Pressure Stage  3 04/01/21 1400   Wound Cleansed Cleansed with saline 04/01/21 1400   Wound Length (cm) 2.6 cm 04/01/21 1400   Wound Width (cm) 2.8 cm 04/01/21 1400   Wound Depth (cm) 0.1 cm 04/01/21 1400   Wound Surface Area (cm^2) 7.28 cm^2 04/01/21 1400   Change in Wound Size % (l*w) 66.91 04/01/21 1400   Wound Volume (cm^3) 0.73 cm^3 04/01/21 1400   Wound Healing % 67 04/01/21 1400   Post-Procedure Length (cm) 2.7 cm 04/01/21 1435   Post-Procedure Width (cm) 2.9 cm 04/01/21 1435   Post-Procedure Depth (cm) 0.2 cm 04/01/21 1435   Post-Procedure Surface Area (cm^2) 7.83 cm^2 04/01/21 1435   Post-Procedure Volume (cm^3) 1.57 cm^3 04/01/21 1435   Wound Assessment Bleeding 04/01/21 1435   Drainage Amount Moderate 04/01/21 1435   Drainage Description Serosanguinous 04/01/21 1435   Odor Mild 04/01/21 1400   Ximena-wound Assessment Maceration 04/01/21 1400   Margins Defined edges 04/01/21 1400   Wound Thickness Description not for Pressure Injury Full thickness 04/01/21 1400   Number of days: 63       Wound 01/28/21 Heel Right #6 (Active)   Wound Image   03/18/21 1432   Wound Etiology Pressure Unstageable 03/11/21 1320   Wound Cleansed Cleansed with saline 03/11/21 1320   Wound Length (cm) 0 cm 04/01/21 1400   Wound Width (cm) 0 cm 04/01/21 1400   Wound Depth (cm) 0 cm 04/01/21 1400   Wound Surface Area (cm^2) 0 cm^2 04/01/21 1400   Wound Volume (cm^3) 0 cm^3 04/01/21 1400   Wound Assessment Other (Comment) 03/18/21 1432   Drainage Amount None 03/25/21 1317   Odor None 03/11/21 1320   Tavon-wound Assessment Intact 03/11/21 1320   Margins Attached edges 03/11/21 1320   Number of days: 63           Total Surface Area Debrided:  7.83 sq cm     Percentage of wound debrided 100 %    Bleeding:  Minimal    Hemostasis Achieved:  by pressure    Procedural Pain:  0  / 10     Post Procedural Pain:  0 / 10     Response to treatment:  Well tolerated by patient. Plan:     The nature of the patient's condition was explained in depth. The patient was informed that their compliance to the treatment plan is paramount to successful healing and prevention of further ulceration and/or infection     Discharge Treatment   Dressing care: Left Heel-  Home care to change Wound VAC. Skin prep to tavon-wound, drape around wound to protect good skin, bridge up side of leg so patient does not stand on track- NPWT continuous at 150 mmHg (depending on the type of NPWT device), change Monday, Wednesday, & Friday. Home care to change. If unable to place NPWT, place wet to dry, dry dressing- change daily.    Right Heel- Apply Mepilex Border      Written Patient Discharge Instructions Given            Electronically signed by JOBY Acosta CNP on 4/2/2021 at 8:44 AM

## 2021-04-08 ENCOUNTER — HOSPITAL ENCOUNTER (OUTPATIENT)
Dept: WOUND CARE | Age: 76
Discharge: HOME | End: 2021-04-08
Payer: MEDICARE

## 2021-04-08 VITALS — RESPIRATION RATE: 17 BRPM | TEMPERATURE: 97.5 F

## 2021-04-08 DIAGNOSIS — I87.2 VENOUS INSUFFICIENCY: ICD-10-CM

## 2021-04-08 DIAGNOSIS — I87.331 IDIOPATHIC CHRONIC VENOUS HTN OF RIGHT LEG WITH ULCER AND INFLAMMATION (HCC): ICD-10-CM

## 2021-04-08 DIAGNOSIS — L89.619 PRESSURE ULCER OF BOTH HEELS: ICD-10-CM

## 2021-04-08 DIAGNOSIS — M79.89 LEG SWELLING: ICD-10-CM

## 2021-04-08 DIAGNOSIS — I73.9 PERIPHERAL VASCULAR DISEASE (HCC): ICD-10-CM

## 2021-04-08 DIAGNOSIS — L89.629 PRESSURE ULCER OF BOTH HEELS: ICD-10-CM

## 2021-04-08 DIAGNOSIS — I89.0 LYMPHEDEMA: ICD-10-CM

## 2021-04-08 DIAGNOSIS — L97.921 ULCER OF LOWER EXTREMITY, LEFT, LIMITED TO BREAKDOWN OF SKIN (HCC): ICD-10-CM

## 2021-04-08 DIAGNOSIS — L97.919 IDIOPATHIC CHRONIC VENOUS HTN OF RIGHT LEG WITH ULCER AND INFLAMMATION (HCC): ICD-10-CM

## 2021-04-08 DIAGNOSIS — L97.911 ULCER OF RIGHT LEG, LIMITED TO BREAKDOWN OF SKIN (HCC): ICD-10-CM

## 2021-04-08 DIAGNOSIS — L89.623 PRESSURE ULCER OF LEFT HEEL, STAGE 3 (HCC): Primary | ICD-10-CM

## 2021-04-08 DIAGNOSIS — I87.2 VENOUS STASIS DERMATITIS OF BOTH LOWER EXTREMITIES: ICD-10-CM

## 2021-04-08 PROCEDURE — 11042 DBRDMT SUBQ TIS 1ST 20SQCM/<: CPT

## 2021-04-08 PROCEDURE — 11042 DBRDMT SUBQ TIS 1ST 20SQCM/<: CPT | Performed by: NURSE PRACTITIONER

## 2021-04-08 RX ORDER — GINSENG 100 MG
CAPSULE ORAL ONCE
Status: CANCELLED | OUTPATIENT
Start: 2021-04-08 | End: 2021-04-08

## 2021-04-08 RX ORDER — LIDOCAINE 40 MG/G
CREAM TOPICAL ONCE
Status: CANCELLED | OUTPATIENT
Start: 2021-04-08 | End: 2021-04-08

## 2021-04-08 RX ORDER — CLOBETASOL PROPIONATE 0.5 MG/G
OINTMENT TOPICAL ONCE
Status: CANCELLED | OUTPATIENT
Start: 2021-04-08 | End: 2021-04-08

## 2021-04-08 RX ORDER — BACITRACIN ZINC AND POLYMYXIN B SULFATE 500; 1000 [USP'U]/G; [USP'U]/G
OINTMENT TOPICAL ONCE
Status: CANCELLED | OUTPATIENT
Start: 2021-04-08 | End: 2021-04-08

## 2021-04-08 RX ORDER — LIDOCAINE 50 MG/G
OINTMENT TOPICAL ONCE
Status: CANCELLED | OUTPATIENT
Start: 2021-04-08 | End: 2021-04-08

## 2021-04-08 RX ORDER — GENTAMICIN SULFATE 1 MG/G
OINTMENT TOPICAL ONCE
Status: CANCELLED | OUTPATIENT
Start: 2021-04-08 | End: 2021-04-08

## 2021-04-08 RX ORDER — BETAMETHASONE DIPROPIONATE 0.05 %
OINTMENT (GRAM) TOPICAL ONCE
Status: CANCELLED | OUTPATIENT
Start: 2021-04-08 | End: 2021-04-08

## 2021-04-08 RX ORDER — LIDOCAINE HYDROCHLORIDE 40 MG/ML
SOLUTION TOPICAL ONCE
Status: CANCELLED | OUTPATIENT
Start: 2021-04-08 | End: 2021-04-08

## 2021-04-08 RX ORDER — BACITRACIN, NEOMYCIN, POLYMYXIN B 400; 3.5; 5 [USP'U]/G; MG/G; [USP'U]/G
OINTMENT TOPICAL ONCE
Status: CANCELLED | OUTPATIENT
Start: 2021-04-08 | End: 2021-04-08

## 2021-04-08 RX ORDER — LIDOCAINE 40 MG/G
CREAM TOPICAL ONCE
Status: DISCONTINUED | OUTPATIENT
Start: 2021-04-08 | End: 2021-04-09 | Stop reason: HOSPADM

## 2021-04-08 NOTE — PLAN OF CARE
Discharge instructions given. Patient verbalized understanding. Return to [address] [place] [address],[address] in 1 week.   Called/faxed orders to [facility]

## 2021-04-08 NOTE — PROGRESS NOTES
[address], [address] [place] [address] Road  Telephone: [phone]        [facility] Fax # [phone]        Discharge Instructions       [place] [id number] [facility], [address] [place] [address] Road  Telephone: [phone]     Discharge Instructions:  Keep weight off wounds and reposition every 2 hours if applicable. Avoid standing for long periods of time. If wound(s) is on your lower extremity, elevate legs to the level of the heart or above for 30 minutes 4-5 times a day and/or when sitting. Do not get wounds wet in bath or shower unless otherwise instructed by your physician. If your wound is on you foot or leg, you may purchase a cast bag. Please ask at the pharmacy. When taking antibiotics take entire prescription as ordered by MD do not stop taking until medicine is all gone. Exercise as tolerated. No Smoking. Smoking prohibits wound healing. If Vascular testing is ordered, please call [address] [place] [address] ([phone]) to schedule. Vascular tests ordered by Wound Care Physicians may take up to 2 hours to complete. Please keep that in mind when scheduling. If Vascular testing is scheduled, please bring supplies to replace your dressing after testing is done. The vascular department does not stock supplies. Wound: Bilateral Legs     With each dressing change, rinse wounds with 0.9% Saline. (May use wound wash or soft contact solution. Both can be purchased at a local drug store). If unable to obtain saline, may use a gentle soap and water. Dressing care: Left Heel-  Home care to change Wound vac. Skin prep to tavon-wound, drape around wound to protect good skin, bridge up side of leg so patient does not stand on track ( Avoid Ankle Bone)- NPWT continuous at 150 mmHg (depending on the type of NPWT device), change Monday, Wednesday, & Friday. Home care to change.       Right Heel- Apply Mepilex Border  Right lower leg Apply Polysporin and Mepilex Border, 1 Tubi  -change every other day    Apply Thigh high Lymphedema pumps for 1 hour twice daily. - 1st application 1 hour after Breakfast , 2nd application 1 hour after dinner   Prevalon Boots or Heel Protecting Boot to Heels when sitting or in bed. Can call Viky,. [facility]  [address]  [facility]  P: [phone]  F: [phone]      Important dietary reminders:  1. Increase Protein intake for optimal wound healing  2. No added salt to reduce any swelling  3. If diabetic, good glucose control  4. If you smoke, smoking affects wound healing, we ask that you refrain from smoking. Follow up with Macie Ford CNP In 1 week in the wound care center. Call [phone] for any questions or concerns. Your  is Haylie Raphael42: [place] [phone] [facility] Information: Should you experience any significant changes in your wound(s) or have questions about your wound care, please contact the [employer] [facility] at [phone] Monday  - Thursday 8:00 am - 4:00 pm and Friday 8:00 am - 1:00pm. If you need help with your wound outside these hours and cannot wait until we are again available, contact your PCP or go to the hospital emergency room. PLEASE NOTE: IF YOU ARE UNABLE TO OBTAIN WOUND SUPPLIES, CONTINUE TO USE THE SUPPLIES YOU HAVE AVAILABLE UNTIL YOU ARE ABLE TO REACH US. IT IS MOST IMPORTANT TO KEEP THE WOUND COVERED AT ALL TIMES. Skilled nurse to evaluate and treat for wound care. Change dressing as ordered  once a day on Monday, Wednesday and Friday using clean technique. Patient/Family/caregiver may change dressings as needed as instructed when Home Care unavailable.     WOUNDS REQUIRING DRESSING Changes:     Wound 01/28/21 Heel Left #5 (Active)   Wound Image   04/08/21 1426   Wound Etiology Pressure Stage  3 04/08/21 1426   Wound Cleansed 1426   Wound Assessment Bleeding 04/08/21 1458   Drainage Amount Moderate 04/08/21 1458   Drainage Description Serosanguinous 04/08/21 1458   Odor None 04/08/21 1426   Ximena-wound Assessment Intact 04/08/21 1426   Margins Unattached edges 04/08/21 1426   Number of days: 0       Wound 04/08/21 Leg Right; Lower; Anterior;Distal #2 (Active)   Wound Image   04/08/21 1426   Wound Etiology Venous 04/08/21 1426   Wound Cleansed Cleansed with saline 04/08/21 1426   Wound Length (cm) 1.2 cm 04/08/21 1426   Wound Width (cm) 0.6 cm 04/08/21 1426   Wound Depth (cm) 0.1 cm 04/08/21 1426   Wound Surface Area (cm^2) 0.72 cm^2 04/08/21 1426   Wound Volume (cm^3) 0.07 cm^3 04/08/21 1426   Wound Assessment Devitalized tissue;Pink/red 04/08/21 1426   Drainage Amount Moderate 04/08/21 1426   Drainage Description Serosanguinous 04/08/21 1426   Odor None 04/08/21 1426   Ximena-wound Assessment Intact 04/08/21 1426   Margins Unattached edges 04/08/21 1426   Number of days: 0       Wound 04/08/21 Leg Right; Lower; Anterior;Mid #3 (Active)   Wound Image   04/08/21 1426   Wound Etiology Venous 04/08/21 1426   Wound Cleansed Cleansed with saline 04/08/21 1426   Wound Length (cm) 1.3 cm 04/08/21 1426   Wound Width (cm) 0.4 cm 04/08/21 1426   Wound Depth (cm) 0.1 cm 04/08/21 1426   Wound Surface Area (cm^2) 0.52 cm^2 04/08/21 1426   Wound Volume (cm^3) 0.05 cm^3 04/08/21 1426   Wound Assessment Devitalized tissue;Pink/red 04/08/21 1426   Drainage Amount Moderate 04/08/21 1426   Drainage Description Serosanguinous 04/08/21 1426   Odor None 04/08/21 1426   Ximena-wound Assessment Intact 04/08/21 1426   Margins Unattached edges 04/08/21 1426   Number of days: 0       Wound 04/08/21 Leg Right; Lower; Anterior;Proximal #4 (Active)   Wound Image   04/08/21 1426   Wound Etiology Venous 04/08/21 1426   Wound Cleansed Cleansed with saline 04/08/21 1426   Wound Length (cm) 0.5 cm 04/08/21 1426   Wound Width (cm) 0.7 cm 04/08/21 1426   Wound Depth (cm) 0.1 cm 04/08/21 1426   Wound Surface Area (cm^2) 0.35 cm^2 04/08/21 1426   Wound Volume (cm^3) 0.04 cm^3 04/08/21 1426   Wound Assessment Slough 04/08/21 1426   Drainage Amount Moderate 04/08/21 1426   Drainage Description Serosanguinous 04/08/21 1426   Odor None 04/08/21 1426   Ximena-wound Assessment Intact 04/08/21 1426   Margins Unattached edges 04/08/21 1426   Number of days: 0          Patient seen and treated on 4/8/2021    By Malka Pollard  [id number]   (provider/NPI)

## 2021-04-08 NOTE — PROGRESS NOTES
Bobby   Progress Note and Procedure Note      [place] Ciro  AGE: 66 y.o. GENDER: female  : 1945  TODAY'S DATE:  2021    Subjective:     Chief Complaint   Patient presents with    Wound Check     Left heel         HISTORY of PRESENT ILLNESS HPI  Melva Hobbs a 66 y. o. female who presents today for wound evaluation.  Pt has NPWT unit/ dressing to left heel wound. Changed 3x/week by home care. Today presents with new open blisters and reddish right lower leg. Pt states unsure how this happened but only noticed this past week. Pt reports has not been wearing compression hose right lower leg but is using lymphedema pumps   Pt has long history of leg wounds. Bilateral lower leg wounds were healed and discharged from [facility] last 2020. Pt states wounds reopened last 2020.  Pt has lymphedema pumps at [facility] states is using these daily. Pt reports last A1C was 6. 9.   Left heel wound is Stage 3.    History of Wound: from last hospitalization/ECF. Wound Pain:  intermittent, mild  Severity:  1 / 10   Wound Type:  venous  Modifying Factors:  edema, venous stasis, lymphedema, diabetes, obesity and non-adherence  Associated Signs/Symptoms:  edema, erythema, drainage and pain                                PAST MEDICAL HISTORY        Diagnosis Date    Arthritis     Blood circulation, collateral     Blood transfusion     CAD (coronary artery disease)     CHF (congestive heart failure) (HCC)     Chronic renal failure, stage 3 (moderate)     Diabetes mellitus (HCC)     GERD (gastroesophageal reflux disease)     Hyperlipidemia     Hypertension     Leg swelling 2018    Lymphedema 2018    Pressure ulcer of both heels 2021    Deep tissue injuries to right and left heels with left>right. Date of origin unknown.     Pressure ulcer of left heel, stage 3 ([address] 2021    Ulcer of right leg, limited to breakdown of skin ([address] 2018 PAST SURGICAL HISTORY    Past Surgical History:   Procedure Laterality Date    ABDOMEN SURGERY      CARDIAC SURGERY      2009    CATARACT REMOVAL WITH IMPLANT Left 11/23/2016    CATARACT REMOVAL WITH IMPLANT Right 11/02/2016    CATARACT REMOVAL WITH IMPLANT Bilateral 10/16, 11/16    CHOLECYSTECTOMY      COLONOSCOPY      ENDOSCOPY, COLON, DIAGNOSTIC      EYE SURGERY      left tear duct surgery    JOINT REPLACEMENT      BTKR    KNEE ARTHROPLASTY  09/15/2010    left total knee    TOTAL KNEE ARTHROPLASTY      VASCULAR SURGERY         FAMILY HISTORY    Family History   Problem Relation Age of Onset    Diabetes Mother     Heart Disease Mother     Heart Disease Father     Stroke Father        SOCIAL HISTORY    Social History     Tobacco Use    Smoking status: Never Smoker    Smokeless tobacco: Never Used   Substance Use Topics    Alcohol use: No    Drug use: No       ALLERGIES    Allergies   Allergen Reactions    Adhesive Tape Shortness Of Breath     Other reaction(s): Ulcers    Chlorhexidine     Lisinopril Other (See Comments)     Med causes lethargy- takes in lower doses       MEDICATIONS    Current Outpatient Medications on File Prior to Encounter   Medication Sig Dispense Refill    betamethasone dipropionate (DIPROLENE) 0.05 % ointment Apply topically daily.  45 g 2    furosemide (LASIX) 20 MG tablet TAKE ONE TABLET BY MOUTH TWICE DAILY  60 tablet 3    Coenzyme Q10 (CO Q-10) 400 MG CAPS Take 400 mg by mouth daily 30 capsule 5    XARELTO 15 MG TABS tablet TAKE ONE TABLET BY MOUTH DAILY 90 tablet 1    Liraglutide (VICTOZA) 18 MG/3ML SOPN SC injection Inject 1.2 mg into the skin daily      Handicap Placard MISC by Does not apply route Sob when walking less than 200 feet  Length of time--greater than 5 year 1 each 0    ferrous sulfate 325 (65 Fe) MG tablet Take 1 tablet by mouth 3 times daily (with meals) 90 tablet 3    atorvastatin (LIPITOR) 20 MG tablet Take 20 mg by mouth daily      vitamin D (CHOLECALCIFEROL) 400 UNITS TABS tablet Take 400 Units by mouth daily      metoprolol (LOPRESSOR) 25 MG tablet Take 0.5 tablets by mouth 2 times daily 60 tablet 3    therapeutic multivitamin-minerals (THERAGRAN-M) tablet Take 1 tablet by mouth daily.  hydrOXYzine (ATARAX) 25 MG tablet Take 25 mg by mouth 3 times daily as needed.  ranitidine (ZANTAC) 300 MG tablet Take 150 mg by mouth 2 times daily        No current facility-administered medications on file prior to encounter. REVIEW OF SYSTEMS    Pertinent items are noted in HPI. Objective:      Temp 97.5 °F (36.4 °C) (Infrared)   Resp 17     PHYSICAL EXAM    General Appearance: alert and oriented to person, place and time, well-developed and well-nourished, in no acute distress, obese and pale  Skin: warm and dry  Head: normocephalic and atraumatic  Eyes: pupils equal, round, and reactive to light  Pulmonary/Chest:  normal air movement, no respiratory distress  Cardiovascular: normal rate, regular rhythm and intact distal pulses  Extremities: no cyanosis, no clubbing and 1 + edema-  right lower leg with light redness to anterior leg and scattered opened blisters on anterior lower leg with moist pink wound base. Circled area of redness. Left heel wound:  Filling in with firmer, red, granular tissue some periwound maceration but no overt signs of infection.        Assessment:     Patient Active Problem List   Diagnosis    Osteoarthritis of left knee    Acute blood loss anemia    Diabetes mellitus ([address]    Ischemic cardiomyopathy    Atrial fibrillation    Peripheral vascular disease [facility]    CHF exacerbation (HCC)    Acute on chronic combined systolic and diastolic heart failure (HCC)    Chronic renal failure, stage 3 (moderate)    Chronic atrial fibrillation (HCC)    Pleural effusion    Atherosclerosis of native coronary artery without angina pectoris    Essential hypertension    Chronic combined systolic and diastolic congestive heart failure (HCC)    Hx of CABG    Non-rheumatic tricuspid valve insufficiency    Mixed hyperlipidemia    Carotid disease, bilateral (HCC)    Atherosclerosis of native coronary artery of native heart without angina pectoris    Chronic kidney disease    Hyperlipidemia    INOCENCIO (obstructive sleep apnea)    Lymphedema    Leg swelling    Venous stasis dermatitis of both lower extremities    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 (HCC)    Venous insufficiency    Ulcer of right leg, limited to breakdown of skin (HCC)    Ulcer of lower extremity, left, limited to breakdown of skin (HCC)    Iron deficiency anemia    CHANO (acute kidney injury) ([facility]    Knee pain    Pressure ulcer of both heels    Pressure ulcer of left heel, stage 3 ([facility]       Procedure Note    Performed by: JOBY Brown CNP    Consent obtained: Yes    Time out taken:  Yes    Pain Control: Anesthetic  Anesthetic: 4% Lidocaine Cream     Debridement:Excisional Debridement    Using curette and forceps the wound was sharply debrided    down through and including the removal of epidermis, dermis and subcutaneous tissue.         Devitalized Tissue Debrided:  fibrin, biofilm and slough    Pre Debridement Measurements:  Are located in the Wound Documentation Flow Sheet    Wound #: 5     Post  Debridement Measurements:  Wound 01/28/21 Heel Left #5 (Active)   Wound Image   04/08/21 1426   Wound Etiology Pressure Stage  3 04/08/21 1426   Wound Cleansed Cleansed with saline 04/08/21 1426   Wound Length (cm) 2.3 cm 04/08/21 1426   Wound Width (cm) 2.8 cm 04/08/21 1426   Wound Depth (cm) 0.2 cm 04/08/21 1426   Wound Surface Area (cm^2) 6.44 cm^2 04/08/21 1426   Change in Wound Size % (l*w) 70.73 04/08/21 1426   Wound Volume (cm^3) 1.29 cm^3 04/08/21 1426   Wound Healing % 41 04/08/21 1426   Post-Procedure Length (cm) 2.4 cm 04/08/21 1458   Post-Procedure Width (cm) 2.9 cm 04/08/21 1458 Post-Procedure Depth (cm) 0.2 cm 04/08/21 1458   Post-Procedure Surface Area (cm^2) 6.96 cm^2 04/08/21 1458   Post-Procedure Volume (cm^3) 1.39 cm^3 04/08/21 1458   Wound Assessment Bleeding 04/08/21 1458   Drainage Amount Moderate 04/08/21 1458   Drainage Description Serosanguinous 04/08/21 1458   Odor None 04/08/21 1426   Ximena-wound Assessment Maceration 04/08/21 1426   Margins Defined edges 04/08/21 1426   Wound Thickness Description not for Pressure Injury Full thickness 04/01/21 1400   Number of days: 70       Wound 01/28/21 Heel Right #6 (Active)   Wound Image   04/08/21 1426   Wound Etiology Pressure Unstageable 03/11/21 1320   Wound Cleansed Cleansed with saline 03/11/21 1320   Wound Length (cm) 0 cm 04/01/21 1400   Wound Width (cm) 0 cm 04/01/21 1400   Wound Depth (cm) 0 cm 04/01/21 1400   Wound Surface Area (cm^2) 0 cm^2 04/01/21 1400   Wound Volume (cm^3) 0 cm^3 04/01/21 1400   Wound Assessment Other (Comment) 03/18/21 1432   Drainage Amount None 03/25/21 1317   Odor None 03/11/21 1320   Ximena-wound Assessment Intact 03/11/21 1320   Margins Attached edges 03/11/21 1320   Number of days: 70       Wound 04/08/21 Leg Right; Lower; Lateral #1 (Active)   Wound Image   04/08/21 1426   Wound Etiology Venous 04/08/21 1426   Wound Cleansed Cleansed with saline 04/08/21 1426   Wound Length (cm) 2.2 cm 04/08/21 1426   Wound Width (cm) 1.7 cm 04/08/21 1426   Wound Depth (cm) 0.1 cm 04/08/21 1426   Wound Surface Area (cm^2) 3.74 cm^2 04/08/21 1426   Wound Volume (cm^3) 0.37 cm^3 04/08/21 1426   Wound Assessment Bleeding 04/08/21 1458   Drainage Amount Moderate 04/08/21 1458   Drainage Description Serosanguinous 04/08/21 1458   Odor None 04/08/21 1426   Ximena-wound Assessment Intact 04/08/21 1426   Margins Unattached edges 04/08/21 1426   Number of days: 0       Wound 04/08/21 Leg Right; Lower; Anterior;Distal #2 (Active)   Wound Image   04/08/21 1426   Wound Etiology Venous 04/08/21 1426   Wound Cleansed Cleansed with

## 2021-04-09 NOTE — TELEPHONE ENCOUNTER
Received refill request for Xarelto from [facility].      Last OV: 09/28/2020 w/ LISSETT     Last Labs: 01/11/2021 CBC    Last Refill: 08/07/2020 #90 w/ 1 refill     Next Appointment: 05/07/2021 w/ [facility]

## 2021-04-09 NOTE — TELEPHONE ENCOUNTER
Medication Refill    Medication needing refilled:XARELTO 15 MG TABS tablet     Dosage of the medication:    How are you taking this medication (QD, BID, TID, QID, PRN):    30 or 90 day supply called in:    When will you run out of your medication: pt is out of medication     Which Pharmacy are we sending the medication to?: [address], [place] [phone] [facility] [phone]     Pt asking if she needs labs before her appt on 05/07?  pls advise thank you

## 2021-04-15 ENCOUNTER — HOSPITAL ENCOUNTER (OUTPATIENT)
Dept: WOUND CARE | Age: 76
Discharge: HOME | End: 2021-04-15
Payer: MEDICARE

## 2021-04-15 VITALS
HEART RATE: 60 BPM | DIASTOLIC BLOOD PRESSURE: 80 MMHG | TEMPERATURE: 96.8 F | RESPIRATION RATE: 17 BRPM | SYSTOLIC BLOOD PRESSURE: 149 MMHG

## 2021-04-15 DIAGNOSIS — M79.89 LEG SWELLING: Primary | ICD-10-CM

## 2021-04-15 DIAGNOSIS — L97.919 IDIOPATHIC CHRONIC VENOUS HTN OF RIGHT LEG WITH ULCER AND INFLAMMATION (HCC): ICD-10-CM

## 2021-04-15 DIAGNOSIS — I87.2 VENOUS INSUFFICIENCY: ICD-10-CM

## 2021-04-15 DIAGNOSIS — I87.2 VENOUS STASIS DERMATITIS OF BOTH LOWER EXTREMITIES: ICD-10-CM

## 2021-04-15 DIAGNOSIS — I87.331 IDIOPATHIC CHRONIC VENOUS HTN OF RIGHT LEG WITH ULCER AND INFLAMMATION (HCC): ICD-10-CM

## 2021-04-15 DIAGNOSIS — L89.623 PRESSURE ULCER OF LEFT HEEL, STAGE 3 (HCC): ICD-10-CM

## 2021-04-15 PROCEDURE — 11042 DBRDMT SUBQ TIS 1ST 20SQCM/<: CPT | Performed by: NURSE PRACTITIONER

## 2021-04-15 PROCEDURE — 11042 DBRDMT SUBQ TIS 1ST 20SQCM/<: CPT

## 2021-04-15 ASSESSMENT — PAIN DESCRIPTION - PAIN TYPE: TYPE: CHRONIC PAIN

## 2021-04-15 ASSESSMENT — PAIN SCALES - GENERAL: PAINLEVEL_OUTOF10: 3

## 2021-04-15 ASSESSMENT — PAIN DESCRIPTION - ORIENTATION: ORIENTATION: RIGHT

## 2021-04-15 NOTE — PROGRESS NOTES
leg Apply Polysporin and Mepilex Border, 1 Tubi  -May leave Mepilex Borders on for 3 days then change. Apply Thigh high Lymphedema pumps for 1 hour twice daily. - 1st application 1 hour after Breakfast , 2nd application 1 hour after dinner   Prevalon Boots or Heel Protecting Boot to Heels when sitting or in bed. Can call [facility],. [facility] [employer]  [address]  [employer], [place]  P: [phone]  F: [phone]      Important dietary reminders:  1. Increase Protein intake for optimal wound healing  2. No added salt to reduce any swelling  3. If diabetic, good glucose control  4. If you smoke, smoking affects wound healing, we ask that you refrain from smoking. Follow up with Malu Kowalski CNP In 1 week in the wound care center. Call [phone] for any questions or concerns. Your  is Haylie Raphael42: [address]      [phone] [facility] Information: Should you experience any significant changes in your wound(s) or have questions about your wound care, please contact the [email or web address] [facility] at [phone] Monday  - Thursday 8:00 am - 4:00 pm and Friday 8:00 am - 1:00pm. If you need help with your wound outside these hours and cannot wait until we are again available, contact your PCP or go to the hospital emergency room. PLEASE NOTE: IF YOU ARE UNABLE TO OBTAIN WOUND SUPPLIES, CONTINUE TO USE THE SUPPLIES YOU HAVE AVAILABLE UNTIL YOU ARE ABLE TO REACH US. IT IS MOST IMPORTANT TO KEEP THE WOUND COVERED AT ALL TIMES. Skilled nurse to evaluate and treat for wound care. Change dressing as ordered  once a day on Monday, Wednesday and Friday using clean technique. Patient/Family/caregiver may change dressings as needed as instructed when Home Care unavailable.     WOUNDS REQUIRING DRESSING Changes:     Wound 01/28/21 Heel Left #5 (Active)   Wound Image   04/08/21 1426   Wound Etiology Pressure Stage  3 04/15/21 1451   Wound Cleansed Cleansed with saline 04/15/21 1451   Wound Length (cm) 2 cm 04/15/21 1451   Wound Width (cm) 3.1 cm 04/15/21 1451   Wound Depth (cm) 0.2 cm 04/15/21 1451   Wound Surface Area (cm^2) 6.2 cm^2 04/15/21 1451   Change in Wound Size % (l*w) 71.82 04/15/21 1451   Wound Volume (cm^3) 1.24 cm^3 04/15/21 1451   Wound Healing % 44 04/15/21 1451   Post-Procedure Length (cm) 2.1 cm 04/15/21 1512   Post-Procedure Width (cm) 3.2 cm 04/15/21 1512   Post-Procedure Depth (cm) 0.2 cm 04/15/21 1512   Post-Procedure Surface Area (cm^2) 6.72 cm^2 04/15/21 1512   Post-Procedure Volume (cm^3) 1.34 cm^3 04/15/21 1512   Wound Assessment Bleeding 04/15/21 1512   Drainage Amount Moderate 04/15/21 1512   Drainage Description Serosanguinous 04/15/21 1512   Odor Mild 04/15/21 1451   Ximena-wound Assessment Intact 04/15/21 1451   Margins Unattached edges 04/15/21 1451   Wound Thickness Description not for Pressure Injury Full thickness 04/01/21 1400   Number of days: 76       Wound 01/28/21 Heel Right #6 (Active)   Wound Image   04/08/21 1426   Wound Etiology Pressure Unstageable 03/11/21 1320   Wound Cleansed Cleansed with saline 03/11/21 1320   Wound Length (cm) 0 cm 04/15/21 1451   Wound Width (cm) 0 cm 04/15/21 1451   Wound Depth (cm) 0 cm 04/15/21 1451   Wound Surface Area (cm^2) 0 cm^2 04/15/21 1451   Wound Volume (cm^3) 0 cm^3 04/15/21 1451   Wound Assessment Dry 04/15/21 1451   Drainage Amount None 03/25/21 1317   Odor None 03/11/21 1320   Ximena-wound Assessment Intact 03/11/21 1320   Margins Attached edges 03/11/21 1320   Number of days: 76       Wound 04/08/21 Leg Right; Lower; Lateral #1 (Active)   Wound Image   04/08/21 1426   Wound Etiology Venous 04/15/21 1451   Wound Cleansed Cleansed with saline 04/15/21 1451   Wound Length (cm) 2.4 cm 04/15/21 1451   Wound Width (cm) 1.2 cm 04/15/21 1451   Wound Depth (cm) 0.1 cm 04/15/21 1451   Wound Surface Area (cm^2) 2.88 cm^2 04/15/21 1451   Change in Wound Size % 04/15/21 1451   Wound Surface Area (cm^2) 0.52 cm^2 04/15/21 1451   Change in Wound Size % (l*w) 0 04/15/21 1451   Wound Volume (cm^3) 0.05 cm^3 04/15/21 1451   Wound Healing % 0 04/15/21 1451   Post-Procedure Length (cm) 1.4 cm 04/15/21 1512   Post-Procedure Width (cm) 0.5 cm 04/15/21 1512   Post-Procedure Depth (cm) 0.1 cm 04/15/21 1512   Post-Procedure Surface Area (cm^2) 0.7 cm^2 04/15/21 1512   Post-Procedure Volume (cm^3) 0.07 cm^3 04/15/21 1512   Wound Assessment Bleeding 04/15/21 1512   Drainage Amount Moderate 04/15/21 1512   Drainage Description Serosanguinous 04/15/21 1512   Odor None 04/15/21 1451   Ximena-wound Assessment Intact 04/15/21 1451   Margins Unattached edges 04/15/21 1451   Number of days: 7       Wound 04/08/21 Leg Right; Lower; Anterior;Proximal #4 (Active)   Wound Image   04/08/21 1426   Wound Etiology Venous 04/15/21 1451   Wound Cleansed Cleansed with saline 04/15/21 1451   Wound Length (cm) 0 cm 04/15/21 1451   Wound Width (cm) 0 cm 04/15/21 1451   Wound Depth (cm) 0 cm 04/15/21 1451   Wound Surface Area (cm^2) 0 cm^2 04/15/21 1451   Change in Wound Size % (l*w) 100 04/15/21 1451   Wound Volume (cm^3) 0 cm^3 04/15/21 1451   Wound Healing % 100 04/15/21 1451   Wound Assessment Epithelialization 04/15/21 1451   Drainage Amount None 04/15/21 1451   Drainage Description Serosanguinous 04/08/21 1426   Odor None 04/15/21 1451   Ximena-wound Assessment Intact 04/15/21 1451   Margins Unattached edges 04/15/21 1451   Number of days: 7          Patient seen and treated on 4/15/2021    By Dimas Woodward  [id number]   (provider/NPI)

## 2021-04-15 NOTE — PROGRESS NOTES
"    4/15/2021         RE: Thomas Davila  [address] [place] [address]        Dear Colleague,    Thank you for referring your patient, Thomas Davila, to the [facility]. Please see a copy of my visit note below.    SUBJECTIVE:                                                                   Thomas Davila is a 71 year old female who presents today to our Allergy Clinic at [facility]; she is being seen in consultation at the request of Omi Ross MD for allergic reaction evaluation.    Years ago, she was on Lovenox and Coumadin, and didn't have a problem with it.    In April, he was diagnosed with thrombophlebitis, and started Lovenox injections along with coumadin. She was getting Lovenox for the first 7 days. Several days after, she started experiencing a local pruritus at the injection site, that later progressed into a localized erythematous, \"lumpy \"pruritic rash.  After receiving Lovenox for about 7 days, she decided to stop it because she could not tolerate it anymore.  Despite stopping the medicine the rash progressed to her chest and back.  It took several weeks for the rash to improve and finally almost resolve.  The initial rash on the abdomen persisted on the same spot for weeks as well.  She did not have any angioedema or other systemic symptoms with that rash.  She was evaluated in the emergency department on April 4, 2021.She was evaluated in the emergency department on April 4, 2021.  Macular papular rash on the back, chest, and abdomen was documented.  Prednisone and oral antihistamines helped with the rash.      Patient Active Problem List   Diagnosis     Essential hypertension, benign     GERD (gastroesophageal reflux disease)     Sebaceous cyst     CARDIOVASCULAR SCREENING; LDL GOAL LESS THAN 130     Itchy eyes     Plantar warts     Breast cancer (H)     Endometrial adenocarcinoma (H)     Cancer of endometrium     Encounter " Bobby Moreno  Progress Note and Procedure Note      Nikky Senate  AGE: 66 y.o. GENDER: female  : 1945  TODAY'S DATE:  3/18/2021    Subjective:     Chief Complaint   Patient presents with    Wound Check     left foot         HISTORY of PRESENT ILLNESS HPI  Chet Da Silva a 66 y. o. female who presents today for wound evaluation. Pt accompanied by  who assists pt with bilateral lower leg care. [address] [place] out on 3/17 to apply NPWT unit and dressing to left heel wound. Pt has long history of leg wounds. Bilateral lower leg wounds were healed and discharged from [facility] last 2020. Pt states wounds reopened last 2020.  Pt has lymphedema pumps at home and states is building up se time to between 1-1.5 hours daily, she understands importance of use.  Pt admits to \"picking wounds\" and \"scratching\" legs. Left heel DTI stable eschar fell off, with intact skin underneath. .  Left heel wound is Stage 3. History of Wound: off and on for last 5 years. Wound Pain:  intermittent, mild  Severity:  1 / 10   Wound Type:  venous  Modifying Factors:  edema, venous stasis, lymphedema, diabetes, obesity and non-adherence  Associated Signs/Symptoms:  edema, erythema, drainage and pain                                       PAST MEDICAL HISTORY        Diagnosis Date    Arthritis     Blood circulation, collateral     Blood transfusion     CAD (coronary artery disease)     CHF (congestive heart failure) (HCC)     Chronic renal failure, stage 3 (moderate)     Diabetes mellitus (HCC)     GERD (gastroesophageal reflux disease)     Hyperlipidemia     Hypertension     Leg swelling 2018    Lymphedema 2018    Pressure ulcer of both heels 2021    Deep tissue injuries to right and left heels with left>right. Date of origin unknown.     Pressure ulcer of left heel, stage 3 ([address] 2021       PAST SURGICAL HISTORY    Past Surgical History: Procedure Laterality Date    ABDOMEN SURGERY      CARDIAC SURGERY      2009    CATARACT REMOVAL WITH IMPLANT Left 11/23/2016    CATARACT REMOVAL WITH IMPLANT Right 11/02/2016    CATARACT REMOVAL WITH IMPLANT Bilateral 10/16, 11/16    CHOLECYSTECTOMY      COLONOSCOPY      ENDOSCOPY, COLON, DIAGNOSTIC      EYE SURGERY      left tear duct surgery    JOINT REPLACEMENT      BTKR    KNEE ARTHROPLASTY  09/15/2010    left total knee    TOTAL KNEE ARTHROPLASTY      VASCULAR SURGERY         FAMILY HISTORY    Family History   Problem Relation Age of Onset    Diabetes Mother     Heart Disease Mother     Heart Disease Father     Stroke Father        SOCIAL HISTORY    Social History     Tobacco Use    Smoking status: Never Smoker    Smokeless tobacco: Never Used   Substance Use Topics    Alcohol use: No    Drug use: No       ALLERGIES    Allergies   Allergen Reactions    Adhesive Tape Shortness Of Breath     Other reaction(s): Ulcers    Chlorhexidine     Lisinopril Other (See Comments)     Med causes lethargy- takes in lower doses       MEDICATIONS    Current Outpatient Medications on File Prior to Encounter   Medication Sig Dispense Refill    betamethasone dipropionate (DIPROLENE) 0.05 % ointment Apply topically daily.  45 g 2    furosemide (LASIX) 20 MG tablet TAKE ONE TABLET BY MOUTH TWICE DAILY  60 tablet 3    Coenzyme Q10 (CO Q-10) 400 MG CAPS Take 400 mg by mouth daily 30 capsule 5    XARELTO 15 MG TABS tablet TAKE ONE TABLET BY MOUTH DAILY 90 tablet 1    Liraglutide (VICTOZA) 18 MG/3ML SOPN SC injection Inject 1.2 mg into the skin daily      Handicap Placard MISC by Does not apply route Sob when walking less than 200 feet  Length of time--greater than 5 year 1 each 0    ferrous sulfate 325 (65 Fe) MG tablet Take 1 tablet by mouth 3 times daily (with meals) 90 tablet 3    atorvastatin (LIPITOR) 20 MG tablet Take 20 mg by mouth daily      vitamin D (CHOLECALCIFEROL) 400 UNITS TABS tablet Take for long-term current use of medication     Other specified prophylactic or treatment measure     Nontoxic multinodular goiter     Morbid obesity (H)     BMI 32.0-32.9,adult     Thrombophlebitis of superficial veins of right lower extremity       Past Medical History:   Diagnosis Date     Acute parametritis and pelvic cellulitis     salpingitis     ASCUS with positive high risk HPV 2013     Asthma     No recent issues     Basal cell carcinoma      breast cancer 1998    left lumpectomy, radiation, tamoxifen     Breast cancer (H)     L Breast; Lumpectomy & Radiation     Chronic salpingitis and oophoritis     PID        Embolism and thrombosis of unspecified site     left leg, due to tamoxifen therapy     Generalized osteoarthrosis, unspecified site     hands     Leiomyoma of uterus, unspecified      Other malignant neoplasm of skin, site unspecified 2006    Basal cell cancer on nose     Squamous cell carcinoma      Thrombosis of leg     Left     Unspecified essential hypertension       Problem (# of Occurrences) Relation (Name,Age of Onset)    Allergies (2) Son (1), Son (2): percocett    Alzheimer Disease (1) Mother    Anesthesia Reaction (2) Sister (3), Sister (4)    Arthritis (3) Mother, Father, Sister (4): RA    Blood Disease (1) Father    Cerebrovascular Disease (1) Mother    Diabetes (2) Mother, Father    Factor V Leiden deficiency (3) Father, Brother (Pb), Niece (Sandra)    Gastrointestinal Disease (2) Son (1): GERD, Son (2): GERD    Heart Disease (1) Father    Hypertension (5) Mother, Father, Brother (Pb), Brother (Ford), Son (2)    Neurologic Disorder (1) Mother: Parkinsons    Thyroid Disease (3) Mother, Father, Sister (3)        Past Surgical History:   Procedure Laterality Date     BREAST LUMPECTOMY, RT/LT      left     C/SECTION, LOW TRANSVERSE  1976    , Low Transverse x2     [facility] SURGICAL PATHOLOGY      Breast reduction     COLONOSCOPY  10/15/02    normal      FOOT SURGERY  2011    R Foot      HC EXCISION BREAST LESION, OPEN >=1  11/30/98    1) Needle localization with generous lumpectomy 2) Left axillary node dissection.     HC LAPAROSCOPY, SURGICAL, ABDOMEN, PERITONEUM & OMENTUM; DX W/ OR W/O SPECIMEN(S)  02/07/94     HYSTERECTOMY, PAP NO LONGER INDICATED       INSERT PORT VASCULAR ACCESS  3/14/2013    Procedure: INSERT PORT VASCULAR ACCESS;  Port Revision;  Surgeon: Arash Puente MD;  Location: [facility] OR     LAPAROSCOPIC HYSTERECTOMY TOTAL, BILATERAL SALPINGO-OOPHORECTOMY, NODE DISSECTION, COMBINED  1/31/2013    Procedure: COMBINED LAPAROSCOPIC HYSTERECTOMY TOTAL, SALPINGO-OOPHORECTOMY, NODE DISSECTION;  Laparosocpic  Total Abdominal Hysterectomy, Bilateral Salphino-Oophorectomy, Bilateral Pelvic Lymph Node Dissection, Pelvic Washings, Cystoscopy;  Surgeon: Tanya Walker MD;  Location: [facility] OR     PHACOEMULSIFICATION WITH STANDARD INTRAOCULAR LENS IMPLANT Left 6/12/2019    Procedure: Cataract Removal with Implant;  Surgeon: Walt Mckeon MD;  Location: [facility]     PHACOEMULSIFICATION WITH STANDARD INTRAOCULAR LENS IMPLANT Right 7/3/2019    Procedure: Cataract Removal with Implant;  Surgeon: Walt Mckeon MD;  Location: [facility] OR     SURGICAL HISTORY OF -   06/22/93    1) Excision of digital cyst right mid finger  2)  Excision of dermatofibroma left calf     SURGICAL HISTORY OF -   12/18/2001    Excision of mucinous cyst & partial ostectomy, bone removal of benign osteophytic overgrowth osteophyte of the distal aspect of the middle phalanx and distal phalanx     SURGICAL HISTORY OF -   2006    Basal cell cancer removal     TONSILLECTOMY       TUBAL LIGATION  1978     Social History     Socioeconomic History     Marital status:      Spouse name: None     Number of children: 2     Years of education: None     Highest education level: None   Occupational History     Employer: sub teacher in [employer]     Employer: RETIRED   Social Needs  400 Units by mouth daily      metoprolol (LOPRESSOR) 25 MG tablet Take 0.5 tablets by mouth 2 times daily 60 tablet 3    therapeutic multivitamin-minerals (THERAGRAN-M) tablet Take 1 tablet by mouth daily.  hydrOXYzine (ATARAX) 25 MG tablet Take 25 mg by mouth 3 times daily as needed.  ranitidine (ZANTAC) 300 MG tablet Take 150 mg by mouth 2 times daily        No current facility-administered medications on file prior to encounter. REVIEW OF SYSTEMS    Pertinent items are noted in HPI. Objective:      BP (!) 154/80   Pulse 77   Resp 16     PHYSICAL EXAM    General Appearance: alert and oriented to person, place and time, well-developed and well-nourished, in no acute distress, obese and pale  Skin: warm and dry, no rash or erythema  Head: normocephalic and atraumatic  Eyes: pupils equal, round, and reactive to light  Pulmonary/Chest:  normal air movement, no respiratory distress  Cardiovascular: normal rate, regular rhythm and intact distal pulses  Extremities: no cyanosis, no clubbing, no edema and venous stasis dermatosis noted  Wound:  left heel wound with soft tissue, no periwound erythema noted, little pain.        Assessment:     Patient Active Problem List   Diagnosis    Osteoarthritis of left knee    Acute blood loss anemia    Diabetes mellitus ([address]    Ischemic cardiomyopathy    Atrial fibrillation    Peripheral vascular disease ([address]    CHF exacerbation (HCC)    Acute on chronic combined systolic and diastolic heart failure (HCC)    Chronic renal failure, stage 3 (moderate)    Chronic atrial fibrillation (HCC)    Pleural effusion    Atherosclerosis of native coronary artery without angina pectoris    Essential hypertension    Chronic combined systolic and diastolic congestive heart failure ([address]    Hx of CABG    Non-rheumatic tricuspid valve insufficiency    Mixed hyperlipidemia    Carotid disease, bilateral ([address]    Atherosclerosis of native coronary artery of     Financial resource strain: None     Food insecurity     Worry: None     Inability: None     Transportation needs     Medical: None     Non-medical: None   Tobacco Use     Smoking status: Never Smoker     Smokeless tobacco: Never Used   Substance and Sexual Activity     Alcohol use: Yes     Frequency: Never     Comment: Rare     Drug use: No     Sexual activity: Yes     Partners: Male   Lifestyle     Physical activity     Days per week: None     Minutes per session: None     Stress: None   Relationships     Social connections     Talks on phone: None     Gets together: None     Attends Catholic service: None     Active member of club or organization: None     Attends meetings of clubs or organizations: None     Relationship status: None     Intimate partner violence     Fear of current or ex partner: None     Emotionally abused: None     Physically abused: None     Forced sexual activity: None   Other Topics Concern     Parent/sibling w/ CABG, MI or angioplasty before 65F 55M? No   Social History Narrative    Orth[employer]-- DECLINES ALL BLOOD PRODUCTS        April 15, 2021    ENVIRONMENTAL HISTORY: [place]. The home is heated with a forced air. They do have central air conditioning. The patient's bedroom is furnished with hard vijaya in bedroom and animals sleep in bedroom.  Pets inside the house include 2 cat(s). There is no history of cockroach or mice infestation. There is/are 0 smokers in the house.  The house does not have a basement.            Review of Systems   Constitutional: Negative for activity change, chills and fever.   HENT: Negative for congestion, dental problem, ear pain, facial swelling, nosebleeds, postnasal drip, rhinorrhea, sinus pressure and sneezing.    Eyes: Positive for itching. Negative for discharge and redness.   Respiratory: Negative for cough, chest tightness, shortness of breath and wheezing.    Cardiovascular: Negative for chest  native heart without angina pectoris    Chronic kidney disease    Hyperlipidemia    INOCENCIO (obstructive sleep apnea)    Lymphedema    Leg swelling    Venous stasis dermatitis of both lower extremities    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 (HCC)    Venous insufficiency    Ulcer of right leg, limited to breakdown of skin (HCC)    Ulcer of lower extremity, left, limited to breakdown of skin (HCC)    Iron deficiency anemia    [facility] (acute [facility] injury) [facility]    Knee pain    Pressure ulcer of both heels    Pressure ulcer of left heel, stage 3 ([facility]       Procedure Note    Performed by: JOBY Lambert - CNP    Consent obtained: Yes    Time out taken:  Yes    Pain Control:   4% lidocaine    Debridement:Excisional Debridement    Using curette the wound was sharply debrided    down through and including the removal of epidermis, dermis and subcutaneous tissue.         Devitalized Tissue Debrided:  fibrin, biofilm and slough    Pre Debridement Measurements:  Are located in the Wound Documentation Flow Sheet    Wound #: 5    Post  Debridement Measurements:  Wound 01/28/21 Heel Left #5 (Active)   Wound Image   02/04/21 1511   Wound Etiology Pressure Stage  3 03/18/21 1432   Wound Cleansed Cleansed with saline 03/18/21 1432   Wound Length (cm) 2.5 cm 03/18/21 1432   Wound Width (cm) 2.8 cm 03/18/21 1432   Wound Depth (cm) 0.2 cm 03/18/21 1432   Wound Surface Area (cm^2) 7 cm^2 03/18/21 1432   Change in Wound Size % (l*w) 68.18 03/18/21 1432   Wound Volume (cm^3) 1.4 cm^3 03/18/21 1432   Wound Healing % 36 03/18/21 1432   Post-Procedure Length (cm) 2.6 cm 03/18/21 1500   Post-Procedure Width (cm) 2.9 cm 03/18/21 1500   Post-Procedure Depth (cm) 0.8 cm 03/18/21 1500   Post-Procedure Surface Area (cm^2) 7.54 cm^2 03/18/21 1500   Post-Procedure Volume (cm^3) 6.03 cm^3 03/18/21 1500   Wound Assessment Bleeding 03/18/21 1500   Drainage Amount Moderate 03/18/21 1500   Drainage Description Serosanguinous 03/18/21 1500   Odor Mild 03/18/21 1432   Tavon-wound Assessment Maceration 03/18/21 1432   Margins Defined edges 03/18/21 1432   Wound Thickness Description not for Pressure Injury Full thickness 03/18/21 1432   Number of days: 49       Wound 01/28/21 Heel Right #6 (Active)   Wound Image   03/18/21 1432   Wound Etiology Pressure Unstageable 03/11/21 1320   Wound Cleansed Cleansed with saline 03/11/21 1320   Wound Length (cm) 0 cm 03/18/21 1432   Wound Width (cm) 0 cm 03/18/21 1432   Wound Depth (cm) 0 cm 03/18/21 1432   Wound Surface Area (cm^2) 0 cm^2 03/18/21 1432   Wound Volume (cm^3) 0 cm^3 03/18/21 1432   Wound Assessment Other (Comment) 03/18/21 1432   Drainage Amount None 03/18/21 1432   Odor None 03/11/21 1320   Tavon-wound Assessment Intact 03/11/21 1320   Margins Attached edges 03/11/21 1320   Number of days: 49           Total Surface Area Debrided:  7.54 sq cm     Percentage of wound debrided 100 %    Bleeding:  Minimal    Hemostasis Achieved:  by pressure    Procedural Pain:  2  / 10     Post Procedural Pain:  1 / 10     Response to treatment:  Well tolerated by patient. Plan:     The nature of the patient's condition was explained in depth. The patient was informed that their compliance to the treatment plan is paramount to successful healing and prevention of further ulceration and/or infection     Discharge Treatment   Home care to change NPWT to left heel. Skin prep to tavon-wound, drape around wound to protect good skin, bridge up side of leg so patient does not stand on track- NPWT continuous at 120 mmHg or 125 mmHg (depending on the type of NPWT device), change Monday, Wednesday, & Friday. Home care to change. If unable to place NPWT, place wet to dry, dry dressing- change daily.       Written Patient Discharge Instructions Given            Electronically signed by JOBY Ball CNP on 3/19/2021 at 9:33 AM pain.   Gastrointestinal: Negative for diarrhea, nausea and vomiting.   Musculoskeletal: Negative for arthralgias, joint swelling and myalgias.   Skin: Negative for rash.   Neurological: Negative for headaches.   Hematological: Negative for adenopathy.   Psychiatric/Behavioral: Negative for behavioral problems and self-injury.           Current Outpatient Medications:      omeprazole (PRILOSEC) 20 MG DR capsule, Take 20 mg by mouth daily, Disp: , Rfl:      triamterene-HCTZ (MAXZIDE-25) 37.5-25 MG tablet, Take 1 tablet by mouth daily, Disp: 90 tablet, Rfl: 3     warfarin ANTICOAGULANT (JANTOVEN ANTICOAGULANT) 5 MG tablet, 2.5 mg Tuesday and 5 mg all other days, Disp: 84 tablet, Rfl: 1     hydrOXYzine (VISTARIL) 50 MG capsule, Take 1-2 capsules ( mg) by mouth 4 times daily as needed for itching (symptoms of allergic reaction, for or anxiety or insomnia) (Patient not taking: Reported on 4/15/2021), Disp: 20 capsule, Rfl: 0     lisinopril (ZESTRIL) 20 MG tablet, Take 1 tablet (20 mg) by mouth daily, Disp: 90 tablet, Rfl: 3     predniSONE (DELTASONE) 20 MG tablet, Take 2 tablets (40 mg) by mouth once daily for 5 days., Disp: 10 tablet, Rfl: 0  Immunization History   Administered Date(s) Administered     COVID-19,PF,Moderna 03/03/2021, 03/31/2021     Influenza (IIV3) PF 11/10/1998, 12/12/2012     Influenza, Quad, High Dose, Pf, 65yr + 09/30/2020     TDAP Vaccine (Adacel) 07/18/2012     Allergies   Allergen Reactions     Acetaminophen      Other reaction(s): Gastrointestinal     Lovenox [Enoxaparin] Itching     Metal [Staples] Other (See Comments)     Not staples, but metal surgical screws; Sutures     Percocet [Oxycodone-Acetaminophen] Nausea and Vomiting     OBJECTIVE:                                                                 BP (!) 166/90 (BP Location: Right arm, Patient Position: Sitting, Cuff Size: Adult Large)   Pulse 90   Temp 98.2  F (36.8  C) (Tympanic)   Wt 90.3 kg (199 lb 1.2 oz)   SpO2 95%    BMI 34.41 kg/m          Physical Exam  Vitals signs and nursing note reviewed.   Constitutional:       General: She is not in acute distress.     Appearance: She is not ill-appearing, toxic-appearing or diaphoretic.   HENT:      Head: Normocephalic and atraumatic.      Right Ear: Tympanic membrane, ear canal and external ear normal.      Left Ear: Tympanic membrane, ear canal and external ear normal.      Nose: No mucosal edema, congestion or rhinorrhea.      Right Turbinates: Not enlarged, swollen or pale.      Left Turbinates: Not enlarged, swollen or pale.      Mouth/Throat:      Lips: Pink.      Mouth: Mucous membranes are moist.      Pharynx: Oropharynx is clear. No pharyngeal swelling, oropharyngeal exudate, posterior oropharyngeal erythema or uvula swelling.   Eyes:      General:         Right eye: No discharge.         Left eye: No discharge.      Conjunctiva/sclera: Conjunctivae normal.   Neck:      Musculoskeletal: Normal range of motion.   Cardiovascular:      Rate and Rhythm: Normal rate and regular rhythm.      Heart sounds: Normal heart sounds.   Pulmonary:      Effort: Pulmonary effort is normal. No respiratory distress.      Breath sounds: Normal air entry. No decreased air movement or transmitted upper airway sounds. No decreased breath sounds, wheezing or rhonchi.   Musculoskeletal: Normal range of motion.   Lymphadenopathy:      Cervical: No cervical adenopathy.   Skin:     General: Skin is warm.      Comments: Mild dryness and postinflammatory hyperpigmentation on the lower abdomen, and there left and right quadrants.   Neurological:      Mental Status: She is alert and oriented to person, place, and time.   Psychiatric:         Mood and Affect: Mood normal.         Behavior: Behavior normal.         ASSESSMENT/PLAN:    Adverse effect of drug, initial encounter  Heparins rarely cause immediate-type hypersensitivity (IgE mediated) reactions.  Most commonly, they cause delayed hypersensitivity  reactions, manifested by macular papular eruptions or eczematous plaques with pruritus, that first appeared around the sites of injection after 7 to 10 days after continuous initial treatment.  However, they can appear more rapidly on subsequent exposures.  The patient was on Lovenox in the past.  Considering the history, description in the ED, and the fact that it took weeks for the rash to resolve, I think that what happened.    There are wide ranges of cross-reactivity that have been documented to occur with LMWHs in [facility], but fondaparinux and  danaparoid are generally well tolerated. Hypersensitivity to heparin is not cross-reactive with structurally distinct anticoagulants such as hirudins or factor Xa inhibitors, and these are often used as alternative agents.    I doubt that the rash started because of Topamax (started in January) or phentermine that she started in early March.  I think she can introduce them back, one by one, with approximately 2 weeks gap in between.      I offered the patient to consider testing for Lovenox.  Usually, in [place], delayed hypersensitivity testing involves delayed intradermal tests.  However, the negative predictive value is unknown, and a challenge could be necessary.  The patient does not think she wants to do that.  I offered her to give us a phone call if she changes her mind.       Return if symptoms worsen or fail to improve.    Thank you for allowing us to participate in the care of this patient. Please feel free to contact us if there are any questions or concerns about the patient.    Disclaimer: This note consists of symbols derived from keyboarding, dictation and/or voice recognition software. As a result, there may be errors in the script that have gone undetected. Please consider this when interpreting information found in this chart.    Trevor Elena MD, FAAAAI, FACAAI  Allergy, Asthma and Immunology    [facility]         Again, thank  you for allowing me to participate in the care of your patient.        Sincerely,        Trevor Elena MD

## 2021-04-16 NOTE — PROGRESS NOTES
Bobby   Progress Note and Procedure Note      Ritika Blanton  AGE: 66 y.o. GENDER: female  : 1945  TODAY'S DATE:  4/15/2021    Subjective:     Chief Complaint   Patient presents with    Wound Check     Left heel, Right heel, Right lower leg         HISTORY of PRESENT ILLNESS HPI  Rachid Russ a 66 y. o. female who presents today for wound evaluation.  Pt has NPWT unit/ dressing to left heel wound. Changed 3x/week by home care. Last week presented with new open blisters and reddish right lower leg. Today less redness and decrease in drainage right lower leg. Pt states unsure how this happened. Pt reports has not been wearing compression hose right lower leg but is using lymphedema pumps. States appetite \"could be better, but after working so hard to lose weight does not want to gain it back by taking supplements\". Pt has long history of leg wounds. Bilateral lower leg wounds were healed and discharged from [facility] last 2020. Pt states wounds reopened last 2020.  Pt has lymphedema pumps at home and states is using these daily.  Pt reports last A1C was 6. 9.   Left heel wound is Stage 3.    History of Wound: from last hospitalization/ECF.   Wound Pain:  intermittent, mild  Severity:  1 / 10   Wound Type:  venous  Modifying Factors:  edema, venous stasis, lymphedema, diabetes, obesity and non-adherence  Associated Signs/Symptoms:  edema, erythema, drainage and pain                                 PAST MEDICAL HISTORY        Diagnosis Date    Arthritis     Blood circulation, collateral     Blood transfusion     CAD (coronary artery disease)     CHF (congestive heart failure) (HCC)     Chronic renal failure, stage 3 (moderate)     Diabetes mellitus (HCC)     GERD (gastroesophageal reflux disease)     Hyperlipidemia     Hypertension     Leg swelling 2018    Lymphedema 2018    Pressure ulcer of both heels 2021    Deep tissue injuries to right and left heels with left>right. Date of origin unknown.  Pressure ulcer of left heel, stage 3 ([facility] [address] 2/18/2021    Ulcer of right leg, limited to breakdown of skin ([facility] [address] 8/20/2018       PAST SURGICAL HISTORY    Past Surgical History:   Procedure Laterality Date    ABDOMEN SURGERY      CARDIAC SURGERY      2009    CATARACT REMOVAL WITH IMPLANT Left 11/23/2016    CATARACT REMOVAL WITH IMPLANT Right 11/02/2016    CATARACT REMOVAL WITH IMPLANT Bilateral 10/16, 11/16    CHOLECYSTECTOMY      COLONOSCOPY      ENDOSCOPY, COLON, DIAGNOSTIC      EYE SURGERY      left tear duct surgery    JOINT REPLACEMENT      BTKR    KNEE ARTHROPLASTY  09/15/2010    left total knee    TOTAL KNEE ARTHROPLASTY      VASCULAR SURGERY         FAMILY HISTORY    Family History   Problem Relation Age of Onset    Diabetes Mother     Heart Disease Mother     Heart Disease Father     Stroke Father        SOCIAL HISTORY    Social History     Tobacco Use    Smoking status: Never Smoker    Smokeless tobacco: Never Used   Substance Use Topics    Alcohol use: No    Drug use: No       ALLERGIES    Allergies   Allergen Reactions    Adhesive Tape Shortness Of Breath     Other reaction(s): Ulcers    Chlorhexidine     Lisinopril Other (See Comments)     Med causes lethargy- takes in lower doses       MEDICATIONS    Current Outpatient Medications on File Prior to Encounter   Medication Sig Dispense Refill    rivaroxaban (XARELTO) 15 MG TABS tablet TAKE ONE TABLET BY MOUTH DAILY 90 tablet 1    betamethasone dipropionate (DIPROLENE) 0.05 % ointment Apply topically daily.  45 g 2    furosemide (LASIX) 20 MG tablet TAKE ONE TABLET BY MOUTH TWICE DAILY  60 tablet 3    Coenzyme Q10 (CO Q-10) 400 MG CAPS Take 400 mg by mouth daily 30 capsule 5    Liraglutide (VICTOZA) 18 MG/3ML SOPN SC injection Inject 1.2 mg into the skin daily      Handicap Placard MISC by Does not apply route Sob when walking less than 200 feet  Length of time--greater than 5 year 1 each 0    ferrous sulfate 325 (65 Fe) MG tablet Take 1 tablet by mouth 3 times daily (with meals) 90 tablet 3    atorvastatin (LIPITOR) 20 MG tablet Take 20 mg by mouth daily      vitamin D (CHOLECALCIFEROL) 400 UNITS TABS tablet Take 400 Units by mouth daily      metoprolol (LOPRESSOR) 25 MG tablet Take 0.5 tablets by mouth 2 times daily 60 tablet 3    therapeutic multivitamin-minerals (THERAGRAN-M) tablet Take 1 tablet by mouth daily.  hydrOXYzine (ATARAX) 25 MG tablet Take 25 mg by mouth 3 times daily as needed.  ranitidine (ZANTAC) 300 MG tablet Take 150 mg by mouth 2 times daily        No current facility-administered medications on file prior to encounter. REVIEW OF SYSTEMS    Pertinent items are noted in HPI. Objective:      BP (!) 149/80   Pulse 60   Temp 96.8 °F (36 °C) (Infrared)   Resp 17     PHYSICAL EXAM    General Appearance: alert and oriented to person, place and time, well-developed and well-nourished, in no acute distress  Skin: warm and dry  Head: normocephalic and atraumatic  Eyes: pupils equal, round, and reactive to light  Pulmonary/Chest:  normal air movement, no respiratory distress  Cardiovascular: normal rate, regular rhythm and intact distal pulses  Extremities: no cyanosis, no clubbing and mild edema-  right lower leg  Wounds:  Left heel, continues to fill in with NPWT. No overt signs of infection. Right lower leg: edema and local redness greatly decreased.        Assessment:     Patient Active Problem List   Diagnosis    Osteoarthritis of left knee    Acute blood loss anemia    Diabetes mellitus ([address]    Ischemic cardiomyopathy    Atrial fibrillation    Peripheral vascular disease ([address]    CHF exacerbation (HCC)    Acute on chronic combined systolic and diastolic heart failure (HCC)    Chronic renal failure, stage 3 (moderate)    Chronic atrial fibrillation (HCC)    Pleural effusion    Atherosclerosis of native coronary artery without angina pectoris    Essential hypertension    Chronic combined systolic and diastolic congestive heart failure (HCC)    Hx of CABG    Non-rheumatic tricuspid valve insufficiency    Mixed hyperlipidemia    Carotid disease, bilateral (HCC)    Atherosclerosis of native coronary artery of native heart without angina pectoris    Chronic kidney disease    Hyperlipidemia    INOCENCIO (obstructive sleep apnea)    Lymphedema    Leg swelling    Venous stasis dermatitis of both lower extremities    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 (HCC)    Venous insufficiency    Ulcer of right leg, limited to breakdown of skin (HCC)    Ulcer of lower extremity, left, limited to breakdown of skin (HCC)    Iron deficiency anemia    CHANO (acute kidney injury) ([facility] [address]    Knee pain    Pressure ulcer of both heels    Pressure ulcer of left heel, stage 3 ([facility] [address]       Procedure Note    Performed by: JOBY Win CNP    Consent obtained: Yes    Time out taken:  Yes    Pain Control: Anesthetic  Anesthetic: 4% Lidocaine Cream     Debridement:Excisional Debridement    Using curette and forceps the wound was sharply debrided    down through and including the removal of epidermis, dermis and subcutaneous tissue.         Devitalized Tissue Debrided:  fibrin, biofilm and slough    Pre Debridement Measurements:  Are located in the Wound Documentation Flow Sheet    Wound #: 1, 2, 3 and 5     Post  Debridement Measurements:  Wound 01/28/21 Heel Left #5 (Active)   Wound Image   04/08/21 1426   Wound Etiology Pressure Stage  3 04/15/21 1451   Wound Cleansed Cleansed with saline 04/15/21 1451   Wound Length (cm) 2 cm 04/15/21 1451   Wound Width (cm) 3.1 cm 04/15/21 1451   Wound Depth (cm) 0.2 cm 04/15/21 1451   Wound Surface Area (cm^2) 6.2 cm^2 04/15/21 1451   Change in Wound Size % (l*w) 71.82 04/15/21 1451   Wound Volume (cm^3) 1.24 cm^3 04/15/21 1451   Wound Healing % 44 04/15/21 1451   Post-Procedure Length (cm) 2.1 cm 04/15/21 1512   Post-Procedure Width (cm) 3.2 cm 04/15/21 1512   Post-Procedure Depth (cm) 0.2 cm 04/15/21 1512   Post-Procedure Surface Area (cm^2) 6.72 cm^2 04/15/21 1512   Post-Procedure Volume (cm^3) 1.34 cm^3 04/15/21 1512   Wound Assessment Bleeding 04/15/21 1512   Drainage Amount Moderate 04/15/21 1512   Drainage Description Serosanguinous 04/15/21 1512   Odor Mild 04/15/21 1451   Ximena-wound Assessment Intact 04/15/21 1451   Margins Unattached edges 04/15/21 1451   Wound Thickness Description not for Pressure Injury Full thickness 04/01/21 1400   Number of days: 77       Wound 01/28/21 Heel Right #6 (Active)   Wound Image   04/08/21 1426   Wound Etiology Pressure Unstageable 03/11/21 1320   Wound Cleansed Cleansed with saline 03/11/21 1320   Wound Length (cm) 0 cm 04/15/21 1451   Wound Width (cm) 0 cm 04/15/21 1451   Wound Depth (cm) 0 cm 04/15/21 1451   Wound Surface Area (cm^2) 0 cm^2 04/15/21 1451   Wound Volume (cm^3) 0 cm^3 04/15/21 1451   Wound Assessment Dry 04/15/21 1451   Drainage Amount None 03/25/21 1317   Odor None 03/11/21 1320   Ximena-wound Assessment Intact 03/11/21 1320   Margins Attached edges 03/11/21 1320   Number of days: 77       Wound 04/08/21 Leg Right; Lower; Lateral #1 (Active)   Wound Image   04/08/21 1426   Wound Etiology Venous 04/15/21 1451   Wound Cleansed Cleansed with saline 04/15/21 1451   Wound Length (cm) 2.4 cm 04/15/21 1451   Wound Width (cm) 1.2 cm 04/15/21 1451   Wound Depth (cm) 0.1 cm 04/15/21 1451   Wound Surface Area (cm^2) 2.88 cm^2 04/15/21 1451   Change in Wound Size % (l*w) 22.99 04/15/21 1451   Wound Volume (cm^3) 0.29 cm^3 04/15/21 1451   Wound Healing % 22 04/15/21 1451   Post-Procedure Length (cm) 2.5 cm 04/15/21 1512   Post-Procedure Width (cm) 1.3 cm 04/15/21 1512   Post-Procedure Depth (cm) 0.1 cm 04/15/21 1512   Post-Procedure Surface Area (cm^2) 3.25 cm^2 04/15/21 1512   Post-Procedure Volume (cm^3) 0.32 Wednesday, & Friday. Home care to change. Right Heel- Apply Mepilex Border   Right lower leg Apply Polysporin and Mepilex Border, 1 Tubi  -May leave Mepilex Borders on for 3 days then change. Apply Thigh high Lymphedema pumps for 1 hour twice daily.  - 1st application 1 hour after Breakfast , 2nd application 1 hour after dinner      Written Patient Discharge Instructions Given            Electronically signed by JOBY Mendoza CNP on 4/16/2021 at 10:30 AM

## 2021-04-22 ENCOUNTER — HOSPITAL ENCOUNTER (OUTPATIENT)
Dept: WOUND CARE | Age: 76
Discharge: HOME | End: 2021-04-22
Payer: MEDICARE

## 2021-04-22 VITALS
HEART RATE: 53 BPM | WEIGHT: 202.7 LBS | TEMPERATURE: 97.5 F | DIASTOLIC BLOOD PRESSURE: 54 MMHG | SYSTOLIC BLOOD PRESSURE: 128 MMHG | BODY MASS INDEX: 32.72 KG/M2 | RESPIRATION RATE: 16 BRPM

## 2021-04-22 DIAGNOSIS — I73.9 PERIPHERAL VASCULAR DISEASE (HCC): ICD-10-CM

## 2021-04-22 DIAGNOSIS — L97.921 ULCER OF LOWER EXTREMITY, LEFT, LIMITED TO BREAKDOWN OF SKIN (HCC): ICD-10-CM

## 2021-04-22 DIAGNOSIS — L89.629 PRESSURE ULCER OF BOTH HEELS: ICD-10-CM

## 2021-04-22 DIAGNOSIS — L89.623 PRESSURE ULCER OF LEFT HEEL, STAGE 3 (HCC): Primary | ICD-10-CM

## 2021-04-22 DIAGNOSIS — M79.89 LEG SWELLING: ICD-10-CM

## 2021-04-22 DIAGNOSIS — L89.619 PRESSURE ULCER OF BOTH HEELS: ICD-10-CM

## 2021-04-22 DIAGNOSIS — I87.2 VENOUS STASIS DERMATITIS OF BOTH LOWER EXTREMITIES: ICD-10-CM

## 2021-04-22 DIAGNOSIS — L97.911 ULCER OF RIGHT LEG, LIMITED TO BREAKDOWN OF SKIN (HCC): ICD-10-CM

## 2021-04-22 DIAGNOSIS — I89.0 LYMPHEDEMA: ICD-10-CM

## 2021-04-22 PROCEDURE — 11042 DBRDMT SUBQ TIS 1ST 20SQCM/<: CPT

## 2021-04-22 PROCEDURE — 11042 DBRDMT SUBQ TIS 1ST 20SQCM/<: CPT | Performed by: NURSE PRACTITIONER

## 2021-04-22 RX ORDER — LIDOCAINE 50 MG/G
OINTMENT TOPICAL ONCE
Status: CANCELLED | OUTPATIENT
Start: 2021-04-22 | End: 2021-04-22

## 2021-04-22 RX ORDER — LIDOCAINE 40 MG/G
CREAM TOPICAL ONCE
Status: DISCONTINUED | OUTPATIENT
Start: 2021-04-22 | End: 2021-04-23 | Stop reason: HOSPADM

## 2021-04-22 RX ORDER — CLOBETASOL PROPIONATE 0.5 MG/G
OINTMENT TOPICAL ONCE
Status: CANCELLED | OUTPATIENT
Start: 2021-04-22 | End: 2021-04-22

## 2021-04-22 RX ORDER — LIDOCAINE HYDROCHLORIDE 40 MG/ML
SOLUTION TOPICAL ONCE
Status: CANCELLED | OUTPATIENT
Start: 2021-04-22 | End: 2021-04-22

## 2021-04-22 RX ORDER — BACITRACIN, NEOMYCIN, POLYMYXIN B 400; 3.5; 5 [USP'U]/G; MG/G; [USP'U]/G
OINTMENT TOPICAL ONCE
Status: CANCELLED | OUTPATIENT
Start: 2021-04-22 | End: 2021-04-22

## 2021-04-22 RX ORDER — BACITRACIN ZINC AND POLYMYXIN B SULFATE 500; 1000 [USP'U]/G; [USP'U]/G
OINTMENT TOPICAL ONCE
Status: CANCELLED | OUTPATIENT
Start: 2021-04-22 | End: 2021-04-22

## 2021-04-22 RX ORDER — GINSENG 100 MG
CAPSULE ORAL ONCE
Status: CANCELLED | OUTPATIENT
Start: 2021-04-22 | End: 2021-04-22

## 2021-04-22 RX ORDER — GENTAMICIN SULFATE 1 MG/G
OINTMENT TOPICAL ONCE
Status: CANCELLED | OUTPATIENT
Start: 2021-04-22 | End: 2021-04-22

## 2021-04-22 RX ORDER — LIDOCAINE 40 MG/G
CREAM TOPICAL ONCE
Status: CANCELLED | OUTPATIENT
Start: 2021-04-22 | End: 2021-04-22

## 2021-04-22 RX ORDER — BETAMETHASONE DIPROPIONATE 0.05 %
OINTMENT (GRAM) TOPICAL ONCE
Status: CANCELLED | OUTPATIENT
Start: 2021-04-22 | End: 2021-04-22

## 2021-04-22 NOTE — PROGRESS NOTES
Bobby   Progress Note and Procedure Note      Abdiaziz Wadsworth  AGE: 66 y.o. GENDER: female  : 1945  TODAY'S DATE:  2021    Subjective:     Chief Complaint   Patient presents with    Wound Check     Right lower leg, Left lower leg         HISTORY of PRESENT ILLNESS HPI  Joel Holiday a 66 y. o. female who presents today for wound evaluation.  Pt has NPWT unit/ dressing to left heel wound. Changed 3x/week by home care. Had difficulty with NPWT last weekend. Unit was alarming and despite taping did not stop. Nursing eventually came out, but unit not working for greater than 24 hours. She states right heel still sore but not keeping heel lift boot on right heel.  Pt reports has not been wearing compression hose right lower leg but is using lymphedema pumps. States appetite \"could be better, but after working so hard to lose weight does not want to gain it back by taking supplements\". Pt has long history of leg wounds. Bilateral lower leg wounds were healed and discharged from [facility] last 2020. Pt states wounds reopened last 2020.  Pt has lymphedema pumps at home and states is using these daily.  Pt reports last A1C was 6. 9.   Left heel wound is Stage 3.    History of Wound: from last hospitalization/ECF.   Wound Pain:  intermittent, mild  Severity:  1 / 10   Wound Type:  venous  Modifying Factors:  edema, venous stasis, lymphedema, diabetes, obesity and non-adherence  Associated Signs/Symptoms:  edema, erythema, drainage and pain                                                               PAST MEDICAL HISTORY        Diagnosis Date    Arthritis     Blood circulation, collateral     Blood transfusion     CAD (coronary artery disease)     CHF (congestive heart failure) (HCC)     Chronic renal failure, stage 3 (moderate)     Diabetes mellitus (HCC)     GERD (gastroesophageal reflux disease)     Hyperlipidemia     Hypertension     Leg swelling 8/14/2018    Lymphedema 8/14/2018    Pressure ulcer of both heels 1/29/2021    Deep tissue injuries to right and left heels with left>right. Date of origin unknown.  Pressure ulcer of left heel, stage 3 ([address] 2/18/2021    Ulcer of right leg, limited to breakdown of skin ([address] 8/20/2018       PAST SURGICAL HISTORY    Past Surgical History:   Procedure Laterality Date    ABDOMEN SURGERY      CARDIAC SURGERY      2009    CATARACT REMOVAL WITH IMPLANT Left 11/23/2016    CATARACT REMOVAL WITH IMPLANT Right 11/02/2016    CATARACT REMOVAL WITH IMPLANT Bilateral 10/16, 11/16    CHOLECYSTECTOMY      COLONOSCOPY      ENDOSCOPY, COLON, DIAGNOSTIC      EYE SURGERY      left tear duct surgery    JOINT REPLACEMENT      BTKR    KNEE ARTHROPLASTY  09/15/2010    left total knee    TOTAL KNEE ARTHROPLASTY      VASCULAR SURGERY         FAMILY HISTORY    Family History   Problem Relation Age of Onset    Diabetes Mother     Heart Disease Mother     Heart Disease Father     Stroke Father        SOCIAL HISTORY    Social History     Tobacco Use    Smoking status: Never Smoker    Smokeless tobacco: Never Used   Substance Use Topics    Alcohol use: No    Drug use: No       ALLERGIES    Allergies   Allergen Reactions    Adhesive Tape Shortness Of Breath     Other reaction(s): Ulcers    Chlorhexidine     Lisinopril Other (See Comments)     Med causes lethargy- takes in lower doses       MEDICATIONS    Current Outpatient Medications on File Prior to Encounter   Medication Sig Dispense Refill    rivaroxaban (XARELTO) 15 MG TABS tablet TAKE ONE TABLET BY MOUTH DAILY 90 tablet 1    betamethasone dipropionate (DIPROLENE) 0.05 % ointment Apply topically daily.  45 g 2    furosemide (LASIX) 20 MG tablet TAKE ONE TABLET BY MOUTH TWICE DAILY  60 tablet 3    Coenzyme Q10 (CO Q-10) 400 MG CAPS Take 400 mg by mouth daily 30 capsule 5    Liraglutide (VICTOZA) 18 MG/3ML SOPN SC injection Inject 1.2 mg into the skin daily  Handicap Placard MISC by Does not apply route Sob when walking less than 200 feet  Length of time--greater than 5 year 1 each 0    ferrous sulfate 325 (65 Fe) MG tablet Take 1 tablet by mouth 3 times daily (with meals) 90 tablet 3    atorvastatin (LIPITOR) 20 MG tablet Take 20 mg by mouth daily      vitamin D (CHOLECALCIFEROL) 400 UNITS TABS tablet Take 400 Units by mouth daily      metoprolol (LOPRESSOR) 25 MG tablet Take 0.5 tablets by mouth 2 times daily 60 tablet 3    therapeutic multivitamin-minerals (THERAGRAN-M) tablet Take 1 tablet by mouth daily.  hydrOXYzine (ATARAX) 25 MG tablet Take 25 mg by mouth 3 times daily as needed.  ranitidine (ZANTAC) 300 MG tablet Take 150 mg by mouth 2 times daily        No current facility-administered medications on file prior to encounter. REVIEW OF SYSTEMS    Pertinent items are noted in HPI. Objective:      BP (!) 128/54   Pulse 53   Temp 97.5 °F (36.4 °C) (Infrared)   Resp 16   Wt 202 lb 11.2 oz (91.9 kg)   BMI 32.72 kg/m²     PHYSICAL EXAM    General Appearance: alert and oriented to person, place and time, well-developed and well-nourished, in no acute distress, obese and pale  Skin: warm and dry, no rash or erythema  Head: normocephalic and atraumatic  Eyes: pupils equal, round, and reactive to light  Pulmonary/Chest: normal air movement, no respiratory distress  Cardiovascular: normal rate, regular rhythm and intact distal pulses  Extremities: no cyanosis, no clubbing and mild edema-  bilateral lower legs  Wounds:  Right heel had dense callus which was debrided to a small open area, left heel with red, moist wound base, still some slough and macerated skin edges.   Right lateral lower leg with hypergranulating wound, moist.       Assessment:     Patient Active Problem List   Diagnosis    Osteoarthritis of left knee    Acute blood loss anemia    Diabetes mellitus ([address]    Ischemic cardiomyopathy    Atrial fibrillation    (cm) 3 cm 04/22/21 1305   Wound Depth (cm) 0.1 cm 04/22/21 1305   Wound Surface Area (cm^2) 6 cm^2 04/22/21 1305   Change in Wound Size % (l*w) 72.73 04/22/21 1305   Wound Volume (cm^3) 0.6 cm^3 04/22/21 1305   Wound Healing % 73 04/22/21 1305   Post-Procedure Length (cm) 2.1 cm 04/22/21 1317   Post-Procedure Width (cm) 3.1 cm 04/22/21 1317   Post-Procedure Depth (cm) 0.4 cm 04/22/21 1317   Post-Procedure Surface Area (cm^2) 6.51 cm^2 04/22/21 1317   Post-Procedure Volume (cm^3) 2.6 cm^3 04/22/21 1317   Wound Assessment Bleeding 04/22/21 1317   Drainage Amount Moderate 04/22/21 1317   Drainage Description Serosanguinous 04/22/21 1317   Odor Mild 04/22/21 1305   Ximena-wound Assessment Maceration 04/22/21 1305   Margins Unattached edges 04/15/21 1451   Wound Thickness Description not for Pressure Injury Full thickness 04/01/21 1400   Number of days: 83       Wound 04/08/21 Leg Right; Lower; Lateral #1 (Active)   Wound Image   04/08/21 1426   Wound Etiology Venous 04/22/21 1305   Wound Cleansed Cleansed with saline 04/22/21 1305   Wound Length (cm) 2 cm 04/22/21 1305   Wound Width (cm) 1.4 cm 04/22/21 1305   Wound Depth (cm) 0.1 cm 04/22/21 1305   Wound Surface Area (cm^2) 2.8 cm^2 04/22/21 1305   Change in Wound Size % (l*w) 25.13 04/22/21 1305   Wound Volume (cm^3) 0.28 cm^3 04/22/21 1305   Wound Healing % 24 04/22/21 1305   Post-Procedure Length (cm) 2.5 cm 04/15/21 1512   Post-Procedure Width (cm) 1.3 cm 04/15/21 1512   Post-Procedure Depth (cm) 0.1 cm 04/15/21 1512   Post-Procedure Surface Area (cm^2) 3.25 cm^2 04/15/21 1512   Post-Procedure Volume (cm^3) 0.32 cm^3 04/15/21 1512   Wound Assessment Epithelialization;Pink/red 04/22/21 1305   Drainage Amount Small 04/22/21 1305   Drainage Description Serosanguinous 04/22/21 1305   Odor None 04/22/21 1305   Ximena-wound Assessment Intact 04/22/21 1305   Margins Unattached edges 04/22/21 1305   Number of days: 14       Wound 04/08/21 Leg Right; Lower; Anterior;Distal #2 Number of days: 14       Wound 04/22/21 Heel Right #5 (Active)   Wound Etiology Pressure Stage  2 04/22/21 1317   Wound Length (cm) 0 cm 04/22/21 1317   Wound Width (cm) 0 cm 04/22/21 1317   Wound Depth (cm) 0 cm 04/22/21 1317   Wound Surface Area (cm^2) 0 cm^2 04/22/21 1317   Wound Volume (cm^3) 0 cm^3 04/22/21 1317   Post-Procedure Length (cm) 0.3 cm 04/22/21 1327   Post-Procedure Width (cm) 0.2 cm 04/22/21 1327   Post-Procedure Depth (cm) 0.1 cm 04/22/21 1327   Post-Procedure Surface Area (cm^2) 0.06 cm^2 04/22/21 1327   Post-Procedure Volume (cm^3) 0.01 cm^3 04/22/21 1327   Wound Assessment Bleeding 04/22/21 1327   Drainage Amount Large 04/22/21 1327   Drainage Description Serosanguinous 04/22/21 1327   Tavon-wound Assessment Hyperkeratosis (callous) 04/22/21 1317   Number of days: 0           Total Surface Area Debrided:  9.82 sq cm     Percentage of wound debrided 100%    Bleeding:  Minimal    Hemostasis Achieved:  by pressure    Procedural Pain:  3  / 10     Post Procedural Pain:  1 / 10     Response to treatment:  Well tolerated by patient. Plan:     The nature of the patient's condition was explained in depth. The patient was informed that their compliance to the treatment plan is paramount to successful healing and prevention of further ulceration and/or infection     Discharge Treatment   Dressing care: Left Heel-  Home care to change Wound VAC. Skin prep to tavon-wound, drape around wound to protect good skin, bridge up side of leg so patient does not stand on track ( Avoid Ankle Bone), Cut black foam to size to fit into wound- NPWT continuous at 150 mmHg (depending on the type of NPWT device), change Monday, Wednesday, & Friday. Home care to change. Right Heel and Right lower leg Apply  Mepilex Border, 1 Tubi  -May leave Mepilex Borders on for 3 days then change. Apply Thigh high Lymphedema pumps for 1 hour twice daily.  - 1st application 1 hour after Breakfast , 2nd application 1 hour after dinner   Prevalon Boots or Heel Protecting Boot to Heels when sitting or in bed for both legs.   Written Patient Discharge Instructions Given            Electronically signed by JOBY Contreras CNP on 4/22/2021 at 3:20 PM

## 2021-04-22 NOTE — PROGRESS NOTES
[address], [address] [place] [address] Road  Telephone: [phone]        [facility] Fax # [phone]        Discharge Instructions       [place] [id number] [facility], [address] [place] [address] Road  Telephone: [phone]     Discharge Instructions:  Keep weight off wounds and reposition every 2 hours if applicable. Avoid standing for long periods of time. If wound(s) is on your lower extremity, elevate legs to the level of the heart or above for 30 minutes 4-5 times a day and/or when sitting. Do not get wounds wet in bath or shower unless otherwise instructed by your physician. If your wound is on you foot or leg, you may purchase a cast bag. Please ask at the pharmacy. When taking antibiotics take entire prescription as ordered by MD do not stop taking until medicine is all gone. Exercise as tolerated. No Smoking. Smoking prohibits wound healing. If Vascular testing is ordered, please call [address] [place] [address] [phone]) to schedule. Vascular tests ordered by Wound Care Physicians may take up to 2 hours to complete. Please keep that in mind when scheduling. If Vascular testing is scheduled, please bring supplies to replace your dressing after testing is done. The vascular department does not stock supplies. Wound: Bilateral Legs     With each dressing change, rinse wounds with 0.9% Saline. (May use wound wash or soft contact solution. Both can be purchased at a local drug store). If unable to obtain saline, may use a gentle soap and water. Dressing care: Left Heel-  Home care to change Wound vac. Skin prep to tavon-wound, drape around wound to protect good skin, bridge up side of leg so patient does not stand on track ( Avoid Ankle Bone), Cut black foam to size to fit into wound- NPWT continuous at 150 mmHg (depending on the type of NPWT device), change Monday, Wednesday, & Friday. Home care to change.       Right Heel and Right lower leg Apply  Mepilex Border, 1 Tubi  -May leave Mepilex Borders on for 3 days then change. Apply Thigh high Lymphedema pumps for 1 hour twice daily. - 1st application 1 hour after Breakfast , 2nd application 1 hour after dinner   Prevalon Boots or Heel Protecting Boot to Heels when sitting or in bed. Can call Viky,. Viky Mckeon [address]  Suzanne Alatorre Fausto  P: [phone]  F: [phone]      Important dietary reminders:  1. Increase Protein intake for optimal wound healing  2. No added salt to reduce any swelling  3. If diabetic, good glucose control  4. If you smoke, smoking affects wound healing, we ask that you refrain from smoking. Follow up with Dontrell Salas CNP In 1 week in the wound care center. Call [phone] for any questions or concerns. Your  is Haylie Donavon42: [facility] [address] [facility] Information: Should you experience any significant changes in your wound(s) or have questions about your wound care, please contact the [employer] [facility] at [phone] Monday  - Thursday 8:00 am - 4:00 pm and Friday 8:00 am - 1:00pm. If you need help with your wound outside these hours and cannot wait until we are again available, contact your PCP or go to the hospital emergency room. PLEASE NOTE: IF YOU ARE UNABLE TO OBTAIN WOUND SUPPLIES, CONTINUE TO USE THE SUPPLIES YOU HAVE AVAILABLE UNTIL YOU ARE ABLE TO REACH US. IT IS MOST IMPORTANT TO KEEP THE WOUND COVERED AT ALL TIMES. Skilled nurse to evaluate and treat for wound care. Change dressing as ordered  once a day on Monday, Wednesday and Friday using clean technique. Patient/Family/caregiver may change dressings as needed as instructed when Home Care unavailable.     WOUNDS REQUIRING DRESSING Changes:     Wound 01/28/21 Heel Left #5 (Active)   Wound Image   04/08/21 1426   Wound Etiology Pressure Stage  3 04/22/21 1305   Wound Cleansed Cleansed with saline 04/22/21 1305   Wound Length (cm) 2 cm 04/22/21 1305   Wound Width (cm) 3 cm 04/22/21 1305   Wound Depth (cm) 0.1 cm 04/22/21 1305   Wound Surface Area (cm^2) 6 cm^2 04/22/21 1305   Change in Wound Size % (l*w) 72.73 04/22/21 1305   Wound Volume (cm^3) 0.6 cm^3 04/22/21 1305   Wound Healing % 73 04/22/21 1305   Post-Procedure Length (cm) 2.1 cm 04/22/21 1317   Post-Procedure Width (cm) 3.1 cm 04/22/21 1317   Post-Procedure Depth (cm) 0.4 cm 04/22/21 1317   Post-Procedure Surface Area (cm^2) 6.51 cm^2 04/22/21 1317   Post-Procedure Volume (cm^3) 2.6 cm^3 04/22/21 1317   Wound Assessment Bleeding 04/22/21 1317   Drainage Amount Moderate 04/22/21 1317   Drainage Description Serosanguinous 04/22/21 1317   Odor Mild 04/22/21 1305   Ximena-wound Assessment Maceration 04/22/21 1305   Margins Unattached edges 04/15/21 1451   Wound Thickness Description not for Pressure Injury Full thickness 04/01/21 1400   Number of days: 83       Wound 04/08/21 Leg Right; Lower; Lateral #1 (Active)   Wound Image   04/08/21 1426   Wound Etiology Venous 04/22/21 1305   Wound Cleansed Cleansed with saline 04/22/21 1305   Wound Length (cm) 2 cm 04/22/21 1305   Wound Width (cm) 1.4 cm 04/22/21 1305   Wound Depth (cm) 0.1 cm 04/22/21 1305   Wound Surface Area (cm^2) 2.8 cm^2 04/22/21 1305   Change in Wound Size % (l*w) 25.13 04/22/21 1305   Wound Volume (cm^3) 0.28 cm^3 04/22/21 1305   Wound Healing % 24 04/22/21 1305   Post-Procedure Length (cm) 2.5 cm 04/15/21 1512   Post-Procedure Width (cm) 1.3 cm 04/15/21 1512   Post-Procedure Depth (cm) 0.1 cm 04/15/21 1512   Post-Procedure Surface Area (cm^2) 3.25 cm^2 04/15/21 1512   Post-Procedure Volume (cm^3) 0.32 cm^3 04/15/21 1512   Wound Assessment Epithelialization;Pink/red 04/22/21 1305   Drainage Amount Small 04/22/21 1305   Drainage Description Serosanguinous 04/22/21 1305   Odor None 04/22/21 1305   Ximena-wound Assessment Intact 04/22/21 1305   Margins Unattached edges 04/22/21 1305   Number of days: 13       Wound 04/08/21 Leg Right; Lower; Anterior;Distal #2 (Active)   Wound Image   04/08/21 1426   Wound Etiology Venous 04/22/21 1305   Wound Cleansed Cleansed with saline 04/22/21 1305   Wound Length (cm) 0 cm 04/22/21 1305   Wound Width (cm) 0 cm 04/22/21 1305   Wound Depth (cm) 0 cm 04/22/21 1305   Wound Surface Area (cm^2) 0 cm^2 04/22/21 1305   Change in Wound Size % (l*w) 100 04/22/21 1305   Wound Volume (cm^3) 0 cm^3 04/22/21 1305   Wound Healing % 100 04/22/21 1305   Post-Procedure Length (cm) 7.1 cm 04/15/21 1512   Post-Procedure Width (cm) 1.1 cm 04/15/21 1512   Post-Procedure Depth (cm) 0.1 cm 04/15/21 1512   Post-Procedure Surface Area (cm^2) 7.81 cm^2 04/15/21 1512   Post-Procedure Volume (cm^3) 0.78 cm^3 04/15/21 1512   Wound Assessment Epithelialization 04/22/21 1305   Drainage Amount None 04/22/21 1305   Drainage Description Serosanguinous 04/15/21 1512   Odor None 04/22/21 1305   Ximena-wound Assessment Intact 04/22/21 1305   Margins Attached edges 04/22/21 1305   Number of days: 13       Wound 04/08/21 Leg Right; Lower; Anterior;Mid #3 (Active)   Wound Image   04/08/21 1426   Wound Etiology Venous 04/22/21 1305   Wound Cleansed Cleansed with saline 04/22/21 1305   Wound Length (cm) 0 cm 04/22/21 1305   Wound Width (cm) 0 cm 04/22/21 1305   Wound Depth (cm) 0 cm 04/22/21 1305   Wound Surface Area (cm^2) 0 cm^2 04/22/21 1305   Change in Wound Size % (l*w) 100 04/22/21 1305   Wound Volume (cm^3) 0 cm^3 04/22/21 1305   Wound Healing % 100 04/22/21 1305   Post-Procedure Length (cm) 1.4 cm 04/15/21 1512   Post-Procedure Width (cm) 0.5 cm 04/15/21 1512   Post-Procedure Depth (cm) 0.1 cm 04/15/21 1512   Post-Procedure Surface Area (cm^2) 0.7 cm^2 04/15/21 1512   Post-Procedure Volume (cm^3) 0.07 cm^3 04/15/21 1512   Wound Assessment Epithelialization 04/22/21 1305   Drainage Amount None 04/22/21 1305   Drainage Description Serosanguinous 04/15/21 1512   Odor None 04/22/21 1305   Ximena-wound Assessment Intact 04/22/21 1305   Margins Attached edges 04/22/21 1305   Number of days: 13       Wound 04/22/21 Heel Right #5 (Active)   Wound Etiology Pressure Stage  2 04/22/21 1317   Wound Length (cm) 0 cm 04/22/21 1317   Wound Width (cm) 0 cm 04/22/21 1317   Wound Depth (cm) 0 cm 04/22/21 1317   Wound Surface Area (cm^2) 0 cm^2 04/22/21 1317   Wound Volume (cm^3) 0 cm^3 04/22/21 1317   Post-Procedure Length (cm) 0.3 cm 04/22/21 1327   Post-Procedure Width (cm) 0.2 cm 04/22/21 1327   Post-Procedure Depth (cm) 0.1 cm 04/22/21 1327   Post-Procedure Surface Area (cm^2) 0.06 cm^2 04/22/21 1327   Post-Procedure Volume (cm^3) 0.01 cm^3 04/22/21 1327   Wound Assessment Bleeding 04/22/21 1327   Drainage Amount Large 04/22/21 1327   Drainage Description Serosanguinous 04/22/21 1327   Ximena-wound Assessment Hyperkeratosis (callous) 04/22/21 1317   Number of days: 0          Patient seen and treated on 4/22/2021    By Dr Guillermo Willis [phone]   (provider/NPI)

## 2021-04-29 ENCOUNTER — HOSPITAL ENCOUNTER (OUTPATIENT)
Dept: WOUND CARE | Age: 76
Discharge: HOME | End: 2021-04-29
Payer: MEDICARE

## 2021-05-05 NOTE — PROGRESS NOTES
swelling 8/14/2018    Lymphedema 8/14/2018    Pressure ulcer of both heels 1/29/2021    Deep tissue injuries to right and left heels with left>right. Date of origin unknown.     Pressure ulcer of left heel, stage 3 ([address] 2/18/2021    Ulcer of right leg, limited to breakdown of skin ([address] 8/20/2018       Surgical History:      Procedure Laterality Date    ABDOMEN SURGERY      CARDIAC SURGERY      2009    CATARACT REMOVAL WITH IMPLANT Left 11/23/2016    CATARACT REMOVAL WITH IMPLANT Right 11/02/2016    CATARACT REMOVAL WITH IMPLANT Bilateral 10/16, 11/16    CHOLECYSTECTOMY      COLONOSCOPY      ENDOSCOPY, COLON, DIAGNOSTIC      EYE SURGERY      left tear duct surgery    JOINT REPLACEMENT      BTKR    KNEE ARTHROPLASTY  09/15/2010    left total knee    TOTAL KNEE ARTHROPLASTY      VASCULAR SURGERY         Social History:  Social History     Socioeconomic History    Marital status:      Spouse name: Not on file    Number of children: Not on file    Years of education: Not on file    Highest education level: Not on file   Occupational History    Not on file   Social Needs    Financial resource strain: Not on file    Food insecurity     Worry: Not on file     Inability: Not on file    Transportation needs     Medical: Not on file     Non-medical: Not on file   Tobacco Use    Smoking status: Never Smoker    Smokeless tobacco: Never Used   Substance and Sexual Activity    Alcohol use: No    Drug use: No    Sexual activity: Yes     Partners: Male   Lifestyle    Physical activity     Days per week: Not on file     Minutes per session: Not on file    Stress: Not on file   Relationships    Social connections     Talks on phone: Not on file     Gets together: Not on file     Attends Yarsani service: Not on file     Active member of club or organization: Not on file     Attends meetings of clubs or organizations: Not on file     Relationship status: Not on file    Intimate partner violence     Fear of current or ex partner: Not on file     Emotionally abused: Not on file     Physically abused: Not on file     Forced sexual activity: Not on file   Other Topics Concern    Not on file   Social History Narrative    Not on file        Family History:  No evidence for sudden cardiac death or premature CAD. Problem Relation Age of Onset    Diabetes Mother     Heart Disease Mother     Heart Disease Father     Stroke Father        Medications:  [x] Medications and dosages reviewed. Prior to Admission medications    Medication Sig Start Date End Date Taking?  Authorizing Provider   colchicine (COLCRYS) 0.6 MG tablet 0.6 mg: TAKE ONE TABLET BY MOUTH DAILY 4/29/21  Yes Historical Provider, MD   Cephalexin 500 MG TABS Per instructions: TAKE ONE CAPSULE BY MOUTH EVERY 12 HOUR 4/29/21  Yes Historical Provider, MD   rivaroxaban (XARELTO) 15 MG TABS tablet TAKE ONE TABLET BY MOUTH DAILY 4/9/21  Yes Shruti Sherman DO   furosemide (LASIX) 20 MG tablet TAKE ONE TABLET BY MOUTH TWICE DAILY  3/10/21  Yes Gato Hong MD   Coenzyme Q10 (CO Q-10) 400 MG CAPS Take 400 mg by mouth daily 9/28/20  Yes Hailey Talbot MD   Liraglutide (VICTOZA) 18 MG/3ML SOPN SC injection Inject 1.2 mg into the skin daily   Yes Historical Provider, MD   Handicap Placard MISC by Does not apply route Sob when walking less than 200 feet  Length of time--greater than 5 year 4/17/19  Yes Hailey Talbot MD   ferrous sulfate 325 (65 Fe) MG tablet Take 1 tablet by mouth 3 times daily (with meals)  Patient taking differently: Take 325 mg by mouth 2 times daily (with meals)  4/3/19  Yes Gato Hong MD   atorvastatin (LIPITOR) 20 MG tablet Take 20 mg by mouth daily   Yes Historical Provider, MD   vitamin D (CHOLECALCIFEROL) 400 UNITS TABS tablet Take 400 Units by mouth daily   Yes Historical Provider, MD   metoprolol (LOPRESSOR) 25 MG tablet Take 0.5 tablets by mouth 2 times daily 8/29/15  Yes Carlos Cardozo, MD   therapeutic multivitamin-minerals (THERAGRAN-M) tablet Take 1 tablet by mouth daily. Yes Historical Provider, MD   hydrOXYzine (ATARAX) 25 MG tablet Take 25 mg by mouth 3 times daily as needed. 8/9/10  Yes Historical Provider, MD   ranitidine (ZANTAC) 300 MG tablet Take 150 mg by mouth 2 times daily  8/9/10  Yes Historical Provider, MD       Allergies:  Adhesive tape, Chlorhexidine, and Lisinopril     Review of Systems:    [x]Full ROS obtained and negative except as mentioned in HPI    Physical Examination:    /84 (Site: Right Upper Arm, Position: Sitting, Cuff Size: Large Adult)   Pulse 58   Ht 5' 6\" (1.676 m)   Wt 201 lb (91.2 kg)   SpO2 97%   BMI 32.44 kg/m²   Wt Readings from Last 3 Encounters:   05/07/21 201 lb (91.2 kg)   04/22/21 202 lb 11.2 oz (91.9 kg)   03/30/21 206 lb (93.4 kg)     Vitals:    05/07/21 0813   BP: 132/84   Pulse: 58   SpO2: 97%       · GENERAL: Well developed, well nourished, no acute distress  · NEUROLOGICAL: Alert and oriented x3  · PSYCH: Normal mood and affect   · SKIN: Warm and dry  · HEENT: Normocephalic, atraumatic, Sclera non-icteric, mucous membranes moist  · NECK: supple, JVP normal  · CARDIAC: Normal PMI, regular rate and rhythm, normal S1S2, no murmur, rub, or gallop  · RESPIRATORY: Normal respiratory effort, clear to auscultation bilaterally  · EXTREMITIES: no edema or clubbing, +2 pulses bilaterally   · MUSCULOSKELETAL: No joint swelling or tenderness, no chest wall tenderness  · GASTROINTESTINAL: normal bowel sounds, soft, non-tender    Labs:  Lab Review   Hospital Outpatient Visit on 03/11/2021   Component Date Value    Sed Rate 03/11/2021 60*    CRP 03/11/2021 <3.0      Imaging:  I have reviewed the below testing personally:  [facility] 8/9/10  Normal LV wall motion  LV gram, EF 31%  Normal systolic function  Right dominance  LM- widely patent  LAD- Mid 99% lesion.    D1- Retrograde filling from LAD  LCx- Prox totally occluded  RCA- Luminal irregularities  RPDA- of beta blocker, anticoagulant, statin, and diuretic. She is compensated, without angina, and tolerating medications without adverse effects. She is agreeable to updating echocardiogram.     CHF education reinforced. ~salt restriction: 2,000 mg daily                         ~fluid restriction: 1500 ml daily                        ~medication compliance                        ~daily weights and notify of any significant weight gain/loss    Orders Placed This Encounter   Procedures    LIPID PANEL     Standing Status:   Future     Standing Expiration Date:   5/7/2022     Order Specific Question:   Is Patient Fasting?/# of Hours     Answer:   y8    AST     Standing Status:   Future     Standing Expiration Date:   5/7/2022    ALT     Standing Status:   Future     Standing Expiration Date:   5/7/2022    Echo 2D w doppler w color complete     Standing Status:   Future     Standing Expiration Date:   5/7/2022     Order Specific Question:   Reason for exam:     Answer:   icm, chf, afib, cad     Return in about 6 months (around 11/7/2021). Patient Instructions   Update echocardiogram     Update your cholesterol blood work at your convenience     No medication changes today    Call with questions or concerns   Call if you have any bleeding concerns    Follow up in 6 months    Thank you for allowing me to participate in the care of your patient. Please do not hesitate to call. Delynn Cockayne, DO, [facility]  Interventional Cardiology     o: [phone]  [address], [address] [facility], [address] [facility]      NOTE:  This report was transcribed using voice recognition software. Every effort was made to ensure accuracy; however, inadvertent computerized transcription errors may be present. Scribe's Attestation:  This note was scribed in the presence of Dr. Mark Patton DO by Brittany Lee RN.    I, Delynn Cockayne, have personally performed the services described in this documentation as scribed by Renan Griggs. KRISTEN oCrbett in my presence, and it is both accurate and complete. An electronic signature was used to authenticate this note.

## 2021-05-06 ENCOUNTER — HOSPITAL ENCOUNTER (OUTPATIENT)
Dept: WOUND CARE | Age: 76
Discharge: HOME | End: 2021-05-06
Payer: MEDICARE

## 2021-05-06 DIAGNOSIS — I89.0 LYMPHEDEMA: ICD-10-CM

## 2021-05-06 DIAGNOSIS — L89.629 PRESSURE ULCER OF BOTH HEELS: ICD-10-CM

## 2021-05-06 DIAGNOSIS — L97.921 ULCER OF LOWER EXTREMITY, LEFT, LIMITED TO BREAKDOWN OF SKIN (HCC): ICD-10-CM

## 2021-05-06 DIAGNOSIS — I87.331 IDIOPATHIC CHRONIC VENOUS HTN OF RIGHT LEG WITH ULCER AND INFLAMMATION (HCC): ICD-10-CM

## 2021-05-06 DIAGNOSIS — L97.911 ULCER OF RIGHT LEG, LIMITED TO BREAKDOWN OF SKIN (HCC): ICD-10-CM

## 2021-05-06 DIAGNOSIS — L97.919 IDIOPATHIC CHRONIC VENOUS HTN OF RIGHT LEG WITH ULCER AND INFLAMMATION (HCC): ICD-10-CM

## 2021-05-06 DIAGNOSIS — L89.623 PRESSURE ULCER OF LEFT HEEL, STAGE 3 (HCC): Primary | ICD-10-CM

## 2021-05-06 DIAGNOSIS — L89.619 PRESSURE ULCER OF BOTH HEELS: ICD-10-CM

## 2021-05-06 DIAGNOSIS — M10.042 ACUTE IDIOPATHIC GOUT OF LEFT HAND: ICD-10-CM

## 2021-05-06 DIAGNOSIS — I87.2 VENOUS STASIS DERMATITIS OF BOTH LOWER EXTREMITIES: ICD-10-CM

## 2021-05-06 DIAGNOSIS — M10.041 ACUTE IDIOPATHIC GOUT OF RIGHT HAND: ICD-10-CM

## 2021-05-06 DIAGNOSIS — M79.89 LEG SWELLING: ICD-10-CM

## 2021-05-06 PROCEDURE — 11042 DBRDMT SUBQ TIS 1ST 20SQCM/<: CPT | Performed by: NURSE PRACTITIONER

## 2021-05-06 PROCEDURE — 11042 DBRDMT SUBQ TIS 1ST 20SQCM/<: CPT

## 2021-05-06 RX ORDER — BETAMETHASONE DIPROPIONATE 0.05 %
OINTMENT (GRAM) TOPICAL ONCE
Status: CANCELLED | OUTPATIENT
Start: 2021-05-06 | End: 2021-05-06

## 2021-05-06 RX ORDER — LIDOCAINE 40 MG/G
CREAM TOPICAL ONCE
Status: DISCONTINUED | OUTPATIENT
Start: 2021-05-06 | End: 2021-05-07 | Stop reason: HOSPADM

## 2021-05-06 RX ORDER — GENTAMICIN SULFATE 1 MG/G
OINTMENT TOPICAL ONCE
Status: CANCELLED | OUTPATIENT
Start: 2021-05-06 | End: 2021-05-06

## 2021-05-06 RX ORDER — LIDOCAINE 50 MG/G
OINTMENT TOPICAL ONCE
Status: CANCELLED | OUTPATIENT
Start: 2021-05-06 | End: 2021-05-06

## 2021-05-06 RX ORDER — LIDOCAINE 40 MG/G
CREAM TOPICAL ONCE
Status: CANCELLED | OUTPATIENT
Start: 2021-05-06 | End: 2021-05-06

## 2021-05-06 RX ORDER — GINSENG 100 MG
CAPSULE ORAL ONCE
Status: CANCELLED | OUTPATIENT
Start: 2021-05-06 | End: 2021-05-06

## 2021-05-06 RX ORDER — LIDOCAINE HYDROCHLORIDE 40 MG/ML
SOLUTION TOPICAL ONCE
Status: CANCELLED | OUTPATIENT
Start: 2021-05-06 | End: 2021-05-06

## 2021-05-06 RX ORDER — BACITRACIN, NEOMYCIN, POLYMYXIN B 400; 3.5; 5 [USP'U]/G; MG/G; [USP'U]/G
OINTMENT TOPICAL ONCE
Status: CANCELLED | OUTPATIENT
Start: 2021-05-06 | End: 2021-05-06

## 2021-05-06 RX ORDER — CLOBETASOL PROPIONATE 0.5 MG/G
OINTMENT TOPICAL ONCE
Status: CANCELLED | OUTPATIENT
Start: 2021-05-06 | End: 2021-05-06

## 2021-05-06 RX ORDER — BACITRACIN ZINC AND POLYMYXIN B SULFATE 500; 1000 [USP'U]/G; [USP'U]/G
OINTMENT TOPICAL ONCE
Status: CANCELLED | OUTPATIENT
Start: 2021-05-06 | End: 2021-05-06

## 2021-05-06 NOTE — PLAN OF CARE
Discharge instructions given. Patient verbalized understanding. Return to [address] [place] [address],[address] in 1 week.   Called/faxed orders to  [facility]

## 2021-05-06 NOTE — PROGRESS NOTES
Bobby   Progress Note and Procedure Note      Aditya Flores  AGE: 66 y.o. GENDER: female  : 1945  TODAY'S DATE:  2021    Subjective:     Chief Complaint   Patient presents with    Wound Check     bilateral lower legs         HISTORY of PRESENT ILLNESS HPI  Aristides Banks a 66 y. o. female who presents today for wound evaluation.  Pt has NPWT unit/ dressing to left heel wound. Changed 3x/week by home care. No problems with NPWT is last 2 weeks. Pt had a acute episode of gout with swelling/pain both hands; started on medication and now is significantly better. Pt reports anterior bilateral lower legs have gilberto breaking out with flat superficial wounds which bleed. She states right heel still sore and again reminded her to use heel lift boot when sitting in chair. States appetite \"could be better, but after working so hard to lose weight does not want to gain it back by taking supplements\". Pt has long history of leg wounds. Bilateral lower leg wounds were healed and discharged from [facility] last 2020. Pt states wounds reopened last 2020.  Pt has lymphedema pumps at home and states is using these daily.  Pt reports last A1C was 6. 9.   Left heel wound is Stage 3.    History of Wound: from last hospitalization/ECF.   Wound Pain:  intermittent, mild  Severity:  1 / 10   Wound Type:  venous  Modifying Factors:  edema, venous stasis, lymphedema, diabetes, obesity and non-adherence  Associated Signs/Symptoms:  edema, erythema, drainage and pain                                                      PAST MEDICAL HISTORY        Diagnosis Date    Acute idiopathic gout of left hand 2021    Acute idiopathic gout of right hand 2021    Arthritis     Blood circulation, collateral     Blood transfusion     CAD (coronary artery disease)     CHF (congestive heart failure) (HCC)     Chronic renal failure, stage 3 (moderate)     Diabetes mellitus ([facility] [address] capsule 5    Liraglutide (VICTOZA) 18 MG/3ML SOPN SC injection Inject 1.2 mg into the skin daily      Handicap Placard MISC by Does not apply route Sob when walking less than 200 feet  Length of time--greater than 5 year 1 each 0    ferrous sulfate 325 (65 Fe) MG tablet Take 1 tablet by mouth 3 times daily (with meals) 90 tablet 3    atorvastatin (LIPITOR) 20 MG tablet Take 20 mg by mouth daily      vitamin D (CHOLECALCIFEROL) 400 UNITS TABS tablet Take 400 Units by mouth daily      metoprolol (LOPRESSOR) 25 MG tablet Take 0.5 tablets by mouth 2 times daily 60 tablet 3    therapeutic multivitamin-minerals (THERAGRAN-M) tablet Take 1 tablet by mouth daily.  hydrOXYzine (ATARAX) 25 MG tablet Take 25 mg by mouth 3 times daily as needed.  ranitidine (ZANTAC) 300 MG tablet Take 150 mg by mouth 2 times daily        No current facility-administered medications on file prior to encounter. REVIEW OF SYSTEMS    Pertinent items are noted in HPI. Objective: There were no vitals taken for this visit. PHYSICAL EXAM    General Appearance: alert and oriented to person, place and time, with anxious affect and pale  Skin: warm and dry  Head: normocephalic and atraumatic  Eyes: pupils equal, round, and reactive to light  Pulmonary/Chest:  normal air movement, no respiratory distress  Cardiovascular: normal rate, regular rhythm and intact distal pulses  Extremities: no cyanosis, no clubbing, venous stasis dermatosis noted and fragile skin. Wounds:  Left heel wound filling in and no overt signs of infection. Right heel with raised tender scab. Bilateral lower legs with multiple superficial denuded areas, slow oozing of serosanguinous drainage.        Assessment:     Patient Active Problem List   Diagnosis    Osteoarthritis of left knee    Acute blood loss anemia    Diabetes mellitus ([facility] [address]    Ischemic cardiomyopathy    Atrial fibrillation    Peripheral vascular disease (HCC)    CHF exacerbation ([facility] [address]    Acute on chronic combined systolic and diastolic heart failure (HCC)    Chronic renal failure, stage 3 (moderate)    Chronic atrial fibrillation (HCC)    Pleural effusion    Atherosclerosis of native coronary artery without angina pectoris    Essential hypertension    Chronic combined systolic and diastolic congestive heart failure (HCC)    Hx of CABG    Non-rheumatic tricuspid valve insufficiency    Mixed hyperlipidemia    Carotid disease, bilateral (HCC)    Atherosclerosis of native coronary artery of native heart without angina pectoris    Chronic kidney disease    Hyperlipidemia    INOCENCIO (obstructive sleep apnea)    Lymphedema    Leg swelling    Venous stasis dermatitis of both lower extremities    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 (HCC)    Venous insufficiency    Ulcer of right leg, limited to breakdown of skin (HCC)    Ulcer of lower extremity, left, limited to breakdown of skin (HCC)    Iron deficiency anemia    CHANO (acute kidney injury) ([facility] [address]    Knee pain    Pressure ulcer of both heels    Pressure ulcer of left heel, stage 3 (HCC)    Acute idiopathic gout of right hand    Acute idiopathic gout of left hand       Procedure Note    Performed by: JOBY Douglas CNP    Consent obtained: Yes    Time out taken:  Yes    Pain Control: Anesthetic  Anesthetic: 4% Lidocaine Cream     Debridement:Excisional Debridement    Using curette and forceps the wound was sharply debrided    down through and including the removal of epidermis, dermis and subcutaneous tissue.         Devitalized Tissue Debrided:  fibrin, biofilm and slough    Pre Debridement Measurements:  Are located in the Wound Documentation Flow Sheet    Wound #: 5     Post  Debridement Measurements:  Wound 01/28/21 Heel Left #5 (Active)   Wound Image   04/08/21 1426   Wound Etiology Pressure Stage  3 05/06/21 1314   Wound Cleansed Cleansed with saline 04/22/21 1305   Wound Length (cm) 2 cm 05/06/21 1314   Wound Width (cm) 2.1 cm 05/06/21 1314   Wound Depth (cm) 0.1 cm 05/06/21 1314   Wound Surface Area (cm^2) 4.2 cm^2 05/06/21 1314   Change in Wound Size % (l*w) 80.91 05/06/21 1314   Wound Volume (cm^3) 0.42 cm^3 05/06/21 1314   Wound Healing % 81 05/06/21 1314   Post-Procedure Length (cm) 2.1 cm 05/06/21 1335   Post-Procedure Width (cm) 2.2 cm 05/06/21 1335   Post-Procedure Depth (cm) 0.1 cm 05/06/21 1335   Post-Procedure Surface Area (cm^2) 4.62 cm^2 05/06/21 1335   Post-Procedure Volume (cm^3) 0.46 cm^3 05/06/21 1335   Wound Assessment Bleeding 05/06/21 1335   Drainage Amount Moderate 05/06/21 1335   Drainage Description Serosanguinous 05/06/21 1335   Odor Mild 05/06/21 1314   Ximena-wound Assessment Maceration 05/06/21 1314   Margins Defined edges 05/06/21 1314   Wound Thickness Description not for Pressure Injury Full thickness 05/06/21 1314   Number of days: 97       Wound 04/08/21 Leg Right; Lower; Lateral #1 (Active)   Wound Image   04/08/21 1426   Wound Etiology Venous 05/06/21 1314   Wound Cleansed Cleansed with saline 04/22/21 1305   Wound Length (cm) 2 cm 05/06/21 1314   Wound Width (cm) 0.9 cm 05/06/21 1314   Wound Depth (cm) 0.1 cm 05/06/21 1314   Wound Surface Area (cm^2) 1.8 cm^2 05/06/21 1314   Change in Wound Size % (l*w) 51.87 05/06/21 1314   Wound Volume (cm^3) 0.18 cm^3 05/06/21 1314   Wound Healing % 51 05/06/21 1314   Post-Procedure Length (cm) 2 cm 05/06/21 1335   Post-Procedure Width (cm) 1 cm 05/06/21 1335   Post-Procedure Depth (cm) 0.1 cm 05/06/21 1335   Post-Procedure Surface Area (cm^2) 2 cm^2 05/06/21 1335   Post-Procedure Volume (cm^3) 0.2 cm^3 05/06/21 1335   Wound Assessment Bleeding 05/06/21 1335   Drainage Amount Moderate 05/06/21 1335   Drainage Description Serosanguinous 05/06/21 1335   Odor None 05/06/21 1314   Ximena-wound Assessment Intact 05/06/21 1314   Margins Unattached edges 05/06/21 1314   Number of days: 28       Wound 04/08/21 Leg Right; Lower; Anterior;Distal #2 (Active)   Wound Image   04/08/21 1426   Wound Etiology Venous 05/06/21 1314   Wound Cleansed Cleansed with saline 05/06/21 1314   Wound Length (cm) 1.5 cm 05/06/21 1314   Wound Width (cm) 0.5 cm 05/06/21 1314   Wound Depth (cm) 0.1 cm 05/06/21 1314   Wound Surface Area (cm^2) 0.75 cm^2 05/06/21 1314   Change in Wound Size % (l*w) -4.17 05/06/21 1314   Wound Volume (cm^3) 0.08 cm^3 05/06/21 1314   Wound Healing % -14 05/06/21 1314   Post-Procedure Length (cm) 1.6 cm 05/06/21 1335   Post-Procedure Width (cm) 0.6 cm 05/06/21 1335   Post-Procedure Depth (cm) 0.1 cm 05/06/21 1335   Post-Procedure Surface Area (cm^2) 0.96 cm^2 05/06/21 1335   Post-Procedure Volume (cm^3) 0.1 cm^3 05/06/21 1335   Wound Assessment Bleeding 05/06/21 1335   Drainage Amount Moderate 05/06/21 1335   Drainage Description Serosanguinous 05/06/21 1335   Odor None 05/06/21 1314   Ximena-wound Assessment Intact 05/06/21 1314   Margins Attached edges 05/06/21 1314   Number of days: 28       Wound 04/08/21 Leg Right; Lower; Anterior;Mid #3 (Active)   Wound Image   04/08/21 1426   Wound Etiology Venous 05/06/21 1314   Wound Cleansed Cleansed with saline 05/06/21 1314   Wound Length (cm) 0 cm 05/06/21 1314   Wound Width (cm) 0 cm 05/06/21 1314   Wound Depth (cm) 0 cm 05/06/21 1314   Wound Surface Area (cm^2) 0 cm^2 05/06/21 1314   Change in Wound Size % (l*w) 100 05/06/21 1314   Wound Volume (cm^3) 0 cm^3 05/06/21 1314   Wound Healing % 100 05/06/21 1314   Post-Procedure Length (cm) 1.4 cm 04/15/21 1512   Post-Procedure Width (cm) 0.5 cm 04/15/21 1512   Post-Procedure Depth (cm) 0.1 cm 04/15/21 1512   Post-Procedure Surface Area (cm^2) 0.7 cm^2 04/15/21 1512   Post-Procedure Volume (cm^3) 0.07 cm^3 04/15/21 1512   Wound Assessment Other (Comment) 05/06/21 1314   Drainage Amount None 04/22/21 1305   Drainage Description Serosanguinous 04/15/21 1512   Odor None 04/22/21 1305   Ximena-wound Assessment Intact 04/22/21 1305 Margins Attached edges 04/22/21 1305   Number of days: 28       Wound 04/22/21 Heel Right #6 (Active)   Wound Etiology Pressure Stage  2 05/06/21 1314   Wound Length (cm) 0 cm 05/06/21 1314   Wound Width (cm) 0 cm 05/06/21 1314   Wound Depth (cm) 0 cm 05/06/21 1314   Wound Surface Area (cm^2) 0 cm^2 05/06/21 1314   Wound Volume (cm^3) 0 cm^3 05/06/21 1314   Post-Procedure Length (cm) 0.3 cm 04/22/21 1327   Post-Procedure Width (cm) 0.2 cm 04/22/21 1327   Post-Procedure Depth (cm) 0.1 cm 04/22/21 1327   Post-Procedure Surface Area (cm^2) 0.06 cm^2 04/22/21 1327   Post-Procedure Volume (cm^3) 0.01 cm^3 04/22/21 1327   Wound Assessment Bleeding 04/22/21 1327   Drainage Amount Large 05/06/21 1314   Drainage Description Serosanguinous 04/22/21 1327   Tavno-wound Assessment Hyperkeratosis (callous) 04/22/21 1317   Number of days: 14           Total Surface Area Debrided:  4.62 sq cm     Percentage of wound debrided 100 %    Bleeding:  Minimal    Hemostasis Achieved:  by pressure    Procedural Pain:  3  / 10     Post Procedural Pain:  2 / 10     Response to treatment:  Well tolerated by patient. Plan:     The nature of the patient's condition was explained in depth. The patient was informed that their compliance to the treatment plan is paramount to successful healing and prevention of further ulceration and/or infection     Discharge Treatment   Dressing care: Left Heel-  Home care to change Wound vac. Skin prep to tavon-wound, drape around wound to protect good skin, bridge up side of leg so patient does not stand on track ( Avoid Ankle Bone), Cut black foam to size to fit into wound- NPWT continuous at 150 mmHg (depending on the type of NPWT device), change Monday, Wednesday, & Friday. Home care to change. Left  And Right Anterior legs- Mepilex Transfer, ABD pads, Dry dressing, 1 Tubi . - Change Mepilex Transfer I x through week and may change outer dry dressing every other day       Right Heel  Mepilex

## 2021-05-06 NOTE — PROGRESS NOTES
[facility]  [address] [facility], [address] [place] [address] Road  Telephone: [phone]        [facility] Fax # [phone]        Discharge Instructions       [place] [id number] [facility], [address] [place] [address] Road  Telephone: [phone]      Discharge Instructions:  Keep weight off wounds and reposition every 2 hours if applicable. Avoid standing for long periods of time.      If wound(s) is on your lower extremity, elevate legs to the level of the heart or above for 30 minutes 4-5 times a day and/or when sitting.      Do not get wounds wet in bath or shower unless otherwise instructed by your physician. If your wound is on you foot or leg, you may purchase a cast bag. Please ask at the pharmacy.     When taking antibiotics take entire prescription as ordered by MD do not stop taking until medicine is all gone. Exercise as tolerated. No Smoking. Smoking prohibits wound healing.     If Vascular testing is ordered, please call [address] [place] [address] [phone]) to schedule.     Vascular tests ordered by Wound Care Physicians may take up to 2 hours to complete. Please keep that in mind when scheduling.      If Vascular testing is scheduled, please bring supplies to replace your dressing after testing is done. The vascular department does not stock supplies.      Wound: Bilateral Legs      With each dressing change, rinse wounds with 0.9% Saline. (May use wound wash or soft contact solution. Both can be purchased at a local drug store). If unable to obtain saline, may use a gentle soap and water.     Dressing care: Left Heel-  Home care to change Wound vac. Skin prep to tavon-wound, drape around wound to protect good skin, bridge up side of leg so patient does not stand on track ( Avoid Ankle Bone), Cut black foam to size to fit into wound- NPWT continuous at 150 mmHg (depending on the type of NPWT device), change Monday, Wednesday, & Friday. Home care to change. Left  And Right Anterior legs- Mepilex Transfer, ABD pads, Dry dressing, 1 Tubi . - Change Mepilex Transfer I x through week and may change outer dry dressing every other day       Right Heel  Mepilex Border, 1 Tubi  -May leave Mepilex Borders on for 3 days then change.     Apply Thigh high Lymphedema pumps for 1 hour twice daily. - 1st application 1 hour after Breakfast , 2nd application 1 hour after dinner   Prevalon Boots or Heel Protecting Boot to Heels when sitting or in bed. Can call [facility],.     [facility]  [address]  [facility]  P: [phone]  F: [phone]        Important dietary reminders:  1. Increase Protein intake for optimal wound healing  2. No added salt to reduce any swelling  3. If diabetic, good glucose control  4. If you smoke, smoking affects wound healing, we ask that you refrain from smoking.     Follow up with Dr Jack Chery In 1 week in the wound care center.      Call [phone] for any questions or concerns.      Your  is [address]: [address] Information: Should you experience any significant changes in your wound(s) or have questions about your wound care, [facility] at [phone] Monday  - Thursday 8:00 am - 4:00 pm and Friday 8:00 am - 1:00pm. If you need help with your wound outside these hours and cannot wait until we are again available, contact your PCP or go to the hospital emergency room.      PLEASE NOTE: IF YOU ARE UNABLE TO OBTAIN WOUND SUPPLIES, CONTINUE TO USE THE SUPPLIES YOU HAVE AVAILABLE UNTIL YOU ARE ABLE TO [facility]. IT IS MOST IMPORTANT TO KEEP THE WOUND COVERED AT ALL TIMES.              Skilled nurse to evaluate and treat for wound care. Change dressing as ordered  once a day on Monday, Wednesday and Friday using clean technique.  Patient/Family/caregiver may change dressings as needed as Drainage Amount Moderate 05/06/21 1335   Drainage Description Serosanguinous 05/06/21 1335   Odor None 05/06/21 1314   Ximena-wound Assessment Intact 05/06/21 1314   Margins Unattached edges 05/06/21 1314   Number of days: 27       Wound 04/08/21 Leg Right; Lower; Anterior;Distal #2 (Active)   Wound Image   04/08/21 1426   Wound Etiology Venous 05/06/21 1314   Wound Cleansed Cleansed with saline 05/06/21 1314   Wound Length (cm) 1.5 cm 05/06/21 1314   Wound Width (cm) 0.5 cm 05/06/21 1314   Wound Depth (cm) 0.1 cm 05/06/21 1314   Wound Surface Area (cm^2) 0.75 cm^2 05/06/21 1314   Change in Wound Size % (l*w) -4.17 05/06/21 1314   Wound Volume (cm^3) 0.08 cm^3 05/06/21 1314   Wound Healing % -14 05/06/21 1314   Post-Procedure Length (cm) 1.6 cm 05/06/21 1335   Post-Procedure Width (cm) 0.6 cm 05/06/21 1335   Post-Procedure Depth (cm) 0.1 cm 05/06/21 1335   Post-Procedure Surface Area (cm^2) 0.96 cm^2 05/06/21 1335   Post-Procedure Volume (cm^3) 0.1 cm^3 05/06/21 1335   Wound Assessment Bleeding 05/06/21 1335   Drainage Amount Moderate 05/06/21 1335   Drainage Description Serosanguinous 05/06/21 1335   Odor None 05/06/21 1314   Ximena-wound Assessment Intact 05/06/21 1314   Margins Attached edges 05/06/21 1314   Number of days: 27       Wound 04/08/21 Leg Right; Lower; Anterior;Mid #3 (Active)   Wound Image   04/08/21 1426   Wound Etiology Venous 05/06/21 1314   Wound Cleansed Cleansed with saline 05/06/21 1314   Wound Length (cm) 0 cm 05/06/21 1314   Wound Width (cm) 0 cm 05/06/21 1314   Wound Depth (cm) 0 cm 05/06/21 1314   Wound Surface Area (cm^2) 0 cm^2 05/06/21 1314   Change in Wound Size % (l*w) 100 05/06/21 1314   Wound Volume (cm^3) 0 cm^3 05/06/21 1314   Wound Healing % 100 05/06/21 1314   Post-Procedure Length (cm) 1.4 cm 04/15/21 1512   Post-Procedure Width (cm) 0.5 cm 04/15/21 1512   Post-Procedure Depth (cm) 0.1 cm 04/15/21 1512   Post-Procedure Surface Area (cm^2) 0.7 cm^2 04/15/21 1512   Post-Procedure

## 2021-05-07 ENCOUNTER — OFFICE VISIT (OUTPATIENT)
Dept: CARDIOLOGY CLINIC | Age: 76
End: 2021-05-07
Payer: MEDICARE

## 2021-05-07 VITALS
OXYGEN SATURATION: 97 % | SYSTOLIC BLOOD PRESSURE: 132 MMHG | BODY MASS INDEX: 32.3 KG/M2 | HEART RATE: 58 BPM | DIASTOLIC BLOOD PRESSURE: 84 MMHG | HEIGHT: 66 IN | WEIGHT: 201 LBS

## 2021-05-07 DIAGNOSIS — I50.43 ACUTE ON CHRONIC COMBINED SYSTOLIC AND DIASTOLIC HEART FAILURE (HCC): ICD-10-CM

## 2021-05-07 DIAGNOSIS — I25.10 ATHEROSCLEROSIS OF NATIVE CORONARY ARTERY OF NATIVE HEART WITHOUT ANGINA PECTORIS: Primary | ICD-10-CM

## 2021-05-07 DIAGNOSIS — I25.5 ISCHEMIC CARDIOMYOPATHY: ICD-10-CM

## 2021-05-07 DIAGNOSIS — E78.2 MIXED HYPERLIPIDEMIA: ICD-10-CM

## 2021-05-07 DIAGNOSIS — I10 ESSENTIAL HYPERTENSION: ICD-10-CM

## 2021-05-07 DIAGNOSIS — I48.0 PAROXYSMAL ATRIAL FIBRILLATION (HCC): ICD-10-CM

## 2021-05-07 PROCEDURE — 1036F TOBACCO NON-USER: CPT | Performed by: INTERNAL MEDICINE

## 2021-05-07 PROCEDURE — G8417 CALC BMI ABV UP PARAM F/U: HCPCS | Performed by: INTERNAL MEDICINE

## 2021-05-07 PROCEDURE — 4040F PNEUMOC VAC/ADMIN/RCVD: CPT | Performed by: INTERNAL MEDICINE

## 2021-05-07 PROCEDURE — 99214 OFFICE O/P EST MOD 30 MIN: CPT | Performed by: INTERNAL MEDICINE

## 2021-05-07 PROCEDURE — G8400 PT W/DXA NO RESULTS DOC: HCPCS | Performed by: INTERNAL MEDICINE

## 2021-05-07 PROCEDURE — G8427 DOCREV CUR MEDS BY ELIG CLIN: HCPCS | Performed by: INTERNAL MEDICINE

## 2021-05-07 PROCEDURE — 1090F PRES/ABSN URINE INCON ASSESS: CPT | Performed by: INTERNAL MEDICINE

## 2021-05-07 PROCEDURE — 1123F ACP DISCUSS/DSCN MKR DOCD: CPT | Performed by: INTERNAL MEDICINE

## 2021-05-07 RX ORDER — COLCHICINE 0.6 MG/1
TABLET ORAL
COMMUNITY
Start: 2021-04-29 | End: 2021-10-12

## 2021-05-07 RX ORDER — CEPHALEXIN 500 MG/1
TABLET ORAL
COMMUNITY
Start: 2021-04-29 | End: 2021-10-12

## 2021-05-07 NOTE — PATIENT INSTRUCTIONS
Update echocardiogram     Update your cholesterol blood work at your convenience     No medication changes today    Call with questions or concerns   Call if you have any bleeding concerns    Follow up in 6 months

## 2021-05-07 NOTE — LETTER
[address] [address]  Phone: [phone]  Fax: [address] [place], [place] [address]DO    May 19, 2021     Ayaka Bernard MD  [address] [place] [address]    Patient: Jayda Mercer   MR Number: [id number]   YOB: 1945   Date of Visit: 2021       Dear Ayaka Bernard:                                         May 7, 2021    PATIENT: Jayda Mercer  : 1945    Primary Care Provider:   Ayaka Bernard MD  P:[phone]  A:[phone]    Reason for evaluation:   Chief Complaint   Patient presents with    6 Month Follow-Up    Hyperlipidemia    Hypertension     History of present illness:   Ms. Jayda Mercer is a 66 y.o. female patient, previously followed by Dr. Antonella Bellamy, here for routine CV follow up regarding CAD, CHF, and atrial fibrillation. Ghazal Walsh and her  have been here for approximately 30 years. She and Charlotte Trejo come from [place] and say that they are like bookend them to have been at all of each other's appointments. He is helpful in providing additional history as well. She states that overall she finds her body to be [employer] as does not feel symptoms\" referencing both atrial fibrillation and coronary artery disease. She states that she has never had chest pain palpitations lightheadedness dizziness shortness of breath. CABG ultimately was following testing to provide cardiac clearance for a knee surgery. AFib was postop and paroxysmal since. Watchman was in discussion following recurrent nosebleeds. Dates that she actually has not had any further bleeding concerns. Never smoker. History of venous stasis with the left lower extremity pressure ulcer currently on wound VAC to the care of the wound clinic. Patient is compliant with medications and is tolerating them well without adverse effects.     Medical History:      Diagnosis Date    Acute idiopathic gout of left hand 5/6/2021    Acute idiopathic gout of right hand 5/6/2021    Arthritis     Blood circulation, collateral     Blood transfusion     CAD (coronary artery disease)     CHF (congestive heart failure) (HCC)     Chronic renal failure, stage 3 (moderate)     Diabetes mellitus (HCC)     GERD (gastroesophageal reflux disease)     Hyperlipidemia     Hypertension     Leg swelling 8/14/2018    Lymphedema 8/14/2018    Pressure ulcer of both heels 1/29/2021    Deep tissue injuries to right and left heels with left>right. Date of origin unknown.     Pressure ulcer of left heel, stage 3 ([address] 2/18/2021    Ulcer of right leg, limited to breakdown of skin ([address] 8/20/2018       Surgical History:      Procedure Laterality Date    ABDOMEN SURGERY      CARDIAC SURGERY      2009    CATARACT REMOVAL WITH IMPLANT Left 11/23/2016    CATARACT REMOVAL WITH IMPLANT Right 11/02/2016    CATARACT REMOVAL WITH IMPLANT Bilateral 10/16, 11/16    CHOLECYSTECTOMY      COLONOSCOPY      ENDOSCOPY, COLON, DIAGNOSTIC      EYE SURGERY      left tear duct surgery    JOINT REPLACEMENT      BTKR    KNEE ARTHROPLASTY  09/15/2010    left total knee    TOTAL KNEE ARTHROPLASTY      VASCULAR SURGERY         Social History:  Social History     Socioeconomic History    Marital status:      Spouse name: Not on file    Number of children: Not on file    Years of education: Not on file    Highest education level: Not on file   Occupational History    Not on file   Social Needs    Financial resource strain: Not on file    Food insecurity     Worry: Not on file     Inability: Not on file    Transportation needs     Medical: Not on file     Non-medical: Not on file   Tobacco Use    Smoking status: Never Smoker    Smokeless tobacco: Never Used   Substance and Sexual Activity    Alcohol use: No    Drug use: No    Sexual activity: Yes     Partners: Male   Lifestyle    Physical activity     Days per week: Not on file     Minutes per session: Not on file    Stress: Not on file   Relationships    Social connections     Talks on phone: Not on file     Gets together: Not on file     Attends Yarsanism service: Not on file     Active member of club or organization: Not on file     Attends meetings of clubs or organizations: Not on file     Relationship status: Not on file    Intimate partner violence     Fear of current or ex partner: Not on file     Emotionally abused: Not on file     Physically abused: Not on file     Forced sexual activity: Not on file   Other Topics Concern    Not on file   Social History Narrative    Not on file        Family History:  No evidence for sudden cardiac death or premature CAD. Problem Relation Age of Onset    Diabetes Mother     Heart Disease Mother     Heart Disease Father     Stroke Father        Medications:  [x] Medications and dosages reviewed. Prior to Admission medications    Medication Sig Start Date End Date Taking?  Authorizing Provider   colchicine (COLCRYS) 0.6 MG tablet 0.6 mg: TAKE ONE TABLET BY MOUTH DAILY 4/29/21  Yes Historical Provider, MD   Cephalexin 500 MG TABS Per instructions: TAKE ONE CAPSULE BY MOUTH EVERY 12 HOUR 4/29/21  Yes Historical Provider, MD   rivaroxaban (XARELTO) 15 MG TABS tablet TAKE ONE TABLET BY MOUTH DAILY 4/9/21  Yes Catarino Avila DO   furosemide (LASIX) 20 MG tablet TAKE ONE TABLET BY MOUTH TWICE DAILY  3/10/21  Yes Sania Padgett MD   Coenzyme Q10 (CO Q-10) 400 MG CAPS Take 400 mg by mouth daily 9/28/20  Yes Bob Trivedi MD   Liraglutide (VICTOZA) 18 MG/3ML SOPN SC injection Inject 1.2 mg into the skin daily   Yes Historical Provider, MD   Handicap Placard MISC by Does not apply route Sob when walking less than 200 feet  Length of time--greater than 5 year 4/17/19  Yes Bob Trivedi MD   ferrous sulfate 325 (65 Fe) MG tablet Take 1 tablet by mouth 3 times daily (with meals)  Patient taking differently: Take 325 mg by mouth 2 times daily (with meals)  4/3/19  Yes Juan Bender MD   atorvastatin (LIPITOR) 20 MG tablet Take 20 mg by mouth daily   Yes Historical Provider, MD   vitamin D (CHOLECALCIFEROL) 400 UNITS TABS tablet Take 400 Units by mouth daily   Yes Historical Provider, MD   metoprolol (LOPRESSOR) 25 MG tablet Take 0.5 tablets by mouth 2 times daily 8/29/15  Yes Aide Alonso MD   therapeutic multivitamin-minerals [facility]) tablet Take 1 tablet by mouth daily. Yes Historical Provider, MD   hydrOXYzine (ATARAX) 25 MG tablet Take 25 mg by mouth 3 times daily as needed.  8/9/10  Yes Historical Provider, MD   ranitidine (ZANTAC) 300 MG tablet Take 150 mg by mouth 2 times daily  8/9/10  Yes Historical Provider, MD       Allergies:  Adhesive tape, Chlorhexidine, and Lisinopril     Review of Systems:    [x]Full ROS obtained and negative except as mentioned in HPI    Physical Examination:    /84 (Site: Right Upper Arm, Position: Sitting, Cuff Size: Large Adult)   Pulse 58   Ht 5' 6\" (1.676 m)   Wt 201 lb (91.2 kg)   SpO2 97%   BMI 32.44 kg/m²   Wt Readings from Last 3 Encounters:   05/07/21 201 lb (91.2 kg)   04/22/21 202 lb 11.2 oz (91.9 kg)   03/30/21 206 lb (93.4 kg)     Vitals:    05/07/21 0813   BP: 132/84   Pulse: 58   SpO2: 97%       · GENERAL: Well developed, well nourished, no acute distress  · NEUROLOGICAL: Alert and oriented x3  · PSYCH: Normal mood and affect   · SKIN: Warm and dry  · HEENT: Normocephalic, atraumatic, Sclera non-icteric, mucous membranes moist  · NECK: supple, JVP normal  · CARDIAC: Normal PMI, regular rate and rhythm, normal S1S2, no murmur, rub, or gallop  · RESPIRATORY: Normal respiratory effort, clear to auscultation bilaterally  · EXTREMITIES: no edema or clubbing, +2 pulses bilaterally   · MUSCULOSKELETAL: No joint swelling or tenderness, no chest wall tenderness  · GASTROINTESTINAL: normal bowel sounds, soft, non-tender    Labs:  Lab Review   Hospital Outpatient Visit on 03/11/2021 Component Date Value    Sed Rate 03/11/2021 60*    CRP 03/11/2021 <3.0      Imaging:  I have reviewed the below testing personally:  [facility] 8/9/10  Normal LV wall motion  LV gram, EF 80%  Normal systolic function  Right dominance  LM- widely patent  LAD- Mid 99% lesion. D1- Retrograde filling from LAD  LCx- Prox totally occluded  RCA- Luminal irregularities  RPDA- Ostial 50% lesion  SVG to D1 and OM1 and OM2 widely patent  LIMA to LAD widely patent    Echo 8/18/15   Summary   Left ventricle size is normal.   Normal left ventricular wall thickness. Global hypokinesis. Estimated ejection fraction is 45%. Mitral annular calcification is present. Mild mitral regurgitation is present. The left atrium is dilated. Aortic valve appears sclerotic but opens adequately. The right ventricle is mildly enlarged. There is severe tricuspid regurgitation with RVSP estimated at 55 mmHg. This is suggestive of pulmonary hypertension. Right atrial size is mildly dilated . There is a pleural effusion. SPECT 3/15/18 ([facility])  Interpetation Summary:  The LV EF is 56%  Normal global and regional wall motion in all territories. 1.Negative nuclear stress imaging for inducible ischemia. 2.Non-diagnostic ECG for ischemia with pharmocologic stress. 3.Normal left ventriular systolic function. 4.Nuclear stress image findings indicate low risk for future      ischemic event . Carotid US 11/8/19  -The right internal carotid artery appears to have a <50% diameter reducing  stenosis based on velocity criteria. -The left internal carotid artery appears to have a <50% diameter reducing  stenosis based on velocity criteria. Impression/Recommendations    Ms. Jayda Mercer is a 66 y.o. female patient, previously followed by Dr. Antonella Bellamy, with:    CAD, CABG x3, 9/2009 (LIMA to LAD, SVG to D1 ,OM1, OM2) patent by 8/2010 [facility], no ischemia by 2018 SPECT MPI  Ischemic cardiomyopathy, (LVEF 45% by 8/2015 TTE) Chronic Systolic CHF,  compensated  Persistent AF, elevated [id number] on [facility] 1/27/20 with Dr. Mimi Pablo  Hypertension, controlled today (132/84)  Hyperlipidemia, due for recheck (10/2019:   HDL 36 LDL 66)  Diabetes mellitus, stable A1C 6.9 (10/2021)  CKD stage 3, follows with Dr. Agustin Contreras  Lymphedema with LLE Ulcer     No change to Shirin's regimen of beta blocker, anticoagulant, statin, and diuretic. She is compensated, without angina, and tolerating medications without adverse effects. She is agreeable to updating echocardiogram.     CHF education reinforced. ~salt restriction: 2,000 mg daily                         ~fluid restriction: 1500 ml daily                        ~medication compliance                        ~daily weights and notify of any significant weight gain/loss    Orders Placed This Encounter   Procedures    LIPID PANEL     Standing Status:   Future     Standing Expiration Date:   5/7/2022     Order Specific Question:   Is Patient Fasting?/# of Hours     Answer:   y8    AST     Standing Status:   Future     Standing Expiration Date:   5/7/2022    ALT     Standing Status:   Future     Standing Expiration Date:   5/7/2022    Echo 2D w doppler w color complete     Standing Status:   Future     Standing Expiration Date:   5/7/2022     Order Specific Question:   Reason for exam:     Answer:   icm, chf, afib, cad     Return in about 6 months (around 11/7/2021). Patient Instructions   Update echocardiogram     Update your cholesterol blood work at your convenience     No medication changes today    Call with questions or concerns   Call if you have any bleeding concerns    Follow up in 6 months    Thank you for allowing me to participate in the care of your patient. Please do not hesitate to call.      Shaquille Conner DO, [facility]  Interventional Cardiology     o: [phone]  [facility] [employer] [address], [address]      NOTE:  This report was transcribed using voice recognition software. Every effort was made to ensure accuracy; however, inadvertent computerized transcription errors may be present. Scribe's Attestation: This note was scribed in the presence of Dr. Sheri Ambrose DO by Marine Pearce RN.    I, Omero Ross, have personally performed the services described in this documentation as scribed by Rosalind Manning. KRISTEN Corbett in my presence, and it is both accurate and complete. An electronic signature was used to authenticate this note.

## 2021-05-13 ENCOUNTER — HOSPITAL ENCOUNTER (OUTPATIENT)
Dept: WOUND CARE | Age: 76
Discharge: HOME | End: 2021-05-13
Payer: MEDICARE

## 2021-05-13 VITALS — HEART RATE: 79 BPM | DIASTOLIC BLOOD PRESSURE: 81 MMHG | SYSTOLIC BLOOD PRESSURE: 176 MMHG | RESPIRATION RATE: 16 BRPM

## 2021-05-13 DIAGNOSIS — M79.89 LEG SWELLING: ICD-10-CM

## 2021-05-13 DIAGNOSIS — L89.623 PRESSURE ULCER OF LEFT HEEL, STAGE 3 (HCC): Primary | ICD-10-CM

## 2021-05-13 DIAGNOSIS — L89.629 PRESSURE ULCER OF BOTH HEELS: ICD-10-CM

## 2021-05-13 DIAGNOSIS — I89.0 LYMPHEDEMA: ICD-10-CM

## 2021-05-13 DIAGNOSIS — L97.921 ULCER OF LOWER EXTREMITY, LEFT, LIMITED TO BREAKDOWN OF SKIN (HCC): ICD-10-CM

## 2021-05-13 DIAGNOSIS — L89.619 PRESSURE ULCER OF BOTH HEELS: ICD-10-CM

## 2021-05-13 DIAGNOSIS — L97.911 ULCER OF RIGHT LEG, LIMITED TO BREAKDOWN OF SKIN (HCC): ICD-10-CM

## 2021-05-13 PROCEDURE — 11042 DBRDMT SUBQ TIS 1ST 20SQCM/<: CPT

## 2021-05-13 PROCEDURE — 11042 DBRDMT SUBQ TIS 1ST 20SQCM/<: CPT | Performed by: INTERNAL MEDICINE

## 2021-05-13 RX ORDER — BACITRACIN ZINC AND POLYMYXIN B SULFATE 500; 1000 [USP'U]/G; [USP'U]/G
OINTMENT TOPICAL ONCE
Status: CANCELLED | OUTPATIENT
Start: 2021-05-13 | End: 2021-05-13

## 2021-05-13 RX ORDER — LIDOCAINE 40 MG/G
CREAM TOPICAL ONCE
Status: CANCELLED | OUTPATIENT
Start: 2021-05-13 | End: 2021-05-13

## 2021-05-13 RX ORDER — LIDOCAINE HYDROCHLORIDE 40 MG/ML
SOLUTION TOPICAL ONCE
Status: CANCELLED | OUTPATIENT
Start: 2021-05-13 | End: 2021-05-13

## 2021-05-13 RX ORDER — BACITRACIN, NEOMYCIN, POLYMYXIN B 400; 3.5; 5 [USP'U]/G; MG/G; [USP'U]/G
OINTMENT TOPICAL ONCE
Status: CANCELLED | OUTPATIENT
Start: 2021-05-13 | End: 2021-05-13

## 2021-05-13 RX ORDER — LIDOCAINE 50 MG/G
OINTMENT TOPICAL ONCE
Status: CANCELLED | OUTPATIENT
Start: 2021-05-13 | End: 2021-05-13

## 2021-05-13 RX ORDER — GENTAMICIN SULFATE 1 MG/G
OINTMENT TOPICAL ONCE
Status: CANCELLED | OUTPATIENT
Start: 2021-05-13 | End: 2021-05-13

## 2021-05-13 RX ORDER — CLOBETASOL PROPIONATE 0.5 MG/G
OINTMENT TOPICAL ONCE
Status: CANCELLED | OUTPATIENT
Start: 2021-05-13 | End: 2021-05-13

## 2021-05-13 RX ORDER — LIDOCAINE 40 MG/G
CREAM TOPICAL ONCE
Status: DISCONTINUED | OUTPATIENT
Start: 2021-05-13 | End: 2021-05-14 | Stop reason: HOSPADM

## 2021-05-13 RX ORDER — BETAMETHASONE DIPROPIONATE 0.05 %
OINTMENT (GRAM) TOPICAL ONCE
Status: CANCELLED | OUTPATIENT
Start: 2021-05-13 | End: 2021-05-13

## 2021-05-13 RX ORDER — GINSENG 100 MG
CAPSULE ORAL ONCE
Status: CANCELLED | OUTPATIENT
Start: 2021-05-13 | End: 2021-05-13

## 2021-05-13 NOTE — PROGRESS NOTES
[facility]  Progress Note and Procedure Note      Ethel Irvin  AGE: 66 y.o. GENDER: female  : 1945  TODAY'S DATE:  2021    Subjective:     Chief Complaint   Patient presents with    Wound Check     left leg         HISTORY of PRESENT ILLNESS HPI     Ethel Irvin is a 66 y.o. female who presents today for wound evaluation. History of Wound:   Long history of leg wounds   Established with wound clinic 2020  Accompanied by  who assists pt with bilateral lower leg care. Lymphedema pumps at home but does not use consistently  Left heel wound remains. Starting to use heel boots in bed and when sitting with legs up    Wound Pain:  intermittent  Severity:  2 / 10   Wound Type:  venous  Modifying Factors:  edema, venous stasis, lymphedema, obesity and non-adherence  Associated Signs/Symptoms:  edema and drainage        PAST MEDICAL HISTORY        Diagnosis Date    Acute idiopathic gout of left hand 2021    Acute idiopathic gout of right hand 2021    Arthritis     Blood circulation, collateral     Blood transfusion     CAD (coronary artery disease)     CHF (congestive heart failure) (HCC)     Chronic renal failure, stage 3 (moderate)     Diabetes mellitus (HCC)     GERD (gastroesophageal reflux disease)     Hyperlipidemia     Hypertension     Leg swelling 2018    Lymphedema 2018    Pressure ulcer of both heels 2021    Deep tissue injuries to right and left heels with left>right. Date of origin unknown.     Pressure ulcer of left heel, stage 3 ([address] 2021    Ulcer of right leg, limited to breakdown of skin ([address] 2018       PAST SURGICAL HISTORY    Past Surgical History:   Procedure Laterality Date    ABDOMEN SURGERY      CARDIAC SURGERY          CATARACT REMOVAL WITH IMPLANT Left 2016    CATARACT REMOVAL WITH IMPLANT Right 2016    CATARACT REMOVAL WITH IMPLANT right leg with ulcer and inflammation (HCC)    Venous insufficiency    Ulcer of right leg, limited to breakdown of skin (HCC)    Ulcer of lower extremity, left, limited to breakdown of skin (HCC)    Iron deficiency anemia    [facility]    Knee pain    Pressure ulcer of both heels    Pressure ulcer of left heel, stage 3 (HCC)    Acute idiopathic gout of right hand    Acute idiopathic gout of left hand       Procedure Note    Performed by: Lisette Santos DO    Consent obtained: Yes    Time out taken:  Yes    Pain Control: Anesthetic  Anesthetic: 4% Lidocaine Cream     Debridement:Excisional Debridement    Using curette the wound was sharply debrided    down through and including the removal of epidermis, dermis and subcutaneous tissue.         Devitalized Tissue Debrided:  biofilm and slough    Pre Debridement Measurements:  Are located in the Wound Documentation Flow Sheet    Wound #: 5     Post  Debridement Measurements:  Wound 01/28/21 Heel Left #5 (Active)   Wound Image   04/08/21 1426   Wound Etiology Pressure Stage  3 05/13/21 0921   Wound Cleansed Cleansed with saline 05/13/21 0921   Wound Length (cm) 2 cm 05/13/21 0921   Wound Width (cm) 2.4 cm 05/13/21 0921   Wound Depth (cm) 0.1 cm 05/13/21 0921   Wound Surface Area (cm^2) 4.8 cm^2 05/13/21 0921   Change in Wound Size % (l*w) 78.18 05/13/21 0921   Wound Volume (cm^3) 0.48 cm^3 05/13/21 0921   Wound Healing % 78 05/13/21 0921   Post-Procedure Length (cm) 2.1 cm 05/06/21 1335   Post-Procedure Width (cm) 2.2 cm 05/06/21 1335   Post-Procedure Depth (cm) 0.1 cm 05/06/21 1335   Post-Procedure Surface Area (cm^2) 4.62 cm^2 05/06/21 1335   Post-Procedure Volume (cm^3) 0.46 cm^3 05/06/21 1335   Wound Assessment Pink/red;Slough 05/13/21 0921   Drainage Amount Small 05/13/21 0921   Drainage Description Serosanguinous 05/13/21 0921   Odor Mild 05/13/21 0921   Ximena-wound Assessment Maceration 05/13/21 0921   Margins Defined edges 05/13/21 0921   Wound Thickness Description not for Pressure Injury Full thickness 05/13/21 0921   Number of days: 104       Wound 04/08/21 Leg Right; Lower; Lateral #1 (Active)   Wound Image   04/08/21 1426   Wound Etiology Venous 05/06/21 1314   Wound Cleansed Cleansed with saline 04/22/21 1305   Wound Length (cm) 0 cm 05/13/21 0921   Wound Width (cm) 0 cm 05/13/21 0921   Wound Depth (cm) 0 cm 05/13/21 0921   Wound Surface Area (cm^2) 0 cm^2 05/13/21 0921   Change in Wound Size % (l*w) 100 05/13/21 0921   Wound Volume (cm^3) 0 cm^3 05/13/21 0921   Wound Healing % 100 05/13/21 0921   Post-Procedure Length (cm) 2 cm 05/06/21 1335   Post-Procedure Width (cm) 1 cm 05/06/21 1335   Post-Procedure Depth (cm) 0.1 cm 05/06/21 1335   Post-Procedure Surface Area (cm^2) 2 cm^2 05/06/21 1335   Post-Procedure Volume (cm^3) 0.2 cm^3 05/06/21 1335   Wound Assessment Other (Comment) 05/13/21 0921   Drainage Amount Moderate 05/06/21 1335   Drainage Description Serosanguinous 05/06/21 1335   Odor None 05/06/21 1314   Ximena-wound Assessment Intact 05/06/21 1314   Margins Unattached edges 05/06/21 1314   Number of days: 34       Wound 04/08/21 Leg Right; Lower; Anterior;Distal #2 (Active)   Wound Image   04/08/21 1426   Wound Etiology Venous 05/06/21 1314   Wound Cleansed Cleansed with saline 05/06/21 1314   Wound Length (cm) 0 cm 05/13/21 0921   Wound Width (cm) 0 cm 05/13/21 0921   Wound Depth (cm) 0 cm 05/13/21 0921   Wound Surface Area (cm^2) 0 cm^2 05/13/21 0921   Change in Wound Size % (l*w) 100 05/13/21 0921   Wound Volume (cm^3) 0 cm^3 05/13/21 0921   Wound Healing % 100 05/13/21 0921   Post-Procedure Length (cm) 1.6 cm 05/06/21 1335   Post-Procedure Width (cm) 0.6 cm 05/06/21 1335   Post-Procedure Depth (cm) 0.1 cm 05/06/21 1335   Post-Procedure Surface Area (cm^2) 0.96 cm^2 05/06/21 1335   Post-Procedure Volume (cm^3) 0.1 cm^3 05/06/21 1335   Wound Assessment Other (Comment) 05/13/21 0921   Drainage Amount Moderate 05/06/21 1335 Drainage Description Serosanguinous 05/06/21 1335   Odor None 05/06/21 1314   Tavon-wound Assessment Intact 05/06/21 1314   Margins Attached edges 05/06/21 1314   Number of days: 34     Total Surface Area Debrided:  4.8 sq cm     Percentage of wound debrided 100%    Bleeding:  Minimal    Hemostasis Achieved:  by pressure    Procedural Pain:  3  / 10     Post Procedural Pain:  1 / 10     Response to treatment:  Well tolerated by patient. Plan:     The nature of the patient's condition was explained in depth. The patient was informed that their compliance to the treatment plan is paramount to successful healing and prevention of further ulceration and/or infection     Discharge Treatment       Wound: Left heel     With each dressing change, rinse wounds with 0.9% Saline. (May use wound wash or soft contact solution. Both can be purchased at a local drug store). If unable to obtain saline, may use a gentle soap and water.     Dressing care: Left Heel- In clinic: Collagen, dry dressing, 1 layer tubi . AT home: Home care to change Wound vac. Skin prep to tavon-wound, drape around wound to protect good skin, bridge up side of leg so patient does not stand on track ( Avoid Ankle Bone), Cut black foam to size to fit into wound- NPWT continuous at 150 mmHg (depending on the type of NPWT device), change Monday, Wednesday, & Friday. Home care to change. Keep pressure off of your heels- wear your offloading boots when sitting or in bed.     Right Anterior legs- Mepilex border-1 Tubi . - Change Mepilex Transfer I x through week and may change outer dry dressing every other day       Right Heel-  Mepilex Border, 1 Tubi  -May leave Mepilex Borders on for 3 days then change.     Apply Thigh high Lymphedema pumps for 1 hour twice daily. - 1st application 1 hour after Breakfast , 2nd application 1 hour after dinner   Prevalon Boots or Heel Protecting Boot to Heels when sitting or in bed. Can call [facility],.

## 2021-05-13 NOTE — PLAN OF CARE
Discharge instructions given. Patient verbalized understanding. Return to [facility] in 1 week.

## 2021-05-20 ENCOUNTER — HOSPITAL ENCOUNTER (OUTPATIENT)
Dept: WOUND CARE | Age: 76
Discharge: HOME | End: 2021-05-20
Payer: MEDICARE

## 2021-05-20 VITALS — RESPIRATION RATE: 16 BRPM

## 2021-05-20 DIAGNOSIS — L97.921 ULCER OF LOWER EXTREMITY, LEFT, LIMITED TO BREAKDOWN OF SKIN (HCC): ICD-10-CM

## 2021-05-20 DIAGNOSIS — M79.89 LEG SWELLING: ICD-10-CM

## 2021-05-20 DIAGNOSIS — L89.623 PRESSURE ULCER OF LEFT HEEL, STAGE 3 (HCC): Primary | ICD-10-CM

## 2021-05-20 DIAGNOSIS — L89.629 PRESSURE ULCER OF BOTH HEELS: ICD-10-CM

## 2021-05-20 DIAGNOSIS — L89.619 PRESSURE ULCER OF BOTH HEELS: ICD-10-CM

## 2021-05-20 DIAGNOSIS — I89.0 LYMPHEDEMA: ICD-10-CM

## 2021-05-20 DIAGNOSIS — L97.911 ULCER OF RIGHT LEG, LIMITED TO BREAKDOWN OF SKIN (HCC): ICD-10-CM

## 2021-05-20 PROCEDURE — 11042 DBRDMT SUBQ TIS 1ST 20SQCM/<: CPT | Performed by: INTERNAL MEDICINE

## 2021-05-20 PROCEDURE — 11042 DBRDMT SUBQ TIS 1ST 20SQCM/<: CPT

## 2021-05-20 RX ORDER — BETAMETHASONE DIPROPIONATE 0.05 %
OINTMENT (GRAM) TOPICAL ONCE
Status: CANCELLED | OUTPATIENT
Start: 2021-05-20 | End: 2021-05-20

## 2021-05-20 RX ORDER — LIDOCAINE HYDROCHLORIDE 40 MG/ML
SOLUTION TOPICAL ONCE
Status: CANCELLED | OUTPATIENT
Start: 2021-05-20 | End: 2021-05-20

## 2021-05-20 RX ORDER — BACITRACIN, NEOMYCIN, POLYMYXIN B 400; 3.5; 5 [USP'U]/G; MG/G; [USP'U]/G
OINTMENT TOPICAL ONCE
Status: CANCELLED | OUTPATIENT
Start: 2021-05-20 | End: 2021-05-20

## 2021-05-20 RX ORDER — GINSENG 100 MG
CAPSULE ORAL ONCE
Status: CANCELLED | OUTPATIENT
Start: 2021-05-20 | End: 2021-05-20

## 2021-05-20 RX ORDER — LIDOCAINE 40 MG/G
CREAM TOPICAL ONCE
Status: DISCONTINUED | OUTPATIENT
Start: 2021-05-20 | End: 2021-05-21 | Stop reason: HOSPADM

## 2021-05-20 RX ORDER — LIDOCAINE 50 MG/G
OINTMENT TOPICAL ONCE
Status: CANCELLED | OUTPATIENT
Start: 2021-05-20 | End: 2021-05-20

## 2021-05-20 RX ORDER — CLOBETASOL PROPIONATE 0.5 MG/G
OINTMENT TOPICAL ONCE
Status: CANCELLED | OUTPATIENT
Start: 2021-05-20 | End: 2021-05-20

## 2021-05-20 RX ORDER — BACITRACIN ZINC AND POLYMYXIN B SULFATE 500; 1000 [USP'U]/G; [USP'U]/G
OINTMENT TOPICAL ONCE
Status: CANCELLED | OUTPATIENT
Start: 2021-05-20 | End: 2021-05-20

## 2021-05-20 RX ORDER — LIDOCAINE 40 MG/G
CREAM TOPICAL ONCE
Status: CANCELLED | OUTPATIENT
Start: 2021-05-20 | End: 2021-05-20

## 2021-05-20 RX ORDER — GENTAMICIN SULFATE 1 MG/G
OINTMENT TOPICAL ONCE
Status: CANCELLED | OUTPATIENT
Start: 2021-05-20 | End: 2021-05-20

## 2021-05-20 NOTE — PROGRESS NOTES
[address], [address] [place] [address] Road  Telephone: [phone]        [facility] Fax # [phone]    Discharge Instructions       [address], [address] [place] [address] Road  Telephone: [phone]      Discharge Instructions:  Keep weight off wounds and reposition every 2 hours if applicable. Avoid standing for long periods of time.      If wound(s) is on your lower extremity, elevate legs to the level of the heart or above for 30 minutes 4-5 times a day and/or when sitting.      Do not get wounds wet in bath or shower unless otherwise instructed by your physician. If your wound is on you foot or leg, you may purchase a cast bag. Please ask at the pharmacy.     When taking antibiotics take entire prescription as ordered by MD do not stop taking until medicine is all gone. Exercise as tolerated. No Smoking. Smoking prohibits wound healing.     If Vascular testing is ordered, please call [address] [place] [address] [phone]) to schedule.     Vascular tests ordered by Wound Care Physicians may take up to 2 hours to complete. Please keep that in mind when scheduling.      If Vascular testing is scheduled, please bring supplies to replace your dressing after testing is done. The vascular department does not stock supplies.      Wound: Left heel     With each dressing change, rinse wounds with 0.9% Saline. (May use wound wash or soft contact solution. Both can be purchased at a local drug store). If unable to obtain saline, may use a gentle soap and water.     Dressing care: Discontinue the NPWT- call the number on the NPWT device & inform them you need to return NPWT. Left Heel- Collagen, dry dressing, 1 layer tubi - change Monday, Wednesday, & Friday. Keep pressure off of your heels- wear your offloading boots when sitting or in bed. Right Anterior legs- 1 Tubi . - Change Mepilex Transfer I x through week and may change outer dry dressing every other day       Right Heel-  Mepilex Border, 1 Tubi  -May leave Mepilex Borders on for 3 days then change.     Apply Thigh high Lymphedema pumps for 1 hour twice daily - 1st application 1 hour after Breakfast , 2nd application 1 hour after dinner   Prevalon Boots or Heel Protecting Boot to Heels when sitting or in bed. Can call [facility],.     [facility]  [address]  [facility], [place]  P: [phone]  F: [phone]        Important dietary reminders:  1. Increase Protein intake for optimal wound healing  2. No added salt to reduce any swelling  3. If diabetic, good glucose control  4. If you smoke, smoking affects wound healing, we ask that you refrain from smoking.     Follow up with Dr Sheri Ambrose In 2 weeks in the wound care center.      Call [phone] for any questions or concerns.      Your  is Yvonne [address]: [address] Information: Should you experience any significant changes in your wound(s) or have questions about your wound care, please contact the [employer] at [phone] Monday  - Thursday 8:00 am - 4:00 pm and Friday 8:00 am - 1:00pm. If you need help with your wound outside these hours and cannot wait until we are again available, contact your PCP or go to the hospital emergency room.      PLEASE NOTE: IF YOU ARE UNABLE TO OBTAIN WOUND SUPPLIES, CONTINUE TO USE THE SUPPLIES YOU HAVE AVAILABLE UNTIL YOU ARE ABLE TO [facility]. IT IS MOST IMPORTANT TO KEEP THE WOUND COVERED AT ALL TIMES. Skilled nurse to evaluate and treat for wound care. Change dressing as ordered  once a day on Monday, Wednesday and Friday using clean technique. Patient/Family/caregiver may change dressings as needed as instructed when Home Care unavailable.     WOUNDS REQUIRING DRESSING Changes:     Wound 01/28/21 Heel Left #5 (Active)   Wound Image   05/20/21 2295

## 2021-05-20 NOTE — PROGRESS NOTES
WITH IMPLANT Bilateral 10/16, 11/16    CHOLECYSTECTOMY      COLONOSCOPY      ENDOSCOPY, COLON, DIAGNOSTIC      EYE SURGERY      left tear duct surgery    JOINT REPLACEMENT      BTKR    KNEE ARTHROPLASTY  09/15/2010    left total knee    TOTAL KNEE ARTHROPLASTY      VASCULAR SURGERY         FAMILY HISTORY    Family History   Problem Relation Age of Onset    Diabetes Mother     Heart Disease Mother     Heart Disease Father     Stroke Father        SOCIAL HISTORY    Social History     Tobacco Use    Smoking status: Never Smoker    Smokeless tobacco: Never Used   Vaping Use    Vaping Use: Never used   Substance Use Topics    Alcohol use: No    Drug use: No       ALLERGIES    Allergies   Allergen Reactions    Adhesive Tape Shortness Of Breath     Other reaction(s): Ulcers    Chlorhexidine     Lisinopril Other (See Comments)     Med causes lethargy- takes in lower doses       MEDICATIONS    Current Outpatient Medications on File Prior to Encounter   Medication Sig Dispense Refill    colchicine (COLCRYS) 0.6 MG tablet 0.6 mg: TAKE ONE TABLET BY MOUTH DAILY      Cephalexin 500 MG TABS Per instructions: TAKE ONE CAPSULE BY MOUTH EVERY 12 HOUR      rivaroxaban (XARELTO) 15 MG TABS tablet TAKE ONE TABLET BY MOUTH DAILY 90 tablet 1    furosemide (LASIX) 20 MG tablet TAKE ONE TABLET BY MOUTH TWICE DAILY  60 tablet 3    Coenzyme Q10 (CO Q-10) 400 MG CAPS Take 400 mg by mouth daily 30 capsule 5    Liraglutide (VICTOZA) 18 MG/3ML SOPN SC injection Inject 1.2 mg into the skin daily      Handicap Placard MISC by Does not apply route Sob when walking less than 200 feet  Length of time--greater than 5 year 1 each 0    ferrous sulfate 325 (65 Fe) MG tablet Take 1 tablet by mouth 3 times daily (with meals) (Patient taking differently: Take 325 mg by mouth 2 times daily (with meals) ) 90 tablet 3    atorvastatin (LIPITOR) 20 MG tablet Take 20 mg by mouth daily      vitamin D (CHOLECALCIFEROL) 400 UNITS TABS tablet Take 400 Units by mouth daily      metoprolol (LOPRESSOR) 25 MG tablet Take 0.5 tablets by mouth 2 times daily 60 tablet 3    therapeutic multivitamin-minerals (THERAGRAN-M) tablet Take 1 tablet by mouth daily.  hydrOXYzine (ATARAX) 25 MG tablet Take 25 mg by mouth 3 times daily as needed.  ranitidine (ZANTAC) 300 MG tablet Take 150 mg by mouth 2 times daily        No current facility-administered medications on file prior to encounter. REVIEW OF SYSTEMS    Pertinent items are noted in HPI.       Objective:      Resp 16     PHYSICAL EXAM    General Appearance: alert and oriented to person, place and time, well-developed and well-nourished, in no acute distress  Skin: warm and dry  Head: normocephalic and atraumatic  Eyes: extraocular eye movements intact and conjunctivae normal  Extremities: no cyanosis and no clubbing  Musculoskeletal: normal range of motion, no joint swelling, deformity or tenderness      Assessment:     Patient Active Problem List   Diagnosis    Osteoarthritis of left knee    Acute blood loss anemia    Diabetes mellitus [facility]    Ischemic cardiomyopathy    Atrial fibrillation    Peripheral vascular disease ([facility]    CHF exacerbation (HCC)    Acute on chronic combined systolic and diastolic heart failure (HCC)    Chronic renal failure, stage 3 (moderate)    Chronic atrial fibrillation (HCC)    Pleural effusion    Atherosclerosis of native coronary artery without angina pectoris    Essential hypertension    Chronic combined systolic and diastolic congestive heart failure (HCC)    Hx of CABG    Non-rheumatic tricuspid valve insufficiency    Mixed hyperlipidemia    Carotid disease, bilateral (HCC)    Atherosclerosis of native coronary artery of native heart without angina pectoris    Chronic kidney disease    Hyperlipidemia    INOCENCIO (obstructive sleep apnea)    Lymphedema    Leg swelling    Venous stasis dermatitis of both lower extremities    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 (HCC)    Venous insufficiency    Ulcer of right leg, limited to breakdown of skin (HCC)    Ulcer of lower extremity, left, limited to breakdown of skin (HCC)    Iron deficiency anemia    CHANO (acute kidney injury) ([facility]    Knee pain    Pressure ulcer of both heels    Pressure ulcer of left heel, stage 3 (HCC)    Acute idiopathic gout of right hand    Acute idiopathic gout of left hand       Procedure Note    Performed by: Milly Syed DO    Consent obtained: Yes    Time out taken:  Yes    Pain Control: Anesthetic  Anesthetic: 4% Lidocaine Cream     Debridement:Excisional Debridement    Using curette the wound was sharply debrided    down through and including the removal of epidermis, dermis and subcutaneous tissue.         Devitalized Tissue Debrided:  biofilm and slough    Pre Debridement Measurements:  Are located in the Wound Documentation Flow Sheet    Wound #: 5   Wound Care Documentation:  Wound 01/28/21 Heel Left #5 (Active)   Wound Image   05/20/21 0922   Wound Etiology Pressure Stage  3 05/20/21 0922   Wound Cleansed Cleansed with saline 05/20/21 0950   Wound Length (cm) 1.5 cm 05/20/21 0922   Wound Width (cm) 1.4 cm 05/20/21 0922   Wound Depth (cm) 0.1 cm 05/20/21 0922   Wound Surface Area (cm^2) 2.1 cm^2 05/20/21 0922   Change in Wound Size % (l*w) 90.45 05/20/21 0922   Wound Volume (cm^3) 0.21 cm^3 05/20/21 0922   Wound Healing % 90 05/20/21 0922   Post-Procedure Length (cm) 1.5 cm 05/20/21 0950   Post-Procedure Width (cm) 1.4 cm 05/20/21 0950   Post-Procedure Depth (cm) 0.1 cm 05/20/21 0950   Post-Procedure Surface Area (cm^2) 2.1 cm^2 05/20/21 0950   Post-Procedure Volume (cm^3) 0.21 cm^3 05/20/21 0950   Wound Assessment Bleeding 05/20/21 0950   Drainage Amount Moderate 05/20/21 0950   Drainage Description Serosanguinous 05/13/21 0921   Odor None 05/20/21 0922   Ximena-wound Assessment Maceration 05/20/21 0922   Margins Defined

## 2021-05-20 NOTE — PLAN OF CARE
Discharge instructions given. Patient verbalized understanding. Return to [address] [place] [address],[address] in 2 weeks.   Called[facility]fax[facility] orders to [facility]  Discontinue NPWT

## 2021-06-03 ENCOUNTER — HOSPITAL ENCOUNTER (OUTPATIENT)
Dept: WOUND CARE | Age: 76
Discharge: HOME | End: 2021-06-03
Payer: MEDICARE

## 2021-06-03 VITALS — RESPIRATION RATE: 18 BRPM

## 2021-06-03 DIAGNOSIS — L89.623 PRESSURE ULCER OF LEFT HEEL, STAGE 3 (HCC): Primary | ICD-10-CM

## 2021-06-03 DIAGNOSIS — L89.619 PRESSURE ULCER OF BOTH HEELS: ICD-10-CM

## 2021-06-03 DIAGNOSIS — L89.629 PRESSURE ULCER OF BOTH HEELS: ICD-10-CM

## 2021-06-03 DIAGNOSIS — L97.911 ULCER OF RIGHT LEG, LIMITED TO BREAKDOWN OF SKIN (HCC): ICD-10-CM

## 2021-06-03 DIAGNOSIS — L97.921 ULCER OF LOWER EXTREMITY, LEFT, LIMITED TO BREAKDOWN OF SKIN (HCC): ICD-10-CM

## 2021-06-03 DIAGNOSIS — M79.89 LEG SWELLING: ICD-10-CM

## 2021-06-03 DIAGNOSIS — I83.029 VENOUS ULCER OF LEFT LEG (HCC): ICD-10-CM

## 2021-06-03 DIAGNOSIS — I89.0 LYMPHEDEMA: ICD-10-CM

## 2021-06-03 DIAGNOSIS — L97.929 VENOUS ULCER OF LEFT LEG (HCC): ICD-10-CM

## 2021-06-03 PROCEDURE — 11042 DBRDMT SUBQ TIS 1ST 20SQCM/<: CPT | Performed by: INTERNAL MEDICINE

## 2021-06-03 PROCEDURE — 11042 DBRDMT SUBQ TIS 1ST 20SQCM/<: CPT

## 2021-06-03 RX ORDER — LIDOCAINE HYDROCHLORIDE 40 MG/ML
SOLUTION TOPICAL ONCE
Status: CANCELLED | OUTPATIENT
Start: 2021-06-03 | End: 2021-06-03

## 2021-06-03 RX ORDER — GINSENG 100 MG
CAPSULE ORAL ONCE
Status: CANCELLED | OUTPATIENT
Start: 2021-06-03 | End: 2021-06-03

## 2021-06-03 RX ORDER — BACITRACIN, NEOMYCIN, POLYMYXIN B 400; 3.5; 5 [USP'U]/G; MG/G; [USP'U]/G
OINTMENT TOPICAL ONCE
Status: CANCELLED | OUTPATIENT
Start: 2021-06-03 | End: 2021-06-03

## 2021-06-03 RX ORDER — GENTAMICIN SULFATE 1 MG/G
CREAM TOPICAL
Qty: 30 G | Refills: 0 | Status: SHIPPED | OUTPATIENT
Start: 2021-06-03 | End: 2021-10-12

## 2021-06-03 RX ORDER — LIDOCAINE 40 MG/G
CREAM TOPICAL ONCE
Status: DISCONTINUED | OUTPATIENT
Start: 2021-06-03 | End: 2021-06-04 | Stop reason: HOSPADM

## 2021-06-03 RX ORDER — BETAMETHASONE DIPROPIONATE 0.05 %
OINTMENT (GRAM) TOPICAL ONCE
Status: CANCELLED | OUTPATIENT
Start: 2021-06-03 | End: 2021-06-03

## 2021-06-03 RX ORDER — BETAMETHASONE DIPROPIONATE 0.5 MG/G
CREAM TOPICAL ONCE
Status: DISCONTINUED | OUTPATIENT
Start: 2021-06-03 | End: 2021-06-04 | Stop reason: HOSPADM

## 2021-06-03 RX ORDER — GENTAMICIN SULFATE 1 MG/G
CREAM TOPICAL ONCE
Status: DISCONTINUED | OUTPATIENT
Start: 2021-06-03 | End: 2021-06-04 | Stop reason: HOSPADM

## 2021-06-03 RX ORDER — LIDOCAINE 50 MG/G
OINTMENT TOPICAL ONCE
Status: CANCELLED | OUTPATIENT
Start: 2021-06-03 | End: 2021-06-03

## 2021-06-03 RX ORDER — BETAMETHASONE DIPROPIONATE 0.5 MG/G
CREAM TOPICAL
Qty: 1 TUBE | Refills: 1 | Status: SHIPPED | OUTPATIENT
Start: 2021-06-03 | End: 2021-12-14

## 2021-06-03 RX ORDER — BACITRACIN ZINC AND POLYMYXIN B SULFATE 500; 1000 [USP'U]/G; [USP'U]/G
OINTMENT TOPICAL ONCE
Status: CANCELLED | OUTPATIENT
Start: 2021-06-03 | End: 2021-06-03

## 2021-06-03 RX ORDER — LIDOCAINE 40 MG/G
CREAM TOPICAL ONCE
Status: CANCELLED | OUTPATIENT
Start: 2021-06-03 | End: 2021-06-03

## 2021-06-03 RX ORDER — CLOBETASOL PROPIONATE 0.5 MG/G
OINTMENT TOPICAL ONCE
Status: CANCELLED | OUTPATIENT
Start: 2021-06-03 | End: 2021-06-03

## 2021-06-03 RX ORDER — GENTAMICIN SULFATE 1 MG/G
OINTMENT TOPICAL ONCE
Status: CANCELLED | OUTPATIENT
Start: 2021-06-03 | End: 2021-06-03

## 2021-06-03 NOTE — PLAN OF CARE
Discharge instructions given. Patient verbalized understanding. Return to [facility] in 1 week.   Called/faxed orders to [facility], [address]- Gentamicin cream & Betamethasone cream

## 2021-06-03 NOTE — PROGRESS NOTES
[address], [address] [place] [address] Road  Telephone: [phone]        [facility] Fax # [phone]    Discharge Instructions       [address], [address] [place] [address] Road  Telephone: [phone]     FAX [phone]      Discharge Instructions:  Keep weight off wounds and reposition every 2 hours if applicable. Avoid standing for long periods of time.      If wound(s) is on your lower extremity, elevate legs to the level of the heart or above for 30 minutes 4-5 times a day and/or when sitting.      Do not get wounds wet in bath or shower unless otherwise instructed by your physician. If your wound is on you foot or leg, you may purchase a cast bag. Please ask at the pharmacy.     When taking antibiotics take entire prescription as ordered by MD do not stop taking until medicine is all gone. Exercise as tolerated. No Smoking. Smoking prohibits wound healing.     If Vascular testing is ordered, please call [address] [place] [address] [phone]) to schedule.     Vascular tests ordered by Wound Care Physicians may take up to 2 hours to complete. Please keep that in mind when scheduling.      If Vascular testing is scheduled, please bring supplies to replace your dressing after testing is done. The vascular department does not stock supplies.      Wound: Left heel. Left lower leg     With each dressing change, rinse wounds with 0.9% Saline. (May use wound wash or soft contact solution. Both can be purchased at a local drug store). If unable to obtain saline, may use a gentle soap and water.     Dressing care: Left lower leg- Mix Gentamicin cream & Betamethasone cream, dry dressing, 1 layer tubi - change Monday, Wednesday, & Friday. Left Heel- Thin layer of Gentamicin cream, dry dressing, 1 layer tubi - change Monday, Wednesday, & Friday.  Keep pressure off of your heels- wear your offloading boots when sitting or in bed.     Right Anterior legs- 1 Tubi - do not place any dressings on the anterior leg even if scabs develop. If open & draining you can place mepilex border.       Apply Thigh high Lymphedema pumps for 1 hour twice daily - 1st application 1 hour after Breakfast , 2nd application 1 hour after dinner   Prevalon Boots or Heel Protecting Boot to Heels when sitting or in bed. Can call Viky,.     Viky Clearfield  [address] [facility], [facility]  P: [phone]  F: [phone]        Important dietary reminders:  1. Increase Protein intake for optimal wound healing  2. No added salt to reduce any swelling  3. If diabetic, good glucose control  4. If you smoke, smoking affects wound healing, we ask that you refrain from smoking.     Follow up with Dr Saintclair Ply In 1 week in the wound care center.      Call [phone] for any questions or concerns.      Your  is Yvonne 42: [address] Information: Should you experience any significant changes in your wound(s) or have questions about your wound care, please contact the [facility]  [phone] Monday  - Thursday 8:00 am - 4:00 pm and Friday 8:00 am - 1:00pm. If you need help with your wound outside these hours and cannot wait until we are again available, contact your PCP or go to the hospital emergency room.      PLEASE NOTE: IF YOU ARE UNABLE TO OBTAIN WOUND SUPPLIES, CONTINUE TO USE THE SUPPLIES YOU HAVE AVAILABLE UNTIL YOU ARE ABLE TO [facility]. IT IS MOST IMPORTANT TO KEEP THE WOUND COVERED AT ALL TIMES. Skilled nurse to evaluate and treat for wound care. Change dressing as ordered  once a day on Monday, Wednesday and Friday using clean technique. Patient/Family/caregiver may change dressings as needed as instructed when Home Care unavailable.     WOUNDS REQUIRING DRESSING Changes:     Wound 01/28/21 Heel Left #5 (Active)   Wound Image   05/20/21 0922   Wound Etiology Pressure Stage  3 06/03/21 0904   Wound Cleansed Cleansed with saline 06/03/21 0926   Wound Length (cm) 0 cm 06/03/21 0904   Wound Width (cm) 0 cm 06/03/21 0904   Wound Depth (cm) 0 cm 06/03/21 0904   Wound Surface Area (cm^2) 0 cm^2 06/03/21 0904   Change in Wound Size % (l*w) 100 06/03/21 0904   Wound Volume (cm^3) 0 cm^3 06/03/21 0904   Wound Healing % 100 06/03/21 0904   Post-Procedure Length (cm) 1.2 cm 06/03/21 0926   Post-Procedure Width (cm) 1.5 cm 06/03/21 0926   Post-Procedure Depth (cm) 0.1 cm 06/03/21 0926   Post-Procedure Surface Area (cm^2) 1.8 cm^2 06/03/21 0926   Post-Procedure Volume (cm^3) 0.18 cm^3 06/03/21 0926   Wound Assessment Bleeding 06/03/21 0926   Drainage Amount Moderate 06/03/21 0926   Drainage Description Serosanguinous 05/13/21 0921   Odor None 06/03/21 0904   Ximena-wound Assessment Other (Comment) 06/03/21 0904   Margins Defined edges; Attached edges 05/20/21 0922   Wound Thickness Description not for Pressure Injury Full thickness 05/13/21 0921   Number of days: 125       Wound 06/03/21 Leg Left; Lower; Anterior #2 (Active)   Wound Image   06/03/21 0912   Wound Etiology Traumatic 06/03/21 0912   Wound Cleansed Cleansed with saline 06/03/21 0926   Wound Length (cm) 7 cm 06/03/21 0912   Wound Width (cm) 1.5 cm 06/03/21 0912   Wound Depth (cm) 0.1 cm 06/03/21 0912   Wound Surface Area (cm^2) 10.5 cm^2 06/03/21 0912   Wound Volume (cm^3) 1.05 cm^3 06/03/21 0912   Post-Procedure Length (cm) 7 cm 06/03/21 0926   Post-Procedure Width (cm) 1.5 cm 06/03/21 0926   Post-Procedure Depth (cm) 0.1 cm 06/03/21 0926   Post-Procedure Surface Area (cm^2) 10.5 cm^2 06/03/21 0926   Post-Procedure Volume (cm^3) 1.05 cm^3 06/03/21 0926   Wound Assessment Bleeding 06/03/21 0926   Drainage Amount Moderate 06/03/21 0926   Drainage Description Serosanguinous 06/03/21 0912   Odor None 06/03/21 0912   Margins Unattached edges 06/03/21 0912   Number of days: 0          Patient seen and treated on 6/3/2021    By Fide Fox DO NPI: [id number]  (provider/NPI)

## 2021-06-03 NOTE — PROGRESS NOTES
SURGERY      CARDIAC SURGERY      2009    CATARACT REMOVAL WITH IMPLANT Left 11/23/2016    CATARACT REMOVAL WITH IMPLANT Right 11/02/2016    CATARACT REMOVAL WITH IMPLANT Bilateral 10/16, 11/16    CHOLECYSTECTOMY      COLONOSCOPY      ENDOSCOPY, COLON, DIAGNOSTIC      EYE SURGERY      left tear duct surgery    JOINT REPLACEMENT      BTKR    KNEE ARTHROPLASTY  09/15/2010    left total knee    TOTAL KNEE ARTHROPLASTY      VASCULAR SURGERY         FAMILY HISTORY    Family History   Problem Relation Age of Onset    Diabetes Mother     Heart Disease Mother     Heart Disease Father     Stroke Father        SOCIAL HISTORY    Social History     Tobacco Use    Smoking status: Never Smoker    Smokeless tobacco: Never Used   Vaping Use    Vaping Use: Never used   Substance Use Topics    Alcohol use: No    Drug use: No       ALLERGIES    Allergies   Allergen Reactions    Adhesive Tape Shortness Of Breath     Other reaction(s): Ulcers    Chlorhexidine     Lisinopril Other (See Comments)     Med causes lethargy- takes in lower doses       MEDICATIONS    Current Outpatient Medications on File Prior to Encounter   Medication Sig Dispense Refill    colchicine (COLCRYS) 0.6 MG tablet 0.6 mg: TAKE ONE TABLET BY MOUTH DAILY      Cephalexin 500 MG TABS Per instructions: TAKE ONE CAPSULE BY MOUTH EVERY 12 HOUR      rivaroxaban (XARELTO) 15 MG TABS tablet TAKE ONE TABLET BY MOUTH DAILY 90 tablet 1    furosemide (LASIX) 20 MG tablet TAKE ONE TABLET BY MOUTH TWICE DAILY  60 tablet 3    Coenzyme Q10 (CO Q-10) 400 MG CAPS Take 400 mg by mouth daily 30 capsule 5    Liraglutide (VICTOZA) 18 MG/3ML SOPN SC injection Inject 1.2 mg into the skin daily      Handicap Placard MISC by Does not apply route Sob when walking less than 200 feet  Length of time--greater than 5 year 1 each 0    ferrous sulfate 325 (65 Fe) MG tablet Take 1 tablet by mouth 3 times daily (with meals) (Patient taking differently: Take 325 mg by [id number]   Drainage Amount Moderate 06/03/21 0926   Drainage Description Serosanguinous 05/13/21 0921   Odor None 06/03/21 0904   Ximena-wound Assessment Other (Comment) 06/03/21 0904   Margins Defined edges; Attached edges 05/20/21 0922   Wound Thickness Description not for Pressure Injury Full thickness 05/13/21 0921   Number of days: 125       Wound 06/03/21 Leg Left; Lower; Anterior #2 (Active)   Wound Image   06/03/21 0912   Wound Etiology Traumatic 06/03/21 0912   Wound Cleansed Cleansed with saline 06/03/21 0926   Wound Length (cm) 7 cm 06/03/21 0912   Wound Width (cm) 1.5 cm 06/03/21 0912   Wound Depth (cm) 0.1 cm 06/03/21 0912   Wound Surface Area (cm^2) 10.5 cm^2 06/03/21 0912   Wound Volume (cm^3) 1.05 cm^3 06/03/21 0912   Post-Procedure Length (cm) 7 cm 06/03/21 0926   Post-Procedure Width (cm) 1.5 cm 06/03/21 0926   Post-Procedure Depth (cm) 0.1 cm 06/03/21 0926   Post-Procedure Surface Area (cm^2) 10.5 cm^2 06/03/21 0926   Post-Procedure Volume (cm^3) 1.05 cm^3 06/03/21 0926   Wound Assessment Bleeding 06/03/21 0926   Drainage Amount Moderate 06/03/21 0926   Drainage Description Serosanguinous 06/03/21 0912   Odor None 06/03/21 0912   Margins Unattached edges 06/03/21 0912   Number of days: 0           Total Surface Area Debrided:  12.3 sq cm     Percentage of wound debrided 100%    Bleeding:  Minimal    Hemostasis Achieved:  by pressure    Procedural Pain:  3  / 10     Post Procedural Pain:  1 / 10     Response to treatment:  Well tolerated by patient. Plan:     The nature of the patient's condition was explained in depth. The patient was informed that their compliance to the treatment plan is paramount to successful healing and prevention of further ulceration and/or infection     Discharge Treatment     Wound: Left heel. Left lower leg     With each dressing change, rinse wounds with 0.9% Saline. (May use wound wash or soft contact solution. Both can be purchased at a local drug store).  If unable to obtain saline, may use a gentle soap and water.     Dressing care: Left lower leg- Mix Gentamicin cream & Betamethasone cream, dry dressing, 1 layer tubi - change Monday, Wednesday, & Friday. Left Heel- Thin layer of Gentamicin cream, dry dressing, 1 layer tubi - change Monday, Wednesday, & Friday. Keep pressure off of your heels- wear your offloading boots when sitting or in bed.     Right Anterior legs- 1 Tubi - do not place any dressings on the anterior leg even if scabs develop. If open & draining you can place mepilex border.       Apply Thigh high Lymphedema pumps for 1 hour twice daily - 1st application 1 hour after Breakfast , 2nd application 1 hour after dinner   Prevalon Boots or Heel Protecting Boot to Heels when sitting or in bed. Can call Viky.     Viky Marion  [address] [facility], [facility]  P: [phone]  F: [phone]        Important dietary reminders:  1. Increase Protein intake for optimal wound healing  2. No added salt to reduce any swelling  3. If diabetic, good glucose control  4.  If you smoke, smoking affects wound healing, we ask that you refrain from smoking.     Follow up with Dr Elam Begin In 1 week in the wound care center.          Kathryn Taylor D.O., [facility] [place]  Interventional Cardiology     o: [phone]  [address] [employer] [employer] [facility], [facility]

## 2021-06-10 ENCOUNTER — HOSPITAL ENCOUNTER (OUTPATIENT)
Dept: WOUND CARE | Age: 76
Discharge: HOME | End: 2021-06-10
Payer: MEDICARE

## 2021-06-10 VITALS — RESPIRATION RATE: 16 BRPM | SYSTOLIC BLOOD PRESSURE: 179 MMHG | HEART RATE: 66 BPM | DIASTOLIC BLOOD PRESSURE: 74 MMHG

## 2021-06-10 DIAGNOSIS — L89.619 PRESSURE ULCER OF BOTH HEELS: ICD-10-CM

## 2021-06-10 DIAGNOSIS — L89.629 PRESSURE ULCER OF BOTH HEELS: ICD-10-CM

## 2021-06-10 DIAGNOSIS — L97.911 ULCER OF RIGHT LEG, LIMITED TO BREAKDOWN OF SKIN (HCC): ICD-10-CM

## 2021-06-10 DIAGNOSIS — M79.89 LEG SWELLING: ICD-10-CM

## 2021-06-10 DIAGNOSIS — L97.921 ULCER OF LOWER EXTREMITY, LEFT, LIMITED TO BREAKDOWN OF SKIN (HCC): ICD-10-CM

## 2021-06-10 DIAGNOSIS — I89.0 LYMPHEDEMA: ICD-10-CM

## 2021-06-10 DIAGNOSIS — L89.623 PRESSURE ULCER OF LEFT HEEL, STAGE 3 (HCC): Primary | ICD-10-CM

## 2021-06-10 PROCEDURE — 11042 DBRDMT SUBQ TIS 1ST 20SQCM/<: CPT | Performed by: INTERNAL MEDICINE

## 2021-06-10 PROCEDURE — 11042 DBRDMT SUBQ TIS 1ST 20SQCM/<: CPT

## 2021-06-10 RX ORDER — GENTAMICIN SULFATE 1 MG/G
OINTMENT TOPICAL ONCE
Status: DISCONTINUED | OUTPATIENT
Start: 2021-06-10 | End: 2021-06-11 | Stop reason: HOSPADM

## 2021-06-10 RX ORDER — LIDOCAINE 50 MG/G
OINTMENT TOPICAL ONCE
Status: CANCELLED | OUTPATIENT
Start: 2021-06-10 | End: 2021-06-10

## 2021-06-10 RX ORDER — LIDOCAINE HYDROCHLORIDE 40 MG/ML
SOLUTION TOPICAL ONCE
Status: CANCELLED | OUTPATIENT
Start: 2021-06-10 | End: 2021-06-10

## 2021-06-10 RX ORDER — BACITRACIN, NEOMYCIN, POLYMYXIN B 400; 3.5; 5 [USP'U]/G; MG/G; [USP'U]/G
OINTMENT TOPICAL ONCE
Status: CANCELLED | OUTPATIENT
Start: 2021-06-10 | End: 2021-06-10

## 2021-06-10 RX ORDER — GINSENG 100 MG
CAPSULE ORAL ONCE
Status: CANCELLED | OUTPATIENT
Start: 2021-06-10 | End: 2021-06-10

## 2021-06-10 RX ORDER — BACITRACIN ZINC AND POLYMYXIN B SULFATE 500; 1000 [USP'U]/G; [USP'U]/G
OINTMENT TOPICAL ONCE
Status: CANCELLED | OUTPATIENT
Start: 2021-06-10 | End: 2021-06-10

## 2021-06-10 RX ORDER — LIDOCAINE 40 MG/G
CREAM TOPICAL ONCE
Status: CANCELLED | OUTPATIENT
Start: 2021-06-10 | End: 2021-06-10

## 2021-06-10 RX ORDER — LIDOCAINE 40 MG/G
CREAM TOPICAL ONCE
Status: DISCONTINUED | OUTPATIENT
Start: 2021-06-10 | End: 2021-06-11 | Stop reason: HOSPADM

## 2021-06-10 RX ORDER — CLOBETASOL PROPIONATE 0.5 MG/G
OINTMENT TOPICAL ONCE
Status: CANCELLED | OUTPATIENT
Start: 2021-06-10 | End: 2021-06-10

## 2021-06-10 RX ORDER — GENTAMICIN SULFATE 1 MG/G
OINTMENT TOPICAL ONCE
Status: CANCELLED | OUTPATIENT
Start: 2021-06-10 | End: 2021-06-10

## 2021-06-10 RX ORDER — BETAMETHASONE DIPROPIONATE 0.05 %
OINTMENT (GRAM) TOPICAL ONCE
Status: DISCONTINUED | OUTPATIENT
Start: 2021-06-10 | End: 2021-06-11 | Stop reason: HOSPADM

## 2021-06-10 RX ORDER — BETAMETHASONE DIPROPIONATE 0.05 %
OINTMENT (GRAM) TOPICAL ONCE
Status: CANCELLED | OUTPATIENT
Start: 2021-06-10 | End: 2021-06-10

## 2021-06-10 NOTE — PROGRESS NOTES
Bobby Moreno  Progress Note and Procedure Note      Jayda Mercer  AGE: 66 y.o. GENDER: female  : 1945  TODAY'S DATE:  6/10/2021    Subjective:     Chief Complaint   Patient presents with    Wound Check     Wounds left heel and right leg         HISTORY of PRESENT ILLNESS HPI     Jayda Mercer is a 66 y.o. female who presents today for wound evaluation. History of Wound:   Long history of leg wounds   Established with wound clinic 2020  Accompanied by  who assists pt with bilateral lower leg care. Lymphedema pumps at home but does not use consistently  Left heel wound remains. Starting to use heel boots in bed and when sitting with legs up      Interval history 6/3/21: States Adaptic was applied to left anterior LE by Elly 76 and when removed \"lifted scabs with it\". New open wounds at left anterior shin. Wound Pain:  intermittent  Severity:  2 / 10   Wound Type:  venous  Modifying Factors:  edema, venous stasis, lymphedema, obesity and non-adherence  Associated Signs/Symptoms:  edema and drainage        PAST MEDICAL HISTORY        Diagnosis Date    Acute idiopathic gout of left hand 2021    Acute idiopathic gout of right hand 2021    Arthritis     Blood circulation, collateral     Blood transfusion     CAD (coronary artery disease)     CHF (congestive heart failure) (HCC)     Chronic renal failure, stage 3 (moderate) (HCC)     Diabetes mellitus (HCC)     GERD (gastroesophageal reflux disease)     Hyperlipidemia     Hypertension     Leg swelling 2018    Lymphedema 2018    Pressure ulcer of both heels 2021    Deep tissue injuries to right and left heels with left>right. Date of origin unknown.     Pressure ulcer of left heel, stage 3 ([address] 2021    Ulcer of right leg, limited to breakdown of skin ([address] 2018       PAST SURGICAL HISTORY    Past Surgical History:   Procedure Laterality Date    ABDOMEN SURGERY      CARDIAC SURGERY      2009    CATARACT REMOVAL WITH IMPLANT Left 11/23/2016    CATARACT REMOVAL WITH IMPLANT Right 11/02/2016    CATARACT REMOVAL WITH IMPLANT Bilateral 10/16, 11/16    CHOLECYSTECTOMY      COLONOSCOPY      ENDOSCOPY, COLON, DIAGNOSTIC      EYE SURGERY      left tear duct surgery    JOINT REPLACEMENT      BTKR    KNEE ARTHROPLASTY  09/15/2010    left total knee    TOTAL KNEE ARTHROPLASTY      VASCULAR SURGERY         FAMILY HISTORY    Family History   Problem Relation Age of Onset    Diabetes Mother     Heart Disease Mother     Heart Disease Father     Stroke Father        SOCIAL HISTORY    Social History     Tobacco Use    Smoking status: Never Smoker    Smokeless tobacco: Never Used   Vaping Use    Vaping Use: Never used   Substance Use Topics    Alcohol use: No    Drug use: No       ALLERGIES    Allergies   Allergen Reactions    Adhesive Tape Shortness Of Breath     Other reaction(s): Ulcers    Chlorhexidine     Lisinopril Other (See Comments)     Med causes lethargy- takes in lower doses       MEDICATIONS    Current Outpatient Medications on File Prior to Encounter   Medication Sig Dispense Refill    gentamicin (GARAMYCIN) 0.1 % cream Apply topically Monday, Wednesday, & Friday. 30 g 0    betamethasone dipropionate (DIPROLENE) 0.05 % cream Apply topically Monday, Wednesday, & Friday.  1 Tube 1    colchicine (COLCRYS) 0.6 MG tablet 0.6 mg: TAKE ONE TABLET BY MOUTH DAILY      Cephalexin 500 MG TABS Per instructions: TAKE ONE CAPSULE BY MOUTH EVERY 12 HOUR      rivaroxaban (XARELTO) 15 MG TABS tablet TAKE ONE TABLET BY MOUTH DAILY 90 tablet 1    furosemide (LASIX) 20 MG tablet TAKE ONE TABLET BY MOUTH TWICE DAILY  60 tablet 3    Coenzyme Q10 (CO Q-10) 400 MG CAPS Take 400 mg by mouth daily 30 capsule 5    Liraglutide (VICTOZA) 18 MG/3ML SOPN SC injection Inject 1.2 mg into the skin daily      Handicap Placard MISC by Does not apply route Sob when walking less than 200 feet  Length of time--greater than 5 year 1 each 0    ferrous sulfate 325 (65 Fe) MG tablet Take 1 tablet by mouth 3 times daily (with meals) (Patient taking differently: Take 325 mg by mouth 2 times daily (with meals) ) 90 tablet 3    atorvastatin (LIPITOR) 20 MG tablet Take 20 mg by mouth daily      vitamin D (CHOLECALCIFEROL) 400 UNITS TABS tablet Take 400 Units by mouth daily      metoprolol (LOPRESSOR) 25 MG tablet Take 0.5 tablets by mouth 2 times daily 60 tablet 3    therapeutic multivitamin-minerals (THERAGRAN-M) tablet Take 1 tablet by mouth daily.  hydrOXYzine (ATARAX) 25 MG tablet Take 25 mg by mouth 3 times daily as needed.  ranitidine (ZANTAC) 300 MG tablet Take 150 mg by mouth 2 times daily        No current facility-administered medications on file prior to encounter. REVIEW OF SYSTEMS    Pertinent items are noted in HPI.       Objective:      BP (!) 179/74   Pulse 66   Resp 16     PHYSICAL EXAM    General Appearance: alert and oriented to person, place and time, well-developed and well-nourished, in no acute distress  Skin: warm and dry  Head: normocephalic and atraumatic  Eyes: extraocular eye movements intact and conjunctivae normal  Extremities: no cyanosis and no clubbing  Musculoskeletal: normal range of motion, no joint swelling, deformity or tenderness      Assessment:     Patient Active Problem List   Diagnosis    Osteoarthritis of left knee    Acute blood loss anemia    Diabetes mellitus ([facility]    Ischemic cardiomyopathy    Atrial fibrillation    Peripheral vascular disease (HCC)    CHF exacerbation (HCC)    Acute on chronic combined systolic and diastolic heart failure (HCC)    Chronic renal failure, stage 3 (moderate) (HCC)    Chronic atrial fibrillation (HCC)    Pleural effusion    Atherosclerosis of native coronary artery without angina pectoris    Essential hypertension    Chronic combined systolic and diastolic congestive heart failure (HCC)    Hx of CABG    Non-rheumatic tricuspid valve insufficiency    Mixed hyperlipidemia    Carotid disease, bilateral (HCC)    Atherosclerosis of native coronary artery of native heart without angina pectoris    Chronic kidney disease    Hyperlipidemia    INOCENCIO (obstructive sleep apnea)    Lymphedema    Leg swelling    Venous stasis dermatitis of both lower extremities    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 (HCC)    Venous insufficiency    Ulcer of right leg, limited to breakdown of skin (HCC)    Ulcer of lower extremity, left, limited to breakdown of skin (HCC)    Iron deficiency anemia    CHANO (acute kidney injury) ([address]    Knee pain    Pressure ulcer of both heels    Pressure ulcer of left heel, stage 3 (HCC)    Acute idiopathic gout of right hand    Acute idiopathic gout of left hand    Venous ulcer of left leg ([address]       Procedure Note    Performed by: Ramiro Burgess DO    Consent obtained: Yes    Time out taken:  Yes    Pain Control: Anesthetic  Anesthetic: 4% Lidocaine Cream     Debridement:Excisional Debridement    Using curette the wound was sharply debrided    down through and including the removal of epidermis, dermis and subcutaneous tissue.         Devitalized Tissue Debrided:  biofilm and slough    Pre Debridement Measurements:  Are located in the Wound Documentation Flow Sheet    Wound #: 2,3,5  Wound Care Documentation:  Wound 01/28/21 Heel Left #5 (Active)   Wound Image   05/20/21 0922   Wound Etiology Pressure Stage  3 06/10/21 0923   Wound Cleansed Cleansed with saline 06/10/21 0942   Wound Length (cm) 0.5 cm 06/10/21 0923   Wound Width (cm) 0.6 cm 06/10/21 0923   Wound Depth (cm) 0.1 cm 06/10/21 0923   Wound Surface Area (cm^2) 0.3 cm^2 06/10/21 0923   Change in Wound Size % (l*w) 98.64 06/10/21 0923   Wound Volume (cm^3) 0.03 cm^3 06/10/21 0923   Wound Healing % 99 06/10/21 0923   Post-Procedure Length (cm) 0.5 cm 06/10/21 0942   Post-Procedure Width (cm) 0.6 cm 06/10/21 0942   Post-Procedure Depth (cm) 0.1 cm 06/10/21 0942   Post-Procedure Surface Area (cm^2) 0.3 cm^2 06/10/21 0942   Post-Procedure Volume (cm^3) 0.03 cm^3 06/10/21 0942   Wound Assessment Bleeding 06/10/21 0942   Drainage Amount Moderate 06/10/21 0942   Drainage Description Serosanguinous 06/10/21 0923   Odor None 06/10/21 0923   Ximena-wound Assessment Intact 06/10/21 0923   Margins Attached edges; Defined edges 06/10/21 0923   Wound Thickness Description not for Pressure Injury Full thickness 05/13/21 0921   Number of days: 132       Wound 06/03/21 Leg Left; Lower; Anterior #2 (Active)   Wound Image   06/03/21 0912   Wound Etiology Traumatic 06/10/21 0923   Wound Cleansed Cleansed with saline 06/10/21 0942   Wound Length (cm) 2.5 cm 06/10/21 0923   Wound Width (cm) 1 cm 06/10/21 0923   Wound Depth (cm) 0.1 cm 06/10/21 0923   Wound Surface Area (cm^2) 2.5 cm^2 06/10/21 0923   Change in Wound Size % (l*w) 76.19 06/10/21 0923   Wound Volume (cm^3) 0.25 cm^3 06/10/21 0923   Wound Healing % 76 06/10/21 0923   Post-Procedure Length (cm) 2.5 cm 06/10/21 0942   Post-Procedure Width (cm) 1 cm 06/10/21 0942   Post-Procedure Depth (cm) 0.1 cm 06/10/21 0942   Post-Procedure Surface Area (cm^2) 2.5 cm^2 06/10/21 0942   Post-Procedure Volume (cm^3) 0.25 cm^3 06/10/21 0942   Wound Assessment Bleeding 06/10/21 0942   Drainage Amount Moderate 06/10/21 0942   Drainage Description Serosanguinous 06/10/21 0923   Odor None 06/10/21 0923   Ximena-wound Assessment Intact 06/10/21 0923   Margins Attached edges; Defined edges 06/10/21 0923   Number of days: 7       Wound 06/10/21 Leg Right;Lateral #3 (Active)   Wound Etiology Traumatic 06/10/21 0927   Wound Cleansed Cleansed with saline 06/10/21 0942   Wound Length (cm) 4 cm 06/10/21 0927   Wound Width (cm) 4.5 cm 06/10/21 0927   Wound Depth (cm) 0.1 cm 06/10/21 0927   Wound Surface Area (cm^2) 18 cm^2 06/10/21 0927   Wound Volume (cm^3) 1.8 cm^3 06/10/21 0927   Post-Procedure Length (cm) 4 cm 06/10/21 0942   Post-Procedure Width (cm) 4.5 cm 06/10/21 0942   Post-Procedure Depth (cm) 0.1 cm 06/10/21 0942   Post-Procedure Surface Area (cm^2) 18 cm^2 06/10/21 0942   Post-Procedure Volume (cm^3) 1.8 cm^3 06/10/21 0942   Wound Assessment Bleeding 06/10/21 0942   Drainage Amount Moderate 06/10/21 0942   Drainage Description Serous 06/10/21 0927   Odor None 06/10/21 0927   Ximena-wound Assessment Excoriated 06/10/21 0927   Margins Attached edges; Defined edges 06/10/21 0927   Wound Thickness Description not for Pressure Injury Full thickness 06/10/21 0927   Number of days: 0       Total Surface Area Debrided:  20.8 sq cm     Percentage of wound debrided 100%    Bleeding:  Minimal    Hemostasis Achieved:  by pressure    Procedural Pain:  3  / 10     Post Procedural Pain:  1 / 10     Response to treatment:  Well tolerated by patient. Plan:     The nature of the patient's condition was explained in depth. The patient was informed that their compliance to the treatment plan is paramount to successful healing and prevention of further ulceration and/or infection     Discharge Treatment      Wound: Left heel. Left lower leg     With each dressing change, rinse wounds with 0.9% Saline. (May use wound wash or soft contact solution. Both can be purchased at a local drug store). If unable to obtain saline, may use a gentle soap and water.     Dressing care: Left lower leg, Right lower leg, Left heel- Gentamicin cream to left heel & Mix Gentamicin cream & Betamethasone cream to bilateral legs, mepilex transfer, 4 x 4's, Unna boot- these will be changed at your next visit unless they slide down or cause pain. If they slide, gets wet, or cause pain please call the wound care center, you may need to come in to be re-wrapped. If you are unable to get to your follow up appointment you will need to remove your wraps at home & place some kind of compression .  Keep pressure off of your heels- wear your offloading boots when sitting or in bed.      Apply Thigh high Lymphedema pumps for 1 hour twice daily - 1st application 1 hour after Breakfast , 2nd application 1 hour after dinner   Prevalon Boots or Heel Protecting Boot to Heels when sitting or in bed. Can call [facility].       Flex Ha D.O., [facility]  Interventional Cardiology     o: [phone]  [address] [place] [address]., [address] [facility], [address] [place] [address]

## 2021-06-10 NOTE — PLAN OF CARE
Discharge instructions given. Patient verbalized understanding. Return to [facility] in 1 week.   Called/faxed orders to [facility]

## 2021-06-10 NOTE — PROGRESS NOTES
[address], [address] [place] [address] Road  Telephone: [phone]        [facility] Fax # [phone]       Discharge Instructions       [address], [address] [place] [address] Road  Telephone: [phone]     FAX [phone]      Discharge Instructions:  Keep weight off wounds and reposition every 2 hours if applicable. Avoid standing for long periods of time.      If wound(s) is on your lower extremity, elevate legs to the level of the heart or above for 30 minutes 4-5 times a day and/or when sitting.      Do not get wounds wet in bath or shower unless otherwise instructed by your physician. If your wound is on you foot or leg, you may purchase a cast bag. Please ask at the pharmacy.     When taking antibiotics take entire prescription as ordered by MD do not stop taking until medicine is all gone. Exercise as tolerated. No Smoking. Smoking prohibits wound healing.     If Vascular testing is ordered, please call [address] [place] [address] [phone]) to schedule.     Vascular tests ordered by Wound Care Physicians may take up to 2 hours to complete. Please keep that in mind when scheduling.      If Vascular testing is scheduled, please bring supplies to replace your dressing after testing is done. The vascular department does not stock supplies.      Wound: Left heel. Left lower leg     With each dressing change, rinse wounds with 0.9% Saline. (May use wound wash or soft contact solution. Both can be purchased at a local drug store). If unable to obtain saline, may use a gentle soap and water.     Dressing care: Left lower leg, Right lower leg, Left heel- Gentamicin cream to left heel & Mix Gentamicin cream & Betamethasone cream to bilateral legs, mepilex transfer, 4 x 4's, Unna boot- these will be changed at your next visit unless they slide down or cause pain.  If they slide, gets wet, or cause pain please call the wound care center, you may need to come in to be re-wrapped. If you are unable to get to your follow up appointment you will need to remove your wraps at home & place some kind of compression . Keep pressure off of your heels- wear your offloading boots when sitting or in bed.      Apply Thigh high Lymphedema pumps for 1 hour twice daily - 1st application 1 hour after Breakfast , 2nd application 1 hour after dinner   Prevalon Boots or Heel Protecting Boot to Heels when sitting or in bed. Can call [facility],.     Compression Wraps  Location: Right & Left lower leg below knee  Type: Unna boot    Your doctor has ordered compression therapy for your wound. Compression bandages reduce the swelling, or edema, in your legs and prevent it from returning. The wound care staff will apply your compression wrap. It must be removed and re-applied at least weekly. As the swelling decreases, the boot no longer provides adequate compression and you need a new one. Once applied, you need to know how to take care of your compression wrap. The boot must stay dry. Do not get it wet in the shower or tub. You may do a partial bath, or you can cover the boot with a large plastic bag, secured at the top, so that no water can get in. Avoid standing in one place for long periods of time. If you must  one place, shift your weight and change positions often. If you have CHF, consult your doctor before following the next two recommendations for leg elevation. When sitting, elevate your legs on pillows, or put blocks under the foot of your bed. Your legs should be higher than your heart. If your boot becomes painful, or you notice an increase in swelling in your toes, numbness or tingling, or purple color to your toes, remove the wrap and call the [address] [place] [address] Road. If it is after hours, call your doctor for instructions or go to the nearest emergency room.    [facility]  [address], [place]  P: [phone]  F: [phone]        Important dietary reminders:  1. Increase Protein intake for optimal wound healing  2. No added salt to reduce any swelling  3. If diabetic, good glucose control  4. If you smoke, smoking affects wound healing, we ask that you refrain from smoking.     Follow up with Chelsy Holman NP In 1 week in the wound care center.      Call [phone] for any questions or concerns.      Your  is [address]: [address] Information: Should you experience any significant changes in your wound(s) or have questions about your wound care, please contact the [facility] [address] [phone] Monday  - Thursday 8:00 am - 4:00 pm and Friday 8:00 am - 1:00pm. If you need help with your wound outside these hours and cannot wait until we are again available, contact your PCP or go to the hospital emergency room.      PLEASE NOTE: IF YOU ARE UNABLE TO OBTAIN WOUND SUPPLIES, CONTINUE TO USE THE SUPPLIES YOU HAVE AVAILABLE UNTIL YOU ARE ABLE TO [facility]. IT IS MOST IMPORTANT TO KEEP THE WOUND COVERED AT ALL TIMES.           WOUNDS REQUIRING DRESSING Changes:     Wound 01/28/21 Heel Left #5 (Active)   Wound Image   05/20/21 0922   Wound Etiology Pressure Stage  3 06/10/21 0923   Wound Cleansed Cleansed with saline 06/10/21 0942   Wound Length (cm) 0.5 cm 06/10/21 0923   Wound Width (cm) 0.6 cm 06/10/21 0923   Wound Depth (cm) 0.1 cm 06/10/21 0923   Wound Surface Area (cm^2) 0.3 cm^2 06/10/21 0923   Change in Wound Size % (l*w) 98.64 06/10/21 0923   Wound Volume (cm^3) 0.03 cm^3 06/10/21 0923   Wound Healing % 99 06/10/21 0923   Post-Procedure Length (cm) 0.5 cm 06/10/21 0942   Post-Procedure Width (cm) 0.6 cm 06/10/21 0942   Post-Procedure Depth (cm) 0.1 cm 06/10/21 0942   Post-Procedure Surface Area (cm^2) 0.3 cm^2 06/10/21 0942   Post-Procedure Volume (cm^3) 0.03 cm^3 06/10/21 0942   Wound Assessment Bleeding 06/10/21 0942   Drainage Amount Moderate 06/10/21 0942   Drainage Description Serosanguinous 06/10/21 0923   Odor None 06/10/21 0923   Ximena-wound Assessment Intact 06/10/21 0923   Margins Attached edges; Defined edges 06/10/21 0923   Wound Thickness Description not for Pressure Injury Full thickness 05/13/21 0921   Number of days: 132       Wound 06/03/21 Leg Left; Lower; Anterior #2 (Active)   Wound Image   06/03/21 0912   Wound Etiology Traumatic 06/10/21 0923   Wound Cleansed Cleansed with saline 06/10/21 0942   Wound Length (cm) 2.5 cm 06/10/21 0923   Wound Width (cm) 1 cm 06/10/21 0923   Wound Depth (cm) 0.1 cm 06/10/21 0923   Wound Surface Area (cm^2) 2.5 cm^2 06/10/21 0923   Change in Wound Size % (l*w) 76.19 06/10/21 0923   Wound Volume (cm^3) 0.25 cm^3 06/10/21 0923   Wound Healing % 76 06/10/21 0923   Post-Procedure Length (cm) 2.5 cm 06/10/21 0942   Post-Procedure Width (cm) 1 cm 06/10/21 0942   Post-Procedure Depth (cm) 0.1 cm 06/10/21 0942   Post-Procedure Surface Area (cm^2) 2.5 cm^2 06/10/21 0942   Post-Procedure Volume (cm^3) 0.25 cm^3 06/10/21 0942   Wound Assessment Bleeding 06/10/21 0942   Drainage Amount Moderate 06/10/21 0942   Drainage Description Serosanguinous 06/10/21 0923   Odor None 06/10/21 0923   Ximena-wound Assessment Intact 06/10/21 0923   Margins Attached edges; Defined edges 06/10/21 0923   Number of days: 7       Wound 06/10/21 Leg Right;Lateral #3 (Active)   Wound Etiology Traumatic 06/10/21 0927   Wound Cleansed Cleansed with saline 06/10/21 0942   Wound Length (cm) 4 cm 06/10/21 0927   Wound Width (cm) 4.5 cm 06/10/21 0927   Wound Depth (cm) 0.1 cm 06/10/21 0927   Wound Surface Area (cm^2) 18 cm^2 06/10/21 0927   Wound Volume (cm^3) 1.8 cm^3 06/10/21 0927   Post-Procedure Length (cm) 4 cm 06/10/21 0942   Post-Procedure Width (cm) 4.5 cm 06/10/21 0942   Post-Procedure Depth (cm) 0.1 cm 06/10/21 0942   Post-Procedure Surface Area (cm^2) 18 cm^2 06/10/21 0942   Post-Procedure Volume (cm^3) 1.8 cm^3 06/10/21 0942   Wound Assessment Bleeding 06/10/21 0942   Drainage Amount Moderate 06/10/21 0942   Drainage Description Serous 06/10/21 0927   Odor None 06/10/21 0927   Ximena-wound Assessment Excoriated 06/10/21 0927   Margins Attached edges; Defined edges 06/10/21 0927   Wound Thickness Description not for Pressure Injury Full thickness 06/10/21 5130   Number of days: 0          Patient seen and treated on 6/10/2021    By Eduardo Jay DO NPI: [id number] (provider/NPI)

## 2021-06-17 ENCOUNTER — HOSPITAL ENCOUNTER (OUTPATIENT)
Dept: WOUND CARE | Age: 76
Discharge: HOME | End: 2021-06-17
Payer: MEDICARE

## 2021-06-17 VITALS — HEART RATE: 65 BPM | DIASTOLIC BLOOD PRESSURE: 74 MMHG | SYSTOLIC BLOOD PRESSURE: 171 MMHG | RESPIRATION RATE: 16 BRPM

## 2021-06-17 DIAGNOSIS — L89.623 PRESSURE ULCER OF LEFT HEEL, STAGE 3 (HCC): Primary | ICD-10-CM

## 2021-06-17 DIAGNOSIS — L97.921 ULCER OF LOWER EXTREMITY, LEFT, LIMITED TO BREAKDOWN OF SKIN (HCC): ICD-10-CM

## 2021-06-17 DIAGNOSIS — L89.619 PRESSURE ULCER OF BOTH HEELS: ICD-10-CM

## 2021-06-17 DIAGNOSIS — L89.629 PRESSURE ULCER OF BOTH HEELS: ICD-10-CM

## 2021-06-17 DIAGNOSIS — M79.89 LEG SWELLING: ICD-10-CM

## 2021-06-17 DIAGNOSIS — I89.0 LYMPHEDEMA: ICD-10-CM

## 2021-06-17 DIAGNOSIS — L97.911 ULCER OF RIGHT LEG, LIMITED TO BREAKDOWN OF SKIN (HCC): ICD-10-CM

## 2021-06-17 PROCEDURE — 11042 DBRDMT SUBQ TIS 1ST 20SQCM/<: CPT | Performed by: INTERNAL MEDICINE

## 2021-06-17 PROCEDURE — 11042 DBRDMT SUBQ TIS 1ST 20SQCM/<: CPT

## 2021-06-17 RX ORDER — GINSENG 100 MG
CAPSULE ORAL ONCE
Status: CANCELLED | OUTPATIENT
Start: 2021-06-17 | End: 2021-06-17

## 2021-06-17 RX ORDER — BACITRACIN, NEOMYCIN, POLYMYXIN B 400; 3.5; 5 [USP'U]/G; MG/G; [USP'U]/G
OINTMENT TOPICAL ONCE
Status: CANCELLED | OUTPATIENT
Start: 2021-06-17 | End: 2021-06-17

## 2021-06-17 RX ORDER — CLOBETASOL PROPIONATE 0.5 MG/G
OINTMENT TOPICAL ONCE
Status: CANCELLED | OUTPATIENT
Start: 2021-06-17 | End: 2021-06-17

## 2021-06-17 RX ORDER — BETAMETHASONE DIPROPIONATE 0.05 %
OINTMENT (GRAM) TOPICAL ONCE
Status: CANCELLED | OUTPATIENT
Start: 2021-06-17 | End: 2021-06-17

## 2021-06-17 RX ORDER — LIDOCAINE HYDROCHLORIDE 40 MG/ML
SOLUTION TOPICAL ONCE
Status: CANCELLED | OUTPATIENT
Start: 2021-06-17 | End: 2021-06-17

## 2021-06-17 RX ORDER — LIDOCAINE 40 MG/G
CREAM TOPICAL ONCE
Status: CANCELLED | OUTPATIENT
Start: 2021-06-17 | End: 2021-06-17

## 2021-06-17 RX ORDER — LIDOCAINE 50 MG/G
OINTMENT TOPICAL ONCE
Status: CANCELLED | OUTPATIENT
Start: 2021-06-17 | End: 2021-06-17

## 2021-06-17 RX ORDER — BETAMETHASONE DIPROPIONATE 0.5 MG/G
CREAM TOPICAL ONCE
Status: DISCONTINUED | OUTPATIENT
Start: 2021-06-17 | End: 2021-06-18 | Stop reason: HOSPADM

## 2021-06-17 RX ORDER — BACITRACIN ZINC AND POLYMYXIN B SULFATE 500; 1000 [USP'U]/G; [USP'U]/G
OINTMENT TOPICAL ONCE
Status: CANCELLED | OUTPATIENT
Start: 2021-06-17 | End: 2021-06-17

## 2021-06-17 RX ORDER — GENTAMICIN SULFATE 1 MG/G
OINTMENT TOPICAL ONCE
Status: CANCELLED | OUTPATIENT
Start: 2021-06-17 | End: 2021-06-17

## 2021-06-17 RX ORDER — GENTAMICIN SULFATE 1 MG/G
CREAM TOPICAL ONCE
Status: DISCONTINUED | OUTPATIENT
Start: 2021-06-17 | End: 2021-06-18 | Stop reason: HOSPADM

## 2021-06-17 RX ORDER — LIDOCAINE 40 MG/G
CREAM TOPICAL ONCE
Status: DISCONTINUED | OUTPATIENT
Start: 2021-06-17 | End: 2021-06-18 | Stop reason: HOSPADM

## 2021-06-17 NOTE — PLAN OF CARE
Multilayer Compression Wrap   Below the Knee    NAME:  Stephanie Phillips  YOB: 1945  MEDICAL RECORD NUMBER:  [id number]  DATE:  6/17/2021    Multilayer compression wrap: Removed old Multilayer wrap if indicated and wash leg with mild soap/water. Applied moisturizing agent to dry skin as needed. Applied primary and secondary dressing as ordered. Applied multilayered dressing below the knee to right lower leg. Applied multilayered dressing below the knee to left lower leg. Instructed patient/caregiver not to remove dressing and to keep it clean and dry. Instructed patient/caregiver on complications to report to provider, such as pain, numbness in toes, heavy drainage, and slippage of dressing. Instructed patient on purpose of compression dressing and on activity and exercise recommendations.      Applied  [employer] wrap from toes to knee overlapping each time    Electronically signed by Patrina Najjar, RN on 6/17/2021 at 9:17 AM

## 2021-06-17 NOTE — PROGRESS NOTES
[facility] [address]  Progress Note and Procedure Note      Pal Blakely  AGE: 66 y.o. GENDER: female  : 1945  TODAY'S DATE:  2021    Subjective:     Chief Complaint   Patient presents with    Wound Check     Follow up wounds heels, BLE         HISTORY of PRESENT ILLNESS HPI     Pal Blakely is a 66 y.o. female who presents today for wound evaluation. History of Wound:   Long history of leg wounds   Established with wound clinic 2020  Accompanied by  who assists pt with bilateral lower leg care. Lymphedema pumps at home but does not use consistently  Left heel wound remains. Starting to use heel boots in bed and when sitting with legs up      Interval history 6/3/21: States Adaptic was applied to left anterior LE by Elly Britton and when removed \"lifted scabs with it\". New open wounds at left anterior shin. Wound Pain:  intermittent  Severity:  2 / 10   Wound Type:  venous  Modifying Factors:  edema, venous stasis, lymphedema, obesity and non-adherence  Associated Signs/Symptoms:  edema and drainage        PAST MEDICAL HISTORY        Diagnosis Date    Acute idiopathic gout of left hand 2021    Acute idiopathic gout of right hand 2021    Arthritis     Blood circulation, collateral     Blood transfusion     CAD (coronary artery disease)     CHF (congestive heart failure) (HCC)     Chronic renal failure, stage 3 (moderate) (HCC)     Diabetes mellitus (HCC)     GERD (gastroesophageal reflux disease)     Hyperlipidemia     Hypertension     Leg swelling 2018    Lymphedema 2018    Pressure ulcer of both heels 2021    Deep tissue injuries to right and left heels with left>right. Date of origin unknown.     Pressure ulcer of left heel, stage 3 ([address] 2021    Ulcer of right leg, limited to breakdown of skin ([address] 2018       PAST SURGICAL HISTORY    Past Surgical History:   Procedure Laterality Date [address]      CARDIAC SURGERY      2009    CATARACT REMOVAL WITH IMPLANT Left 11/23/2016    CATARACT REMOVAL WITH IMPLANT Right 11/02/2016    CATARACT REMOVAL WITH IMPLANT Bilateral 10/16, 11/16    CHOLECYSTECTOMY      COLONOSCOPY      ENDOSCOPY, COLON, DIAGNOSTIC      EYE SURGERY      left tear duct surgery    JOINT REPLACEMENT      BTKR    KNEE ARTHROPLASTY  09/15/2010    left total knee    TOTAL KNEE ARTHROPLASTY      VASCULAR SURGERY         FAMILY HISTORY    Family History   Problem Relation Age of Onset    Diabetes Mother     Heart Disease Mother     Heart Disease Father     Stroke Father        SOCIAL HISTORY    Social History     Tobacco Use    Smoking status: Never Smoker    Smokeless tobacco: Never Used   Vaping Use    Vaping Use: Never used   Substance Use Topics    Alcohol use: No    Drug use: No       ALLERGIES    Allergies   Allergen Reactions    Adhesive Tape Shortness Of Breath     Other reaction(s): Ulcers    Chlorhexidine     Lisinopril Other (See Comments)     Med causes lethargy- takes in lower doses       MEDICATIONS    Current Outpatient Medications on File Prior to Encounter   Medication Sig Dispense Refill    gentamicin (GARAMYCIN) 0.1 % cream Apply topically Monday, Wednesday, & Friday. 30 g 0    betamethasone dipropionate (DIPROLENE) 0.05 % cream Apply topically Monday, Wednesday, & Friday.  1 Tube 1    colchicine (COLCRYS) 0.6 MG tablet 0.6 mg: TAKE ONE TABLET BY MOUTH DAILY      Cephalexin 500 MG TABS Per instructions: TAKE ONE CAPSULE BY MOUTH EVERY 12 HOUR      rivaroxaban (XARELTO) 15 MG TABS tablet TAKE ONE TABLET BY MOUTH DAILY 90 tablet 1    furosemide (LASIX) 20 MG tablet TAKE ONE TABLET BY MOUTH TWICE DAILY  60 tablet 3    Coenzyme Q10 (CO Q-10) 400 MG CAPS Take 400 mg by mouth daily 30 capsule 5    Liraglutide (VICTOZA) 18 MG/3ML SOPN SC injection Inject 1.2 mg into the skin daily      Handicap Placard MISC by Does not apply route Sob when walking less than 200 feet  Length of time--greater than 5 year 1 each 0    ferrous sulfate 325 (65 Fe) MG tablet Take 1 tablet by mouth 3 times daily (with meals) (Patient taking differently: Take 325 mg by mouth 2 times daily (with meals) ) 90 tablet 3    atorvastatin (LIPITOR) 20 MG tablet Take 20 mg by mouth daily      vitamin D (CHOLECALCIFEROL) 400 UNITS TABS tablet Take 400 Units by mouth daily      metoprolol (LOPRESSOR) 25 MG tablet Take 0.5 tablets by mouth 2 times daily 60 tablet 3    therapeutic multivitamin-minerals (THERAGRAN-M) tablet Take 1 tablet by mouth daily.  hydrOXYzine (ATARAX) 25 MG tablet Take 25 mg by mouth 3 times daily as needed.  ranitidine (ZANTAC) 300 MG tablet Take 150 mg by mouth 2 times daily        No current facility-administered medications on file prior to encounter. REVIEW OF SYSTEMS    Pertinent items are noted in HPI.       Objective:      BP (!) 171/74   Pulse 65   Resp 16     PHYSICAL EXAM    General Appearance: alert and oriented to person, place and time, well-developed and well-nourished, in no acute distress  Skin: warm and dry  Head: normocephalic and atraumatic  Eyes: extraocular eye movements intact and conjunctivae normal  Extremities: no cyanosis and no clubbing  Musculoskeletal: normal range of motion, no joint swelling, deformity or tenderness      Assessment:     Patient Active Problem List   Diagnosis    Osteoarthritis of left knee    Acute blood loss anemia    [facility]    Ischemic cardiomyopathy    Atrial fibrillation    Peripheral vascular disease (HCC)    CHF exacerbation (HCC)    Acute on chronic combined systolic and diastolic heart failure (HCC)    Chronic renal failure, stage 3 (moderate) (HCC)    Chronic atrial fibrillation (HCC)    Pleural effusion    Atherosclerosis of native coronary artery without angina pectoris    Essential hypertension    Chronic combined systolic and diastolic congestive heart failure (HCC)    Hx of CABG    Non-rheumatic tricuspid valve insufficiency    Mixed hyperlipidemia    Carotid disease, bilateral (HCC)    Atherosclerosis of native coronary artery of native heart without angina pectoris    Chronic kidney disease    Hyperlipidemia    INOCENCIO (obstructive sleep apnea)    Lymphedema    Leg swelling    Venous stasis dermatitis of both lower extremities    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 (HCC)    Venous insufficiency    Ulcer of right leg, limited to breakdown of skin (HCC)    Ulcer of lower extremity, left, limited to breakdown of skin (HCC)    Iron deficiency anemia    CHANO (acute kidney injury) ([address]    Knee pain    Pressure ulcer of both heels    Pressure ulcer of left heel, stage 3 (HCC)    Acute idiopathic gout of right hand    Acute idiopathic gout of left hand    Venous ulcer of left leg ([address]       Procedure Note    Performed by: Madison Polo DO    Consent obtained: Yes    Time out taken:  Yes    Pain Control: Anesthetic  Anesthetic: 4% Lidocaine Cream     Debridement:Excisional Debridement    Using curette the wound was sharply debrided    down through and including the removal of epidermis, dermis and subcutaneous tissue.         Devitalized Tissue Debrided:  biofilm and slough    Pre Debridement Measurements:  Are located in the Wound Documentation Flow Sheet    Wound #:3,5     Wound Care Documentation:  Wound 01/28/21 Heel Left #5 (Active)   Wound Image   05/20/21 0922   Wound Etiology Pressure Stage  3 06/17/21 0823   Wound Cleansed Cleansed with saline 06/17/21 0857   Wound Length (cm) 0.2 cm 06/17/21 0823   Wound Width (cm) 0.5 cm 06/17/21 0823   Wound Depth (cm) 0.1 cm 06/17/21 0823   Wound Surface Area (cm^2) 0.1 cm^2 06/17/21 0823   Change in Wound Size % (l*w) 99.55 06/17/21 0823   Wound Volume (cm^3) 0.01 cm^3 06/17/21 0823   Wound Healing % 100 06/17/21 0823   Post-Procedure Length (cm) 0.2 cm 06/17/21 0857   Post-Procedure Width (cm) 0.5 cm 06/17/21 0857   Post-Procedure Depth (cm) 0.1 cm 06/17/21 0857   Post-Procedure Surface Area (cm^2) 0.1 cm^2 06/17/21 0857   Post-Procedure Volume (cm^3) 0.01 cm^3 06/17/21 0857   Wound Assessment Bleeding 06/17/21 0857   Drainage Amount Moderate 06/17/21 0857   Drainage Description Serosanguinous 06/17/21 0823   Odor None 06/17/21 0823   Ximena-wound Assessment Maceration 06/17/21 0823   Margins Attached edges; Defined edges 06/17/21 0823   Wound Thickness Description not for Pressure Injury Full thickness 05/13/21 0921   Number of days: 139       Wound 06/10/21 Leg Right;Lateral #3 (Active)   Wound Etiology Traumatic 06/17/21 0823   Wound Cleansed Cleansed with saline 06/17/21 0857   Wound Length (cm) 0 cm 06/17/21 0823   Wound Width (cm) 0 cm 06/17/21 0823   Wound Depth (cm) 0 cm 06/17/21 0823   Wound Surface Area (cm^2) 0 cm^2 06/17/21 0823   Change in Wound Size % (l*w) 100 06/17/21 0823   Wound Volume (cm^3) 0 cm^3 06/17/21 0823   Wound Healing % 100 06/17/21 0823   Post-Procedure Length (cm) 2 cm 06/17/21 0857   Post-Procedure Width (cm) 1.5 cm 06/17/21 0857   Post-Procedure Depth (cm) 0.1 cm 06/17/21 0857   Post-Procedure Surface Area (cm^2) 3 cm^2 06/17/21 0857   Post-Procedure Volume (cm^3) 0.3 cm^3 06/17/21 0857   Wound Assessment Bleeding 06/17/21 0857   Drainage Amount Moderate 06/17/21 0857   Drainage Description Serous 06/10/21 0927   Odor None 06/10/21 0927   Ximena-wound Assessment Excoriated 06/10/21 0927   Margins Attached edges; Defined edges 06/10/21 0927   Wound Thickness Description not for Pressure Injury Full thickness 06/10/21 0927   Number of days: 7       Total Surface Area Debrided:  0.1 sq cm     Percentage of wound debrided 100%    Bleeding:  Minimal    Hemostasis Achieved:  by pressure    Procedural Pain:  3  / 10     Post Procedural Pain:  1 / 10     Response to treatment:  Well tolerated by patient.      Plan:     The nature of the patient's condition was explained in depth. The patient was informed that their compliance to the treatment plan is paramount to successful healing and prevention of further ulceration and/or infection     Discharge Treatment        Wound: Left heel, Left lower leg, Right lower leg     With each dressing change, rinse wounds with 0.9% Saline. (May use wound wash or soft contact solution. Both can be purchased at a local drug store). If unable to obtain saline, may use a gentle soap and water.     Dressing care: Left lower leg, Right lower leg, Left heel- Betadine ointment to left heel & Mix Gentamicin cream & Betamethasone cream to Right lower leg, mepilex transfer, 4 x 4's, Unna boot- these will be changed at your next visit unless they slide down or cause pain. If they slide, gets wet, or cause pain please call the wound care center, you may need to come in to be re-wrapped. If you are unable to get to your follow up appointment you will need to remove your wraps at home & place some kind of compression . Keep pressure off of your heels- wear your offloading boots when sitting or in bed.      Apply Thigh high Lymphedema pumps for 1 hour twice daily - 1st application 1 hour after Breakfast , 2nd application 1 hour after dinner   Prevalon Boots or Heel Protecting Boot to Heels when sitting or in bed.  Can call Viky Drummond D.O., [facility] [place]  Interventional Cardiology     o: [phone]  [facility] [address] [facility], [address] [place] [address]

## 2021-06-24 ENCOUNTER — HOSPITAL ENCOUNTER (OUTPATIENT)
Dept: WOUND CARE | Age: 76
Discharge: HOME | End: 2021-06-24
Payer: MEDICARE

## 2021-06-24 VITALS — DIASTOLIC BLOOD PRESSURE: 70 MMHG | HEART RATE: 71 BPM | SYSTOLIC BLOOD PRESSURE: 163 MMHG | RESPIRATION RATE: 16 BRPM

## 2021-06-24 DIAGNOSIS — L97.911 ULCER OF RIGHT LEG, LIMITED TO BREAKDOWN OF SKIN (HCC): ICD-10-CM

## 2021-06-24 DIAGNOSIS — L89.623 PRESSURE ULCER OF LEFT HEEL, STAGE 3 (HCC): Primary | ICD-10-CM

## 2021-06-24 DIAGNOSIS — I89.0 LYMPHEDEMA: ICD-10-CM

## 2021-06-24 DIAGNOSIS — L97.921 ULCER OF LOWER EXTREMITY, LEFT, LIMITED TO BREAKDOWN OF SKIN (HCC): ICD-10-CM

## 2021-06-24 DIAGNOSIS — L89.619 PRESSURE ULCER OF BOTH HEELS: ICD-10-CM

## 2021-06-24 DIAGNOSIS — M79.89 LEG SWELLING: ICD-10-CM

## 2021-06-24 DIAGNOSIS — L89.629 PRESSURE ULCER OF BOTH HEELS: ICD-10-CM

## 2021-06-24 PROCEDURE — 99212 OFFICE O/P EST SF 10 MIN: CPT

## 2021-06-24 PROCEDURE — 99212 OFFICE O/P EST SF 10 MIN: CPT | Performed by: INTERNAL MEDICINE

## 2021-06-24 RX ORDER — BETAMETHASONE DIPROPIONATE 0.05 %
OINTMENT (GRAM) TOPICAL ONCE
Status: CANCELLED | OUTPATIENT
Start: 2021-06-24 | End: 2021-06-24

## 2021-06-24 RX ORDER — LIDOCAINE 40 MG/G
CREAM TOPICAL ONCE
Status: DISCONTINUED | OUTPATIENT
Start: 2021-06-24 | End: 2021-06-25 | Stop reason: HOSPADM

## 2021-06-24 RX ORDER — BACITRACIN ZINC AND POLYMYXIN B SULFATE 500; 1000 [USP'U]/G; [USP'U]/G
OINTMENT TOPICAL ONCE
Status: CANCELLED | OUTPATIENT
Start: 2021-06-24 | End: 2021-06-24

## 2021-06-24 RX ORDER — LIDOCAINE 40 MG/G
CREAM TOPICAL ONCE
Status: CANCELLED | OUTPATIENT
Start: 2021-06-24 | End: 2021-06-24

## 2021-06-24 RX ORDER — GENTAMICIN SULFATE 1 MG/G
OINTMENT TOPICAL ONCE
Status: CANCELLED | OUTPATIENT
Start: 2021-06-24 | End: 2021-06-24

## 2021-06-24 RX ORDER — LIDOCAINE 50 MG/G
OINTMENT TOPICAL ONCE
Status: CANCELLED | OUTPATIENT
Start: 2021-06-24 | End: 2021-06-24

## 2021-06-24 RX ORDER — LIDOCAINE HYDROCHLORIDE 40 MG/ML
SOLUTION TOPICAL ONCE
Status: CANCELLED | OUTPATIENT
Start: 2021-06-24 | End: 2021-06-24

## 2021-06-24 RX ORDER — GINSENG 100 MG
CAPSULE ORAL ONCE
Status: CANCELLED | OUTPATIENT
Start: 2021-06-24 | End: 2021-06-24

## 2021-06-24 RX ORDER — BACITRACIN, NEOMYCIN, POLYMYXIN B 400; 3.5; 5 [USP'U]/G; MG/G; [USP'U]/G
OINTMENT TOPICAL ONCE
Status: CANCELLED | OUTPATIENT
Start: 2021-06-24 | End: 2021-06-24

## 2021-06-24 RX ORDER — CLOBETASOL PROPIONATE 0.5 MG/G
OINTMENT TOPICAL ONCE
Status: CANCELLED | OUTPATIENT
Start: 2021-06-24 | End: 2021-06-24

## 2021-06-24 NOTE — PROGRESS NOTES
[facility]      Progress Note      Philomena Pat  AGE: 66 y.o. GENDER: female  : 1945  TODAY'S DATE:  2021    Subjective:     Chief Complaint   Patient presents with    Wound Check     Right leg, left heel wounds         HISTORY of PRESENT ILLNESS HPI     Philomena Pat is a 66 y.o. female who presents today for wound evaluation. History of Wound:   Long history of leg wounds   Established with wound clinic 2020  Accompanied by  who assists pt with bilateral lower leg care. Lymphedema pumps at home but does not use consistently  Left heel wound remains. Starting to use heel boots in bed and when sitting with legs up      Interval history 6/3/21: States Adaptic was applied to left anterior LE by Elly Britton and when removed \"lifted scabs with it\". New open wounds at left anterior shin. Wound Pain:  intermittent  Severity:  2 / 10   Wound Type:  venous  Modifying Factors:  edema, venous stasis, lymphedema, obesity and non-adherence  Associated Signs/Symptoms:  edema and drainage        PAST MEDICAL HISTORY        Diagnosis Date    Acute idiopathic gout of left hand 2021    Acute idiopathic gout of right hand 2021    Arthritis     Blood circulation, collateral     Blood transfusion     CAD (coronary artery disease)     CHF (congestive heart failure) (HCC)     Chronic renal failure, stage 3 (moderate) (HCC)     Diabetes mellitus (HCC)     GERD (gastroesophageal reflux disease)     Hyperlipidemia     Hypertension     Leg swelling 2018    Lymphedema 2018    Pressure ulcer of both heels 2021    Deep tissue injuries to right and left heels with left>right. Date of origin unknown.     Pressure ulcer of left heel, stage 3 ([address] 2021    Ulcer of right leg, limited to breakdown of skin ([address] 2018       PAST SURGICAL HISTORY    Past Surgical History:   Procedure Laterality Date    ABDOMEN less than 200 feet  Length of time--greater than 5 year 1 each 0    ferrous sulfate 325 (65 Fe) MG tablet Take 1 tablet by mouth 3 times daily (with meals) (Patient taking differently: Take 325 mg by mouth 2 times daily (with meals) ) 90 tablet 3    atorvastatin (LIPITOR) 20 MG tablet Take 20 mg by mouth daily      vitamin D (CHOLECALCIFEROL) 400 UNITS TABS tablet Take 400 Units by mouth daily      metoprolol (LOPRESSOR) 25 MG tablet Take 0.5 tablets by mouth 2 times daily 60 tablet 3    therapeutic multivitamin-minerals (THERAGRAN-M) tablet Take 1 tablet by mouth daily.  hydrOXYzine (ATARAX) 25 MG tablet Take 25 mg by mouth 3 times daily as needed.  ranitidine (ZANTAC) 300 MG tablet Take 150 mg by mouth 2 times daily        No current facility-administered medications on file prior to encounter. REVIEW OF SYSTEMS    Pertinent items are noted in HPI.       Objective:      BP (!) 163/70   Pulse 71   Resp 16     PHYSICAL EXAM    General Appearance: alert and oriented to person, place and time, well-developed and well-nourished, in no acute distress  Skin: warm and dry  Head: normocephalic and atraumatic  Eyes: extraocular eye movements intact and conjunctivae normal  Extremities: no cyanosis and no clubbing  Musculoskeletal: normal range of motion, no joint swelling, deformity or tenderness      Assessment:     Patient Active Problem List   Diagnosis    Osteoarthritis of left knee    Acute blood loss anemia    [facility] mellitus [facility]    Ischemic cardiomyopathy    Atrial fibrillation    Peripheral vascular disease (HCC)    CHF exacerbation (HCC)    Acute on chronic combined systolic and diastolic heart failure (HCC)    Chronic renal failure, stage 3 (moderate) (HCC)    Chronic atrial fibrillation (HCC)    Pleural effusion    Atherosclerosis of native coronary artery without angina pectoris    Essential hypertension    Chronic combined systolic and diastolic [facility]    Hx of CABG    Non-rheumatic tricuspid valve insufficiency    Mixed hyperlipidemia    Carotid disease, bilateral ([facility]    Atherosclerosis of native coronary artery of native heart without angina pectoris    Chronic kidney disease    Hyperlipidemia    INOCENCIO (obstructive sleep apnea)    Lymphedema    Leg swelling    Venous stasis dermatitis of both lower extremities    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 (HCC)    Venous insufficiency    Ulcer of right leg, limited to breakdown of skin (HCC)    Ulcer of lower extremity, left, limited to breakdown of skin (HCC)    Iron deficiency anemia    CHANO (acute kidney injury) ([facility]    Knee pain    Pressure ulcer of both heels    Pressure ulcer of left heel, stage 3 (HCC)    Acute idiopathic gout of right hand    Acute idiopathic gout of left hand    Venous ulcer of left leg (HCC)        Wound #:3,5    Wound Care Documentation:  Wound 01/28/21 Heel Left #5 (Active)   Wound Image   06/24/21 0820   Wound Etiology Pressure Stage  3 06/24/21 0820   Wound Cleansed Cleansed with saline 06/24/21 0820   Wound Length (cm) 0 cm 06/24/21 0820   Wound Width (cm) 0 cm 06/24/21 0820   Wound Depth (cm) 0 cm 06/24/21 0820   Wound Surface Area (cm^2) 0 cm^2 06/24/21 0820   Change in Wound Size % (l*w) 100 06/24/21 0820   Wound Volume (cm^3) 0 cm^3 06/24/21 0820   Wound Healing % 100 06/24/21 0820   Post-Procedure Length (cm) 0.2 cm 06/17/21 0857   Post-Procedure Width (cm) 0.5 cm 06/17/21 0857   Post-Procedure Depth (cm) 0.1 cm 06/17/21 0857   Post-Procedure Surface Area (cm^2) 0.1 cm^2 06/17/21 0857   Post-Procedure Volume (cm^3) 0.01 cm^3 06/17/21 0857   Wound Assessment Epithelialization 06/24/21 0820   Drainage Amount Moderate 06/17/21 0857   Drainage Description Serosanguinous 06/17/21 0823   Odor None 06/17/21 0823   Ximena-wound Assessment Maceration 06/17/21 0823   Margins Attached edges; Defined edges 06/17/21 0823   Wound Thickness Description not for Pressure Injury Full thickness 05/13/21 0921   Number of days: 146       Wound 06/10/21 Leg Right;Lateral #3 (Active)   Wound Etiology Traumatic 06/24/21 0820   Wound Cleansed Wound cleanser 06/24/21 0820   Wound Length (cm) 0 cm 06/24/21 0820   Wound Width (cm) 0 cm 06/24/21 0820   Wound Depth (cm) 0 cm 06/24/21 0820   Wound Surface Area (cm^2) 0 cm^2 06/24/21 0820   Change in Wound Size % (l*w) 100 06/24/21 0820   Wound Volume (cm^3) 0 cm^3 06/24/21 0820   Wound Healing % 100 06/24/21 0820   Post-Procedure Length (cm) 2 cm 06/17/21 0857   Post-Procedure Width (cm) 1.5 cm 06/17/21 0857   Post-Procedure Depth (cm) 0.1 cm 06/17/21 0857   Post-Procedure Surface Area (cm^2) 3 cm^2 06/17/21 0857   Post-Procedure Volume (cm^3) 0.3 cm^3 06/17/21 0857   Wound Assessment Epithelialization 06/24/21 0820   Drainage Amount Moderate 06/17/21 0857   Drainage Description Serous 06/10/21 0927   Odor None 06/10/21 0927   Ximena-wound Assessment Excoriated 06/10/21 0927   Margins Attached edges; Defined edges 06/10/21 0927   Wound Thickness Description not for Pressure Injury Full thickness 06/10/21 3943   Number of days: 13         Plan:     The nature of the patient's condition was explained in depth. The patient was informed that their compliance to the treatment plan is paramount to successful healing and prevention of further ulceration and/or infection     Discharge Treatment        1. Return to your primary care physician for all your health issues. 2. Resume your ordinary activities as prescribed. 3. Take your medications as prescribed by your doctor. 4. Check your skin daily for cracks, bruises, sores, or dryness. 5. Clean and dry your skin thoroughly. 6. Avoid alcohol. Quit smoking if applicable. 7. Maintain a nutritious diet; take a multivitamin daily. 8. Avoid pressure on the wound site. Keep your legs elevated above the level of your heart whenever possible.   9. Continue to use wraps/stockings/compresion if prescribed/  10. Replace compression hose/stockings every 6 months to insure proper fit.   11. Wear well fitting shoes.     Paradise Drummond D.O., [facility]  Interventional Cardiology     o: [phone]  [address] [place] [address]., [address] [facility], [address] [place] [address]

## 2021-06-24 NOTE — PLAN OF CARE
Discharge instructions given. Patient verbalized understanding.   Return to [facility] as needed  Wounds healed

## 2021-08-03 ENCOUNTER — HOSPITAL ENCOUNTER (OUTPATIENT)
Dept: NON INVASIVE DIAGNOSTICS | Age: 76
Discharge: HOME | End: 2021-08-03
Payer: MEDICARE

## 2021-08-03 DIAGNOSIS — I10 ESSENTIAL HYPERTENSION: ICD-10-CM

## 2021-08-03 DIAGNOSIS — I50.43 ACUTE ON CHRONIC COMBINED SYSTOLIC AND DIASTOLIC HEART FAILURE (HCC): ICD-10-CM

## 2021-08-03 DIAGNOSIS — I25.10 ATHEROSCLEROSIS OF NATIVE CORONARY ARTERY OF NATIVE HEART WITHOUT ANGINA PECTORIS: ICD-10-CM

## 2021-08-03 DIAGNOSIS — I48.0 PAROXYSMAL ATRIAL FIBRILLATION (HCC): ICD-10-CM

## 2021-08-03 DIAGNOSIS — I25.5 ISCHEMIC CARDIOMYOPATHY: ICD-10-CM

## 2021-08-03 LAB
LV EF: 53 %
LVEF MODALITY: NORMAL

## 2021-08-03 PROCEDURE — 93306 TTE W/DOPPLER COMPLETE: CPT

## 2021-08-03 NOTE — RESULT ENCOUNTER NOTE
Discussed echo results with Angel Castorena. ALYSAN reviewed echo images, will repeat in 1 year to review ascending aortic aneurysm. Angel Castorena will let us know if she would like to pursue CTA for ascending aortic aneurysm sooner. She has FU in November with [facility] and knows to call sooner with any concerns/questions.

## 2021-08-18 LAB
ANION GAP SERPL CALCULATED.3IONS-SCNC: 8 MMOL/L (ref 6–18)
BUN BLDV-MCNC: 74 MG/DL (ref 8–26)
CALCIUM SERPL-MCNC: 10 MG/DL (ref 8.5–10.4)
CHLORIDE BLD-SCNC: 108 MEQ/L (ref 98–111)
CO2: 24 MMOL/L (ref 21–31)
CREAT SERPL-MCNC: 1.73 MG/DL (ref 0.6–1.2)
GFR AFRICAN AMERICAN: 32 ML/MIN/1.73 M2
GFR NON-AFRICAN AMERICAN: 27 ML/MIN/1.73 M2
GLUCOSE BLD-MCNC: 113 MG/DL (ref 70–99)
POTASSIUM SERPL-SCNC: 4.4 MEQ/L (ref 3.6–5.1)
SODIUM BLD-SCNC: 140 MEQ/L (ref 135–145)

## 2021-11-08 NOTE — PROGRESS NOTES
2021    PATIENT: Mery Perez  : 1945    Primary Care Provider:   Elora Siemens, MD  L:[phone]  Y:[phone]    Reason for evaluation:   Chief Complaint   Patient presents with    Other     no cardiac symptoms at this time    6 Month Follow-Up     History of present illness:   Ms. Mery Perez is a 64 y.o. female patient here for routine CV follow up regarding CAD, CHF, and atrial fibrillation. Laura Narayan was a longstanding patient of Dr. Valentina Hdz and in meeting her last visit she states historically been asymptomatic with both atrial fibrillation and coronary artery disease. She continues to deny chest pain palpitations lightheadedness dizziness shortness of breath. Watchman was in discussion following recurrent nosebleeds. No further bleeding concerns on Xarelto. Her  is with her today and knows her medications well. She has had several changes as below since following with nephrology. No adverse effects. Has noticed a mild trend up in her blood pressure. Stable venous stasis with close follow-up with wound clinic due to ulcer. Medical History:      Diagnosis Date    Acute idiopathic gout of left hand 2021    Acute idiopathic gout of right hand 2021    Arthritis     Blood circulation, collateral     Blood transfusion     CAD (coronary artery disease)     CHF (congestive heart failure) (HCC)     Chronic renal failure, stage 3 (moderate) (HCC)     Diabetes mellitus (HCC)     GERD (gastroesophageal reflux disease)     Hyperlipidemia     Hypertension     Leg swelling 2018    Lymphedema 2018    Pressure ulcer of both heels 2021    Deep tissue injuries to right and left heels with left>right. Date of origin unknown.     Pressure ulcer of left heel, stage 3 ([address] 2021    Ulcer of right leg, limited to breakdown of skin ([address] 2018       Surgical History:      Procedure Laterality Date    ABDOMEN SURGERY      CARDIAC SURGERY      2009    CATARACT REMOVAL WITH IMPLANT Left 11/23/2016    CATARACT REMOVAL WITH IMPLANT Right 11/02/2016    CATARACT REMOVAL WITH IMPLANT Bilateral 10/16, 11/16    CHOLECYSTECTOMY      COLONOSCOPY      ENDOSCOPY, COLON, DIAGNOSTIC      EYE SURGERY      left tear duct surgery    JOINT REPLACEMENT      BTKR    KNEE ARTHROPLASTY  09/15/2010    left total knee    TOTAL KNEE ARTHROPLASTY      VASCULAR SURGERY         Social History:  Social History     Socioeconomic History    Marital status:      Spouse name: Not on file    Number of children: Not on file    Years of education: Not on file    Highest education level: Not on file   Occupational History    Not on file   Tobacco Use    Smoking status: Never Smoker    Smokeless tobacco: Never Used   Vaping Use    Vaping Use: Never used   Substance and Sexual Activity    Alcohol use: No    Drug use: No    Sexual activity: Yes     Partners: Male   Other Topics Concern    Not on file   Social History Narrative    Not on file     Social Determinants of Health     Financial Resource Strain:     Difficulty of Paying Living Expenses: Not on file   Food Insecurity:     Worried About Running Out of Food in the Last Year: Not on file    Fabricio of Food in the Last Year: Not on file   Transportation Needs:     Lack of Transportation (Medical): Not on file    Lack of Transportation (Non-Medical):  Not on file   Physical Activity:     Days of Exercise per Week: Not on file    Minutes of Exercise per Session: Not on file   Stress:     Feeling of Stress : Not on file   Social Connections:     Frequency of Communication with Friends and Family: Not on file    Frequency of Social Gatherings with Friends and Family: Not on file    Attends Latter day Services: Not on file    Active Member of Clubs or Organizations: Not on file    Attends Club or Organization Meetings: Not on file    Marital Status: Not on file   Intimate Partner Violence:     Fear of Current or Ex-Partner: Not on file    Emotionally Abused: Not on file    Physically Abused: Not on file    Sexually Abused: Not on file   Housing Stability:     Unable to Pay for Housing in the Last Year: Not on file    Number of [place] in the Last Year: Not on file    Unstable Housing in the Last Year: Not on file        Family History:  No evidence for sudden cardiac death or premature CAD. Problem Relation Age of Onset    Diabetes Mother     Heart Disease Mother     Heart Disease Father     Stroke Father        Medications:  [x] Medications and dosages reviewed. Prior to Admission medications    Medication Sig Start Date End Date Taking? Authorizing Provider   atorvastatin (LIPITOR) 40 MG tablet Take 1 tablet by mouth daily 11/9/21  Yes Garret Rutledge,    carvedilol (COREG) 6.25 MG tablet Take 1 tablet by mouth 2 times daily 11/9/21  Yes Garret Rutledge DO   rivaroxaban (XARELTO) 15 MG TABS tablet TAKE ONE TABLET BY MOUTH DAILY 10/5/21  Yes Garret Rutledge DO   furosemide (LASIX) 20 MG tablet TAKE ONE TABLET BY MOUTH TWICE DAILY  8/2/21  Yes Spenser Eden MD   Coenzyme Q10 (CO Q-10) 400 MG CAPS Take 400 mg by mouth daily 9/28/20  Yes Joshua Rojo MD   Liraglutide (VICTOZA) 18 MG/3ML SOPN SC injection Inject 1.8 mg into the skin daily    Yes Historical Provider, MD   Handicap Placard MISC by Does not apply route Sob when walking less than 200 feet  Length of time--greater than 5 year 4/17/19  Yes Joshua Rojo MD   ferrous sulfate 325 (65 Fe) MG tablet Take 1 tablet by mouth 3 times daily (with meals)  Patient taking differently: Take 325 mg by mouth 2 times daily (with meals)  4/3/19  Yes Spenser Eden MD   vitamin D (CHOLECALCIFEROL) 400 UNITS TABS tablet Take 400 Units by mouth daily   Yes Historical Provider, MD   therapeutic multivitamin-minerals (THERAGRAN-M) tablet Take 1 tablet by mouth daily.    Yes  RDW 10/05/2021 13.6     Platelets 80/99/3879 337     MPV 10/05/2021 7.4     SOURCE: 10/05/2021 Urine, Clean Catch     Urine Type 10/05/2021 Urine     Color, UA 10/05/2021 Yellow     Clarity, UA 10/05/2021 Clear     pH, Urine 10/05/2021 5.5     Specific Gravity, UA 10/05/2021 1.015     Protein, Urine 10/05/2021 2+ (100-200 mg/dL)*    Glucose, Ur 10/05/2021 NEGATIVE     Ketones, Urine 10/05/2021 NEGATIVE     Bilirubin, Urine 10/05/2021 NEGATIVE     RBC, UA 10/05/2021 1+ (0.06-0.1 mg/dL)*    Urobilinogen, Urine 10/05/2021 NORMAL     Nitrite, Urine 10/05/2021 NEGATIVE     Leukocyte Esterase, Urine 10/05/2021 NEGATIVE     Erythrocytes, Urine 10/05/2021 <6     LEUKOCYTES, UA 10/05/2021 0 to 5     Epithelial Cells, UA 10/05/2021 0 to 5     Mucus, UA 10/05/2021 PRESENT     Creatinine, Ur 10/05/2021 70.2     Protein, Urine, Random 10/05/2021 185.8*    PROTEIN/CREAT RATIO URIN* 10/05/2021 2.65*    Calcium 10/05/2021 9.4     Phosphorus 10/05/2021 4.1     Glucose 10/05/2021 111*    BUN 10/05/2021 69*    CREATININE 10/05/2021 2.09*    Albumin 10/05/2021 3.6     Sodium 10/05/2021 140     Potassium 10/05/2021 4.2     Chloride 10/05/2021 107     CO2 10/05/2021 25     GFR Non- 10/05/2021 22*    GFR  10/05/2021 25*    Anion Gap 10/05/2021 8     C3 Complement 10/05/2021 164     C4 Complement 10/05/2021 37     LUIS MIGUEL Ab, IgG PRABHJOT 10/05/2021 NEGATIVE     Calcium 10/27/2021 9.5     Phosphorus 10/27/2021 3.7     Glucose 10/27/2021 182*    BUN 10/27/2021 62*    CREATININE 10/27/2021 1.81*    Albumin 10/27/2021 3.6     Sodium 10/27/2021 140     Potassium 10/27/2021 4.0     Chloride 10/27/2021 108     CO2 10/27/2021 23     GFR Non- 10/27/2021 26*    GFR  10/27/2021 30*    Anion Gap 10/27/2021 9      Imaging:  I have reviewed the below testing personally:  [facility] 8/9/10  Normal LV wall motion  LV gram, EF 74%  Normal systolic function  Right dominance  LM- widely patent  LAD- Mid 99% lesion. D1- Retrograde filling from LAD  LCx- Prox totally occluded  RCA- Luminal irregularities  RPDA- Ostial 50% lesion  SVG to D1 and OM1 and OM2 widely patent  LIMA to LAD widely patent    SPECT 3/15/18 ([facility])  Interpetation Summary:  The LV EF is 56%  Normal global and regional wall motion in all territories. 1.Negative nuclear stress imaging for inducible ischemia. 2.Non-diagnostic ECG for ischemia with pharmocologic stress. 3.Normal left ventriular systolic function. 4.Nuclear stress image findings indicate low risk for future      ischemic event . Echo 8/18/15   Summary   Left ventricle size is normal.   Normal left ventricular wall thickness. Global hypokinesis. Estimated ejection fraction is 45%. Mitral annular calcification is present. Mild mitral regurgitation is present. The left atrium is dilated. Aortic valve appears sclerotic but opens adequately. The right ventricle is mildly enlarged. There is severe tricuspid regurgitation with RVSP estimated at 55 mmHg. This is suggestive of pulmonary hypertension. Right atrial size is mildly dilated . There is a pleural effusion. Echo 8/3/21   Summary   Left ventricular systolic function is normal with ejection fraction   estimated at 50-55 %. No regional wall motion abnormalities are noted. There is mild concentric left ventricular hypertrophy. Grade I-II diastolic dysfunction with elevated filling pressure. The ascending aorta is moderately dilated. Mild aortic regurgitation is present. .   Mild mitral and pulmonic regurgitation. Moderate-to-severe tricuspid regurgitation. Systolic pulmonary artery pressure (SPAP) estimated at 55 mmHg (right atrial   pressure 3 mmHg), consistent with moderate pulmonary hypertension. IVC is normal in size (< 2.1 cm) and collapses > 50% with respiration   consistent with normal RA pressure (3 mmHg).       Previous echo done 2015 - 45%   The aortic valve is severly thickened, opens well with normal gradients. Carotid US 11/8/19  -The right internal carotid artery appears to have a <50% diameter reducing  stenosis based on velocity criteria. -The left internal carotid artery appears to have a <50% diameter reducing  stenosis based on velocity criteria. EKG 11/9/21  SB @ 58 bpm  RBBB  LAFB     Impression/Recommendations    Ms. Jeremiah Hidalgo is a 64 y.o. female patient, previously followed by Dr. Chandni Goodwin, with:    CAD: CABG x3, 9/2009 (LIMA to LAD, SVG to D1 ,OM1, OM2) patent by 8/2010 [facility], no ischemia by 2018 SPECT MPI  Ischemic cardiomyopathy, recovered (LVEF 50-55% by 8/2021 TTE)  Chronic Diastolic CHF,  compensated  PAF, elevated QNT3XC6-EGDm on [address] [place] [address], with Dr. Aguilar Settler  Hypertension, suboptimal (152/64)  Hyperlipidemia, due for recheck (11/2020:   HDL 49 )  Diabetes mellitus, stable A1C 6.9 (10/2021)  CKD stage 3, follows with Dr. Chrystal Pat with LLE Ulcer       Sinus bradycardia, no change on ECG in office today. No symptoms reported. Diuretic dosing per Nephrology. Venous stasis ulcer managed at wound clinic. Discussed below regimen changes with Senthil Henry and her . Atorvastatin 20---> 40 mg for LDL goal <70, follow up lipids thereafter   Metoprolol tartrate 12.5 mg bid ---> Carvedilol 6.25 mg bid for BP goal <130/80   Furosemide 20 mg daily  Losartan 25 mg daily   Xarelto 15 mg      Orders Placed This Encounter   Procedures    LIPID PANEL     Standing Status:   Future     Standing Expiration Date:   11/9/2022     Order Specific Question:   Is Patient Fasting?/# of Hours     Answer:   y8    AST     Standing Status:   Future     Standing Expiration Date:   11/9/2022    ALT     Standing Status:   Future     Standing Expiration Date:   11/9/2022    EKG 12 lead     Order Specific Question:   Reason for Exam?     Answer:    Other     Comments:   np     Return in about 6 months (around 5/9/2022). Patient Instructions   Stop Metoprolol Tartrate  Replace with Carvedilol 6.25 mg twice daily  (This should help with your blood pressure)    Increase Lipitor (Atorvastatin) to 40 mg nightly  (You can double the 20 mg tablets until your next refill)  Check fasting bloodwork in 8-12 weeks on new dose    Call us if your blood pressures continue to remain elevated at home   Follow up in 6 months     Thank you for allowing me to participate in the care of your patient. Please do not hesitate to call. Zuleika Preciado DO, [facility]  Interventional Cardiology     o: [phone]  [address] [place] [address]., [address] [facility], [address] [place] [address]      NOTE:  This report was transcribed using voice recognition software. Every effort was made to ensure accuracy; however, inadvertent computerized transcription errors may be present. Scribe's Attestation: This note was scribed in the presence of Dr. Yaw Covarrubias DO by Elizabeth Weeks, KRISTEN.    I, Zuleika Preciado, have personally performed the services described in this documentation as scribed by Maxine Perez. KRISTEN Corbett in my presence, and it is both accurate and complete. An electronic signature was used to authenticate this note.

## 2021-11-09 ENCOUNTER — OFFICE VISIT (OUTPATIENT)
Dept: CARDIOLOGY CLINIC | Age: 76
End: 2021-11-09
Payer: MEDICARE

## 2021-11-09 VITALS
SYSTOLIC BLOOD PRESSURE: 152 MMHG | HEIGHT: 66 IN | OXYGEN SATURATION: 98 % | HEART RATE: 75 BPM | BODY MASS INDEX: 31.02 KG/M2 | DIASTOLIC BLOOD PRESSURE: 64 MMHG | WEIGHT: 193 LBS

## 2021-11-09 DIAGNOSIS — I50.9 ACUTE ON CHRONIC CONGESTIVE HEART FAILURE, UNSPECIFIED HEART FAILURE TYPE (HCC): ICD-10-CM

## 2021-11-09 DIAGNOSIS — E78.5 HYPERLIPIDEMIA, UNSPECIFIED HYPERLIPIDEMIA TYPE: ICD-10-CM

## 2021-11-09 DIAGNOSIS — I48.0 PAROXYSMAL ATRIAL FIBRILLATION (HCC): ICD-10-CM

## 2021-11-09 DIAGNOSIS — Z95.1 HX OF CABG: Chronic | ICD-10-CM

## 2021-11-09 DIAGNOSIS — I25.10 ATHEROSCLEROSIS OF NATIVE CORONARY ARTERY OF NATIVE HEART WITHOUT ANGINA PECTORIS: Primary | ICD-10-CM

## 2021-11-09 DIAGNOSIS — I25.5 ISCHEMIC CARDIOMYOPATHY: ICD-10-CM

## 2021-11-09 DIAGNOSIS — I50.42 CHRONIC COMBINED SYSTOLIC AND DIASTOLIC CONGESTIVE HEART FAILURE (HCC): ICD-10-CM

## 2021-11-09 DIAGNOSIS — E78.2 MIXED HYPERLIPIDEMIA: ICD-10-CM

## 2021-11-09 PROCEDURE — G8427 DOCREV CUR MEDS BY ELIG CLIN: HCPCS | Performed by: INTERNAL MEDICINE

## 2021-11-09 PROCEDURE — G8484 FLU IMMUNIZE NO ADMIN: HCPCS | Performed by: INTERNAL MEDICINE

## 2021-11-09 PROCEDURE — 93000 ELECTROCARDIOGRAM COMPLETE: CPT | Performed by: INTERNAL MEDICINE

## 2021-11-09 PROCEDURE — 1123F ACP DISCUSS/DSCN MKR DOCD: CPT | Performed by: INTERNAL MEDICINE

## 2021-11-09 PROCEDURE — 99214 OFFICE O/P EST MOD 30 MIN: CPT | Performed by: INTERNAL MEDICINE

## 2021-11-09 PROCEDURE — G8417 CALC BMI ABV UP PARAM F/U: HCPCS | Performed by: INTERNAL MEDICINE

## 2021-11-09 PROCEDURE — 1036F TOBACCO NON-USER: CPT | Performed by: INTERNAL MEDICINE

## 2021-11-09 PROCEDURE — 1090F PRES/ABSN URINE INCON ASSESS: CPT | Performed by: INTERNAL MEDICINE

## 2021-11-09 PROCEDURE — G8400 PT W/DXA NO RESULTS DOC: HCPCS | Performed by: INTERNAL MEDICINE

## 2021-11-09 PROCEDURE — 4040F PNEUMOC VAC/ADMIN/RCVD: CPT | Performed by: INTERNAL MEDICINE

## 2021-11-09 RX ORDER — CARVEDILOL 6.25 MG/1
6.25 TABLET ORAL 2 TIMES DAILY
Qty: 180 TABLET | Refills: 1 | Status: SHIPPED | OUTPATIENT
Start: 2021-11-09 | End: 2022-05-10

## 2021-11-09 RX ORDER — ATORVASTATIN CALCIUM 40 MG/1
40 TABLET, FILM COATED ORAL DAILY
Qty: 90 TABLET | Refills: 1 | Status: SHIPPED | OUTPATIENT
Start: 2021-11-09 | End: 2022-05-02

## 2021-11-09 NOTE — LETTER
[facility] CARDIOLOGY[address]  Dept: [phone]  Dept Fax: [phone]      2021    Patient:Shirin Archuleta  : 1945  DOS: 2021    To Whom it May Concern:    Bebeto Obando is recommended to receive the COVID-19 booster due to her cardiac condition. Please let my office know if you have any questions or concerns.       Dalia Coyle, [address] [facility]

## 2021-11-09 NOTE — LETTER
[address] [address]  Phone: [phone]  Fax: [phone]    Kesha Rosario DO    2021     Gemini Lewis MD  [address] [place] [id number]    Patient: Diego Tirado   MR Number: [id number]   YOB: 1945   Date of Visit: 2021       Dear Gemini Lewis:                                         2021    PATIENT: Diego Tirado  : 1945    Primary Care Provider:   Gemini Lewis MD  [phone]  I:[phone]    Reason for evaluation:   Chief Complaint   Patient presents with    Other     no cardiac symptoms at this time    6 Month Follow-Up     History of present illness:   Ms. Diego Tirado is a 64 y.o. female patient here for routine CV follow up regarding CAD, CHF, and atrial fibrillation. Mona Barnard was a longstanding patient of Dr. Chino Womack and in meeting her last visit she states historically been asymptomatic with both atrial fibrillation and coronary artery disease. She continues to deny chest pain palpitations lightheadedness dizziness shortness of breath. Watchman was in discussion following recurrent nosebleeds. No further bleeding concerns on Xarelto. Her  is with her today and knows her medications well. She has had several changes as below since following with nephrology. No adverse effects. Has noticed a mild trend up in her blood pressure. Stable venous stasis with close follow-up with wound clinic due to ulcer.     Medical History:      Diagnosis Date    Acute idiopathic gout of left hand 2021    Acute idiopathic gout of right hand 2021    Arthritis     Blood circulation, collateral     Blood transfusion     CAD (coronary artery disease)     CHF (congestive heart failure) (HCC)     Chronic renal failure, stage 3 (moderate) (HCC)     Diabetes mellitus (HCC)     GERD (gastroesophageal reflux disease)     Hyperlipidemia     Hypertension     Leg swelling 8/14/2018    Lymphedema 8/14/2018    Pressure ulcer of both heels 1/29/2021    Deep tissue injuries to right and left heels with left>right. Date of origin unknown.  Pressure ulcer of left heel, stage 3 ([address] 2/18/2021    Ulcer of right leg, limited to breakdown of skin ([address] 8/20/2018       Surgical History:      Procedure Laterality Date    ABDOMEN SURGERY      CARDIAC SURGERY      2009    CATARACT REMOVAL WITH IMPLANT Left 11/23/2016    CATARACT REMOVAL WITH IMPLANT Right 11/02/2016    CATARACT REMOVAL WITH IMPLANT Bilateral 10/16, 11/16    CHOLECYSTECTOMY      COLONOSCOPY      ENDOSCOPY, COLON, DIAGNOSTIC      EYE SURGERY      left tear duct surgery    JOINT REPLACEMENT      BTKR    KNEE ARTHROPLASTY  09/15/2010    left total knee    TOTAL KNEE ARTHROPLASTY      VASCULAR SURGERY         Social History:  Social History     Socioeconomic History    Marital status:      Spouse name: Not on file    Number of children: Not on file    Years of education: Not on file    Highest education level: Not on file   Occupational History    Not on file   Tobacco Use    Smoking status: Never Smoker    Smokeless tobacco: Never Used   Vaping Use    Vaping Use: Never used   Substance and Sexual Activity    Alcohol use: No    Drug use: No    Sexual activity: Yes     Partners: Male   Other Topics Concern    Not on file   Social History Narrative    Not on file     Social Determinants of Health     Financial Resource Strain:     Difficulty of Paying Living Expenses: Not on file   Food Insecurity:     Worried About Running Out of Food in the Last Year: Not on file    Fabricio of Food in the Last Year: Not on file   Transportation Needs:     Lack of Transportation (Medical): Not on file    Lack of Transportation (Non-Medical):  Not on file   Physical Activity:     Days of Exercise per Week: Not on file    Minutes of Exercise per Session: Not on file   Stress:  Feeling of Stress : Not on file   Social Connections:     Frequency of Communication with Friends and Family: Not on file    Frequency of Social Gatherings with Friends and Family: Not on file    Attends [employer] Services: Not on file    Active Member of Clubs or Organizations: Not on file    Attends Club or Organization Meetings: Not on file    Marital Status: Not on file   Intimate Partner Violence:     Fear of Current or Ex-Partner: Not on file    Emotionally Abused: Not on file    Physically Abused: Not on file    Sexually Abused: Not on file   Housing Stability:     Unable to Pay for Housing in the Last Year: Not on file    Number of [place] in the Last Year: Not on file    Unstable Housing in the Last Year: Not on file        Family History:  No evidence for sudden cardiac death or premature CAD. Problem Relation Age of Onset    Diabetes Mother     Heart Disease Mother     Heart Disease Father     Stroke Father        Medications:  [x] Medications and dosages reviewed. Prior to Admission medications    Medication Sig Start Date End Date Taking?  Authorizing Provider   atorvastatin (LIPITOR) 40 MG tablet Take 1 tablet by mouth daily 11/9/21  Yes Leonela Franks, DO   carvedilol (COREG) 6.25 MG tablet Take 1 tablet by mouth 2 times daily 11/9/21  Yes Leonela Franks, DO   rivaroxaban (XARELTO) 15 MG TABS tablet TAKE ONE TABLET BY MOUTH DAILY 10/5/21  Yes Leonela Franks, DO   furosemide (LASIX) 20 MG tablet TAKE ONE TABLET BY MOUTH TWICE DAILY  8/2/21  Yes Jorje Palacios MD   Coenzyme Q10 (CO Q-10) 400 MG CAPS Take 400 mg by mouth daily 9/28/20  Yes Anders Fermin MD   Liraglutide (VICTOZA) 18 MG/3ML SOPN SC injection Inject 1.8 mg into the skin daily    Yes Historical Provider, MD   Handicap Placard MISC by Does not apply route Sob when walking less than 200 feet  Length of time--greater than 5 year 4/17/19  Yes Anders Fermin MD   ferrous sulfate 325 (65 Fe) MG tablet Take 1 tablet by mouth 3 times daily (with meals)  Patient taking differently: Take 325 mg by mouth 2 times daily (with meals)  4/3/19  Yes Deja Aldridge MD   vitamin D (CHOLECALCIFEROL) 400 UNITS TABS tablet Take 400 Units by mouth daily   Yes Historical Provider, MD   therapeutic multivitamin-minerals (THERAGRAN-M) tablet Take 1 tablet by mouth daily. Yes Historical Provider, MD   hydrOXYzine (ATARAX) 25 MG tablet Take 25 mg by mouth 3 times daily as needed. 8/9/10  Yes Historical Provider, MD   ranitidine (ZANTAC) 300 MG tablet Take 150 mg by mouth 2 times daily  8/9/10  Yes Historical Provider, MD   losartan (COZAAR) 50 MG tablet Take 0.5 tablets by mouth daily 11/9/21   Deja Aldridge MD   betamethasone dipropionate (DIPROLENE) 0.05 % cream Apply topically Monday, Wednesday, & Friday.   Patient not taking: Reported on 11/9/2021 6/3/21   Nayeli Thomas DO       Allergies:  Adhesive tape, Chlorhexidine, and Lisinopril     Review of Systems:    [x]Full ROS obtained and negative except as mentioned in HPI    Physical Examination:    BP (!) 152/64 (Site: Right Upper Arm, Position: Sitting, Cuff Size: Medium Adult)   Pulse 75   Ht 5' 6\" (1.676 m)   Wt 193 lb (87.5 kg)   SpO2 98%   BMI 31.15 kg/m²   Wt Readings from Last 3 Encounters:   11/09/21 193 lb (87.5 kg)   10/12/21 192 lb (87.1 kg)   08/03/21 199 lb (90.3 kg)     Vitals:    11/09/21 0919   BP: (!) 152/64   Pulse:    SpO2:        · GENERAL: Well developed, well nourished, no acute distress  · NEUROLOGICAL: Alert and oriented x3  · PSYCH: Normal mood and affect   · SKIN: Warm and dry  · HEENT: Normocephalic, atraumatic, Sclera non-icteric, mucous membranes moist  · NECK: supple, JVP normal  · CARDIAC: Normal PMI, regular rate and rhythm, normal S1S2, no murmur, rub, or gallop  · RESPIRATORY: Normal respiratory effort, clear to auscultation bilaterally  · EXTREMITIES: no edema or clubbing, +2 pulses bilaterally   · MUSCULOSKELETAL: No joint swelling or tenderness, no chest wall tenderness  · GASTROINTESTINAL: normal bowel sounds, soft, non-tender    Labs:  Lab Review   Orders Only on 10/05/2021   Component Date Value    WBC 10/05/2021 8.3     RBC 10/05/2021 3.73*    Hemoglobin 10/05/2021 11.3*    Hematocrit 10/05/2021 34.0*    MCV 10/05/2021 91.1     MCH 10/05/2021 30.3     MCHC 10/05/2021 33.3     RDW 10/05/2021 13.6     Platelets 73/44/9831 337     MPV 10/05/2021 7.4     SOURCE: 10/05/2021 Urine, Clean Catch     Urine Type 10/05/2021 Urine     Color, UA 10/05/2021 Yellow     Clarity, UA 10/05/2021 Clear     pH, Urine 10/05/2021 5.5     Specific Gravity, UA 10/05/2021 1.015     Protein, Urine 10/05/2021 2+ (100-200 mg/dL)*    Glucose, Ur 10/05/2021 NEGATIVE     Ketones, Urine 10/05/2021 NEGATIVE     Bilirubin, Urine 10/05/2021 NEGATIVE     RBC, UA 10/05/2021 1+ (0.06-0.1 mg/dL)*    Urobilinogen, Urine 10/05/2021 NORMAL     Nitrite, Urine 10/05/2021 NEGATIVE     Leukocyte Esterase, Urine 10/05/2021 NEGATIVE     Erythrocytes, Urine 10/05/2021 <6     LEUKOCYTES, UA 10/05/2021 0 to 5     Epithelial Cells, UA 10/05/2021 0 to 5     Mucus, UA 10/05/2021 PRESENT     Creatinine, Ur 10/05/2021 70.2     Protein, Urine, Random 10/05/2021 185.8*    PROTEIN/CREAT RATIO URIN* 10/05/2021 2.65*    Calcium 10/05/2021 9.4     Phosphorus 10/05/2021 4.1     Glucose 10/05/2021 111*    BUN 10/05/2021 69*    CREATININE 10/05/2021 2.09*    Albumin 10/05/2021 3.6     Sodium 10/05/2021 140     Potassium 10/05/2021 4.2     Chloride 10/05/2021 107     CO2 10/05/2021 25     GFR Non- 10/05/2021 22*    GFR  10/05/2021 25*    Anion Gap 10/05/2021 8     C3 Complement 10/05/2021 164     C4 Complement 10/05/2021 37     LUIS MIGUEL Ab, IgG PRABHJOT 10/05/2021 NEGATIVE     Calcium 10/27/2021 9.5     Phosphorus 10/27/2021 3.7     Glucose 10/27/2021 182*    BUN 10/27/2021 62*    CREATININE 10/27/2021 1.81*    Albumin 10/27/2021 3.6     Sodium 10/27/2021 140     Potassium 10/27/2021 4.0     Chloride 10/27/2021 108     CO2 10/27/2021 23     GFR Non- 10/27/2021 26*    GFR  10/27/2021 30*    Anion Gap 10/27/2021 9      Imaging:  I have reviewed the below testing personally:  [facility] 8/9/10  Normal LV wall motion  LV gram, EF 57%  Normal systolic function  Right dominance  LM- widely patent  LAD- Mid 99% lesion. D1- Retrograde filling from LAD  LCx- Prox totally occluded  RCA- Luminal irregularities  RPDA- Ostial 50% lesion  SVG to D1 and OM1 and OM2 widely patent  LIMA to LAD widely patent    SPECT 3/15/18 ([facility])  Interpetation Summary:  The LV EF is 56%  Normal global and regional wall motion in all territories. 1.Negative nuclear stress imaging for inducible ischemia. 2.Non-diagnostic ECG for ischemia with pharmocologic stress. 3.Normal left ventriular systolic function. 4.Nuclear stress image findings indicate low risk for future      ischemic event . Echo 8/18/15   Summary   Left ventricle size is normal.   Normal left ventricular wall thickness. Global hypokinesis. Estimated ejection fraction is 45%. Mitral annular calcification is present. Mild mitral regurgitation is present. The left atrium is dilated. Aortic valve appears sclerotic but opens adequately. The right ventricle is mildly enlarged. There is severe tricuspid regurgitation with RVSP estimated at 55 mmHg. This is suggestive of pulmonary hypertension. Right atrial size is mildly dilated . There is a pleural effusion. Echo 8/3/21   Summary   Left ventricular systolic function is normal with ejection fraction   estimated at 50-55 %. No regional wall motion abnormalities are noted. There is mild concentric left ventricular hypertrophy. Grade I-II diastolic dysfunction with elevated filling pressure. The ascending aorta is moderately dilated.    Mild aortic regurgitation is present. .   Mild mitral and pulmonic regurgitation. Moderate-to-severe tricuspid regurgitation. Systolic pulmonary artery pressure (SPAP) estimated at 55 mmHg (right atrial   pressure 3 mmHg), consistent with moderate pulmonary hypertension. IVC is normal in size (< 2.1 cm) and collapses > 50% with respiration   consistent with normal RA pressure (3 mmHg). Previous echo done 2015 - 45%   The aortic valve is severly thickened, opens well with normal gradients. Carotid US 11/8/19  -The right internal carotid artery appears to have a <50% diameter reducing  stenosis based on velocity criteria. -The left internal carotid artery appears to have a <50% diameter reducing  stenosis based on velocity criteria. EKG 11/9/21  SB @ 58 bpm  RBBB  LAFB     Impression/Recommendations    Ms. Rod Hannah is a 64 y.o. female patient, previously followed by Dr. Bjorn Alan, with:    CAD: CABG x3, 9/2009 (LIMA to LAD, SVG to D1 ,OM1, OM2) patent by 8/2010 [facility], no ischemia by 2018 SPECT MPI  Ischemic cardiomyopathy, recovered (LVEF 50-55% by 8/2021 TTE)  Chronic Diastolic CHF,  compensated  PAF, elevated [id number] on [address] [place] [address], with Dr. Nate Ovalles  Hypertension, suboptimal (152/64)  Hyperlipidemia, due for recheck (11/2020:   HDL 49 )  Diabetes mellitus, stable A1C 6.9 (10/2021)  CKD stage 3, follows with Dr. Rigo Mejia with LLE Ulcer       Sinus bradycardia, no change on ECG in office today. No symptoms reported. Diuretic dosing per Nephrology. Venous stasis ulcer managed at wound clinic. Discussed below regimen changes with Asiya King and her .      Atorvastatin 20---> 40 mg for LDL goal <70, follow up lipids thereafter   Metoprolol tartrate 12.5 mg bid ---> Carvedilol 6.25 mg bid for BP goal <130/80   Furosemide 20 mg daily  Losartan 25 mg daily   Xarelto 15 mg      Orders Placed This Encounter   Procedures    LIPID PANEL     Standing Status:   Future     Standing Expiration Date: 11/9/2022     Order Specific Question:   Is Patient Fasting?/# of Hours     Answer:   y8    AST     Standing Status:   Future     Standing Expiration Date:   11/9/2022    ALT     Standing Status:   Future     Standing Expiration Date:   11/9/2022    EKG 12 lead     Order Specific Question:   Reason for Exam?     Answer: Other     Comments:   np     Return in about 6 months (around 5/9/2022). Patient Instructions   Stop Metoprolol Tartrate  Replace with Carvedilol 6.25 mg twice daily  (This should help with your blood pressure)    Increase Lipitor (Atorvastatin) to 40 mg nightly  (You can double the 20 mg tablets until your next refill)  Check fasting bloodwork in 8-12 weeks on new dose    Call us if your blood pressures continue to remain elevated at home   Follow up in 6 months     Thank you for allowing me to participate in the care of your patient. Please do not hesitate to call. Alden Jaramillo DO, [facility]  Interventional Cardiology     o: [phone]  [address] [place], [place] [address] [address], [address] [facility]      NOTE:  This report was transcribed using voice recognition software. Every effort was made to ensure accuracy; however, inadvertent computerized transcription errors may be present. Scribe's Attestation: This note was scribed in the presence of Dr. Allie Wood DO by Kayli Chowdary RN.    I, Alden Jaramillo, have personally performed the services described in this documentation as scribed by Luisa Butler. KRISTEN Corbett in my presence, and it is both accurate and complete. An electronic signature was used to authenticate this note.            Sincerely,      Jacki Lipscomb DO

## 2021-11-09 NOTE — PATIENT INSTRUCTIONS
Stop Metoprolol Tartrate  Replace with Carvedilol 6.25 mg twice daily  (This should help with your blood pressure)    Increase Lipitor (Atorvastatin) to 40 mg nightly  (You can double the 20 mg tablets until your next refill)  Check fasting bloodwork in 8-12 weeks on new dose    Call us if your blood pressures continue to remain elevated at home   Follow up in 6 months

## 2022-02-11 ENCOUNTER — TELEPHONE (OUTPATIENT)
Dept: CARDIOLOGY | Age: 77
End: 2022-02-11

## 2022-02-11 NOTE — TELEPHONE ENCOUNTER
Spoke with the patient and advised her of the results below per [facility]. Patient voiced understanding .  Call complete

## 2022-02-11 NOTE — TELEPHONE ENCOUNTER
LDL well controlled on Atorvastatin 40 mg nightly by labwork- no medication changes recommended per [facility]. Please let her know to continue current statin dose.

## 2022-04-04 NOTE — TELEPHONE ENCOUNTER
Received refill request for Xarelto from [facility].     Last ov: 11/09/2021 BNN    Last labs: 03/31/2022 BMP    Last Refill: 10/05/2021 #90 w/ 1 refill    Next appointment: 05/17/2022 [facility]

## 2022-04-30 DIAGNOSIS — E78.5 HYPERLIPIDEMIA, UNSPECIFIED HYPERLIPIDEMIA TYPE: ICD-10-CM

## 2022-05-02 RX ORDER — ATORVASTATIN CALCIUM 40 MG/1
TABLET, FILM COATED ORAL
Qty: 90 TABLET | Refills: 0 | Status: SHIPPED | OUTPATIENT
Start: 2022-05-02 | End: 2022-08-04

## 2022-05-02 NOTE — TELEPHONE ENCOUNTER
Received refill request for Atorvastatin from [address] [facility].     Last ov: 11/09/2021 BNN    Last labs: 02/10/2022 Lipid, ALT, AST    Last Refill: 11/09/2021 #90 w/ 1 refill    Next appointment: 05/17/2022 [facility]

## 2022-05-10 RX ORDER — CARVEDILOL 6.25 MG/1
TABLET ORAL
Qty: 180 TABLET | Refills: 3 | Status: SHIPPED | OUTPATIENT
Start: 2022-05-10

## 2022-05-10 NOTE — TELEPHONE ENCOUNTER
Received refill request for Carvedilol 6.25mg from [facility] : 11/09/2021 with BNN    Last EKG : 11/09/2021    Last Refill : 11/09/2021 180 w/1 refill    Next OV : 05/17/2022 with [facility]

## 2022-05-16 NOTE — PROGRESS NOTES
2022    PATIENT: Michael Colvin  : 1945    Primary Care Provider:   Gemini Victoria MD  Z:[phone]  S:[phone]    Reason for evaluation:   Chief Complaint   Patient presents with    Coronary Artery Disease     no cardiac symptoms at this time    6 Month Follow-Up     History of present illness:   Ms. Michael Colvin is a 64 y.o. female patient here for routine CV follow up regarding CAD, CHF, and atrial fibrillation. Ki Carlitos and her  have noticed significant change in her energy. She states previously she was finding herself sitting most of the day. She is now doing up to 20-minute intervals of walking. No shortness of breath chest pain or palpitations. She states that her appetite is less as well and is down 21 pounds from 6 months ago. Venous insufficiency has improved but she is planning to see Dr. Josefina Perez again for her history of venous leg ulcers. No adverse effects with current medication regimen. She is hoping to visit her nieces and nephews in the [place].    Medical History:      Diagnosis Date    Acute idiopathic gout of left hand 2021    Acute idiopathic gout of right hand 2021    Arthritis     Blood circulation, collateral     Blood transfusion     CAD (coronary artery disease)     CHF (congestive heart failure) (HCC)     Chronic renal failure, stage 3 (moderate) (HCC)     Diabetes mellitus (HCC)     GERD (gastroesophageal reflux disease)     Hyperlipidemia     Hypertension     Leg swelling 2018    Lymphedema 2018    Pressure ulcer of both heels 2021    Deep tissue injuries to right and left heels with left>right. Date of origin unknown.     Pressure ulcer of left heel, stage 3 ([address] 2021    Ulcer of right leg, limited to breakdown of skin ([address] 2018       Surgical History:      Procedure Laterality Date    ABDOMEN SURGERY      CARDIAC SURGERY          CATARACT REMOVAL WITH IMPLANT Left 11/23/2016    CATARACT REMOVAL WITH IMPLANT Right 11/02/2016    CATARACT REMOVAL WITH IMPLANT Bilateral 10/16, 11/16    CHOLECYSTECTOMY      COLONOSCOPY      ENDOSCOPY, COLON, DIAGNOSTIC      EYE SURGERY      left tear duct surgery    JOINT REPLACEMENT      BTKR    KNEE ARTHROPLASTY  09/15/2010    left total knee    TOTAL KNEE ARTHROPLASTY      VASCULAR SURGERY         Social History:  Social History     Socioeconomic History    Marital status:      Spouse name: Not on file    Number of children: Not on file    Years of education: Not on file    Highest education level: Not on file   Occupational History    Not on file   Tobacco Use    Smoking status: Never Smoker    Smokeless tobacco: Never Used   Vaping Use    Vaping Use: Never used   Substance and Sexual Activity    Alcohol use: No    Drug use: No    Sexual activity: Yes     Partners: Male   Other Topics Concern    Not on file   Social History Narrative    Not on file     Social Determinants of Health     Financial Resource Strain:     Difficulty of Paying Living Expenses: Not on file   Food Insecurity:     Worried About Running Out of Food in the Last Year: Not on file    Fabricio of Food in the Last Year: Not on file   Transportation Needs:     Lack of Transportation (Medical): Not on file    Lack of Transportation (Non-Medical):  Not on file   Physical Activity:     Days of Exercise per Week: Not on file    Minutes of Exercise per Session: Not on file   Stress:     Feeling of Stress : Not on file   Social Connections:     Frequency of Communication with Friends and Family: Not on file    Frequency of Social Gatherings with Friends and Family: Not on file    Attends Faith Services: Not on file    Active Member of Clubs or Organizations: Not on file    Attends Club or Organization Meetings: Not on file    Marital Status: Not on file   Intimate Partner Violence:     Fear of Current or Ex-Partner: Not on file    Emotionally Abused: Not on file    Physically Abused: Not on file    Sexually Abused: Not on file   Housing Stability:     Unable to Pay for Housing in the Last Year: Not on file    Number of Places Lived in the Last Year: Not on file    Unstable Housing in the Last Year: Not on file        Family History:  No evidence for sudden cardiac death or premature CAD. Problem Relation Age of Onset    Diabetes Mother     Heart Disease Mother     Heart Disease Father     Stroke Father        Medications:  [x] Medications and dosages reviewed. Prior to Admission medications    Medication Sig Start Date End Date Taking?  Authorizing Provider   guaiFENesin (MUCINEX PO) Take by mouth   Yes Historical Provider, MD   polyethyl glycol-propyl glycol 0.4-0.3 % (SYSTANE) 0.4-0.3 % ophthalmic solution 1 drop as needed for Dry Eyes   Yes Historical Provider, MD   [address] antacid   Yes Historical Provider, MD   carvedilol (COREG) 6.25 MG tablet TAKE ONE TABLET BY MOUTH TWICE DAILY 5/10/22  Yes Jose Alfredo Fatimah, DO   atorvastatin (LIPITOR) 40 MG tablet TAKE ONE TABLET BY MOUTH DAILY 5/2/22  Yes Jose Alfredo Fatimah, DO   rivaroxaban (XARELTO) 15 MG TABS tablet TAKE ONE TABLET BY MOUTH DAILY 4/4/22  Yes Jose Alfredo [facility], DO   FARXIGA 5 MG tablet TAKE ONE TABLET BY MOUTH IN THE MORNING 3/7/22  Yes Crow Hernandez MD   losartan (COZAAR) 50 MG tablet Take 0.5 tablets by mouth daily 11/9/21  Yes Crow Hernandez MD   furosemide (LASIX) 20 MG tablet TAKE ONE TABLET BY MOUTH TWICE DAILY  8/2/21  Yes Crow Hernandez MD   Coenzyme Q10 (CO Q-10) 400 MG CAPS Take 400 mg by mouth daily 9/28/20  Yes Lizz Izquierdo MD   Liraglutide (VICTOZA) 18 MG/3ML SOPN SC injection Inject 1.8 mg into the skin daily    Yes Historical Provider, MD   Handicap Placard MISC by Does not apply route Sob when walking less than 200 feet  Length of time--greater than 5 year 4/17/19  Yes Lizz Izquierdo MD ferrous sulfate 325 (65 Fe) MG tablet Take 1 tablet by mouth 3 times daily (with meals)  Patient taking differently: Take 325 mg by mouth 2 times daily (with meals)  4/3/19  Yes Rossy Rucker MD   vitamin D (CHOLECALCIFEROL) 400 UNITS TABS tablet Take 400 Units by mouth daily   Yes Historical Provider, MD   therapeutic multivitamin-minerals (THERAGRAN-M) tablet Take 1 tablet by mouth daily. Yes Historical Provider, MD   hydrOXYzine (ATARAX) 25 MG tablet Take 25 mg by mouth 3 times daily as needed. 8/9/10  Yes Historical Provider, MD       Allergies:  Adhesive tape, Chlorhexidine, and Lisinopril     Review of Systems:    [x]Full ROS obtained and negative except as mentioned in HPI    Physical Examination:    /64 (Site: Right Upper Arm, Position: Sitting, Cuff Size: Medium Adult)   Pulse 58   Ht 5' 6\" (1.676 m)   Wt 176 lb 12.8 oz (80.2 kg)   SpO2 98%   BMI 28.54 kg/m²   Wt Readings from Last 3 Encounters:   05/17/22 176 lb 12.8 oz (80.2 kg)   03/15/22 184 lb (83.5 kg)   12/14/21 197 lb (89.4 kg)     Vitals:    05/17/22 1004   BP: 138/64   Pulse:    SpO2:        · GENERAL: Well developed, well nourished, no acute distress  · NEUROLOGICAL: Alert and oriented x3  · PSYCH: Normal mood and affect   · SKIN: Warm and dry  · HEENT: Normocephalic, atraumatic, Sclera non-icteric, mucous membranes moist  · NECK: supple, JVP normal  · CARDIAC: Normal PMI, regular rate and rhythm, normal S1S2, no murmur, rub, or gallop  · RESPIRATORY: Normal respiratory effort, clear to auscultation bilaterally  · EXTREMITIES: no edema or clubbing, +2 pulses bilaterally   · MUSCULOSKELETAL: No joint swelling or tenderness, no chest wall tenderness  · GASTROINTESTINAL: normal bowel sounds, soft, non-tender    Labs:  Lab Review   No visits with results within 2 Month(s) from this visit.    Latest known visit with results is:   Office Visit on 03/15/2022   Component Date Value    BUN 03/31/2022 71*    Sodium 03/31/2022 141  Potassium 03/31/2022 4.7     Chloride 03/31/2022 108     CO2 03/31/2022 24     Glucose 03/31/2022 101*    CREATININE 03/31/2022 2.00*    Anion Gap 03/31/2022 9     Calcium 03/31/2022 9.6     eGFR (CKD-EPI) 03/31/2022 25*     Imaging:  I have reviewed the below testing personally:  [facility] 8/9/10  Normal LV wall motion  LV gram, EF 99%  Normal systolic function  Right dominance  LM- widely patent  LAD- Mid 99% lesion. D1- Retrograde filling from LAD  LCx- Prox totally occluded  RCA- Luminal irregularities  RPDA- Ostial 50% lesion  SVG to D1 and OM1 and OM2 widely patent  LIMA to LAD widely patent    SPECT 3/15/18 ([facility])  Interpetation Summary:  The LV EF is 56%  Normal global and regional wall motion in all territories. 1.Negative nuclear stress imaging for inducible ischemia. 2.Non-diagnostic ECG for ischemia with pharmocologic stress. 3.Normal left ventriular systolic function. 4.Nuclear stress image findings indicate low risk for future      ischemic event . Echo 8/18/15   Summary   Left ventricle size is normal.   Normal left ventricular wall thickness. Global hypokinesis. Estimated ejection fraction is 45%. Mitral annular calcification is present. Mild mitral regurgitation is present. The left atrium is dilated. Aortic valve appears sclerotic but opens adequately. The right ventricle is mildly enlarged. There is severe tricuspid regurgitation with RVSP estimated at 55 mmHg. This is suggestive of pulmonary hypertension. Right atrial size is mildly dilated . There is a pleural effusion. Echo 8/3/21   Summary   Left ventricular systolic function is normal with ejection fraction   estimated at 50-55 %. No regional wall motion abnormalities are noted. There is mild concentric left ventricular hypertrophy. Grade I-II diastolic dysfunction with elevated filling pressure. The ascending aorta is moderately dilated. Mild aortic regurgitation is present. .   Mild mitral and pulmonic regurgitation. Moderate-to-severe tricuspid regurgitation. Systolic pulmonary artery pressure (SPAP) estimated at 55 mmHg (right atrial   pressure 3 mmHg), consistent with moderate pulmonary hypertension. IVC is normal in size (< 2.1 cm) and collapses > 50% with respiration   consistent with normal RA pressure (3 mmHg). Previous echo done 2015 - 45%   The aortic valve is severely thickened, opens well with normal gradients. Carotid US 11/8/19  -The right internal carotid artery appears to have a <50% diameter reducing  stenosis based on velocity criteria. -The left internal carotid artery appears to have a <50% diameter reducing  stenosis based on velocity criteria. EKG 11/9/21  SB @ 58 bpm  RBBB  LAFB     Impression/Recommendations    Ms. Chapis Valdez is a 64 y.o. female patient    CAD: CABG 2009 (LIMA-LAD, SVG -D1 ,OM1, OM2) patent by 2010 [facility], no ischemia by 2018 SPECT MPI  Ischemic cardiomyopathy, recovered (LVEF 50-55% by 8/2021 TTE)  Chronic Diastolic CHF,  compensated  PAF, elevated [id number] on [address] [place] [address], follows with Dr. Radha Rosenthal  Hypertension, controlled  Hyperlipidemia, now controlled on moderate intensity Atorvastatin (2/2022:   HDL 36 LDL 56)  Diabetes mellitus, controlled    CKD III, follows with Dr. Ceballos Xander has lost over twenty pounds and is asymptomatic in her walking routine. She is with much improved risk profile. No change to medications at this time. We could consider surveillance stress testing following coronary bypass next year. Atorvastatin 40 mg  Carvedilol 6.25 mg bid    Losartan 25 mg daily   Xarelto 15 mg  Furosemide  Farxiga    Return in about 1 year (around 5/17/2023). Patient Instructions   No medication changes today  We will discuss a stress test next year  Continue walking (not over 80* outside)  You can try topical hydrocortisone (OTC) for your itching. Benadryl at night if it gets too bad. Thank you for allowing me to participate in the care of your patient. Please do not hesitate to call. Yazmin Hernández DO, [facility]  Interventional Cardiology     o: [phone]  [address] [facility], [address] [facility]      NOTE:  This report was transcribed using voice recognition software. Every effort was made to ensure accuracy; however, inadvertent computerized transcription errors may be present. Scribe's Attestation: This note was scribed in the presence of Dr. Precious Mercedes DO by Marah Rivera RN.

## 2022-05-17 ENCOUNTER — OFFICE VISIT (OUTPATIENT)
Dept: CARDIOLOGY CLINIC | Age: 77
End: 2022-05-17
Payer: MEDICARE

## 2022-05-17 VITALS
BODY MASS INDEX: 28.42 KG/M2 | WEIGHT: 176.8 LBS | HEIGHT: 66 IN | SYSTOLIC BLOOD PRESSURE: 138 MMHG | OXYGEN SATURATION: 98 % | DIASTOLIC BLOOD PRESSURE: 64 MMHG | HEART RATE: 58 BPM

## 2022-05-17 DIAGNOSIS — E78.5 HYPERLIPIDEMIA, UNSPECIFIED HYPERLIPIDEMIA TYPE: ICD-10-CM

## 2022-05-17 DIAGNOSIS — I10 ESSENTIAL HYPERTENSION: ICD-10-CM

## 2022-05-17 DIAGNOSIS — Z95.1 HX OF CABG: Primary | ICD-10-CM

## 2022-05-17 PROCEDURE — 99214 OFFICE O/P EST MOD 30 MIN: CPT | Performed by: INTERNAL MEDICINE

## 2022-05-17 PROCEDURE — 1036F TOBACCO NON-USER: CPT | Performed by: INTERNAL MEDICINE

## 2022-05-17 PROCEDURE — 1123F ACP DISCUSS/DSCN MKR DOCD: CPT | Performed by: INTERNAL MEDICINE

## 2022-05-17 PROCEDURE — G8417 CALC BMI ABV UP PARAM F/U: HCPCS | Performed by: INTERNAL MEDICINE

## 2022-05-17 PROCEDURE — G8400 PT W/DXA NO RESULTS DOC: HCPCS | Performed by: INTERNAL MEDICINE

## 2022-05-17 PROCEDURE — 1090F PRES/ABSN URINE INCON ASSESS: CPT | Performed by: INTERNAL MEDICINE

## 2022-05-17 PROCEDURE — 4040F PNEUMOC VAC/ADMIN/RCVD: CPT | Performed by: INTERNAL MEDICINE

## 2022-05-17 PROCEDURE — G8427 DOCREV CUR MEDS BY ELIG CLIN: HCPCS | Performed by: INTERNAL MEDICINE

## 2022-05-17 NOTE — PATIENT INSTRUCTIONS
No medication changes today  We will discuss a stress test next year  Continue walking (not over 80* outside)  You can try topical hydrocortisone (OTC) for your itching. Benadryl at night if it gets too bad.

## 2022-06-28 PROBLEM — R80.0 ISOLATED PROTEINURIA: Status: ACTIVE | Noted: 2022-06-28

## 2022-08-03 DIAGNOSIS — E78.5 HYPERLIPIDEMIA, UNSPECIFIED HYPERLIPIDEMIA TYPE: ICD-10-CM

## 2022-08-04 RX ORDER — ATORVASTATIN CALCIUM 40 MG/1
TABLET, FILM COATED ORAL
Qty: 90 TABLET | Refills: 0 | Status: SHIPPED | OUTPATIENT
Start: 2022-08-04 | End: 2022-10-31

## 2022-08-04 NOTE — TELEPHONE ENCOUNTER
Received refill request for Atorvastatin from [facility].     Last ov: 05/17/2022 BNN    Last labs: 02/10/2022    Last Refill: 05/02/2022 #90 w/ 0 refills    Next appointment: 11/21/2022 NPSR

## 2022-10-04 PROBLEM — N30.00 ACUTE CYSTITIS WITHOUT HEMATURIA: Status: ACTIVE | Noted: 2022-10-04

## 2022-10-29 DIAGNOSIS — E78.5 HYPERLIPIDEMIA, UNSPECIFIED HYPERLIPIDEMIA TYPE: ICD-10-CM

## 2022-10-31 RX ORDER — ATORVASTATIN CALCIUM 40 MG/1
TABLET, FILM COATED ORAL
Qty: 90 TABLET | Refills: 0 | Status: SHIPPED | OUTPATIENT
Start: 2022-10-31

## 2022-11-17 PROBLEM — M79.89 LEG SWELLING: Status: RESOLVED | Noted: 2018-08-14 | Resolved: 2022-11-17

## 2022-11-17 PROBLEM — N30.00 ACUTE CYSTITIS WITHOUT HEMATURIA: Status: RESOLVED | Noted: 2022-10-04 | Resolved: 2022-11-17

## 2022-11-17 PROBLEM — N17.9 AKI (ACUTE KIDNEY INJURY) (HCC): Status: RESOLVED | Noted: 2019-07-03 | Resolved: 2022-11-17

## 2022-11-17 PROBLEM — M10.042 ACUTE IDIOPATHIC GOUT OF LEFT HAND: Status: RESOLVED | Noted: 2021-05-06 | Resolved: 2022-11-17

## 2022-11-17 PROBLEM — R80.0 ISOLATED PROTEINURIA: Status: RESOLVED | Noted: 2022-06-28 | Resolved: 2022-11-17

## 2022-11-17 PROBLEM — M25.569 KNEE PAIN: Status: RESOLVED | Noted: 2021-01-10 | Resolved: 2022-11-17

## 2022-11-21 ENCOUNTER — TELEPHONE (OUTPATIENT)
Dept: CARDIOLOGY CLINIC | Age: 77
End: 2022-11-21

## 2022-11-21 NOTE — TELEPHONE ENCOUNTER
Pt called back and states she called Dr Sylvain Altman office and they told her to call our office because Dr Bernarda Nazario was original prescriber . Pt sates she has had a little higher BP in the last 5 weeks along with dizziness when she stands and also just walking around.

## 2022-11-21 NOTE — TELEPHONE ENCOUNTER
Called Ashvni Alston- she reports her BP has been running 756 systolic at the dentist with a wrist cuff. Later during the appointment was 851/42 systolic. She feels it has been gradually increasing in recent weeks by checks at Dr. Yariel De La Cruz office. She states she uses caraessential that she uses on her toenails, and she feels it started around that time. She feels her blood pressure may be related because it has been running higher than her baseline. Admits she gets anxious when she goes to the kidney doctor because she can see the dialysis patients next door and it make her nervous. She does stick to 64 ounces fluid daily. 11 oz decaf coffee in the morning  Admits to a mix of decaf iced tea, soda, and milk throughout the day. She does feel it's more water than anything. On Lasix 20 mg. She does try to walk daily, and it takes a little time to get her balance. She uses a Rolator. Will discuss with another provider in [facility] absence.

## 2022-11-21 NOTE — TELEPHONE ENCOUNTER
Pt states she is on her second UTI in 2 weeks and feels it is [place] that is causing it. Pt also states her BP is running around 135/61. Pt needs advise on Farxiga and it causing UTI's. Please call pt to advise.

## 2022-11-22 NOTE — TELEPHONE ENCOUNTER
systolic and pulse 55 this AM  Discussed with PSC- hydrate, change position slowly, and log for a week then call with logging  Rosa Fill in agreement  She will call me next week with logging

## 2023-01-28 DIAGNOSIS — E78.5 HYPERLIPIDEMIA, UNSPECIFIED HYPERLIPIDEMIA TYPE: ICD-10-CM

## 2023-01-30 RX ORDER — ATORVASTATIN CALCIUM 40 MG/1
TABLET, FILM COATED ORAL
Qty: 90 TABLET | Refills: 3 | Status: SHIPPED | OUTPATIENT
Start: 2023-01-30

## 2023-05-03 ENCOUNTER — OFFICE VISIT (OUTPATIENT)
Dept: CARDIOLOGY CLINIC | Age: 78
End: 2023-05-03
Payer: MEDICARE

## 2023-05-03 VITALS — OXYGEN SATURATION: 99 % | DIASTOLIC BLOOD PRESSURE: 52 MMHG | HEART RATE: 49 BPM | SYSTOLIC BLOOD PRESSURE: 156 MMHG

## 2023-05-03 DIAGNOSIS — I50.9 ACUTE ON CHRONIC CONGESTIVE HEART FAILURE, UNSPECIFIED HEART FAILURE TYPE (HCC): ICD-10-CM

## 2023-05-03 DIAGNOSIS — I25.5 ISCHEMIC CARDIOMYOPATHY: Primary | ICD-10-CM

## 2023-05-03 DIAGNOSIS — I48.0 PAROXYSMAL ATRIAL FIBRILLATION (HCC): ICD-10-CM

## 2023-05-03 PROCEDURE — 1123F ACP DISCUSS/DSCN MKR DOCD: CPT | Performed by: INTERNAL MEDICINE

## 2023-05-03 PROCEDURE — 3077F SYST BP >= 140 MM HG: CPT | Performed by: INTERNAL MEDICINE

## 2023-05-03 PROCEDURE — G8427 DOCREV CUR MEDS BY ELIG CLIN: HCPCS | Performed by: INTERNAL MEDICINE

## 2023-05-03 PROCEDURE — G8417 CALC BMI ABV UP PARAM F/U: HCPCS | Performed by: INTERNAL MEDICINE

## 2023-05-03 PROCEDURE — 99214 OFFICE O/P EST MOD 30 MIN: CPT | Performed by: INTERNAL MEDICINE

## 2023-05-03 PROCEDURE — 1090F PRES/ABSN URINE INCON ASSESS: CPT | Performed by: INTERNAL MEDICINE

## 2023-05-03 PROCEDURE — 1036F TOBACCO NON-USER: CPT | Performed by: INTERNAL MEDICINE

## 2023-05-03 PROCEDURE — 3078F DIAST BP <80 MM HG: CPT | Performed by: INTERNAL MEDICINE

## 2023-05-03 PROCEDURE — G8400 PT W/DXA NO RESULTS DOC: HCPCS | Performed by: INTERNAL MEDICINE

## 2023-05-03 RX ORDER — CARVEDILOL 3.12 MG/1
3.12 TABLET ORAL 2 TIMES DAILY
Qty: 180 TABLET | Refills: 1 | Status: SHIPPED | OUTPATIENT
Start: 2023-05-03

## 2023-05-03 RX ORDER — CARVEDILOL 6.25 MG/1
6.25 TABLET ORAL 2 TIMES DAILY
Qty: 180 TABLET | Refills: 3 | Status: CANCELLED | OUTPATIENT
Start: 2023-05-03

## 2023-05-09 ENCOUNTER — TELEPHONE (OUTPATIENT)
Dept: CARDIOLOGY CLINIC | Age: 78
End: 2023-05-09

## 2023-05-09 ENCOUNTER — HOSPITAL ENCOUNTER (OUTPATIENT)
Dept: NON INVASIVE DIAGNOSTICS | Age: 78
Discharge: HOME | End: 2023-05-09
Payer: MEDICARE

## 2023-05-09 DIAGNOSIS — I48.0 PAROXYSMAL ATRIAL FIBRILLATION (HCC): ICD-10-CM

## 2023-05-09 DIAGNOSIS — I50.9 ACUTE ON CHRONIC CONGESTIVE HEART FAILURE, UNSPECIFIED HEART FAILURE TYPE (HCC): Primary | ICD-10-CM

## 2023-05-09 DIAGNOSIS — I50.9 ACUTE ON CHRONIC CONGESTIVE HEART FAILURE, UNSPECIFIED HEART FAILURE TYPE (HCC): ICD-10-CM

## 2023-05-09 DIAGNOSIS — I25.5 ISCHEMIC CARDIOMYOPATHY: ICD-10-CM

## 2023-05-09 LAB
LV EF: 53 %
LVEF MODALITY: NORMAL

## 2023-05-09 PROCEDURE — 6360000004 HC RX CONTRAST MEDICATION: Performed by: INTERNAL MEDICINE

## 2023-05-09 PROCEDURE — C8929 TTE W OR WO FOL WCON,DOPPLER: HCPCS

## 2023-05-09 RX ORDER — HYDRALAZINE HYDROCHLORIDE 25 MG/1
25 TABLET, FILM COATED ORAL DAILY PRN
Qty: 45 TABLET | Refills: 3 | Status: SHIPPED | OUTPATIENT
Start: 2023-05-09

## 2023-05-09 RX ORDER — FUROSEMIDE 20 MG/1
40 TABLET ORAL DAILY
Qty: 60 TABLET | Refills: 3 | Status: SHIPPED
Start: 2023-05-09

## 2023-05-09 RX ORDER — FUROSEMIDE 20 MG/1
40 TABLET ORAL 2 TIMES DAILY
Qty: 60 TABLET | Refills: 3
Start: 2023-05-09 | End: 2023-05-09 | Stop reason: DRUGHIGH

## 2023-05-09 RX ADMIN — PERFLUTREN 1.5 ML: 6.52 INJECTION, SUSPENSION INTRAVENOUS at 09:21

## 2023-05-09 NOTE — TELEPHONE ENCOUNTER
Spoke with Marylee Gianotti- The minute she reduced her Carvedilol she started feeling better, HR has been running high 50s.  -279C systolic and one time 992C systolic (all readings at 1000 and she takes her AM pills [id number])  She is not as tired as when we saw her in the office     Discussed echo results with BNN  Recommend increasing Lasix 40 mg once daily  Hydralazine 25 mg PRN for BP >140/90  Labs in 1 week (BMP/BNP)  Check BP midday    She is in agreement

## 2023-05-09 NOTE — PROGRESS NOTES
IV was started in [facility]. Definity was administered for echo and patient tolerated it well.

## 2023-05-15 ENCOUNTER — HOSPITAL ENCOUNTER (OUTPATIENT)
Age: 78
Discharge: HOME | End: 2023-05-15
Payer: MEDICARE

## 2023-05-15 DIAGNOSIS — I50.9 ACUTE ON CHRONIC CONGESTIVE HEART FAILURE, UNSPECIFIED HEART FAILURE TYPE (HCC): ICD-10-CM

## 2023-05-15 LAB
ANION GAP SERPL CALCULATED.3IONS-SCNC: 14 MMOL/L (ref 3–16)
BUN SERPL-MCNC: 74 MG/DL (ref 7–20)
CALCIUM SERPL-MCNC: 9.4 MG/DL (ref 8.3–10.6)
CHLORIDE SERPL-SCNC: 104 MMOL/L (ref 99–110)
CO2 SERPL-SCNC: 21 MMOL/L (ref 21–32)
CREAT SERPL-MCNC: 2.5 MG/DL (ref 0.6–1.2)
GFR SERPLBLD CREATININE-BSD FMLA CKD-EPI: 19 ML/MIN/{1.73_M2}
GLUCOSE SERPL-MCNC: 129 MG/DL (ref 70–99)
NT-PROBNP SERPL-MCNC: 5326 PG/ML (ref 0–449)
POTASSIUM SERPL-SCNC: 5.1 MMOL/L (ref 3.5–5.1)
SODIUM SERPL-SCNC: 139 MMOL/L (ref 136–145)

## 2023-05-15 PROCEDURE — 36415 COLL VENOUS BLD VENIPUNCTURE: CPT

## 2023-05-15 PROCEDURE — 83880 ASSAY OF NATRIURETIC PEPTIDE: CPT

## 2023-05-15 PROCEDURE — 80048 BASIC METABOLIC PNL TOTAL CA: CPT

## 2023-05-17 NOTE — RESULT ENCOUNTER NOTE
Called Shirin regarding bloodwork- states her BP has been stable and only has needed Hydralazine once since our last conversation.   Advised per [facility] to alternate 20-40 mg every other day of Lasix since her Creatinine is elevated  Has nephrology appointment 6/6/23  She verbalized understanding to reccomendation

## 2023-11-13 RX ORDER — CARVEDILOL 3.12 MG/1
3.12 TABLET ORAL 2 TIMES DAILY
Qty: 180 TABLET | Refills: 3 | Status: SHIPPED | OUTPATIENT
Start: 2023-11-13

## 2024-02-03 DIAGNOSIS — E78.5 HYPERLIPIDEMIA, UNSPECIFIED HYPERLIPIDEMIA TYPE: ICD-10-CM

## 2024-02-05 NOTE — TELEPHONE ENCOUNTER
Last ov:23 BNN  Next ov:N/A  Last EK21  Last labs:11/15/23  Last filled:   Disp Refills Start End    atorvastatin (LIPITOR) 40 MG tablet 90 tablet 3 2023 --    Sig: TAKE ONE TABLET BY MOUTH DAILY    Sent to pharmacy as: Atorvastatin Calcium 40 MG Oral Tablet (LIPITOR)    Cosign for Ordering: Accepted by Nayeli Baker DO on 2023  7:27 PM    E-Prescribing Status: Receipt confirmed by pharmacy (2023 12:52 PM EST)

## 2024-02-06 RX ORDER — ATORVASTATIN CALCIUM 40 MG/1
TABLET, FILM COATED ORAL
Qty: 90 TABLET | Refills: 0 | Status: SHIPPED | OUTPATIENT
Start: 2024-02-06

## 2024-05-04 DIAGNOSIS — E78.5 HYPERLIPIDEMIA, UNSPECIFIED HYPERLIPIDEMIA TYPE: ICD-10-CM

## 2024-05-06 RX ORDER — ATORVASTATIN CALCIUM 40 MG/1
TABLET, FILM COATED ORAL
Qty: 90 TABLET | Refills: 0 | Status: SHIPPED | OUTPATIENT
Start: 2024-05-06 | End: 2024-05-09

## 2024-05-06 NOTE — TELEPHONE ENCOUNTER
Requested Prescriptions     Pending Prescriptions Disp Refills    atorvastatin (LIPITOR) 40 MG tablet [Pharmacy Med Name: Atorvastatin Calcium Oral Tablet 40 MG] 90 tablet 0     Sig: TAKE ONE TABLET BY MOUTH DAILY        [facility]       Last OV:  5/3/2023 BNN    Next OV: 05/22/2024 BNN     Last Labs: 09/20/2023 LIPID [facility]    Last Filled: 02/06/2024 BNN

## 2024-05-08 DIAGNOSIS — E78.5 HYPERLIPIDEMIA, UNSPECIFIED HYPERLIPIDEMIA TYPE: ICD-10-CM

## 2024-05-08 NOTE — TELEPHONE ENCOUNTER
Requested Prescriptions     Pending Prescriptions Disp Refills    atorvastatin (LIPITOR) 40 MG tablet [Pharmacy Med Name: Atorvastatin Calcium Oral Tablet 40 MG] 90 tablet 0     Sig: TAKE ONE TABLET BY MOUTH DAILY        [facility]     Last OV:  5/3/2023 BNN    Next OV: 05/22/2024 BNN    Last Labs: 09/20/2023 LIPID [facility]    Last Filled: 05/06/2024 BNN

## 2024-05-09 RX ORDER — ATORVASTATIN CALCIUM 40 MG/1
TABLET, FILM COATED ORAL
Qty: 90 TABLET | Refills: 0 | Status: SHIPPED | OUTPATIENT
Start: 2024-05-09

## 2024-05-21 NOTE — PROGRESS NOTES
hypertrophy.   Grade I-II diastolic dysfunction with elevated filling pressure.   The ascending aorta is moderately dilated.   Mild aortic regurgitation is present..   Mild mitral and pulmonic regurgitation.   Moderate-to-severe tricuspid regurgitation.   Systolic pulmonary artery pressure (SPAP) estimated at 55 mmHg (right atrial   pressure 3 mmHg), consistent with moderate pulmonary hypertension.   IVC is normal in size (< 2.1 cm) and collapses > 50% with respiration   consistent with normal RA pressure (3 mmHg).   Previous echo done 2015 - 45%   The aortic valve is severely thickened, opens well with normal gradients.    Echo 5/9/23   Summary   Definity was administered for better endocardial definition.   Left ventricular cavity size is normal with mild concentric left ventricular hypertrophy.   Overall, left ventricular systolic function is low normal. Ejection fraction is visually estimated to be 50-55%.   No definitive regional wall motion abnormalities are noted.   Grade III diastolic dysfunction with elevated LV filling pressures. E/e' = 19.5.   Left atrium is dilated.   Mild aortic stenosis. Trivial aortic regurgitation.   Mild mitral regurgitation.   Moderate tricuspid regurgitation. Estimated pulmonary artery systolic   pressure is elevated at 55 mmHg assuming a right atrial pressure of 3 mmHg.   Moderate pulmonary hypertension.    EKG 5/22/24  Atrial fibrillation   Right bundle branch block with left axis -bifascicular block.    Impression/Recommendations    Ms. Shirin Mathews is a 78 y.o. female patient    CAD: CABG 2009 (LIMA-LAD, SVG -D1 ,OM1, OM2) patent by 2010 [facility], no ischemia by 2018 SPECT MPI  Ischemic cardiomyopathy, recovered (LVEF 50-55% by 5/2023 TTE)  Chronic Diastolic CHF,  compensated  Chronic AF, [id number]=9+ on OAC  Hypertension, controlled  Hyperlipidemia, stable (9/2023:   HDL 41 LDL 62)  Diabetes mellitus, controlled  (A1C 6.1)  CKD III, follows with Dr. Mechelle Contreras

## 2024-05-21 NOTE — PATIENT INSTRUCTIONS
Stress test (we will do this at [place])    No medication changes     Call with questions or concerns

## 2024-05-22 ENCOUNTER — OFFICE VISIT (OUTPATIENT)
Dept: CARDIOLOGY CLINIC | Age: 79
End: 2024-05-22

## 2024-05-22 VITALS
DIASTOLIC BLOOD PRESSURE: 86 MMHG | HEIGHT: 66 IN | HEART RATE: 98 BPM | BODY MASS INDEX: 26.52 KG/M2 | OXYGEN SATURATION: 97 % | SYSTOLIC BLOOD PRESSURE: 142 MMHG | WEIGHT: 165 LBS

## 2024-05-22 DIAGNOSIS — I10 ESSENTIAL HYPERTENSION: ICD-10-CM

## 2024-05-22 DIAGNOSIS — I25.10 CORONARY ARTERY DISEASE INVOLVING NATIVE CORONARY ARTERY OF NATIVE HEART WITHOUT ANGINA PECTORIS: ICD-10-CM

## 2024-05-22 DIAGNOSIS — I25.5 ISCHEMIC CARDIOMYOPATHY: ICD-10-CM

## 2024-05-22 DIAGNOSIS — I50.42 CHRONIC COMBINED SYSTOLIC AND DIASTOLIC CONGESTIVE HEART FAILURE (HCC): Primary | ICD-10-CM

## 2024-05-22 DIAGNOSIS — I48.0 PAROXYSMAL ATRIAL FIBRILLATION (HCC): ICD-10-CM

## 2024-05-22 DIAGNOSIS — E78.5 HYPERLIPIDEMIA, UNSPECIFIED HYPERLIPIDEMIA TYPE: ICD-10-CM

## 2024-05-22 DIAGNOSIS — E78.2 MIXED HYPERLIPIDEMIA: ICD-10-CM

## 2024-05-22 DIAGNOSIS — I25.10 ATHEROSCLEROSIS OF NATIVE CORONARY ARTERY OF NATIVE HEART WITHOUT ANGINA PECTORIS: ICD-10-CM

## 2024-06-07 ENCOUNTER — HOSPITAL ENCOUNTER (OUTPATIENT)
Age: 79
End: 2024-06-07
Attending: INTERNAL MEDICINE
Payer: MEDICARE

## 2024-06-07 ENCOUNTER — HOSPITAL ENCOUNTER (OUTPATIENT)
Age: 79
Discharge: HOME | End: 2024-06-07
Attending: INTERNAL MEDICINE
Payer: MEDICARE

## 2024-06-07 VITALS
HEART RATE: 90 BPM | SYSTOLIC BLOOD PRESSURE: 151 MMHG | HEIGHT: 66 IN | WEIGHT: 162 LBS | BODY MASS INDEX: 26.03 KG/M2 | DIASTOLIC BLOOD PRESSURE: 79 MMHG

## 2024-06-07 VITALS — HEIGHT: 66 IN | BODY MASS INDEX: 26.03 KG/M2 | WEIGHT: 162 LBS

## 2024-06-07 DIAGNOSIS — I25.10 CORONARY ARTERY DISEASE INVOLVING NATIVE CORONARY ARTERY OF NATIVE HEART WITHOUT ANGINA PECTORIS: ICD-10-CM

## 2024-06-07 DIAGNOSIS — I25.10 ATHEROSCLEROSIS OF NATIVE CORONARY ARTERY OF NATIVE HEART WITHOUT ANGINA PECTORIS: ICD-10-CM

## 2024-06-07 DIAGNOSIS — I48.0 PAROXYSMAL ATRIAL FIBRILLATION (HCC): ICD-10-CM

## 2024-06-07 DIAGNOSIS — I50.42 CHRONIC COMBINED SYSTOLIC AND DIASTOLIC CONGESTIVE HEART FAILURE (HCC): ICD-10-CM

## 2024-06-07 DIAGNOSIS — I25.5 ISCHEMIC CARDIOMYOPATHY: ICD-10-CM

## 2024-06-07 LAB
ECHO BSA: 1.85 M2
NUC STRESS EJECTION FRACTION: 46 %
NUC STRESS LV EDV: 134 ML (ref 56–104)
NUC STRESS LV ESV: 72 ML (ref 19–49)
NUC STRESS LV MASS: 155 G
STRESS BASELINE DIAS BP: 79 MMHG
STRESS BASELINE HR: 85 BPM
STRESS BASELINE SYS BP: 151 MMHG
STRESS ESTIMATED WORKLOAD: 1 METS
STRESS EXERCISE DUR MIN: 4 MIN
STRESS EXERCISE DUR SEC: 0 SEC
STRESS O2 SAT PEAK: 93 %
STRESS O2 SAT REST: 92 %
STRESS PEAK DIAS BP: 67 MMHG
STRESS PEAK SYS BP: 132 MMHG
STRESS PERCENT HR ACHIEVED: 59 %
STRESS POST PEAK HR: 82 BPM
STRESS RATE PRESSURE PRODUCT: ABNORMAL BPM*MMHG
STRESS TARGET HR: 140 BPM
TID: 1.05

## 2024-06-07 PROCEDURE — 78452 HT MUSCLE IMAGE SPECT MULT: CPT

## 2024-06-07 PROCEDURE — A9502 TC99M TETROFOSMIN: HCPCS | Performed by: INTERNAL MEDICINE

## 2024-06-07 PROCEDURE — 93016 CV STRESS TEST SUPVJ ONLY: CPT | Performed by: INTERNAL MEDICINE

## 2024-06-07 PROCEDURE — 93017 CV STRESS TEST TRACING ONLY: CPT

## 2024-06-07 PROCEDURE — 3430000000 HC RX DIAGNOSTIC RADIOPHARMACEUTICAL: Performed by: INTERNAL MEDICINE

## 2024-06-07 PROCEDURE — 78452 HT MUSCLE IMAGE SPECT MULT: CPT | Performed by: INTERNAL MEDICINE

## 2024-06-07 PROCEDURE — 93018 CV STRESS TEST I&R ONLY: CPT | Performed by: INTERNAL MEDICINE

## 2024-06-07 PROCEDURE — 6360000002 HC RX W HCPCS: Performed by: INTERNAL MEDICINE

## 2024-06-07 RX ORDER — REGADENOSON 0.08 MG/ML
0.4 INJECTION, SOLUTION INTRAVENOUS
Status: COMPLETED | OUTPATIENT
Start: 2024-06-07 | End: 2024-06-07

## 2024-06-07 RX ADMIN — TETROFOSMIN 10.3 MILLICURIE: 1.38 INJECTION, POWDER, LYOPHILIZED, FOR SOLUTION INTRAVENOUS at 08:30

## 2024-06-07 RX ADMIN — REGADENOSON 0.4 MG: 0.08 INJECTION, SOLUTION INTRAVENOUS at 10:36

## 2024-06-07 RX ADMIN — TETROFOSMIN 30.1 MILLICURIE: 1.38 INJECTION, POWDER, LYOPHILIZED, FOR SOLUTION INTRAVENOUS at 11:58

## 2024-06-10 RX ORDER — CARVEDILOL 6.25 MG/1
6.25 TABLET ORAL 2 TIMES DAILY
Qty: 180 TABLET | Refills: 3 | Status: SHIPPED | OUTPATIENT
Start: 2024-06-10

## 2024-06-10 NOTE — RESULT ENCOUNTER NOTE
Called Shirin regarding results of SPECT  She is agreement with increasing Carvedilol to 6.25 mg bid per Dr. Samuel Carpio would like an appointment in 6 months- scheduled

## 2024-06-26 ENCOUNTER — TELEPHONE (OUTPATIENT)
Dept: CARDIOLOGY CLINIC | Age: 79
End: 2024-06-26

## 2024-06-26 ENCOUNTER — APPOINTMENT (OUTPATIENT)
Dept: GENERAL RADIOLOGY | Age: 79
DRG: 638 | End: 2024-06-26
Payer: MEDICARE

## 2024-06-26 ENCOUNTER — HOSPITAL ENCOUNTER (INPATIENT)
Age: 79
LOS: 5 days | Discharge: NURSING, REHAB OR LONG-TERM CARE | DRG: 638 | End: 2024-07-01
Attending: STUDENT IN AN ORGANIZED HEALTH CARE EDUCATION/TRAINING PROGRAM | Admitting: STUDENT IN AN ORGANIZED HEALTH CARE EDUCATION/TRAINING PROGRAM
Payer: MEDICARE

## 2024-06-26 DIAGNOSIS — R91.1 PULMONARY NODULE: ICD-10-CM

## 2024-06-26 DIAGNOSIS — N17.9 AKI (ACUTE KIDNEY INJURY) (HCC): ICD-10-CM

## 2024-06-26 DIAGNOSIS — R60.0 PERIPHERAL EDEMA: ICD-10-CM

## 2024-06-26 DIAGNOSIS — L03.119 CELLULITIS OF LOWER EXTREMITY, UNSPECIFIED LATERALITY: Primary | ICD-10-CM

## 2024-06-26 PROBLEM — L03.90 CELLULITIS: Status: ACTIVE | Noted: 2024-06-26

## 2024-06-26 LAB
ALBUMIN SERPL-MCNC: 3.2 G/DL (ref 3.4–5)
ALBUMIN/GLOB SERPL: 0.7 {RATIO} (ref 1.1–2.2)
ALP SERPL-CCNC: 116 U/L (ref 40–129)
ALT SERPL-CCNC: 9 U/L (ref 10–40)
ANION GAP SERPL CALCULATED.3IONS-SCNC: 13 MMOL/L (ref 3–16)
AST SERPL-CCNC: 16 U/L (ref 15–37)
BASOPHILS # BLD: 0 K/UL (ref 0–0.2)
BASOPHILS NFR BLD: 0.4 %
BILIRUB SERPL-MCNC: 0.5 MG/DL (ref 0–1)
BUN SERPL-MCNC: 84 MG/DL (ref 7–20)
CALCIUM SERPL-MCNC: 9.2 MG/DL (ref 8.3–10.6)
CHLORIDE SERPL-SCNC: 106 MMOL/L (ref 99–110)
CO2 SERPL-SCNC: 19 MMOL/L (ref 21–32)
CREAT SERPL-MCNC: 3.1 MG/DL (ref 0.6–1.2)
CRP SERPL-MCNC: 94.3 MG/L (ref 0–5.1)
DEPRECATED RDW RBC AUTO: 16 % (ref 12.4–15.4)
EOSINOPHIL # BLD: 0.2 K/UL (ref 0–0.6)
EOSINOPHIL NFR BLD: 3.3 %
ERYTHROCYTE [SEDIMENTATION RATE] IN BLOOD BY WESTERGREN METHOD: 95 MM/HR (ref 0–30)
GFR SERPLBLD CREATININE-BSD FMLA CKD-EPI: 15 ML/MIN/{1.73_M2}
GLUCOSE SERPL-MCNC: 135 MG/DL (ref 70–99)
HCT VFR BLD AUTO: 31.2 % (ref 36–48)
HGB BLD-MCNC: 10.1 G/DL (ref 12–16)
LACTATE BLDV-SCNC: 0.8 MMOL/L (ref 0.4–1.9)
LYMPHOCYTES # BLD: 0.7 K/UL (ref 1–5.1)
LYMPHOCYTES NFR BLD: 10 %
MCH RBC QN AUTO: 28.4 PG (ref 26–34)
MCHC RBC AUTO-ENTMCNC: 32.4 G/DL (ref 31–36)
MCV RBC AUTO: 87.5 FL (ref 80–100)
MONOCYTES # BLD: 0.7 K/UL (ref 0–1.3)
MONOCYTES NFR BLD: 9.7 %
NEUTROPHILS # BLD: 5.3 K/UL (ref 1.7–7.7)
NEUTROPHILS NFR BLD: 76.6 %
NT-PROBNP SERPL-MCNC: ABNORMAL PG/ML (ref 0–449)
PLATELET # BLD AUTO: 210 K/UL (ref 135–450)
PMV BLD AUTO: 8.1 FL (ref 5–10.5)
POTASSIUM SERPL-SCNC: 5 MMOL/L (ref 3.5–5.1)
PROCALCITONIN SERPL IA-MCNC: 0.21 NG/ML (ref 0–0.15)
PROT SERPL-MCNC: 7.6 G/DL (ref 6.4–8.2)
RBC # BLD AUTO: 3.56 M/UL (ref 4–5.2)
SODIUM SERPL-SCNC: 138 MMOL/L (ref 136–145)
WBC # BLD AUTO: 7 K/UL (ref 4–11)

## 2024-06-26 PROCEDURE — 1200000000 HC SEMI PRIVATE

## 2024-06-26 PROCEDURE — 83540 ASSAY OF IRON: CPT

## 2024-06-26 PROCEDURE — 86140 C-REACTIVE PROTEIN: CPT

## 2024-06-26 PROCEDURE — 83605 ASSAY OF LACTIC ACID: CPT

## 2024-06-26 PROCEDURE — 80053 COMPREHEN METABOLIC PANEL: CPT

## 2024-06-26 PROCEDURE — 85025 COMPLETE CBC W/AUTO DIFF WBC: CPT

## 2024-06-26 PROCEDURE — 6360000002 HC RX W HCPCS: Performed by: STUDENT IN AN ORGANIZED HEALTH CARE EDUCATION/TRAINING PROGRAM

## 2024-06-26 PROCEDURE — 87040 BLOOD CULTURE FOR BACTERIA: CPT

## 2024-06-26 PROCEDURE — 83880 ASSAY OF NATRIURETIC PEPTIDE: CPT

## 2024-06-26 PROCEDURE — 93005 ELECTROCARDIOGRAM TRACING: CPT | Performed by: PHYSICIAN ASSISTANT

## 2024-06-26 PROCEDURE — 99285 EMERGENCY DEPT VISIT HI MDM: CPT

## 2024-06-26 PROCEDURE — 6370000000 HC RX 637 (ALT 250 FOR IP): Performed by: STUDENT IN AN ORGANIZED HEALTH CARE EDUCATION/TRAINING PROGRAM

## 2024-06-26 PROCEDURE — 84145 PROCALCITONIN (PCT): CPT

## 2024-06-26 PROCEDURE — 71045 X-RAY EXAM CHEST 1 VIEW: CPT

## 2024-06-26 PROCEDURE — 2580000003 HC RX 258: Performed by: STUDENT IN AN ORGANIZED HEALTH CARE EDUCATION/TRAINING PROGRAM

## 2024-06-26 PROCEDURE — 85652 RBC SED RATE AUTOMATED: CPT

## 2024-06-26 PROCEDURE — 83550 IRON BINDING TEST: CPT

## 2024-06-26 RX ORDER — ONDANSETRON 4 MG/1
4 TABLET, ORALLY DISINTEGRATING ORAL EVERY 8 HOURS PRN
Status: DISCONTINUED | OUTPATIENT
Start: 2024-06-26 | End: 2024-07-01 | Stop reason: HOSPADM

## 2024-06-26 RX ORDER — SODIUM CHLORIDE 0.9 % (FLUSH) 0.9 %
5-40 SYRINGE (ML) INJECTION PRN
Status: DISCONTINUED | OUTPATIENT
Start: 2024-06-26 | End: 2024-07-01 | Stop reason: HOSPADM

## 2024-06-26 RX ORDER — POLYETHYLENE GLYCOL 3350 17 G/17G
17 POWDER, FOR SOLUTION ORAL DAILY PRN
Status: DISCONTINUED | OUTPATIENT
Start: 2024-06-26 | End: 2024-07-01 | Stop reason: HOSPADM

## 2024-06-26 RX ORDER — ONDANSETRON 2 MG/ML
4 INJECTION INTRAMUSCULAR; INTRAVENOUS EVERY 6 HOURS PRN
Status: DISCONTINUED | OUTPATIENT
Start: 2024-06-26 | End: 2024-07-01 | Stop reason: HOSPADM

## 2024-06-26 RX ORDER — ATORVASTATIN CALCIUM 40 MG/1
40 TABLET, FILM COATED ORAL NIGHTLY
Status: DISCONTINUED | OUTPATIENT
Start: 2024-06-26 | End: 2024-07-01 | Stop reason: HOSPADM

## 2024-06-26 RX ORDER — SODIUM CHLORIDE 0.9 % (FLUSH) 0.9 %
5-40 SYRINGE (ML) INJECTION EVERY 12 HOURS SCHEDULED
Status: DISCONTINUED | OUTPATIENT
Start: 2024-06-26 | End: 2024-07-01 | Stop reason: HOSPADM

## 2024-06-26 RX ORDER — FUROSEMIDE 10 MG/ML
20 INJECTION INTRAMUSCULAR; INTRAVENOUS 2 TIMES DAILY
Status: DISCONTINUED | OUTPATIENT
Start: 2024-06-26 | End: 2024-06-27

## 2024-06-26 RX ORDER — ACETAMINOPHEN 650 MG/1
650 SUPPOSITORY RECTAL EVERY 6 HOURS PRN
Status: DISCONTINUED | OUTPATIENT
Start: 2024-06-26 | End: 2024-07-01 | Stop reason: HOSPADM

## 2024-06-26 RX ORDER — LINEZOLID 2 MG/ML
600 INJECTION, SOLUTION INTRAVENOUS EVERY 12 HOURS
Status: COMPLETED | OUTPATIENT
Start: 2024-06-26 | End: 2024-07-01

## 2024-06-26 RX ORDER — ACETAMINOPHEN 325 MG/1
650 TABLET ORAL EVERY 6 HOURS PRN
Status: DISCONTINUED | OUTPATIENT
Start: 2024-06-26 | End: 2024-07-01 | Stop reason: HOSPADM

## 2024-06-26 RX ORDER — SODIUM CHLORIDE 9 MG/ML
INJECTION, SOLUTION INTRAVENOUS PRN
Status: DISCONTINUED | OUTPATIENT
Start: 2024-06-26 | End: 2024-07-01 | Stop reason: HOSPADM

## 2024-06-26 RX ADMIN — FUROSEMIDE 20 MG: 10 INJECTION, SOLUTION INTRAMUSCULAR; INTRAVENOUS at 22:21

## 2024-06-26 RX ADMIN — SODIUM CHLORIDE, PRESERVATIVE FREE 10 ML: 5 INJECTION INTRAVENOUS at 22:22

## 2024-06-26 RX ADMIN — LINEZOLID 600 MG: 600 INJECTION, SOLUTION INTRAVENOUS at 22:20

## 2024-06-26 RX ADMIN — CEFEPIME 1000 MG: 1 INJECTION, POWDER, FOR SOLUTION INTRAMUSCULAR; INTRAVENOUS at 23:51

## 2024-06-26 RX ADMIN — ATORVASTATIN CALCIUM 40 MG: 40 TABLET, FILM COATED ORAL at 22:21

## 2024-06-26 ASSESSMENT — PAIN SCALES - GENERAL: PAINLEVEL_OUTOF10: 0

## 2024-06-26 ASSESSMENT — PAIN - FUNCTIONAL ASSESSMENT: PAIN_FUNCTIONAL_ASSESSMENT: NONE - DENIES PAIN

## 2024-06-26 NOTE — TELEPHONE ENCOUNTER
Called Shirin   States her feet have been leaking for \"a couple of days\" and requiring her to place towels/blankets under her heels to absorb the liquid.   Feels as if she is not urinating as much since her stress test and swelling began after her stress test.    Taking Lasix 20 mg but has been \"taking extra\" tablets  Breathing okay  Hasn't weighed herself in several days   165 lbs on May 22 which she states is her normal weight   Of note recent intentional weight loss    Has not worn her lymphedema pumps \"in a while\"    States she has been awake since 8 am and hasn't urinated despite extra Lasix    Advised she proceed to the ER  She verbalized understanding.

## 2024-06-26 NOTE — ED PROVIDER NOTES
[facility]  EMERGENCY DEPARTMENT ENCOUNTER        Pt Name: Shirin Mathews  MRN: [id number]  Birthdate 1945  Date of evaluation: 6/26/2024  Provider: Zachery Cote PA-C  PCP: Bryn Daniel MD  Note Started: 6:34 PM EDT 6/26/24      YONAS. I have evaluated this patient.        CHIEF COMPLAINT       Chief Complaint   Patient presents with    Leg Swelling     Pt has chronic leg swelling, states she feels like the swelling is worse also reports difficulty urinating this morning, has since been able to go, pt denies any cp or sob       HISTORY OF PRESENT ILLNESS: 1 or more Elements     History From: patient  Limitations to history : None    Shirin Mathews is a 78 y.o. female who presents to the emergency department with a chief complaint of increased swelling in her legs with drainage of clear fluid coming from her feet.  She has history of atrial fibrillation anticoagulated on Xarelto, CHF on 20 mg of Lasix twice a day.  She has history of diabetes and kidney failure.  Denies any nausea, vomiting, fevers or any history of MRSA or skin infections in the past.  States she is now having some wounds and redness on her legs associated with this.  This began shortly after she had a stress test at the end of May.  Denies chest pain or shortness of breath.    Nursing Notes were all reviewed and agreed with or any disagreements were addressed in the HPI.    REVIEW OF SYSTEMS :      Review of Systems    Positives and Pertinent negatives as per HPI.     SURGICAL HISTORY     Past Surgical History:   Procedure Laterality Date    ABDOMEN SURGERY      CARDIAC SURGERY      2009    CATARACT REMOVAL WITH IMPLANT Left 11/23/2016    CATARACT REMOVAL WITH IMPLANT Right 11/02/2016    CATARACT REMOVAL WITH IMPLANT Bilateral 10/16, 11/16    CHOLECYSTECTOMY      COLONOSCOPY      ENDOSCOPY, COLON, DIAGNOSTIC      EYE SURGERY      left tear duct surgery    JOINT REPLACEMENT      BTKR    KNEE ARTHROPLASTY  09/15/2010

## 2024-06-26 NOTE — TELEPHONE ENCOUNTER
hSirin called the office to report that she has leaking in her legs and feet. She shared that she has a towel under her feet currently that is soaking up the water. When asked when did this issue first occur, she states earlier today.     She shared that her feet are swollen, she has also taken her medications, especially her Lasix. Liquid in question is clear, not blood, minimal odor.    Patient states that the time before she was holding water she had a catheter at [facility] and was inpatient for 13 days. She was told that she had lymphedema and CHF by separate doctors.    Patient states that she is not voiding as normal and does have kidney issues.    Patient had recent stress test and believes that her symptoms are a result of this issue.     Please advise:   [phone]

## 2024-06-27 LAB
ANION GAP SERPL CALCULATED.3IONS-SCNC: 13 MMOL/L (ref 3–16)
BACTERIA URNS QL MICRO: NORMAL /HPF
BASE EXCESS BLDV CALC-SCNC: -5.3 MMOL/L (ref -3–3)
BASOPHILS # BLD: 0.1 K/UL (ref 0–0.2)
BASOPHILS NFR BLD: 0.9 %
BILIRUB UR QL STRIP.AUTO: NEGATIVE
BUN SERPL-MCNC: 84 MG/DL (ref 7–20)
CALCIUM SERPL-MCNC: 8.8 MG/DL (ref 8.3–10.6)
CHLORIDE SERPL-SCNC: 107 MMOL/L (ref 99–110)
CHOLEST SERPL-MCNC: 65 MG/DL (ref 0–199)
CLARITY UR: CLEAR
CO2 BLDV-SCNC: 43 MMOL/L
CO2 SERPL-SCNC: 17 MMOL/L (ref 21–32)
COHGB MFR BLDV: 2.6 % (ref 0–1.5)
COLOR UR: YELLOW
CREAT SERPL-MCNC: 3.2 MG/DL (ref 0.6–1.2)
DEPRECATED RDW RBC AUTO: 16 % (ref 12.4–15.4)
EKG DIAGNOSIS: NORMAL
EKG Q-T INTERVAL: 428 MS
EKG QRS DURATION: 162 MS
EKG QTC CALCULATION (BAZETT): 500 MS
EKG R AXIS: -83 DEGREES
EKG T AXIS: 61 DEGREES
EKG VENTRICULAR RATE: 82 BPM
EOSINOPHIL # BLD: 0.2 K/UL (ref 0–0.6)
EOSINOPHIL NFR BLD: 3.3 %
EPI CELLS #/AREA URNS AUTO: 1 /HPF (ref 0–5)
GFR SERPLBLD CREATININE-BSD FMLA CKD-EPI: 14 ML/MIN/{1.73_M2}
GLUCOSE SERPL-MCNC: 116 MG/DL (ref 70–99)
GLUCOSE UR STRIP.AUTO-MCNC: NEGATIVE MG/DL
HCO3 BLDV-SCNC: 18.2 MMOL/L (ref 23–29)
HCT VFR BLD AUTO: 27.1 % (ref 36–48)
HDLC SERPL-MCNC: 30 MG/DL (ref 40–60)
HGB BLD-MCNC: 9 G/DL (ref 12–16)
HGB UR QL STRIP.AUTO: NEGATIVE
HYALINE CASTS #/AREA URNS AUTO: 2 /LPF (ref 0–8)
IRON SATN MFR SERPL: 9 % (ref 15–50)
IRON SERPL-MCNC: 23 UG/DL (ref 37–145)
KETONES UR STRIP.AUTO-MCNC: NEGATIVE MG/DL
LDLC SERPL CALC-MCNC: 24 MG/DL
LEUKOCYTE ESTERASE UR QL STRIP.AUTO: NEGATIVE
LYMPHOCYTES # BLD: 0.7 K/UL (ref 1–5.1)
LYMPHOCYTES NFR BLD: 10.7 %
MAGNESIUM SERPL-MCNC: 1.8 MG/DL (ref 1.8–2.4)
MCH RBC QN AUTO: 28.9 PG (ref 26–34)
MCHC RBC AUTO-ENTMCNC: 33.2 G/DL (ref 31–36)
MCV RBC AUTO: 87.1 FL (ref 80–100)
METHGB MFR BLDV: 0.4 %
MONOCYTES # BLD: 0.7 K/UL (ref 0–1.3)
MONOCYTES NFR BLD: 11.3 %
NEUTROPHILS # BLD: 4.8 K/UL (ref 1.7–7.7)
NEUTROPHILS NFR BLD: 73.8 %
NITRITE UR QL STRIP.AUTO: NEGATIVE
O2 CT VFR BLDV CALC: 13 VOL %
O2 THERAPY: ABNORMAL
PCO2 BLDV: 28 MMHG (ref 40–50)
PH BLDV: 7.42 [PH] (ref 7.35–7.45)
PH UR STRIP.AUTO: 5 [PH] (ref 5–8)
PLATELET # BLD AUTO: 185 K/UL (ref 135–450)
PMV BLD AUTO: 7.9 FL (ref 5–10.5)
PO2 BLDV: 165 MMHG (ref 25–40)
POTASSIUM SERPL-SCNC: 4.9 MMOL/L (ref 3.5–5.1)
PROT UR STRIP.AUTO-MCNC: ABNORMAL MG/DL
RBC # BLD AUTO: 3.11 M/UL (ref 4–5.2)
RBC CLUMPS #/AREA URNS AUTO: 0 /HPF (ref 0–4)
SAO2 % BLDV: 100 %
SODIUM SERPL-SCNC: 137 MMOL/L (ref 136–145)
SP GR UR STRIP.AUTO: 1.01 (ref 1–1.03)
TIBC SERPL-MCNC: 249 UG/DL (ref 260–445)
TRIGL SERPL-MCNC: 55 MG/DL (ref 0–150)
UA COMPLETE W REFLEX CULTURE PNL UR: ABNORMAL
UA DIPSTICK W REFLEX MICRO PNL UR: YES
URN SPEC COLLECT METH UR: ABNORMAL
UROBILINOGEN UR STRIP-ACNC: 0.2 E.U./DL
VLDLC SERPL CALC-MCNC: 11 MG/DL
WBC # BLD AUTO: 6.5 K/UL (ref 4–11)
WBC #/AREA URNS AUTO: 1 /HPF (ref 0–5)

## 2024-06-27 PROCEDURE — 81001 URINALYSIS AUTO W/SCOPE: CPT

## 2024-06-27 PROCEDURE — 2580000003 HC RX 258: Performed by: STUDENT IN AN ORGANIZED HEALTH CARE EDUCATION/TRAINING PROGRAM

## 2024-06-27 PROCEDURE — 1200000000 HC SEMI PRIVATE

## 2024-06-27 PROCEDURE — 82803 BLOOD GASES ANY COMBINATION: CPT

## 2024-06-27 PROCEDURE — 92610 EVALUATE SWALLOWING FUNCTION: CPT

## 2024-06-27 PROCEDURE — 97166 OT EVAL MOD COMPLEX 45 MIN: CPT

## 2024-06-27 PROCEDURE — 97530 THERAPEUTIC ACTIVITIES: CPT

## 2024-06-27 PROCEDURE — 51798 US URINE CAPACITY MEASURE: CPT

## 2024-06-27 PROCEDURE — 97162 PT EVAL MOD COMPLEX 30 MIN: CPT

## 2024-06-27 PROCEDURE — 97535 SELF CARE MNGMENT TRAINING: CPT

## 2024-06-27 PROCEDURE — 85025 COMPLETE CBC W/AUTO DIFF WBC: CPT

## 2024-06-27 PROCEDURE — 80048 BASIC METABOLIC PNL TOTAL CA: CPT

## 2024-06-27 PROCEDURE — 36415 COLL VENOUS BLD VENIPUNCTURE: CPT

## 2024-06-27 PROCEDURE — 6370000000 HC RX 637 (ALT 250 FOR IP): Performed by: STUDENT IN AN ORGANIZED HEALTH CARE EDUCATION/TRAINING PROGRAM

## 2024-06-27 PROCEDURE — 97116 GAIT TRAINING THERAPY: CPT

## 2024-06-27 PROCEDURE — 92526 ORAL FUNCTION THERAPY: CPT

## 2024-06-27 PROCEDURE — 83735 ASSAY OF MAGNESIUM: CPT

## 2024-06-27 PROCEDURE — 6360000002 HC RX W HCPCS: Performed by: STUDENT IN AN ORGANIZED HEALTH CARE EDUCATION/TRAINING PROGRAM

## 2024-06-27 PROCEDURE — 80061 LIPID PANEL: CPT

## 2024-06-27 PROCEDURE — 93010 ELECTROCARDIOGRAM REPORT: CPT | Performed by: INTERNAL MEDICINE

## 2024-06-27 RX ORDER — CARVEDILOL 6.25 MG/1
6.25 TABLET ORAL 2 TIMES DAILY
Status: DISCONTINUED | OUTPATIENT
Start: 2024-06-27 | End: 2024-07-01 | Stop reason: HOSPADM

## 2024-06-27 RX ORDER — FUROSEMIDE 10 MG/ML
20 INJECTION INTRAMUSCULAR; INTRAVENOUS 2 TIMES DAILY
Status: DISCONTINUED | OUTPATIENT
Start: 2024-06-28 | End: 2024-06-28

## 2024-06-27 RX ADMIN — ATORVASTATIN CALCIUM 40 MG: 40 TABLET, FILM COATED ORAL at 22:27

## 2024-06-27 RX ADMIN — FUROSEMIDE 20 MG: 10 INJECTION, SOLUTION INTRAMUSCULAR; INTRAVENOUS at 09:33

## 2024-06-27 RX ADMIN — CEFEPIME 1000 MG: 1 INJECTION, POWDER, FOR SOLUTION INTRAMUSCULAR; INTRAVENOUS at 22:40

## 2024-06-27 RX ADMIN — LINEZOLID 600 MG: 600 INJECTION, SOLUTION INTRAVENOUS at 22:40

## 2024-06-27 RX ADMIN — CARVEDILOL 6.25 MG: 6.25 TABLET, FILM COATED ORAL at 22:27

## 2024-06-27 RX ADMIN — SODIUM CHLORIDE, PRESERVATIVE FREE 10 ML: 5 INJECTION INTRAVENOUS at 22:28

## 2024-06-27 RX ADMIN — RIVAROXABAN 15 MG: 15 TABLET, FILM COATED ORAL at 18:46

## 2024-06-27 RX ADMIN — CARVEDILOL 6.25 MG: 6.25 TABLET, FILM COATED ORAL at 09:33

## 2024-06-27 RX ADMIN — LINEZOLID 600 MG: 600 INJECTION, SOLUTION INTRAVENOUS at 09:52

## 2024-06-27 RX ADMIN — CEFEPIME 1000 MG: 1 INJECTION, POWDER, FOR SOLUTION INTRAMUSCULAR; INTRAVENOUS at 09:36

## 2024-06-27 ASSESSMENT — PAIN SCALES - GENERAL
PAINLEVEL_OUTOF10: 0

## 2024-06-27 ASSESSMENT — PAIN SCALES - WONG BAKER
WONGBAKER_NUMERICALRESPONSE: NO HURT

## 2024-06-27 NOTE — H&P
DAILY 5/9/24   Nayeli Baker DO   rivaroxaban (XARELTO) 15 MG TABS tablet TAKE ONE TABLET BY MOUTH DAILY 7/17/23   Nayeli Baker DO   hydrALAZINE (APRESOLINE) 25 MG tablet Take 1 tablet by mouth daily as needed (For BP >140/90)  Patient taking differently: Take 5 mg by mouth daily as needed (For BP >140/90) 5/9/23   Nayeli Baker DO   guaiFENesin (MUCINEX PO) Take by mouth    Cleveland Ruff MD   polyethyl glycol-propyl glycol 0.4-0.3 % (SYSTANE) 0.4-0.3 % ophthalmic solution 1 drop as needed for Dry Eyes    Cleveland Ruff MD   UNABLE TO FIND Izagu[employer] Brand antacid    Cleveland Ruff MD   Handicap Placard MISC by Does not apply route Sob when walking less than 200 feet  Length of time--greater than 5 year 4/17/19   Tl Hope MD   ferrous sulfate 325 (65 Fe) MG tablet Take 1 tablet by mouth 3 times daily (with meals)  Patient taking differently: Take 1 tablet by mouth 2 times daily (with meals) 4/3/19   Jaelyn Min MD   vitamin D (CHOLECALCIFEROL) 400 UNITS TABS tablet Take 1 tablet by mouth daily    Cleveland Ruff MD   therapeutic multivitamin-minerals (THERAGRAN-M) tablet Take 1 tablet by mouth daily    Cleveland Ruff MD   hydrOXYzine (ATARAX) 25 MG tablet Take 1 tablet by mouth 3 times daily as needed 8/9/10   Cleveland Ruff MD       Physical Exam:    Physical Exam     General: NAD  Eyes: EOMI  ENT: neck supple  Cardiovascular: Regular rate.  Respiratory: Clear to auscultation  Gastrointestinal: Soft, non tender  Genitourinary: no suprapubic tenderness  Musculoskeletal: Bilateral lower extremity edema present    Skin: warm, dry  Neuro: Alert.  Psych: Mood appropriate.       Past Medical History:   PMHx   Past Medical History:   Diagnosis Date    Acute idiopathic gout of left hand 5/6/2021    Acute idiopathic gout of right hand 5/6/2021    Arthritis     Blood circulation, collateral     Blood transfusion     CAD (coronary artery disease)

## 2024-06-27 NOTE — CARE COORDINATION
Case Management Assessment  Initial Evaluation    Date/Time of Evaluation: 6/27/2024 1:32 PM  Assessment Completed by: TYRA Wolfe    If patient is discharged prior to next notation, then this note serves as note for discharge by case management.    Patient Name: Shirin Mathews                   YOB: 1945  Diagnosis: Cellulitis [L03.90]  Peripheral edema [R60.0]  CHANO (acute kidney injury) (HCC) [N17.9]  Cellulitis of lower extremity, unspecified laterality [L03.119]                   Date / Time: 6/26/2024  5:05 PM    Patient Admission Status: Inpatient   Readmission Risk (Low < 19, Mod (19-27), High > 27): Readmission Risk Score: 20    Current PCP: Bryn Daniel MD  PCP verified by CM? Yes    Chart Reviewed: Yes      History Provided by: Patient, Significant Other  Patient Orientation: Alert and Oriented    Patient Cognition: Alert    Hospitalization in the last 30 days (Readmission):  No    If yes, Readmission Assessment in CM Navigator will be completed.    Advance Directives:      Code Status: Full Code   Patient's Primary Decision Maker is: Named in Scanned ACP Document    Primary Decision Maker: Escobar Mathews - Spouse - [phone]    Discharge Planning:    Patient lives with: Spouse/Significant Other Type of Home: House  Primary Care Giver: Spouse  Patient Support Systems include: Spouse/Significant Other   Current Financial resources: Medicare  Current community resources: None  Current services prior to admission: Durable Medical Equipment            Current DME: Walker, Cane (Grab bars in the bathroom)            Type of Home Care services:  None    ADLS  Prior functional level: Assistance with the following:, Housework, Cooking, Bathing, Dressing, Toileting, Mobility, Shopping  Current functional level: Assistance with the following:, Bathing, Dressing, Toileting, Housework, Cooking, Shopping, Mobility    PT AM-PAC: 11 /24  OT AM-PAC: 14 /24    Family can provide assistance at

## 2024-06-27 NOTE — CARE COORDINATION
[facility] Nurse  Consult Note       NAME:  Shirin Mathews  MEDICAL RECORD NUMBER:  [id number]  AGE: 78 y.o.   GENDER: female  : 1945  TODAY'S DATE:  2024    Subjective   Reason for WOCN Evaluation and Assessment: wounds to buttocks, bilateral legs      Shirin Mathews is a 78 y.o. female referred by:   [x] Physician  [x] Nursing  [] Other:     Wound Identification:  Wound Type: venous and pressure  Contributing Factors: edema, venous stasis, and decreased mobility    Wound History: Patient reports her legs started swelling and then the multiple sores occurred and increased in size as the swelling increased. Patient was unaware that she had any wounds to buttocks.  Current Wound Care Treatment:  none    Patient Goal of Care:  [x] Wound Healing  [] Odor Control  [] Palliative Care  [] Pain Control   [] Other:         PAST MEDICAL HISTORY        Diagnosis Date    Acute idiopathic gout of left hand 2021    Acute idiopathic gout of right hand 2021    Arthritis     Blood circulation, collateral     Blood transfusion     CAD (coronary artery disease)     CHF (congestive heart failure) (HCC)     Chronic renal failure, stage 3 (moderate) (HCC)     Diabetes mellitus (HCC)     GERD (gastroesophageal reflux disease)     Hyperlipidemia     Hypertension     Leg swelling 2018    Lymphedema 2018    Pressure ulcer of both heels 2021    Deep tissue injuries to right and left heels with left>right.  Date of origin unknown.    Pressure ulcer of left heel, stage 3 (HCC) 2021    Ulcer of right leg, limited to breakdown of skin ([facility]) 2018       PAST SURGICAL HISTORY    Past Surgical History:   Procedure Laterality Date    ABDOMEN SURGERY      CARDIAC SURGERY          CATARACT REMOVAL WITH IMPLANT Left 2016    CATARACT REMOVAL WITH IMPLANT Right 2016    CATARACT REMOVAL WITH IMPLANT Bilateral 10/16,     CHOLECYSTECTOMY      COLONOSCOPY

## 2024-06-27 NOTE — DISCHARGE INSTRUCTIONS
The [place] Sleepiness Scale        The [place] Sleepiness Scale is widely used in the field of sleep medicine as a subjective measure of a patient's sleepiness. The test is a list of eight situations in which you rate your tendency to become sleepy on a scale of 0, no chance to 3, high chance of dozing. Your score is based on a scale of 0 to 24. The scale estimates whether you are experiencing excessive sleepiness that possibly requires medical attention.      How Sleepy Are You?  How sleepy are you to doze off or fall asleep in the following situations? You should rate your chances of dozing off, not just feeling tired. Even if you have not done some of these things recently try to determine how they would have affected you. For each situation, decide whether or not you would have:      0 = No chance of dozing         1 = Slight chance of dozing      2 = Moderate chance of  dozing         3 = High change of dozing                  Situation                                                                                                                                                                                                                                                       Chance of Dozing    Sitting and reading                                                                                                                            0 =  []  1 =    [] 2 =    [] 3 =    [x]     Watching TV                                                                                                                                     0 =  []  1 =    [] 2 =    [] 3 =    [x]                                                                                                                                                              Sitting inactive in public place (e.g., a theater or a meeting)                                                            0 =  [x]  1 =    [] 2 =    [] 3 =    []     As a passenger in a car for

## 2024-06-27 NOTE — DISCHARGE INSTR - COC
Continuity of Care Form  HF Pathway  _x_ Cardiovascular Assessment. Titrate O2 to keep SaO2 greater than 90%    _x_ Daily Weights- Baseline Wt: 165 lb   Call MD if:   3 pound weight gain or loss in one day OR 5 pound weight gain in one week       _x_No added salt diet (3-4 G sodium) and 64 oz fluid restriction      _x_ Med List attached:   Hold Coreg/Metoprolol if HR less than 45 or patient symptomatic*   Hold ACE/ARB if SBP less than 85 or patient symptomatic*   Do not hold Spironolactone (aldactone) for hypotension/bradycardia   Call MD for questions: Dr. Baker [phone]    _x_Follow up appointment with cardiology: date/time: Dr. Baker 7/10 at 1pm      PRIOR TO DISCHARGE HOME FROM FACILITY PLEASE NOTIFY CHF TEAM- CALL [phone] AND LEAVE A MESSAGE    Patient Name: Shirin Mathews   :  1945  MRN:  [id number]    Admit date:  2024  Discharge date:  24    Code Status Order: Full Code   Advance Directives:     Admitting Physician:  Prem Oh MD  PCP: Bryn Daniel MD    Discharging Nurse: Yoel  Discharging Hospital Unit/Room#: 5TN-5582/5582-01  Discharging Unit Phone Number: [phone]    Emergency Contact:   Extended Emergency Contact Information  Primary Emergency Contact: BisiEscobar  Address: [address] [place] [address]            [address]  Home Phone: [phone]  Relation: Spouse    Past Surgical History:  Past Surgical History:   Procedure Laterality Date    ABDOMEN SURGERY      CARDIAC SURGERY          CATARACT REMOVAL WITH IMPLANT Left 2016    CATARACT REMOVAL WITH IMPLANT Right 2016    CATARACT REMOVAL WITH IMPLANT Bilateral 10/16,     CHOLECYSTECTOMY      COLONOSCOPY      ENDOSCOPY, COLON, DIAGNOSTIC      EYE SURGERY      left tear duct surgery    JOINT REPLACEMENT      BTKR    KNEE ARTHROPLASTY  09/15/2010    left total knee    TOTAL KNEE ARTHROPLASTY      VASCULAR SURGERY         Immunization History:   Immunization History

## 2024-06-27 NOTE — PLAN OF CARE
Problem: Pain  Goal: Verbalizes/displays adequate comfort level or baseline comfort level  Outcome: Progressing  Flowsheets (Taken 6/26/2024 2245)  Verbalizes/displays adequate comfort level or baseline comfort level:   Encourage patient to monitor pain and request assistance   Assess pain using appropriate pain scale   Administer analgesics based on type and severity of pain and evaluate response   Implement non-pharmacological measures as appropriate and evaluate response   Consider cultural and social influences on pain and pain management   Notify Licensed Independent Practitioner if interventions unsuccessful or patient reports new pain     Problem: Safety - Adult  Goal: Free from fall injury  Outcome: Progressing  Flowsheets (Taken 6/26/2024 2245)  Free From Fall Injury: Instruct family/caregiver on patient safety

## 2024-06-28 LAB
ALBUMIN SERPL-MCNC: 2.6 G/DL (ref 3.4–5)
ANION GAP SERPL CALCULATED.3IONS-SCNC: 13 MMOL/L (ref 3–16)
BASOPHILS # BLD: 0 K/UL (ref 0–0.2)
BASOPHILS NFR BLD: 0.8 %
BUN SERPL-MCNC: 82 MG/DL (ref 7–20)
CALCIUM SERPL-MCNC: 8.6 MG/DL (ref 8.3–10.6)
CHLORIDE SERPL-SCNC: 107 MMOL/L (ref 99–110)
CO2 SERPL-SCNC: 17 MMOL/L (ref 21–32)
CREAT SERPL-MCNC: 3.2 MG/DL (ref 0.6–1.2)
DEPRECATED RDW RBC AUTO: 16.1 % (ref 12.4–15.4)
EOSINOPHIL # BLD: 0.2 K/UL (ref 0–0.6)
EOSINOPHIL NFR BLD: 3.9 %
GFR SERPLBLD CREATININE-BSD FMLA CKD-EPI: 14 ML/MIN/{1.73_M2}
GLUCOSE SERPL-MCNC: 112 MG/DL (ref 70–99)
HCT VFR BLD AUTO: 27.6 % (ref 36–48)
HGB BLD-MCNC: 9.1 G/DL (ref 12–16)
LYMPHOCYTES # BLD: 0.9 K/UL (ref 1–5.1)
LYMPHOCYTES NFR BLD: 14.8 %
MAGNESIUM SERPL-MCNC: 1.8 MG/DL (ref 1.8–2.4)
MCH RBC QN AUTO: 28.7 PG (ref 26–34)
MCHC RBC AUTO-ENTMCNC: 32.9 G/DL (ref 31–36)
MCV RBC AUTO: 87.3 FL (ref 80–100)
MONOCYTES # BLD: 0.7 K/UL (ref 0–1.3)
MONOCYTES NFR BLD: 11.4 %
NEUTROPHILS # BLD: 4 K/UL (ref 1.7–7.7)
NEUTROPHILS NFR BLD: 69.1 %
PHOSPHATE SERPL-MCNC: 4.9 MG/DL (ref 2.5–4.9)
PLATELET # BLD AUTO: 195 K/UL (ref 135–450)
PMV BLD AUTO: 7.8 FL (ref 5–10.5)
POTASSIUM SERPL-SCNC: 4.9 MMOL/L (ref 3.5–5.1)
RBC # BLD AUTO: 3.16 M/UL (ref 4–5.2)
SODIUM SERPL-SCNC: 137 MMOL/L (ref 136–145)
WBC # BLD AUTO: 5.8 K/UL (ref 4–11)

## 2024-06-28 PROCEDURE — 97535 SELF CARE MNGMENT TRAINING: CPT

## 2024-06-28 PROCEDURE — 6360000002 HC RX W HCPCS: Performed by: STUDENT IN AN ORGANIZED HEALTH CARE EDUCATION/TRAINING PROGRAM

## 2024-06-28 PROCEDURE — 83735 ASSAY OF MAGNESIUM: CPT

## 2024-06-28 PROCEDURE — 85025 COMPLETE CBC W/AUTO DIFF WBC: CPT

## 2024-06-28 PROCEDURE — 80069 RENAL FUNCTION PANEL: CPT

## 2024-06-28 PROCEDURE — 97530 THERAPEUTIC ACTIVITIES: CPT

## 2024-06-28 PROCEDURE — 97110 THERAPEUTIC EXERCISES: CPT

## 2024-06-28 PROCEDURE — 2580000003 HC RX 258: Performed by: STUDENT IN AN ORGANIZED HEALTH CARE EDUCATION/TRAINING PROGRAM

## 2024-06-28 PROCEDURE — 36415 COLL VENOUS BLD VENIPUNCTURE: CPT

## 2024-06-28 PROCEDURE — 1200000000 HC SEMI PRIVATE

## 2024-06-28 PROCEDURE — 97116 GAIT TRAINING THERAPY: CPT

## 2024-06-28 PROCEDURE — 6370000000 HC RX 637 (ALT 250 FOR IP): Performed by: STUDENT IN AN ORGANIZED HEALTH CARE EDUCATION/TRAINING PROGRAM

## 2024-06-28 RX ORDER — FUROSEMIDE 10 MG/ML
20 INJECTION INTRAMUSCULAR; INTRAVENOUS DAILY
Status: DISCONTINUED | OUTPATIENT
Start: 2024-06-29 | End: 2024-07-01 | Stop reason: HOSPADM

## 2024-06-28 RX ADMIN — LINEZOLID 600 MG: 600 INJECTION, SOLUTION INTRAVENOUS at 09:52

## 2024-06-28 RX ADMIN — CEFEPIME 1000 MG: 1 INJECTION, POWDER, FOR SOLUTION INTRAMUSCULAR; INTRAVENOUS at 11:39

## 2024-06-28 RX ADMIN — CARVEDILOL 6.25 MG: 6.25 TABLET, FILM COATED ORAL at 09:52

## 2024-06-28 RX ADMIN — LINEZOLID 600 MG: 600 INJECTION, SOLUTION INTRAVENOUS at 21:27

## 2024-06-28 RX ADMIN — ATORVASTATIN CALCIUM 40 MG: 40 TABLET, FILM COATED ORAL at 21:18

## 2024-06-28 RX ADMIN — CARVEDILOL 6.25 MG: 6.25 TABLET, FILM COATED ORAL at 21:18

## 2024-06-28 RX ADMIN — SODIUM CHLORIDE, PRESERVATIVE FREE 10 ML: 5 INJECTION INTRAVENOUS at 21:24

## 2024-06-28 RX ADMIN — RIVAROXABAN 15 MG: 15 TABLET, FILM COATED ORAL at 17:48

## 2024-06-28 RX ADMIN — SODIUM CHLORIDE, PRESERVATIVE FREE 10 ML: 5 INJECTION INTRAVENOUS at 09:57

## 2024-06-28 ASSESSMENT — PAIN SCALES - GENERAL
PAINLEVEL_OUTOF10: 0
PAINLEVEL_OUTOF10: 0
PAINLEVEL_OUTOF10: 8

## 2024-06-28 ASSESSMENT — PAIN SCALES - WONG BAKER
WONGBAKER_NUMERICALRESPONSE: NO HURT

## 2024-06-28 ASSESSMENT — PAIN DESCRIPTION - LOCATION: LOCATION: KNEE

## 2024-06-28 NOTE — DISCHARGE INSTR - DIET

## 2024-06-28 NOTE — CARE COORDINATION
[facility]    Plan Auth ID:    [facility] Auth ID:  [id number]   Service:  SNF  Approval Dates:  06/28/2024-07/02/2024   Next Review Date:  07/02/2024       Electronically signed by TYRA Wolfe on 6/28/2024 at 1:05 PM

## 2024-06-29 LAB
ALBUMIN SERPL-MCNC: 2.5 G/DL (ref 3.4–5)
ANION GAP SERPL CALCULATED.3IONS-SCNC: 14 MMOL/L (ref 3–16)
BASOPHILS # BLD: 0.1 K/UL (ref 0–0.2)
BASOPHILS NFR BLD: 1.1 %
BUN SERPL-MCNC: 82 MG/DL (ref 7–20)
CALCIUM SERPL-MCNC: 8.6 MG/DL (ref 8.3–10.6)
CHLORIDE SERPL-SCNC: 107 MMOL/L (ref 99–110)
CO2 SERPL-SCNC: 16 MMOL/L (ref 21–32)
CREAT SERPL-MCNC: 3.6 MG/DL (ref 0.6–1.2)
DEPRECATED RDW RBC AUTO: 16.2 % (ref 12.4–15.4)
EOSINOPHIL # BLD: 0.2 K/UL (ref 0–0.6)
EOSINOPHIL NFR BLD: 3.2 %
GFR SERPLBLD CREATININE-BSD FMLA CKD-EPI: 12 ML/MIN/{1.73_M2}
GLUCOSE SERPL-MCNC: 114 MG/DL (ref 70–99)
HCT VFR BLD AUTO: 27.2 % (ref 36–48)
HGB BLD-MCNC: 8.9 G/DL (ref 12–16)
LYMPHOCYTES # BLD: 0.8 K/UL (ref 1–5.1)
LYMPHOCYTES NFR BLD: 12.1 %
MAGNESIUM SERPL-MCNC: 1.9 MG/DL (ref 1.8–2.4)
MCH RBC QN AUTO: 28.6 PG (ref 26–34)
MCHC RBC AUTO-ENTMCNC: 32.6 G/DL (ref 31–36)
MCV RBC AUTO: 87.7 FL (ref 80–100)
MONOCYTES # BLD: 0.7 K/UL (ref 0–1.3)
MONOCYTES NFR BLD: 11.9 %
NEUTROPHILS # BLD: 4.5 K/UL (ref 1.7–7.7)
NEUTROPHILS NFR BLD: 71.7 %
NT-PROBNP SERPL-MCNC: ABNORMAL PG/ML (ref 0–449)
PHOSPHATE SERPL-MCNC: 4.7 MG/DL (ref 2.5–4.9)
PLATELET # BLD AUTO: 203 K/UL (ref 135–450)
PMV BLD AUTO: 7.8 FL (ref 5–10.5)
POTASSIUM SERPL-SCNC: 4.7 MMOL/L (ref 3.5–5.1)
RBC # BLD AUTO: 3.1 M/UL (ref 4–5.2)
SODIUM SERPL-SCNC: 137 MMOL/L (ref 136–145)
WBC # BLD AUTO: 6.3 K/UL (ref 4–11)

## 2024-06-29 PROCEDURE — 83880 ASSAY OF NATRIURETIC PEPTIDE: CPT

## 2024-06-29 PROCEDURE — 80069 RENAL FUNCTION PANEL: CPT

## 2024-06-29 PROCEDURE — 6360000002 HC RX W HCPCS: Performed by: STUDENT IN AN ORGANIZED HEALTH CARE EDUCATION/TRAINING PROGRAM

## 2024-06-29 PROCEDURE — 83735 ASSAY OF MAGNESIUM: CPT

## 2024-06-29 PROCEDURE — 85025 COMPLETE CBC W/AUTO DIFF WBC: CPT

## 2024-06-29 PROCEDURE — 6360000002 HC RX W HCPCS: Performed by: INTERNAL MEDICINE

## 2024-06-29 PROCEDURE — 36415 COLL VENOUS BLD VENIPUNCTURE: CPT

## 2024-06-29 PROCEDURE — 2580000003 HC RX 258: Performed by: STUDENT IN AN ORGANIZED HEALTH CARE EDUCATION/TRAINING PROGRAM

## 2024-06-29 PROCEDURE — 6370000000 HC RX 637 (ALT 250 FOR IP): Performed by: STUDENT IN AN ORGANIZED HEALTH CARE EDUCATION/TRAINING PROGRAM

## 2024-06-29 PROCEDURE — 1200000000 HC SEMI PRIVATE

## 2024-06-29 RX ADMIN — CARVEDILOL 6.25 MG: 6.25 TABLET, FILM COATED ORAL at 09:11

## 2024-06-29 RX ADMIN — LINEZOLID 600 MG: 600 INJECTION, SOLUTION INTRAVENOUS at 09:10

## 2024-06-29 RX ADMIN — ATORVASTATIN CALCIUM 40 MG: 40 TABLET, FILM COATED ORAL at 20:52

## 2024-06-29 RX ADMIN — RIVAROXABAN 15 MG: 15 TABLET, FILM COATED ORAL at 17:14

## 2024-06-29 RX ADMIN — SODIUM CHLORIDE, PRESERVATIVE FREE 10 ML: 5 INJECTION INTRAVENOUS at 09:11

## 2024-06-29 RX ADMIN — LINEZOLID 600 MG: 600 INJECTION, SOLUTION INTRAVENOUS at 20:56

## 2024-06-29 RX ADMIN — SODIUM CHLORIDE, PRESERVATIVE FREE 10 ML: 5 INJECTION INTRAVENOUS at 20:54

## 2024-06-29 RX ADMIN — FUROSEMIDE 20 MG: 10 INJECTION, SOLUTION INTRAMUSCULAR; INTRAVENOUS at 09:10

## 2024-06-29 RX ADMIN — CARVEDILOL 6.25 MG: 6.25 TABLET, FILM COATED ORAL at 20:52

## 2024-06-29 ASSESSMENT — PAIN SCALES - WONG BAKER: WONGBAKER_NUMERICALRESPONSE: NO HURT

## 2024-06-29 NOTE — PLAN OF CARE
Problem: Pain  Goal: Verbalizes/displays adequate comfort level or baseline comfort level  Outcome: Progressing     Problem: Skin/Tissue Integrity  Goal: Absence of new skin breakdown  Description: 1.  Monitor for areas of redness and/or skin breakdown  2.  Assess vascular access sites hourly  3.  Every 4-6 hours minimum:  Change oxygen saturation probe site  4.  Every 4-6 hours:  If on nasal continuous positive airway pressure, respiratory therapy assess nares and determine need for appliance change or resting period.  Outcome: Progressing     Problem: Safety - Adult  Goal: Free from fall injury  Outcome: Progressing     Problem: ABCDS Injury Assessment  Goal: Absence of physical injury  Outcome: Progressing     Problem: Nutrition Deficit:  Goal: Optimize nutritional status  Outcome: Progressing

## 2024-06-30 LAB
ALBUMIN SERPL-MCNC: 2.7 G/DL (ref 3.4–5)
ANION GAP SERPL CALCULATED.3IONS-SCNC: 15 MMOL/L (ref 3–16)
BACTERIA BLD CULT ORG #2: NORMAL
BACTERIA BLD CULT: NORMAL
BUN SERPL-MCNC: 78 MG/DL (ref 7–20)
CALCIUM SERPL-MCNC: 8.7 MG/DL (ref 8.3–10.6)
CHLORIDE SERPL-SCNC: 103 MMOL/L (ref 99–110)
CO2 SERPL-SCNC: 16 MMOL/L (ref 21–32)
CREAT SERPL-MCNC: 3.4 MG/DL (ref 0.6–1.2)
GFR SERPLBLD CREATININE-BSD FMLA CKD-EPI: 13 ML/MIN/{1.73_M2}
GLUCOSE SERPL-MCNC: 119 MG/DL (ref 70–99)
MAGNESIUM SERPL-MCNC: 2 MG/DL (ref 1.8–2.4)
PHOSPHATE SERPL-MCNC: 4.8 MG/DL (ref 2.5–4.9)
POTASSIUM SERPL-SCNC: 4.5 MMOL/L (ref 3.5–5.1)
SODIUM SERPL-SCNC: 134 MMOL/L (ref 136–145)

## 2024-06-30 PROCEDURE — 6370000000 HC RX 637 (ALT 250 FOR IP): Performed by: STUDENT IN AN ORGANIZED HEALTH CARE EDUCATION/TRAINING PROGRAM

## 2024-06-30 PROCEDURE — 36415 COLL VENOUS BLD VENIPUNCTURE: CPT

## 2024-06-30 PROCEDURE — 83735 ASSAY OF MAGNESIUM: CPT

## 2024-06-30 PROCEDURE — 6370000000 HC RX 637 (ALT 250 FOR IP): Performed by: HOSPITALIST

## 2024-06-30 PROCEDURE — 6360000002 HC RX W HCPCS: Performed by: STUDENT IN AN ORGANIZED HEALTH CARE EDUCATION/TRAINING PROGRAM

## 2024-06-30 PROCEDURE — 80069 RENAL FUNCTION PANEL: CPT

## 2024-06-30 PROCEDURE — 2580000003 HC RX 258: Performed by: STUDENT IN AN ORGANIZED HEALTH CARE EDUCATION/TRAINING PROGRAM

## 2024-06-30 PROCEDURE — 1200000000 HC SEMI PRIVATE

## 2024-06-30 RX ORDER — LACTOBACILLUS RHAMNOSUS GG 10B CELL
1 CAPSULE ORAL
Status: DISCONTINUED | OUTPATIENT
Start: 2024-06-30 | End: 2024-07-01 | Stop reason: HOSPADM

## 2024-06-30 RX ADMIN — SODIUM CHLORIDE, PRESERVATIVE FREE 10 ML: 5 INJECTION INTRAVENOUS at 20:05

## 2024-06-30 RX ADMIN — Medication 1 CAPSULE: at 18:47

## 2024-06-30 RX ADMIN — RIVAROXABAN 15 MG: 15 TABLET, FILM COATED ORAL at 18:47

## 2024-06-30 RX ADMIN — ATORVASTATIN CALCIUM 40 MG: 40 TABLET, FILM COATED ORAL at 20:05

## 2024-06-30 RX ADMIN — CARVEDILOL 6.25 MG: 6.25 TABLET, FILM COATED ORAL at 09:01

## 2024-06-30 RX ADMIN — LINEZOLID 600 MG: 600 INJECTION, SOLUTION INTRAVENOUS at 09:02

## 2024-06-30 RX ADMIN — CARVEDILOL 6.25 MG: 6.25 TABLET, FILM COATED ORAL at 20:04

## 2024-06-30 RX ADMIN — LINEZOLID 600 MG: 600 INJECTION, SOLUTION INTRAVENOUS at 20:09

## 2024-06-30 ASSESSMENT — PAIN SCALES - GENERAL
PAINLEVEL_OUTOF10: 0

## 2024-06-30 ASSESSMENT — PAIN SCALES - WONG BAKER
WONGBAKER_NUMERICALRESPONSE: NO HURT

## 2024-07-01 VITALS
OXYGEN SATURATION: 96 % | BODY MASS INDEX: 28.12 KG/M2 | HEIGHT: 66 IN | HEART RATE: 88 BPM | WEIGHT: 175 LBS | RESPIRATION RATE: 18 BRPM | SYSTOLIC BLOOD PRESSURE: 140 MMHG | DIASTOLIC BLOOD PRESSURE: 86 MMHG | TEMPERATURE: 99.5 F

## 2024-07-01 LAB
ALBUMIN SERPL-MCNC: 2.5 G/DL (ref 3.4–5)
ANION GAP SERPL CALCULATED.3IONS-SCNC: 15 MMOL/L (ref 3–16)
BUN SERPL-MCNC: 75 MG/DL (ref 7–20)
CALCIUM SERPL-MCNC: 8.9 MG/DL (ref 8.3–10.6)
CHLORIDE SERPL-SCNC: 102 MMOL/L (ref 99–110)
CO2 SERPL-SCNC: 15 MMOL/L (ref 21–32)
CREAT SERPL-MCNC: 3.2 MG/DL (ref 0.6–1.2)
GFR SERPLBLD CREATININE-BSD FMLA CKD-EPI: 14 ML/MIN/{1.73_M2}
GLUCOSE SERPL-MCNC: 127 MG/DL (ref 70–99)
MAGNESIUM SERPL-MCNC: 2 MG/DL (ref 1.8–2.4)
PHOSPHATE SERPL-MCNC: 4.7 MG/DL (ref 2.5–4.9)
POTASSIUM SERPL-SCNC: 4.5 MMOL/L (ref 3.5–5.1)
SODIUM SERPL-SCNC: 132 MMOL/L (ref 136–145)

## 2024-07-01 PROCEDURE — 36415 COLL VENOUS BLD VENIPUNCTURE: CPT

## 2024-07-01 PROCEDURE — 83735 ASSAY OF MAGNESIUM: CPT

## 2024-07-01 PROCEDURE — 6370000000 HC RX 637 (ALT 250 FOR IP): Performed by: INTERNAL MEDICINE

## 2024-07-01 PROCEDURE — 2580000003 HC RX 258: Performed by: STUDENT IN AN ORGANIZED HEALTH CARE EDUCATION/TRAINING PROGRAM

## 2024-07-01 PROCEDURE — 80069 RENAL FUNCTION PANEL: CPT

## 2024-07-01 PROCEDURE — 6360000002 HC RX W HCPCS: Performed by: STUDENT IN AN ORGANIZED HEALTH CARE EDUCATION/TRAINING PROGRAM

## 2024-07-01 PROCEDURE — 6370000000 HC RX 637 (ALT 250 FOR IP): Performed by: STUDENT IN AN ORGANIZED HEALTH CARE EDUCATION/TRAINING PROGRAM

## 2024-07-01 PROCEDURE — 6370000000 HC RX 637 (ALT 250 FOR IP): Performed by: HOSPITALIST

## 2024-07-01 RX ORDER — SODIUM BICARBONATE 650 MG/1
650 TABLET ORAL 3 TIMES DAILY
Qty: 90 TABLET | Refills: 3 | Status: SHIPPED | OUTPATIENT
Start: 2024-07-01

## 2024-07-01 RX ORDER — AMOXICILLIN AND CLAVULANATE POTASSIUM 250; 62.5 MG/5ML; MG/5ML
250 POWDER, FOR SUSPENSION ORAL 2 TIMES DAILY
Qty: 70 ML | Refills: 0 | Status: SHIPPED | OUTPATIENT
Start: 2024-07-01 | End: 2024-07-08

## 2024-07-01 RX ORDER — SODIUM BICARBONATE 650 MG/1
650 TABLET ORAL 3 TIMES DAILY
Status: DISCONTINUED | OUTPATIENT
Start: 2024-07-01 | End: 2024-07-01 | Stop reason: HOSPADM

## 2024-07-01 RX ORDER — LACTOBACILLUS RHAMNOSUS GG 10B CELL
1 CAPSULE ORAL
Qty: 30 CAPSULE | Refills: 0 | Status: SHIPPED | OUTPATIENT
Start: 2024-07-01

## 2024-07-01 RX ADMIN — CARVEDILOL 6.25 MG: 6.25 TABLET, FILM COATED ORAL at 08:11

## 2024-07-01 RX ADMIN — SODIUM CHLORIDE, PRESERVATIVE FREE 10 ML: 5 INJECTION INTRAVENOUS at 08:19

## 2024-07-01 RX ADMIN — LINEZOLID 600 MG: 600 INJECTION, SOLUTION INTRAVENOUS at 08:19

## 2024-07-01 RX ADMIN — Medication 1 CAPSULE: at 08:11

## 2024-07-01 RX ADMIN — SODIUM BICARBONATE 650 MG: 650 TABLET ORAL at 12:26

## 2024-07-01 NOTE — CARE COORDINATION
Case Management -  Discharge Note      Patient Name: Shirin Mathews                   YOB: 1945            Readmission Risk (Low < 19, Mod (19-27), High > 27): Readmission Risk Score: 21    Current PCP: Bryn Daniel MD    ([facility]) Important Message from Medicare:    Date: 07/01/2024    PT AM-PAC: 13 /24  OT AM-PAC: 14 /24    Patient/patient representative has been educated on the benefits of skilled nursing facility  as well as the possible risks of declining recommended services. Patient/patient representative has acknowledged the information provided and decided on the following discharge plan. Patient/ patient representative has been provided freedom of choice regarding service provider, supported by basic dialogue that supports the patient's individualized plan of care/goals.    Patient noted to have a discharge order.  Pt has been medically cleared for transition to Skilled Nursing Rehab Facility    Patient discharged to   [facility] [place]  [address] [address]  Phone: [phone]  Fax: [phone]        HENS Completed:  Yes  Pre-cert required/obtained:  Yes    Transportation scheduled for 1:00pm  Transportation provided by [employer] [phone]   AVS faxed and agency notified:  Yes  The following prescriptions sent with pt:   Family Notified:   is aware    Nurse to call report to facility        Financial    Payor: [facility] MEDICARE / Plan: [facility] AARP MEDICARE ADVANTAGE / Product Type: *No Product type* /       Electronically signed by TYRA Wolfe on 7/1/2024 at 10:49 AM

## 2024-07-01 NOTE — PROGRESS NOTES
The [place] Bird Sleepiness Scale       The [place] Bird Sleepiness Scale is widely used in the field of sleep medicine as a subjective measure of a patient's sleepiness. The test is a list of eight situations in which you rate your tendency to become sleepy on a scale of 0, no chance to 3, high chance of dozing. Your score is based on a scale of 0 to 24. The scale estimates whether you are experiencing excessive sleepiness that possibly requires medical attention.     How Sleepy Are You?  How sleepy are you to doze off or fall asleep in the following situations? You should rate your chances of dozing off, not just feeling tired. Even if you have not done some of these things recently try to determine how they would have affected you. For each situation, decide whether or not you would have:     0 = No chance of dozing 1 = Slight chance of dozing   2 = Moderate chance of  dozing 3 = High change of dozing       Situation                                                                                     Chance of Dozing    Sitting and reading  0 =  []  1 =    [] 2 =    [] 3 =    [x]    Watching TV  0 =  []  1 =    [] 2 =    [] 3 =    [x]      Sitting inactive in public place (e.g., a theater or a meeting)  0 =  [x]  1 =    [] 2 =    [] 3 =    []    As a passenger in a car for an hour without a break          0  =  []  1 =    [] 2 =    [] 3 =    [x]    Lying down to rest in the afternoon when circumstances permit     0 =  []  1 =    [] 2 =    [] 3 =    [x]    Sitting and talking to someone  0 =  [x]  1 =    [] 2 =    [] 3 =    []      Sitting quietly after a lunch without alcohol  0 =  [x]  1 =    [] 2 =    [] 3 =    []    In a car, while stopped for a few minutes in traffic                                                                      0 =  [x]  1 =    [] 2 =    [] 3 =    []    Total Score = 12    If your total score is 10 or greater, you are experiencing excessive sleepiness and should consider seeking a medical 
    MD Jaelyn Cole MD Samir Brahmbhatt, MD                  Office: [phone]                      Fax: [phone]             [employer]                   NEPHROLOGY INPATIENT PROGRESS NOTE:     PATIENT NAME: Shirin Mathews  : 1945  MRN: [id number]        RECOMMENDATIONS:     Resuming Lasix 20 mg a day as trial  Then slow up titration as tolerates    Holding Cozaar  Hydralazine for BP control with Coreg  Okay to keep slightly higher    Follow-up culture, antibiotics per primary team  monitor PVR w/ bladder scan for benson insertion need.   no need for dialysis,    at higher risk for decompensation, needing closer monitoring.        Not ready for discharge.    Acute renal failure-severe-no improvement-worsening creatinine level.  - BUN 82 and a creatinine of 3.6.  - Creatinine is 3.1 on admission.    - Most likely cardiorenal or etiology.    Get a bedside bladder scan to exclude any bladder outlet obstruction.    Hyponatremia-slow improvement-continue to monitor.    Electrolytes are stable.    Remains critically ill.    No indications for dialysis.    Time 35m          Tj Garza MD,  [facility]  Office: [phone] or [address] [employer]  Fax: [phone]        IMPRESSION:       Admitted on:  2024  5:05 PM   For:    Hospital Problems             Last Modified POA    * (Principal) Cellulitis 2024 Yes     CHANO :   H/O proteinuric CKD: stage 4   - BL S.Cr.: 2.0 ,  BL eGFR 20s, last known 2024  - on admission S.Cr.:  3.1  - Worsening to 3.2    - Etiology of CHANO -presumed prerenal, but at risk of acute tubular necrosis in the setting of cellulitis, hypovolemia, diuretic use,  - UA : results reviewed: Trace, otherwise bland,  - In past recent protein creatinine ratio random urine was 5.6  - Renal imaging none done recently    History of CKD stage IV  Follows with Dr. Min  Thought to be due to diabetes, hypertensive 
    V2.0    [facility] Progress Note      Name:  Shirin Mathews /Age/Sex: 1945  (78 y.o. female)   MRN & CSN:  [id number] & [id number] Encounter Date/Time: 2024 11:36 AM EDT   Location:  5TN-5582/5582-01 PCP: Bryn Daniel MD     Attending:Ana Luisa Jordan MD       Hospital Day: 5    Assessment and Recommendations   Shirin Mathews is a 78 y.o. female who presents with Cellulitis      Plan:   Cellulitis of bilateral extremity  With open wound.  Probably exacerbated by underlying CHF and leg swelling.  Continue IV antibiotics.  Monitor closely will reassess tomorrow and de-escalate antibiotics.    Acute on chronic kidney disease.  Presenting creatinine 3.1 baseline around 2-2.5 c  Creatine today is at 3.2   Per nephrology       Acute CHF exacerbation.  With bilateral swelling marked improvement today.    Improved   A-fib  Rate is controlled on Coreg and Xarelto.      Diet ADULT DIET; Regular; No Added Salt (3-4 gm)  ADULT ORAL NUTRITION SUPPLEMENT; Breakfast, Dinner; Low Calorie/High Protein Oral Supplement  ADULT ORAL NUTRITION SUPPLEMENT; Lunch, Dinner; Wound Healing Oral Supplement   DVT Prophylaxis    Code Status Full Code   Disposition One more day day pending improvement renal function         Personally reviewed Lab Studies and Imaging         Subjective:     Chief Complaint:     Shirin Mathews is a 78 y.o. female who presents with Resting feeling better.  Still with significant swelling.      Review of Systems:      Pertinent positives and negatives discussed in HPI    Objective:     Intake/Output Summary (Last 24 hours) at 2024 1135  Last data filed at 2024 1048  Gross per 24 hour   Intake 2068.54 ml   Output 1211 ml   Net 857.54 ml      Vitals:   Vitals:    24 2045 24 0356 24 0845 24 1130   BP: 134/64 131/74 129/70 126/70   Pulse: 72 84 85 74   Resp: 16 18 17 16   Temp: 97.6 °F (36.4 °C) 97.5 °F (36.4 °C) 98.2 °F (36.8 °C) 98 °F (36.7 °C)   TempSrc:  Axillary 
    V2.0    [facility] [place]      Name:  Shirin Mathews /Age/Sex: 1945  (78 y.o. female)   MRN & CSN:  [id number] & [id number] Encounter Date/Time: 2024 11:36 AM EDT   Location:  5TN-5582/5582-01 PCP: Bryn Daniel MD     Attending:Ana Luisa Jordan MD       Hospital Day: 4    Assessment and Recommendations   Shirin Mathews is a 78 y.o. female who presents with Cellulitis      Plan:   Cellulitis of bilateral extremity  With open wound.  Probably exacerbated by underlying CHF and leg swelling.  Continue IV antibiotics.  Monitor closely will reassess tomorrow and de-escalate antibiotics.    Acute on chronic kidney disease.  Presenting creatinine 3.1 baseline around 2-2.5 c  Creatinine slightly worse today.  At 3.6  Hold Lasix for now.  Hold cardiac medicine on hold compensated      Acute CHF exacerbation.  With bilateral swelling marked improvement today.    -= Seems to improve with initially Lasix Lasix on hold for now due to rising BUN/creatinine     A-fib  Rate is controlled on Coreg and Xarelto.    Dysphagia.  Patient noted some choking episode or coughing episode while eating garg.  Has happened at home speech evaluation      Diet ADULT DIET; Regular; No Added Salt (3-4 gm)  ADULT ORAL NUTRITION SUPPLEMENT; Breakfast, Dinner; Low Calorie/High Protein Oral Supplement  ADULT ORAL NUTRITION SUPPLEMENT; Lunch, Dinner; Wound Healing Oral Supplement   DVT Prophylaxis    Code Status Full Code   Disposition 2 to 3-day pending improvement renal function         Personally reviewed Lab Studies and Imaging         Subjective:     Chief Complaint:     Shirin Mathews is a 78 y.o. female who presents with Resting feeling better.  Still with significant swelling.      Review of Systems:      Pertinent positives and negatives discussed in HPI    Objective:     Intake/Output Summary (Last 24 hours) at 2024 0959  Last data filed at 2024 0900  Gross per 24 hour   Intake --   Output 400 ml   Net -400 
  Physician Progress Note      PATIENT:               REJI DE LA ROSA  CSN #:                  [id number]  :                       1945  ADMIT DATE:       2024 5:05 PM  DISCH DATE:  RESPONDING  PROVIDER #:        Ana Luisa Ríos MD          QUERY TEXT:    Pt admitted with bilateral LE cellulitis. Pt noted to have DM2, if   documented). If possible, please document in progress notes and discharge   summary the relationship, if any, between cellulitis and DM.    The medical record reflects the following:  Risk Factors: 78 y.o. female with hx of DM, CAD, HTN, HLD, Gout,  Clinical Indicators: Per ED: Has erythema over bilateral lower legs worse on   the right with some drainage.  Presented with bilateral LE cellulitis.  Has DM2.  Presenting serum glucose:   135  Treatment: cefepime, cefazolin, daily labs  Options provided:  -- Bilateral LE cellulitis likely associated with Diabetes  -- Bilateral LE cellulitis unrelated to Diabetes  -- Other - I will add my own diagnosis  -- Disagree - Not applicable / Not valid  -- Disagree - Clinically unable to determine / Unknown  -- Refer to Clinical Documentation Reviewer    PROVIDER RESPONSE TEXT:    Bilateral LE cellulitis likely associated with Diabetes.    Query created by: Marcellus Chavez on 2024 4:44 PM      Electronically signed by:  Ana Luisa Ríos MD 2024 5:04 PM          
  Physician Progress Note      PATIENT:               REJI DE LA ROSA  CSN #:                  [id number]  :                       1945  ADMIT DATE:       2024 5:05 PM  DISCH DATE:  RESPONDING  PROVIDER #:        Ana Luisa Ríos MD          QUERY TEXT:    Pt admitted with BLE cellulitis and has CHF exacerbation documented in H&P and   acute CHF exacerbation documented by attending on .  Last ECHO on   23 with EF 50-55%. If possible, please document in progress notes and   discharge summary further specificity regarding the type and acuity of CHF:    The medical record reflects the following:  Risk Factors: 78 y.o. female with hx of CAD, CHF, CKD, GERD, HLD, HTN, A-fib  Clinical Indicators: Per ED: 1+ pitting edema bilaterally, Per H&P: Bilateral   lower leg edema present.  Presented with Pro-BNP: 20,741.  Per  CXR: Stable cardiomegaly with no   acute congestive heart failure or evidence of pneumonia.  Treatment: Imaging, Lasix 20mg IV twice daily, Coreg  Options provided:  -- Acute on Chronic Diastolic CHF/HFpEF  -- Chronic Diastolic CHF/HFpEF  -- Other - I will add my own diagnosis  -- Disagree - Not applicable / Not valid  -- Disagree - Clinically unable to determine / Unknown  -- Refer to Clinical Documentation Reviewer    PROVIDER RESPONSE TEXT:    This patient has chronic diastolic CHF/HFpEF.    Query created by: Marcellus Chavez on 2024 5:09 PM      Electronically signed by:  Ana Luisa Ríos MD 2024 5:14 PM          
  [facility] - Inpatient Rehabilitation Department   Phone: [phone]    Occupational Therapy    [x] Initial Evaluation            [] Daily Treatment Note         [] Discharge Summary      Patient: Shirin Mathews   : 1945   MRN: [id number]   Date of Service:  2024    Admitting Diagnosis:  Cellulitis  Current Admission Summary: 78 y.o. female with a pmh of CAD, CHF, CKD, GERD, hyperlipidemia, hypertension, A-fib who presents with Cellulitis   Past Medical History:  has a past medical history of Acute idiopathic gout of left hand, Acute idiopathic gout of right hand, Arthritis, Blood circulation, collateral, Blood transfusion, CAD (coronary artery disease), CHF (congestive heart failure) (HCC), Chronic renal failure, stage 3 (moderate) (HCC), Diabetes mellitus (HCC), GERD (gastroesophageal reflux disease), Hyperlipidemia, Hypertension, Leg swelling, Lymphedema, Pressure ulcer of both heels, Pressure ulcer of left heel, stage 3 (HCC), and Ulcer of right leg, limited to breakdown of skin (HCC).  Past Surgical History:  has a past surgical history that includes Total knee arthroplasty; Cholecystectomy; Knee Arthroplasty (09/15/2010); Abdomen surgery; Colonoscopy; Endoscopy, colon, diagnostic; joint replacement; Cardiac surgery; vascular surgery; Cataract removal with implant (Left, 2016); Cataract removal with implant (Right, 2016); eye surgery; and Cataract removal with implant (Bilateral, 10/16, ).    Discharge Recommendations: Shirin Mathews scored a 14/24 on the AM-PAC ADL Inpatient form. Current research shows that an AM-PAC score of 17 or less is typically not associated with a discharge to the patient's home setting. Based on the patient's AM-PAC score and their current ADL deficits, it is recommended that the patient have 3-5 sessions per week of Occupational Therapy at d/c to increase the patient's independence.  Please see assessment section for further patient 
  [facility] - Inpatient Rehabilitation Department   Phone: [phone]    Physical Therapy    [x] Initial Evaluation            [] Daily Treatment Note         [] Discharge Summary      Patient: Shirin Mathews   : 1945   MRN: [id number]   Date of Service:  2024  Admitting Diagnosis: Cellulitis  Current Admission Summary: Shirin Mathews is a 78 y.o. female who presents to the emergency department with a chief complaint of increased swelling in her legs with drainage of clear fluid coming from her feet.  She has history of atrial fibrillation anticoagulated on Xarelto, CHF on 20 mg of Lasix twice a day.  She has history of diabetes and kidney failure.  Denies any nausea, vomiting, fevers or any history of MRSA or skin infections in the past.  States she is now having some wounds and redness on her legs associated with this.  This began shortly after she had a stress test at the end of May.  Denies chest pain or shortness of breath.   Past Medical History:  has a past medical history of Acute idiopathic gout of left hand, Acute idiopathic gout of right hand, Arthritis, Blood circulation, collateral, Blood transfusion, CAD (coronary artery disease), CHF (congestive heart failure) ([facility]), Chronic renal failure, stage 3 (moderate) ([facility]), Diabetes mellitus ([facility]), GERD (gastroesophageal reflux disease), Hyperlipidemia, Hypertension, Leg swelling, Lymphedema, Pressure ulcer of both heels, Pressure ulcer of left heel, stage 3 (HCC), and Ulcer of right leg, limited to breakdown of skin ([facility]).  Past Surgical History:  has a past surgical history that includes Total knee arthroplasty; Cholecystectomy; Knee Arthroplasty (09/15/2010); Abdomen surgery; Colonoscopy; Endoscopy, colon, diagnostic; joint replacement; Cardiac surgery; vascular surgery; Cataract removal with implant (Left, 2016); Cataract removal with implant (Right, 2016); eye surgery; and Cataract removal with implant (Bilateral, 
.CHF Care Plan      Patient's EF (Ejection Fraction) is greater than 40%    Heart Failure Medications:  Diuretics:: Furosemide    (One of the following REQUIRED for EF </= 40%/SYSTOLIC FAILURE but MAY be used in EF% >40%/DIASTOLIC FAILURE)        ACE:: None        ARB:: None         ARNI:: None    (Beta Blockers)  NON- Evidenced Based Beta Blocker (for EF% >40%/DIASTOLIC FAILURE): None    Evidenced Based Beta Blocker::(REQUIRED for EF% <40%/SYSTOLIC FAILURE) Carvedilol- Coreg  ...................................................................................................................................................    Failed to redirect to the Timeline version of the [employer].      Patient's weights and intake/output reviewed    Daily Weight log at bedside, patient/family participation in use of log: \"yes    Patient's current weight today shows a difference of 79.6kg more than last documented weight.      Intake/Output Summary (Last 24 hours) at 6/28/2024 0653  Last data filed at 6/27/2024 1330  Gross per 24 hour   Intake 360 ml   Output 250 ml   Net 110 ml       Education Booklet Provided: no    Comorbidities Reviewed Yes    Patient has a past medical history of Acute idiopathic gout of left hand, Acute idiopathic gout of right hand, Arthritis, Blood circulation, collateral, Blood transfusion, CAD (coronary artery disease), CHF (congestive heart failure) (HCC), Chronic renal failure, stage 3 (moderate) (HCC), Diabetes mellitus (HCC), GERD (gastroesophageal reflux disease), Hyperlipidemia, Hypertension, Leg swelling, Lymphedema, Pressure ulcer of both heels, Pressure ulcer of left heel, stage 3 (HCC), and Ulcer of right leg, limited to breakdown of skin (HCC).     >>For CHF and Comorbidity documentation on Education Time and Topics, please see Education Tab      Pt resting in bed at this time on room air. Pt denies shortness of breath. Pt with pitting lower extremity edema.     Patient and/or Family's 
4 Eyes Skin Assessment     NAME:  Shirin Mathews  YOB: 1945  MEDICAL RECORD NUMBER:  [id number]    The patient is being assessed for  Admission    I agree that at least one RN has performed a thorough Head to Toe Skin Assessment on the patient. ALL assessment sites listed below have been assessed.      Areas assessed by both nurses:    Head, Face, Ears, Shoulders, Back, Chest, Arms, Elbows, Hands, Sacrum. Buttock, Coccyx, Ischium, and Legs. Feet and Heels        Does the Patient have a Wound? Yes wound(s) were present on assessment. LDA wound assessment was Initiated and completed by RN     Bilateral coccyx wound that was scabbed over and skin folds MASD.  Bilatearl lower extremities wounds (Cellulitis)  Stephan Prevention initiated by RN: Yes  Wound Care Orders initiated by RN: No    Pressure Injury (Stage 3,4, Unstageable, DTI, NWPT, and Complex wounds) if present, place Wound referral order by RN under : No    New Ostomies, if present place, Ostomy referral order under : No     Nurse 1 eSignature: Electronically signed by PARISH PRITCHARD RN on 6/27/24 at 4:24 AM EDT    **SHARE this note so that the co-signing nurse can place an eSignature**    Nurse 2 eSignature: Electronically signed by Sydney Wolff RN on 6/27/24 at 5:03 AM EDT    
Assessment complete. VSS. Patient resting in bed. Respirations even and easy. Call light in reach. Fall precautions in place. No needs expressed at this time.   
Consult for CHF education received. Pt aware & declines need for education.    
Facility/Department: [address] ONCOLOGY  Speech Language Pathology  DYSPHAGIA BEDSIDE SWALLOW EVALUATION     Patient: Shirin Mathews   : 1945   MRN: [id number]      Evaluation Date: 2024   Admitting Diagnosis: Cellulitis [L03.90]  Peripheral edema [R60.0]  CHANO (acute kidney injury) (HCC) [N17.9]  Cellulitis of lower extremity, unspecified laterality [L03.119]  Pain: Did not state                                                       H&P: Shirin Mathews is a 78 y.o. female who presents to the emergency department with a chief complaint of increased swelling in her legs with drainage of clear fluid coming from her feet.  She has history of atrial fibrillation anticoagulated on Xarelto, CHF on 20 mg of Lasix twice a day.  She has history of diabetes and kidney failure.  Denies any nausea, vomiting, fevers or any history of MRSA or skin infections in the past.  States she is now having some wounds and redness on her legs associated with this.  This began shortly after she had a stress test at the end of May.  Denies chest pain or shortness of breath.       Imaging:  XR CHEST PORTABLE   Final Result   Stable cardiomegaly with no acute congestive heart failure or evidence of   pneumonia.      Right perihilar 2 cm nodular density, non emergent CT thorax without contrast   can be performed to further evaluate.            No Prior Instrumental Swallow Study completed     History/Prior Level of Function:   Living Status: lives with their spouse  Prior Dysphagia History: Pt and pt's spouse deny any difficulty with swallowing.   Reason for referral: SLP evaluation orders received due to coughing with intake     Dysphagia Impressions/Diagnosis: Oropharyngeal Dysphagia   Pt found sitting upright in chair on RA with  present. Pt verbose, mildly tangential and cooperative. PT/OT referred pt for a dysphagia evaluation due to incident of pt choking on garg. Pt denies usual difficulty swallowing but occasionally 
Nutrition Note    RECOMMENDATIONS  PO Diet: Continue the current diet   ONS: Order Ensure HP and Carlos BID    ASSESSMENT   Pt triggered nutrition assessment for multiple pressure wounds. Pt admitted with BLE cellulitis and has CHF exacerbation documented in H&P and acute CHF exacerbation. Wt fluctuations in the setting of continue diuresis. Pt reported she had intentional wt loss over past few years, UBW is 165lb. No wt recent wt loss. Pt tries to eat plant-based meals at home, and she dislikes beef recently. Educated provided on increasing meat/protein food intake for Pressure wound healing. Pt is understanding. Currently on a COBY diet, appetite is fair-good, 51-75% meal intake. Offered Ensure HP and Carlos to help with wound healing. Pt would like to try. Will cont to follow.       Malnutrition Status: No malnutrition  Acute Illness  Findings of the 6 clinical characteristics of malnutrition:  Energy Intake:  No significant decrease in energy intake  Weight Loss:  No significant weight loss     Body Fat Loss:  No significant body fat loss     Muscle Mass Loss:  No significant muscle mass loss    Fluid Accumulation:  No significant fluid accumulation     Strength:  Not Performed      NUTRITION DIAGNOSIS   Increased nutrient needs related to increase demand for energy/nutrients as evidenced by wounds    Goals: PO intake 75% or greater, by next RD assessment     NUTRITION RELATED FINDINGS  Objective: LBM 6/27, BLE non-pittin gedema. .  Wounds: Multiple, Stage II, Venous Stasis    CURRENT NUTRITION THERAPIES  ADULT DIET; Regular; No Added Salt (3-4 gm)     PO Intake: 51-75%   PO Supplement Intake:None Ordered      ANTHROPOMETRICS  Current Height: 167.6 cm (5' 5.98\")  Current Weight - Scale: 79.6 kg (175 lb 7.8 oz)    Ideal Body Weight (IBW): 130 lbs  (59 kg)    Usual Bodyweight 74.8 kg (165 lb)       BMI: 28.3      COMPARATIVE STANDARDS  Total Energy Requirements (kcals/day): 1956-1485     Protein (g):  71-83 
Patient A&Ox4, occasionally forgetful. VSS. Pt reports bilateral leg pain, dressings clean, dry, and intact. Pain managed per MAR. Pt tolerating diet well, denies nausea. Pt incontinent, External cath in place with adequate output, PVR 11mL. Pt and spouse instructed to call out for needs. Fall precautions in place. Will continue to monitor for changes.   
Pt Aox4 VSS pt stated no pain or SOB. Dressing remain dry intact. Call light in reach. Bed alarm on.   
Pt admitted to room 5582 from ED accompanied by her . Oriented to call room, light staff rounding. Alert ad oriented x3. Denied pain/discomfort. BLE multiple open skins are with moderate drainage. Zyvox and cefepime given as ordered. Wound nurse consulted. No needs at this time   
RN discharge summary from  [place] to a facility.     This patient has had a discharge order placed. They are being discharged to and being picked up by [facility]. Discharge paperwork has been printed and faxed by MARIA INES GALVEZ completed by this RN. no further needs at this time. IV has been removed with no complications. Telemetry has been removed. Pt has all belongings present.    
Speech Language Pathology   Follow up/ Discharge from speech therapy    Shirin Mathews  1945    Followed up with pt and pt's spouse regarding swallowing. Both deny any further instances of choking or difficulty with eating/ drinking. Pt recently finished lunch meal. Will sign off at this time, please re-consult speech therapy if issues arise.     Thanks,  Amanda Solitario M.A. CCC-SLP #[id number] 6/28/2024 2:53 PM  Speech-Language Pathologist    
  Weight:       Height:             Physical Exam:      General: NAD  Eyes: EOMI  ENT: neck supple  Cardiovascular: Regular rate.  Respiratory: Clear to auscultation  Gastrointestinal: Soft, non tender  Genitourinary: no suprapubic tenderness  Musculoskeletal: No edema  Skin: warm, dry  Neuro: Alert.  Psych: Mood appropriate.         Medications:   Medications:    carvedilol  6.25 mg Oral BID    furosemide  20 mg IntraVENous BID    atorvastatin  40 mg Oral Nightly    rivaroxaban  15 mg Oral Daily    sodium chloride flush  5-40 mL IntraVENous 2 times per day    cefepime  1,000 mg IntraVENous Q12H    linezolid  600 mg IntraVENous Q12H      Infusions:    sodium chloride       PRN Meds: sodium chloride flush, 5-40 mL, PRN  sodium chloride, , PRN  ondansetron, 4 mg, Q8H PRN   Or  ondansetron, 4 mg, Q6H PRN  polyethylene glycol, 17 g, Daily PRN  acetaminophen, 650 mg, Q6H PRN   Or  acetaminophen, 650 mg, Q6H PRN        Labs and Imaging   XR CHEST PORTABLE    Result Date: 6/26/2024  EXAMINATION: ONE XRAY VIEW OF THE CHEST 6/26/2024 8:23 pm COMPARISON: Chest radiograph 2015. HISTORY: ORDERING SYSTEM PROVIDED HISTORY: SOB TECHNOLOGIST PROVIDED HISTORY: Reason for exam:->SOB Reason for Exam: SOB FINDINGS: Single view provided.  Prior sternotomy.  Stable mediastinal silhouette with enlarged cardiac silhouette.  Coronary and aortic vascular calcifications. Normal lung volumes.  No interstitial edema or acute consolidation.  Right perihilar 2 cm nodular density.  No pleural effusion or pneumothorax.  No free subdiaphragmatic air.     Stable cardiomegaly with no acute congestive heart failure or evidence of pneumonia. Right perihilar 2 cm nodular density, non emergent CT thorax without contrast can be performed to further evaluate.       CBC:   Recent Labs     06/26/24 1748 06/27/24 0422 06/28/24  0524   WBC 7.0 6.5 5.8   HGB 10.1* 9.0* 9.1*    185 195     BMP:    Recent Labs     06/26/24 1748 06/27/24 0422 
10/16, 11/16).  Discharge Recommendations: Shirin Mathews scored a 13/24 on the AM-PAC short mobility form. Current research shows that an AM-PAC score of 17 or less is typically not associated with a discharge to the patient's home setting. Based on the patient's AM-PAC score and their current functional mobility deficits, it is recommended that the patient have 3-5 sessions per week of Physical Therapy at d/c to increase the patient's independence.  Please see assessment section for further patient specific details.    If patient discharges prior to next session this note will serve as a discharge summary.  Please see below for the latest assessment towards goals.    DME Required For Discharge: DME to be determined at next level of care  Precautions/Restrictions: , up with assistance  Weight Bearing Restrictions: no restrictions  [] Right Upper Extremity  [] Left Upper Extremity [] Right Lower Extremity  [] Left Lower Extremity     Required Braces/Orthotics: no braces required   [] Right  [] Left  Positional Restrictions:no positional restrictions    Pre-Admission Information   Lives With: spouse    [facility] of Home: [facility]  Home Layout: two level, laundry in basement  Home Access:  3 step to enter with handrail.  Handrails are located on bilaterally side. - assist from spouse  Bathroom Layout: walk in shower  Bathroom Equipment: grab bars in shower, shower chair, hand held shower head  Toilet Height: standard height  Home Equipment: standard walker, rollator - 4 wheeled walker, single point cane, leg , adjustable bed  Transfer Assistance: requires assistance  Ambulation Assistance:requires assistance  ADL Assistance: requires assistance with bathing  IADL Assistance: requires assistance with all homemaking tasks  Active :        [] Yes  [x] No  Hand Dominance: [x] Left  [] Right  Current Employment: N/A  Hobbies:   Recent Falls: Pt with 2 falls on past 6 mo.  reports pt declining and needing much 
  CREATININE 3.1* 3.2*   GLUCOSE 135* 116*     Hepatic:   Recent Labs     06/26/24  1748   AST 16   ALT 9*   BILITOT 0.5   ALKPHOS 116     Lipids:   Lab Results   Component Value Date/Time    CHOL 65 06/27/2024 04:22 AM    HDL 30 06/27/2024 04:22 AM    TRIG 55 06/27/2024 04:22 AM     Hemoglobin A1C:   Lab Results   Component Value Date/Time    LABA1C 6.9 01/10/2021 11:45 AM     TSH: No results found for: \"TSH\"  Troponin: No results found for: \"TROPONINT\"  Lactic Acid: No results for input(s): \"LACTA\" in the last 72 hours.  BNP:   Recent Labs     06/26/24  1748   PROBNP 20,741*     UA:  Lab Results   Component Value Date/Time    NITRU Negative 06/27/2024 04:00 AM    COLORU Yellow 06/27/2024 04:00 AM    PHUR 5.0 06/27/2024 04:00 AM    PHUR 6.0 09/17/2010 06:01 PM    LABCAST 0-1 Coarsely granular 09/17/2010 06:01 PM    WBCUA 1 06/27/2024 04:00 AM    RBCUA 0 06/27/2024 04:00 AM    RBCUA 1+ (0.06-0.1 mg/dL) 02/21/2024 09:52 AM    MUCUS PRESENT 11/30/2022 02:11 PM    BACTERIA None Seen 06/27/2024 04:00 AM    CLARITYU Clear 06/27/2024 04:00 AM    LEUKOCYTESUR Negative 06/27/2024 04:00 AM    UROBILINOGEN 0.2 06/27/2024 04:00 AM    BILIRUBINUR Negative 06/27/2024 04:00 AM    BLOODU Negative 06/27/2024 04:00 AM    GLUCOSEU Negative 06/27/2024 04:00 AM    GLUCOSEU NEGATIVE 09/17/2010 06:01 PM    KETUA Negative 06/27/2024 04:00 AM    AMORPHOUS FEW 11/30/2022 02:11 PM     Urine Cultures:   Lab Results   Component Value Date/Time    LABURIN >100,000 CFU/ml 12/09/2020 02:36 PM     Blood Cultures: No results found for: \"BC\"  No results found for: \"BLOODCULT2\"  Organism:   Lab Results   Component Value Date/Time    ORG  09/28/2022 09:15 AM       ORGANISM:                       ESCHERICHIA COLI           Electronically signed by Ana Luisa Jordan MD on 6/27/2024 at 11:36 AM    
depression.       Please refer to the orders.   Medical decision making- High Complexity. Multiple complex problems.  Discussed with patient and  her ,  treatment team,  attending Ana Luisa Jordan MD     Time spent includes face-to-face meeting/discussion with patient, patient's family-as available, and treatment team (including primary/referring team and other consultants and nursing team- as needed; and included coordination of care with the treatment team; and review of patient's electronic medical records as needed for my evaluation and ordering appropriates tests.           =======================================================================================   Subjective / interval history / nephrology update / medical decision making:   Refer to assessment and plan for more details.   Remains on room air without hypoxia  BP low normal  Patient was seen comfortably sitting up in bed,   Reported no active complaints/distress,   Renal labs noted .    =======================================================================================    Chart reviewed, patient's pertinent electronic medical records , Medical history and medication reviewed as needed for my evaulation.     MEDICATIONS: reviewed by me.   Medications Prior to Admission:  No current facility-administered medications on file prior to encounter.     Current Outpatient Medications on File Prior to Encounter   Medication Sig Dispense Refill    carvedilol (COREG) 6.25 MG tablet Take 1 tablet by mouth 2 times daily 180 tablet 3    furosemide (LASIX) 20 MG tablet TAKE ONE TABLET BY MOUTH TWICE DAILY 60 tablet 3    losartan (COZAAR) 50 MG tablet TAKE ONE-HALF TABLET ONCE DAILY 90 tablet 0    atorvastatin (LIPITOR) 40 MG tablet TAKE ONE TABLET BY MOUTH DAILY 90 tablet 0    rivaroxaban (XARELTO) 15 MG TABS tablet TAKE ONE TABLET BY MOUTH DAILY 90 tablet 3    hydrALAZINE (APRESOLINE) 25 MG tablet Take 1 tablet by mouth daily as needed (For BP >140/90) 
EXAM:  Recent vital signs and recent I/Os reviewed by me, as needed for my evaulation.      Wt Readings from Last 3 Encounters:   07/01/24 79.4 kg (175 lb)   06/07/24 73.5 kg (162 lb)   06/07/24 73.5 kg (162 lb)     BP Readings from Last 3 Encounters:   07/01/24 (!) 140/86   06/07/24 (!) 151/79   05/22/24 (!) 142/86     Patient Vitals for the past 24 hrs:   BP Temp Temp src Pulse Resp SpO2 Weight   07/01/24 0815 (!) 140/86 99.5 °F (37.5 °C) Axillary 88 18 96 % --   07/01/24 0430 124/63 97.9 °F (36.6 °C) Oral 80 16 95 % 79.4 kg (175 lb)   06/30/24 2330 126/75 97.7 °F (36.5 °C) Oral 81 16 97 % --   06/30/24 2000 130/66 97.7 °F (36.5 °C) Oral 90 16 96 % --   06/30/24 1636 126/70 98 °F (36.7 °C) Oral 81 -- 96 % --   06/30/24 1130 126/70 98 °F (36.7 °C) Oral 74 16 97 % --         Intake/Output Summary (Last 24 hours) at 7/1/2024 1114  Last data filed at 6/30/2024 2010  Gross per 24 hour   Intake 480 ml   Output 800 ml   Net -320 ml         Physical exam:  done as needed for my evaulation.    General:   No acute distress   CVS:  Heart sounds S1 S2 +     RS: Normal respiratory effort, Breat sound: diminished at bases.     Abd: bowel sounds +,  distension .   Extremities/MSK:  Edema             =======================================================================================     DATA:  Diagnostic tests reviewed by me for today's visit:  (AS NEEDED FOR MY EVALUATION AND MANAGEMENT).         Recent Labs     06/29/24  0613   WBC 6.3   HCT 27.2*          Iron Saturation:  No components found for: \"PERCENTFE\"  FERRITIN:    Lab Results   Component Value Date/Time    FERRITIN 91 04/22/2024 12:21 PM     IRON:    Lab Results   Component Value Date/Time    IRON 23 06/26/2024 05:42 PM     TIBC:    Lab Results   Component Value Date/Time    TIBC 249 06/26/2024 05:42 PM       Recent Labs     06/29/24  0613 06/30/24  0554 07/01/24  0553    134* 132*   K 4.7 4.5 4.5    103 102   CO2 16* 16* 15*   BUN 82* 78* 75* 
for grooming. Fxl transfers/mobility limited this session d/t pt fatigue and newly onset pain. Continued OT indicated in order to promote return to PLOF    Safety Interventions: patient left in chair, chair alarm in place, call light within reach, gait belt, nurse notified, and family/caregiver present    Plan  Frequency: 3-5 x/per week  Current Treatment Recommendations: strengthening, balance training, functional mobility training, transfer training, endurance training, patient/caregiver education, ADL/self-care training, IADL training, safety education, and equipment evaluation/education    Goals    Short Term Goals:  Time Frame: by discharge  Patient will complete upper body ADL at stand by assistance   Patient will complete lower body ADL at moderate assistance   Patient will complete toileting at moderate assistance   Patient will complete grooming at set up assistance Met seated 6/28, continue for standing  Patient will complete functional transfers at contact guard assistance   Patient will complete functional mobility at contact guard assistance     Above goals reviewed on 6/28/2024.  All goals are ongoing at this time unless indicated above.       Therapy Session Time     Individual Group Co-treatment   Time In 1239     Time Out 1317     Minutes 38          Timed Code Treatment Minutes:   38  Total Treatment Minutes:  38       Electronically Signed By: ASHLEY Stewart/[id number]      MARCELLUS Tapia, OTR/L, CNS ([id number])        
HISTORY: ORDERING SYSTEM PROVIDED HISTORY: SOB TECHNOLOGIST PROVIDED HISTORY: Reason for exam:->SOB Reason for Exam: SOB FINDINGS: Single view provided.  Prior sternotomy.  Stable mediastinal silhouette with enlarged cardiac silhouette.  Coronary and aortic vascular calcifications. Normal lung volumes.  No interstitial edema or acute consolidation.  Right perihilar 2 cm nodular density.  No pleural effusion or pneumothorax.  No free subdiaphragmatic air.     Stable cardiomegaly with no acute congestive heart failure or evidence of pneumonia. Right perihilar 2 cm nodular density, non emergent CT thorax without contrast can be performed to further evaluate.     Nuclear stress test with myocardial perfusion    Result Date: 6/7/2024    Stress Combined Conclusion: The study is most consistent with myocardial infarction and is negative for myocardial ischemia. Findings suggest a Low-moderate risk study.   Perfusion Defect: There is a mild severity left ventricular stress perfusion defect that is small in size present in the anteroseptal and apex segment(s) that is fixed. There is abnormal wall motion in the defect area. This defect was visualized during the rest and stress phase of imaging.   Stress Function: Left ventricular function post-stress is abnormal. Global function is mildly reduced. Post-stress ejection fraction is 46%. The stress end diastolic cavity size is mildly enlarged. Stress end diastolic volume: 134 mL. The stress end systolic cavity size is mildly enlarged. Stress end systolic volume: 72 mL. LV mass: 155.0 g.   Perfusion Comments: LV perfusion is abnormal. There is no evidence of inducible ischemia.   Perfusion Conclusion: TID ratio is 1.05.   Results relayed to Dr. Baker. Continue medical management.           Imaging: [unfilled]         ======================================================================  Please note that this chart entry has been generated using voice recognition software,

## 2024-07-01 NOTE — CARE COORDINATION
07/01/24 1054   IMM Letter   IMM Letter given to Patient/Family/Significant other/Guardian/POA/by: IMM given to the patient's spouse Escobar. Escobar is agreeable to discharge.   IMM Letter date given: 07/01/24   IMM Letter time given: 1041

## 2024-07-02 ENCOUNTER — TELEPHONE (OUTPATIENT)
Dept: OTHER | Age: 79
End: 2024-07-02

## 2024-07-02 ENCOUNTER — TELEPHONE (OUTPATIENT)
Dept: CASE MANAGEMENT | Age: 79
End: 2024-07-02

## 2024-07-02 NOTE — TELEPHONE ENCOUNTER
Attempted to reach nursing at the facility. No answer. Patient was discharged with HF pathway on fady and follow up with Dr. Baker 7/10

## 2024-07-02 NOTE — TELEPHONE ENCOUNTER
Incidental pulmonary finding on 6/26/24 CT, sent results and recommendations to PCP via EPIC fax.    
Include Location In Plan?: No
Detail Level: Simple

## 2024-07-10 NOTE — PATIENT INSTRUCTIONS
Recommend getting capsul endoscopy with the GI office   [facility] at [facility]   [address], [address]   [place] [address] [phone]     You need bloodwork for your appointment with nephrology   (CBC and Renal panel)

## 2024-07-10 NOTE — PROGRESS NOTES
2024    PATIENT: Shirin Mathews  : 1945    Primary Care Provider:   Bryn Daniel MD  o:[phone]  f:[phone]    Reason for evaluation:   Chief Complaint   Patient presents with    Follow-Up from Hospital     2024 - 2024 (5 days)  [facility]  cellulitis       History of present illness:   Ms. Shirin Mathews is a 78 y.o. female patient here for hospital follow up after recent admissions for cellulitis and CHANO ([facility] -24) and blood loss anemia ([facility] 7/).   ARB was held. Lasix was transitioned back to home dose of 20 mg bid and she had follow up bloodwork and appointment with Nephrology.  Shirin was at [facility] for rehab when she appeared fatigued during physical therapy, and admitted for Hgb 5.1. EGD gastritis, normal duodenum, Colonoscopy was with diverticula throughout and no active source of bleeding. Plan for outpatient capsule endoscopy. Xarelto resumed 2024  Her  Escobar is here today and Shirin is on a stretcher. She reports feeling alright all things considered. She is eating meals with Escobar. No melena. No specific symptom concerns. Legs without redness, warmth, edema, open wounds. She expects two more weeks of rehab before potentially returning home.     Medical History:      Diagnosis Date    Acute idiopathic gout of left hand 2021    Acute idiopathic gout of right hand 2021    Arthritis     Blood circulation, collateral     Blood transfusion     CAD (coronary artery disease)     CHF (congestive heart failure) (HCC)     Chronic renal failure, stage 3 (moderate) (HCC)     Diabetes mellitus (HCC)     GERD (gastroesophageal reflux disease)     Hyperlipidemia     Hypertension     Leg swelling 2018    Lymphedema 2018    Pressure ulcer of both heels 2021    Deep tissue injuries to right and left heels with left>right.  Date of origin

## 2024-07-11 NOTE — PATIENT INSTRUCTIONS
[facility]  [address]   [place], [place] [address]  Telephone: [phone]     FAX [phone]    Discharge Instructions    Important reminders:    **If you have any signs and symptoms of illness (Cough, fever, congestion, nausea, vomiting, diarrhea, etc.) please call the wound care center prior to your appointment.    1. Increase Protein intake for optimal wound healing  2. No added salt to reduce any swelling  3. If diabetic, maintain good glucose control  4. If you smoke, smoking prohibits wound healing, we ask that you refrain from smoking.  5. When taking antibiotics take the entire prescription as ordered. Do not stop taking until medication is all gone unless otherwise instructed.   6. Exercise as tolerated.   7. Keep weight off wounds and reposition every 2 hours if applicable.  8. If wound(s) is on your lower extremity, elevate legs to the level of the heart or above for 30 minutes 4-5 times a day and/or when sitting. Avoid standing for long periods of time.   9. Do not get wounds wet in bath or shower unless otherwise instructed by your physician. If your wound is on your foot or leg, you may purchase a cast bag. Please ask at the pharmacy.      If Vascular testing is ordered, please call [phone] to schedule.    Vascular tests ordered by Wound Care Physicians may take up to 2 hours to complete. Please keep that in mind when scheduling.     If Vascular testing is scheduled, please bring supplies to replace your dressing after testing is done. The vascular department does not stock supplies.     Wound: Left lower leg     With each dressing change, rinse wounds with 0.9% Saline. (May use wound wash or soft contact solution. Both can be purchased at a local drug store). If unable to obtain saline, may use a gentle soap and water.    Dressing care:     Home Care Agency/Facility:     Your wound-care supplies will be provided by:   Please note, depending on your insurance

## 2024-07-15 ENCOUNTER — HOSPITAL ENCOUNTER (OUTPATIENT)
Dept: WOUND CARE | Age: 79
Discharge: HOME | End: 2024-07-15

## 2024-07-24 ENCOUNTER — OFFICE VISIT (OUTPATIENT)
Dept: CARDIOLOGY CLINIC | Age: 79
End: 2024-07-24

## 2024-07-24 ENCOUNTER — TELEPHONE (OUTPATIENT)
Dept: CARDIOLOGY CLINIC | Age: 79
End: 2024-07-24

## 2024-07-24 VITALS
TEMPERATURE: 97.7 F | SYSTOLIC BLOOD PRESSURE: 112 MMHG | DIASTOLIC BLOOD PRESSURE: 62 MMHG | OXYGEN SATURATION: 98 % | HEART RATE: 64 BPM

## 2024-07-24 DIAGNOSIS — I50.43 ACUTE ON CHRONIC COMBINED SYSTOLIC AND DIASTOLIC HEART FAILURE (HCC): Primary | ICD-10-CM

## 2024-07-24 DIAGNOSIS — Z09 HOSPITAL DISCHARGE FOLLOW-UP: ICD-10-CM

## 2024-07-24 DIAGNOSIS — I10 ESSENTIAL HYPERTENSION: ICD-10-CM

## 2024-07-24 DIAGNOSIS — E78.5 HYPERLIPIDEMIA, UNSPECIFIED HYPERLIPIDEMIA TYPE: ICD-10-CM

## 2024-07-24 DIAGNOSIS — I25.10 ATHEROSCLEROSIS OF NATIVE CORONARY ARTERY OF NATIVE HEART WITHOUT ANGINA PECTORIS: ICD-10-CM

## 2024-07-24 DIAGNOSIS — I25.5 ISCHEMIC CARDIOMYOPATHY: ICD-10-CM

## 2024-07-24 DIAGNOSIS — I48.0 PAROXYSMAL ATRIAL FIBRILLATION (HCC): ICD-10-CM

## 2024-07-24 RX ORDER — ZINC OXIDE 20 %
OINTMENT (GRAM) TOPICAL PRN
COMMUNITY

## 2024-07-24 RX ORDER — LOPERAMIDE HYDROCHLORIDE 2 MG/1
2 CAPSULE ORAL 4 TIMES DAILY PRN
COMMUNITY

## 2024-07-24 RX ORDER — BISACODYL 5 MG/1
5 TABLET, DELAYED RELEASE ORAL DAILY PRN
COMMUNITY

## 2024-07-24 RX ORDER — ASCORBIC ACID 500 MG
500 TABLET ORAL DAILY
COMMUNITY

## 2024-07-24 RX ORDER — ONDANSETRON 4 MG/1
4 TABLET, FILM COATED ORAL EVERY 8 HOURS PRN
COMMUNITY

## 2024-07-24 NOTE — TELEPHONE ENCOUNTER
Received labs from [facility]- last drawn 7/18/24  Hemoglobin 8.2  Creat 2.0  Will place in scan bin    Sent back request for repeat Renal panel and CBC for upcoming nephrology appointment via fax

## 2024-09-30 NOTE — CARE COORDINATION
Discharge Planning    The SW received a call from Candida from [facility] who informed the SW that that they can ACCEP the patient. Pre-cert will need to be started once the patient is medically ready.       Electronically signed by TYRA Wolfe on 6/27/2024 at 2:44 PM    Last PPM check in 6-2024 at [facility]  [employer] remote BSC pacemaker interrogation from [facility]; Remote monitoring period (3/22/24-6/11/24): Persistent AF and remains on A/C therapy, warfarin; 1 VHR episode detected on 5/6/24 lasting 16secs in duration; Presenting rhythm as of 6/11/24=  w/ capture - device programmed VVIR 60 bpm; Battery voltage adequate with 1.5 yrs remaining until PAUL; Available lead measurements stable and adequate; autocap ON; V-paced 100%; Appropriately functioning device